# Patient Record
Sex: MALE | Race: WHITE | Employment: FULL TIME | URBAN - METROPOLITAN AREA
[De-identification: names, ages, dates, MRNs, and addresses within clinical notes are randomized per-mention and may not be internally consistent; named-entity substitution may affect disease eponyms.]

---

## 2021-10-11 ENCOUNTER — OFFICE VISIT (OUTPATIENT)
Dept: SURGERY | Age: 61
End: 2021-10-11
Payer: COMMERCIAL

## 2021-10-11 VITALS
RESPIRATION RATE: 18 BRPM | WEIGHT: 242 LBS | BODY MASS INDEX: 33.88 KG/M2 | OXYGEN SATURATION: 97 % | TEMPERATURE: 97.9 F | DIASTOLIC BLOOD PRESSURE: 82 MMHG | HEIGHT: 71 IN | SYSTOLIC BLOOD PRESSURE: 128 MMHG | HEART RATE: 94 BPM

## 2021-10-11 DIAGNOSIS — K64.2 GRADE III HEMORRHOIDS: Primary | ICD-10-CM

## 2021-10-11 PROCEDURE — 99203 OFFICE O/P NEW LOW 30 MIN: CPT | Performed by: COLON & RECTAL SURGERY

## 2021-10-11 PROCEDURE — 46600 DIAGNOSTIC ANOSCOPY SPX: CPT | Performed by: COLON & RECTAL SURGERY

## 2021-10-11 RX ORDER — METFORMIN HYDROCHLORIDE 500 MG/1
TABLET ORAL
COMMUNITY
Start: 2021-09-08

## 2021-10-11 RX ORDER — MELATONIN
1000 DAILY
COMMUNITY

## 2021-10-11 RX ORDER — DICLOFENAC SODIUM 75 MG/1
75 TABLET, DELAYED RELEASE ORAL 2 TIMES DAILY
COMMUNITY
Start: 2021-09-22

## 2021-10-11 RX ORDER — LISINOPRIL 40 MG/1
TABLET ORAL
COMMUNITY
Start: 2021-09-17

## 2021-10-11 RX ORDER — SIMVASTATIN 40 MG/1
TABLET, FILM COATED ORAL
COMMUNITY
Start: 2021-09-17

## 2021-10-11 RX ORDER — CYANOCOBALAMIN (VITAMIN B-12) 2500 MCG
5000 TABLET, SUBLINGUAL SUBLINGUAL
COMMUNITY

## 2021-10-11 RX ORDER — ACETAMINOPHEN 500 MG
TABLET ORAL
COMMUNITY

## 2021-10-11 RX ORDER — GABAPENTIN 300 MG/1
CAPSULE ORAL 3 TIMES DAILY
COMMUNITY
Start: 2021-04-14

## 2021-10-11 NOTE — PROGRESS NOTES
HPI: Neetu Morel is a 59 y.o. male presenting with chief complain of hemorrhoids. He has pain and burning. He denies blood per rectum. He denies a sensation of tissue protrusion. This is been going on for the last 6 weeks. He moves his bowels daily. He takes a stool softener twice per day. He denies fecal incontinence. He had a colonoscopy 7 to 8 years ago where polyps were identified. He he tells me he was asked to return but he did not. He has a colonoscopy scheduled with Dr. Rosalino Govea in December. Past Medical History:   Diagnosis Date    Back pain     Colon polyps     Diabetes ([address]     Hyperlipidemia     Hypertension     Pneumonia        Past Surgical History:   Procedure Laterality Date    HX COLONOSCOPY  2014    polyps    HX HEENT  1964    HX ORTHOPAEDIC  01/09/2017    right knee    HX ORTHOPAEDIC  1985    torn cartilage knee       History reviewed. No pertinent family history. Social History     Socioeconomic History    Marital status:      Spouse name: Not on file    Number of children: Not on file    Years of education: Not on file    Highest education level: Not on file   Tobacco Use    Smoking status: Current Every Day Smoker    Smokeless tobacco: Never Used   Substance and Sexual Activity    Alcohol use: Yes     Comment: rare    Drug use: Never     Social Determinants of Health     Financial Resource Strain:     Difficulty of Paying Living Expenses:    Food Insecurity:     Worried About Running Out of Food in the Last Year:     [address] in the Last Year:    Transportation Needs:     Lack of Transportation (Medical):      Lack of Transportation (Non-Medical):    Physical Activity:     Days of Exercise per Week:     Minutes of Exercise per Session:    Stress:     Feeling of Stress :    Social Connections:     Frequency of Communication with Friends and Family:     Frequency of Social Gatherings with Friends and Family:     Attends [employer] Services:     Active Member of Clubs or Organizations:     Attends Club or Organization Meetings:     Marital Status:        Review of Systems - Review of Systems   Constitutional: Negative. HENT: Negative. Eyes: Negative. Respiratory: Negative. Cardiovascular: Negative. Gastrointestinal: Negative. Genitourinary: Negative. Musculoskeletal: Positive for back pain. Negative for falls, joint pain, myalgias and neck pain. Skin: Positive for itching. Negative for rash. rectal   Neurological: Negative. Endo/Heme/Allergies: Negative for environmental allergies and polydipsia. Bruises/bleeds easily. Psychiatric/Behavioral: Negative. Outpatient Medications Marked as Taking for the 10/11/21 encounter (Office Visit) with Johnice Meigs, MD   Medication Sig Dispense Refill    metFORMIN (GLUCOPHAGE) 500 mg tablet TAKE 1 TABLET BY MOUTH TWICE DAILY WITH A MEAL      cholecalciferol (VITAMIN D3) (1000 Units /25 mcg) tablet Take 1,000 Units by mouth daily.  gabapentin (NEURONTIN) 300 mg capsule TAKE 1 CAPSULE BY MOUTH EVERY DAY AT BEDTIME      diclofenac EC (VOLTAREN) 75 mg EC tablet Take 75 mg by mouth two (2) times a day.  cyanocobalamin-cobamamide (B-12) 5,000-100 mcg sublingual tablet 5,000 mcg by SubLINGual route.  lisinopriL (PRINIVIL, ZESTRIL) 40 mg tablet TAKE 1 TABLET BY MOUTH ONCE DAILY FOR BLOOD PRESSURE FOR 90 DAYS      simvastatin (ZOCOR) 40 mg tablet TAKE 1 TABLET BY MOUTH ONCE DAILY IN THE EVENING FOR CHOLESTEROL FOR 90 DAYS      acetaminophen (Tylenol Extra Strength) 500 mg tablet Take  by mouth every six (6) hours as needed for Pain. No Known Allergies    Vitals:    10/11/21 1441   Resp: 18   Weight: 109.8 kg (242 lb)   Height: 5' 11\" (1.803 m)   PainSc:   0 - No pain       Physical Exam  Constitutional:       Appearance: He is well-developed. HENT:      Head: Normocephalic and atraumatic.    Eyes:      Conjunctiva/sclera: Conjunctivae normal.   Abdominal:      General: There is no distension. Palpations: Abdomen is soft. There is no mass. Tenderness: There is no abdominal tenderness. Musculoskeletal:         General: Normal range of motion. Lymphadenopathy:      Cervical: No cervical adenopathy. Skin:     General: Skin is warm and dry. Neurological:      Sensory: No sensory deficit. Psychiatric:         Speech: Speech normal.     Rectum: Minor perianal erythema, prolapsed internal hemorrhoid right anterior quadrant, enlarged external hemorrhoid right posterior quadrant  Digital rectal exam: Moderate tone, no mass  Anoscopy: Enlarged internal hemorrhoids in 3 quadrants    Assessment / Plan    Grade 3 hemorrhoids and perianal dermatitis  Fiber supplement daily, stops stool softener  Reviewed perianal hygiene, calmoseptine  Patient told to call regarding hemorrhoidectomy if symptoms persist  We need to work on timing with regards to spine surgery and colonoscopy in December    The diagnoses and plan were discussed with the patient. All questions answered. Plan of care agreed to by all concerned.

## 2021-10-11 NOTE — PATIENT INSTRUCTIONS
"Radha Morin  Your attached labs are overall normal, but your vitamin D is still low and your platelets are still low (but improving)  Please make sure you are taking Vitamin  D 5000 units daily and we will repeat Complete Blood Count in a few months.  Please contact the office with any questions or concerns.    Ambar Viera \"Timo\" ADDIE Mohamud    " Metamucil fiber powder once a day    Don't use wipes to clean with, only toilet paper    Do not over clean area, 1-2 swipes then done    After washing use barrier cream such as Desitin, A & D ointment or Calmoseptine    Call office if symptoms persist to schedule hemorrhoidectomy

## 2021-10-11 NOTE — LETTER
10/11/2021    Patient: Jaswant Johnston   YOB: 1960   Date of Visit: 10/11/2021     MD Jase [facility] [address] [place] [id number]  Via Fax: [phone]    Dear Adina Ann saw Ratna Jo in the office today for his hemorrhoids. On exam he has grade 3 hemorrhoids. I recommended a high-fiber diet and a fiber supplement. If his symptoms persist he is asked to call the office to discuss hemorrhoidectomy. I tell him there is a substantial recovery time from hemorrhoid surgery, so if he has other procedures such as colonoscopy and spine surgery he may wish to manage his hemorrhoids conservatively for the time being. If you have questions, please do not hesitate to call me. I look forward to following your patient along with you.       Sincerely,    Cammie Hernandez MD

## 2022-11-11 ENCOUNTER — HOSPITAL ENCOUNTER (OUTPATIENT)
Dept: LAB | Age: 62
Discharge: HOME | End: 2022-11-11

## 2022-11-11 LAB — SENTARA SPECIMEN COL,SENBCF: NORMAL

## 2022-11-11 PROCEDURE — 99001 SPECIMEN HANDLING PT-LAB: CPT

## 2022-11-16 ENCOUNTER — HOSPITAL ENCOUNTER (OUTPATIENT)
Dept: LAB | Age: 62
Discharge: HOME | End: 2022-11-16
Payer: COMMERCIAL

## 2022-11-16 LAB
ATRIAL RATE: 88 BPM
CALCULATED P AXIS, ECG09: 9 DEGREES
CALCULATED R AXIS, ECG10: 41 DEGREES
CALCULATED T AXIS, ECG11: 27 DEGREES
DIAGNOSIS, 93000: NORMAL
P-R INTERVAL, ECG05: 132 MS
Q-T INTERVAL, ECG07: 370 MS
QRS DURATION, ECG06: 122 MS
QTC CALCULATION (BEZET), ECG08: 447 MS
VENTRICULAR RATE, ECG03: 88 BPM

## 2022-11-16 PROCEDURE — 93005 ELECTROCARDIOGRAM TRACING: CPT

## 2022-11-18 ENCOUNTER — HOSPITAL ENCOUNTER (OUTPATIENT)
Dept: PREADMISSION TESTING | Age: 62
Discharge: HOME | End: 2022-11-18

## 2022-11-18 VITALS — HEIGHT: 71 IN | BODY MASS INDEX: 33.6 KG/M2 | WEIGHT: 240 LBS

## 2022-11-18 RX ORDER — ASPIRIN 81 MG/1
81 TABLET ORAL DAILY
Status: ON HOLD | COMMUNITY

## 2022-11-18 RX ORDER — CEFAZOLIN SODIUM/WATER 2 G/20 ML
2 SYRINGE (ML) INTRAVENOUS ONCE
Status: CANCELLED | OUTPATIENT
Start: 2022-12-07 | End: 2022-12-07

## 2022-11-18 RX ORDER — SODIUM CHLORIDE, SODIUM LACTATE, POTASSIUM CHLORIDE, CALCIUM CHLORIDE 600; 310; 30; 20 MG/100ML; MG/100ML; MG/100ML; MG/100ML
125 INJECTION, SOLUTION INTRAVENOUS CONTINUOUS
Status: CANCELLED | OUTPATIENT
Start: 2022-11-18

## 2022-12-06 NOTE — H&P
Pre-Admission History and Physical    Patient: Aggie Scott   MRN: [id number]   SSN: [id number]   YOB: 1960   Age: 57 y.o. Sex: male     Patient scheduled for: lumbar two. three, lumbar three/four, lumbar four/five, lumbar five/sacral one laminectomy fusion, transforaminal lumbar interbody fusion. Date of surgery: 2022. Surgeon: Thuy Bangura MD    HPI:  Aggie Scott is a 57 y.o. male with severe back, buttock and leg pain. He reports a pain level of 6/10. MRI demonstrates stenosis severe to moderately severe at L3/4, L4/5, L5/S1. This patient has failed the presurgical conservative treatments  including physical therapy, spinal block injections and medications. Pain has impacted the patient's functional ability. He is being admitted for surgical intervention. Past Medical History:   Diagnosis Date    Arthritis     Back pain     from previous back injury    Colon polyps     Diabetes ([facility]     9421    Hernia, umbilical     Hyperlipidemia     Hypertension     Pneumonia      Social History     Socioeconomic History    Marital status:    Tobacco Use    Smoking status: Former     Years: 5.00     Types: Cigarettes     Quit date: 2021     Years since quittin.9    Smokeless tobacco: Never    Tobacco comments:     3-4 cig per day, told not to smoke or drink 24/hrs prior to surgery   Vaping Use    Vaping Use: Never used   Substance and Sexual Activity    Alcohol use: Yes     Alcohol/week: 2.0 standard drinks     Types: 2 Cans of beer per week     Comment: 2 per month    Drug use: Never    Sexual activity: Never     Past Surgical History:   Procedure Laterality Date    HX COLONOSCOPY      polyps    HX HEENT      left lazy eye procedure during childhood    HX HEMORRHOIDECTOMY      HX HERNIA REPAIR          HX ORTHOPAEDIC  2017    right knee    HX ORTHOPAEDIC  1985    torn cartilage knee    HX TONSILLECTOMY  1964     No family history on file.   No Known Allergies  Current Outpatient Medications   Medication Sig Dispense Refill    aspirin delayed-release 81 mg tablet Take 81 mg by mouth daily. OTHER,NON-FORMULARY, Indications: maxforce prostate 1 tab twice daily      multivitamin (ONE A DAY) tablet Take 1 Tablet by mouth daily. metFORMIN (GLUCOPHAGE) 500 mg tablet TAKE 1 TABLET BY MOUTH TWICE DAILY WITH A MEAL      cholecalciferol (VITAMIN D3) (1000 Units /25 mcg) tablet Take 2,000 Units by mouth daily. gabapentin (NEURONTIN) 300 mg capsule 600 mg three (3) times daily. diclofenac EC (VOLTAREN) 75 mg EC tablet Take 75 mg by mouth two (2) times a day. lisinopriL (PRINIVIL, ZESTRIL) 40 mg tablet TAKE 1 TABLET BY MOUTH ONCE DAILY FOR BLOOD PRESSURE FOR 90 DAYS      simvastatin (ZOCOR) 40 mg tablet TAKE 1 TABLET BY MOUTH ONCE DAILY IN THE EVENING FOR CHOLESTEROL FOR 90 DAYS      acetaminophen (TYLENOL) 500 mg tablet Take  by mouth every six (6) hours as needed for Pain. ROS:  Denies chills, fever,night sweats,  bowel or bladder dysfunction, unexplained weight loss/weight gain, chest pain, sob or anxiety. Physical Examination    Gen: Well developed, well nourished 57 y.o. male in moderate distress. Diffuse sense of weakness in his lower extremities. Has good strength of his ehl, ta, hams and quads. Decreased range of motion of the lumbar spine. 2+ pulses. Soft abdomen. Assessment and Plan    Due to the pt's persistent symptoms unrelieved by conservative measure Dario Newton is being admitted to undergo surgical intervention. The post-operative plan of care consists of physical therapy, home health and a 2 week f/u office visit. The risks, benefits, complications and alternatives to surgery have been discussed in detail with the patient. The patient understands and agrees to proceed.

## 2022-12-07 ENCOUNTER — ANESTHESIA (OUTPATIENT)
Dept: SURGERY | Age: 62
DRG: 455 | End: 2022-12-07
Payer: COMMERCIAL

## 2022-12-07 ENCOUNTER — ANESTHESIA EVENT (OUTPATIENT)
Dept: SURGERY | Age: 62
DRG: 455 | End: 2022-12-07
Payer: COMMERCIAL

## 2022-12-07 ENCOUNTER — HOSPITAL ENCOUNTER (INPATIENT)
Age: 62
LOS: 3 days | Discharge: HOME | DRG: 455 | End: 2022-12-12
Attending: ORTHOPAEDIC SURGERY | Admitting: ORTHOPAEDIC SURGERY
Payer: COMMERCIAL

## 2022-12-07 ENCOUNTER — APPOINTMENT (OUTPATIENT)
Dept: GENERAL RADIOLOGY | Age: 62
DRG: 455 | End: 2022-12-07
Attending: ORTHOPAEDIC SURGERY
Payer: COMMERCIAL

## 2022-12-07 DIAGNOSIS — Z98.1 S/P LUMBAR FUSION: Primary | ICD-10-CM

## 2022-12-07 PROBLEM — M48.061 SPINAL STENOSIS OF LUMBAR REGION AT MULTIPLE LEVELS: Status: ACTIVE | Noted: 2022-12-07

## 2022-12-07 LAB
ABO + RH BLD: NORMAL
ANION GAP SERPL CALC-SCNC: 3 MMOL/L (ref 3–18)
BLOOD GROUP ANTIBODIES SERPL: NORMAL
BUN SERPL-MCNC: 24 MG/DL (ref 7–18)
BUN/CREAT SERPL: 17 (ref 12–20)
CALCIUM SERPL-MCNC: 8.3 MG/DL (ref 8.5–10.1)
CHLORIDE SERPL-SCNC: 107 MMOL/L (ref 100–111)
CO2 SERPL-SCNC: 28 MMOL/L (ref 21–32)
CREAT SERPL-MCNC: 1.39 MG/DL (ref 0.6–1.3)
ERYTHROCYTE [DISTWIDTH] IN BLOOD BY AUTOMATED COUNT: 12.1 % (ref 11.6–14.5)
GLUCOSE BLD STRIP.AUTO-MCNC: 149 MG/DL (ref 70–110)
GLUCOSE BLD STRIP.AUTO-MCNC: 159 MG/DL (ref 70–110)
GLUCOSE BLD STRIP.AUTO-MCNC: 179 MG/DL (ref 70–110)
GLUCOSE BLD STRIP.AUTO-MCNC: 229 MG/DL (ref 70–110)
GLUCOSE SERPL-MCNC: 144 MG/DL (ref 74–99)
HCT VFR BLD AUTO: 29 % (ref 36–48)
HCT VFR BLD AUTO: 30 % (ref 36–48)
HGB BLD-MCNC: 9.7 G/DL (ref 13–16)
HGB BLD-MCNC: 9.8 G/DL (ref 13–16)
MCH RBC QN AUTO: 32.6 PG (ref 24–34)
MCHC RBC AUTO-ENTMCNC: 32.3 G/DL (ref 31–37)
MCV RBC AUTO: 100.7 FL (ref 78–100)
NRBC # BLD: 0 K/UL (ref 0–0.01)
NRBC BLD-RTO: 0 PER 100 WBC
PLATELET # BLD AUTO: 210 K/UL (ref 135–420)
PMV BLD AUTO: 9.8 FL (ref 9.2–11.8)
POTASSIUM SERPL-SCNC: 4.6 MMOL/L (ref 3.5–5.5)
RBC # BLD AUTO: 2.98 M/UL (ref 4.35–5.65)
SODIUM SERPL-SCNC: 138 MMOL/L (ref 136–145)
SPECIMEN EXP DATE BLD: NORMAL
WBC # BLD AUTO: 11.5 K/UL (ref 4.6–13.2)

## 2022-12-07 PROCEDURE — 77030005513 HC CATH URETH FOL11 MDII -B: Performed by: ORTHOPAEDIC SURGERY

## 2022-12-07 PROCEDURE — C1821 INTERSPINOUS IMPLANT: HCPCS | Performed by: ORTHOPAEDIC SURGERY

## 2022-12-07 PROCEDURE — 76060000039 HC ANESTHESIA 4 TO 4.5 HR: Performed by: ORTHOPAEDIC SURGERY

## 2022-12-07 PROCEDURE — 74011000250 HC RX REV CODE- 250: Performed by: ORTHOPAEDIC SURGERY

## 2022-12-07 PROCEDURE — 80048 BASIC METABOLIC PNL TOTAL CA: CPT

## 2022-12-07 PROCEDURE — 0ST20ZZ RESECTION OF LUMBAR VERTEBRAL DISC, OPEN APPROACH: ICD-10-PCS | Performed by: ORTHOPAEDIC SURGERY

## 2022-12-07 PROCEDURE — 0SG30AJ FUSION OF LUMBOSACRAL JOINT WITH INTERBODY FUSION DEVICE, POSTERIOR APPROACH, ANTERIOR COLUMN, OPEN APPROACH: ICD-10-PCS | Performed by: ORTHOPAEDIC SURGERY

## 2022-12-07 PROCEDURE — 77030002933 HC SUT MCRYL J&J -A: Performed by: ORTHOPAEDIC SURGERY

## 2022-12-07 PROCEDURE — 74011000250 HC RX REV CODE- 250: Performed by: ANESTHESIOLOGY

## 2022-12-07 PROCEDURE — 77030004402 HC BUR NEUR STRY -C: Performed by: ORTHOPAEDIC SURGERY

## 2022-12-07 PROCEDURE — 74011250636 HC RX REV CODE- 250/636: Performed by: ANESTHESIOLOGY

## 2022-12-07 PROCEDURE — 76010000135 HC OR TIME 4 TO 4.5 HR: Performed by: ORTHOPAEDIC SURGERY

## 2022-12-07 PROCEDURE — 77030040361 HC SLV COMPR DVT MDII -B: Performed by: ORTHOPAEDIC SURGERY

## 2022-12-07 PROCEDURE — 74011250636 HC RX REV CODE- 250/636: Performed by: ORTHOPAEDIC SURGERY

## 2022-12-07 PROCEDURE — C1713 ANCHOR/SCREW BN/BN,TIS/BN: HCPCS | Performed by: ORTHOPAEDIC SURGERY

## 2022-12-07 PROCEDURE — 36415 COLL VENOUS BLD VENIPUNCTURE: CPT

## 2022-12-07 PROCEDURE — 74011636637 HC RX REV CODE- 636/637: Performed by: ANESTHESIOLOGY

## 2022-12-07 PROCEDURE — 77030027138 HC INCENT SPIROMETER -A: Performed by: ORTHOPAEDIC SURGERY

## 2022-12-07 PROCEDURE — 82962 GLUCOSE BLOOD TEST: CPT

## 2022-12-07 PROCEDURE — 77030031140 HC SUT VCRL3 J&J -A: Performed by: ORTHOPAEDIC SURGERY

## 2022-12-07 PROCEDURE — 76210000017 HC OR PH I REC 1.5 TO 2 HR: Performed by: ORTHOPAEDIC SURGERY

## 2022-12-07 PROCEDURE — 2709999900 HC NON-CHARGEABLE SUPPLY: Performed by: ORTHOPAEDIC SURGERY

## 2022-12-07 PROCEDURE — 86900 BLOOD TYPING SEROLOGIC ABO: CPT

## 2022-12-07 PROCEDURE — 85018 HEMOGLOBIN: CPT

## 2022-12-07 PROCEDURE — 85027 COMPLETE CBC AUTOMATED: CPT

## 2022-12-07 PROCEDURE — 01NR0ZZ RELEASE SACRAL NERVE, OPEN APPROACH: ICD-10-PCS | Performed by: ORTHOPAEDIC SURGERY

## 2022-12-07 PROCEDURE — 77030034479 HC ADH SKN CLSR PRINEO J&J -B: Performed by: ORTHOPAEDIC SURGERY

## 2022-12-07 PROCEDURE — 74011250637 HC RX REV CODE- 250/637: Performed by: ORTHOPAEDIC SURGERY

## 2022-12-07 PROCEDURE — 0SG10K1 FUSION OF 2 OR MORE LUMBAR VERTEBRAL JOINTS WITH NONAUTOLOGOUS TISSUE SUBSTITUTE, POSTERIOR APPROACH, POSTERIOR COLUMN, OPEN APPROACH: ICD-10-PCS | Performed by: ORTHOPAEDIC SURGERY

## 2022-12-07 PROCEDURE — 77030035236 HC SUT PDS STRATFX BARB J&J -B: Performed by: ORTHOPAEDIC SURGERY

## 2022-12-07 PROCEDURE — 0SG30K1 FUSION OF LUMBOSACRAL JOINT WITH NONAUTOLOGOUS TISSUE SUBSTITUTE, POSTERIOR APPROACH, POSTERIOR COLUMN, OPEN APPROACH: ICD-10-PCS | Performed by: ORTHOPAEDIC SURGERY

## 2022-12-07 PROCEDURE — 74011636637 HC RX REV CODE- 636/637: Performed by: ORTHOPAEDIC SURGERY

## 2022-12-07 PROCEDURE — 0SG10AJ FUSION OF 2 OR MORE LUMBAR VERTEBRAL JOINTS WITH INTERBODY FUSION DEVICE, POSTERIOR APPROACH, ANTERIOR COLUMN, OPEN APPROACH: ICD-10-PCS | Performed by: ORTHOPAEDIC SURGERY

## 2022-12-07 PROCEDURE — 01NB0ZZ RELEASE LUMBAR NERVE, OPEN APPROACH: ICD-10-PCS | Performed by: ORTHOPAEDIC SURGERY

## 2022-12-07 PROCEDURE — 77030034475 HC MISC IMPL SPN: Performed by: ORTHOPAEDIC SURGERY

## 2022-12-07 PROCEDURE — 0ST40ZZ RESECTION OF LUMBOSACRAL DISC, OPEN APPROACH: ICD-10-PCS | Performed by: ORTHOPAEDIC SURGERY

## 2022-12-07 PROCEDURE — 77030014007 HC SPNG HEMSTAT J&J -B: Performed by: ORTHOPAEDIC SURGERY

## 2022-12-07 PROCEDURE — 77030020782 HC GWN BAIR PAWS FLX 3M -B: Performed by: ORTHOPAEDIC SURGERY

## 2022-12-07 DEVICE — SPACER SPNL 7 DEG SM 28X12 MM STRL PROLIFT: Type: IMPLANTABLE DEVICE | Site: SPINE LUMBAR | Status: FUNCTIONAL

## 2022-12-07 DEVICE — I-FACTOR™ PUTTY, 5.0 CC SYRINGE
Type: IMPLANTABLE DEVICE | Site: SPINE LUMBAR | Status: FUNCTIONAL
Brand: I-FACTOR™ PEPTIDE ENHANCED BONE GRAFT

## 2022-12-07 DEVICE — IMPLANTABLE DEVICE: Type: IMPLANTABLE DEVICE | Site: SPINE LUMBAR | Status: FUNCTIONAL

## 2022-12-07 DEVICE — SCREW SET EVEREST: Type: IMPLANTABLE DEVICE | Site: SPINE LUMBAR | Status: FUNCTIONAL

## 2022-12-07 DEVICE — I-FACTOR PUTTY, 2.5CC SYRINGE
Type: IMPLANTABLE DEVICE | Site: SPINE LUMBAR | Status: FUNCTIONAL
Brand: I-FACTOR PEPTIDE ENHANCED BONE GRAFT

## 2022-12-07 DEVICE — SCREW SPNL L40MM DIA7.5MM PEDCL POLYAX 2 LD THRD TOP LD FOR: Type: IMPLANTABLE DEVICE | Site: SPINE LUMBAR | Status: FUNCTIONAL

## 2022-12-07 DEVICE — ALLOGRAFT BNE DBM 10 CC VESUVIUS 4104K0810DC: Type: IMPLANTABLE DEVICE | Site: SPINE LUMBAR | Status: FUNCTIONAL

## 2022-12-07 RX ORDER — PHENYLEPHRINE HCL IN 0.9% NACL 1 MG/10 ML
SYRINGE (ML) INTRAVENOUS AS NEEDED
Status: DISCONTINUED | OUTPATIENT
Start: 2022-12-07 | End: 2022-12-07 | Stop reason: HOSPADM

## 2022-12-07 RX ORDER — TAMSULOSIN HYDROCHLORIDE 0.4 MG/1
0.4 CAPSULE ORAL DAILY
Status: DISCONTINUED | OUTPATIENT
Start: 2022-12-07 | End: 2022-12-13 | Stop reason: HOSPADM

## 2022-12-07 RX ORDER — SODIUM CHLORIDE 0.9 % (FLUSH) 0.9 %
5-40 SYRINGE (ML) INJECTION AS NEEDED
Status: DISCONTINUED | OUTPATIENT
Start: 2022-12-07 | End: 2022-12-13 | Stop reason: HOSPADM

## 2022-12-07 RX ORDER — ACETAMINOPHEN 500 MG
1000 TABLET ORAL EVERY 6 HOURS
Status: DISCONTINUED | OUTPATIENT
Start: 2022-12-07 | End: 2022-12-13 | Stop reason: HOSPADM

## 2022-12-07 RX ORDER — ONDANSETRON 2 MG/ML
INJECTION INTRAMUSCULAR; INTRAVENOUS AS NEEDED
Status: DISCONTINUED | OUTPATIENT
Start: 2022-12-07 | End: 2022-12-07 | Stop reason: HOSPADM

## 2022-12-07 RX ORDER — HYDROMORPHONE HYDROCHLORIDE 1 MG/ML
0.5 INJECTION, SOLUTION INTRAMUSCULAR; INTRAVENOUS; SUBCUTANEOUS
Status: DISCONTINUED | OUTPATIENT
Start: 2022-12-07 | End: 2022-12-07 | Stop reason: HOSPADM

## 2022-12-07 RX ORDER — VANCOMYCIN HYDROCHLORIDE 1 G/20ML
INJECTION, POWDER, LYOPHILIZED, FOR SOLUTION INTRAVENOUS AS NEEDED
Status: DISCONTINUED | OUTPATIENT
Start: 2022-12-07 | End: 2022-12-07 | Stop reason: HOSPADM

## 2022-12-07 RX ORDER — SODIUM CHLORIDE 0.9 % (FLUSH) 0.9 %
5-40 SYRINGE (ML) INJECTION EVERY 8 HOURS
Status: DISCONTINUED | OUTPATIENT
Start: 2022-12-07 | End: 2022-12-13 | Stop reason: HOSPADM

## 2022-12-07 RX ORDER — INSULIN LISPRO 100 [IU]/ML
INJECTION, SOLUTION INTRAVENOUS; SUBCUTANEOUS
Status: DISCONTINUED | OUTPATIENT
Start: 2022-12-07 | End: 2022-12-13 | Stop reason: HOSPADM

## 2022-12-07 RX ORDER — FENTANYL CITRATE 50 UG/ML
25 INJECTION, SOLUTION INTRAMUSCULAR; INTRAVENOUS AS NEEDED
Status: DISCONTINUED | OUTPATIENT
Start: 2022-12-07 | End: 2022-12-07 | Stop reason: HOSPADM

## 2022-12-07 RX ORDER — TRANEXAMIC ACID 10 MG/ML
1 INJECTION, SOLUTION INTRAVENOUS ONCE
Status: COMPLETED | OUTPATIENT
Start: 2022-12-07 | End: 2022-12-07

## 2022-12-07 RX ORDER — CEFAZOLIN SODIUM/WATER 2 G/20 ML
2 SYRINGE (ML) INTRAVENOUS ONCE
Status: COMPLETED | OUTPATIENT
Start: 2022-12-07 | End: 2022-12-07

## 2022-12-07 RX ORDER — HYDROMORPHONE HYDROCHLORIDE 1 MG/ML
1 INJECTION, SOLUTION INTRAMUSCULAR; INTRAVENOUS; SUBCUTANEOUS
Status: DISCONTINUED | OUTPATIENT
Start: 2022-12-07 | End: 2022-12-08

## 2022-12-07 RX ORDER — NALOXONE HYDROCHLORIDE 0.4 MG/ML
0.1 INJECTION, SOLUTION INTRAMUSCULAR; INTRAVENOUS; SUBCUTANEOUS AS NEEDED
Status: DISCONTINUED | OUTPATIENT
Start: 2022-12-07 | End: 2022-12-07 | Stop reason: HOSPADM

## 2022-12-07 RX ORDER — SODIUM CHLORIDE, SODIUM LACTATE, POTASSIUM CHLORIDE, CALCIUM CHLORIDE 600; 310; 30; 20 MG/100ML; MG/100ML; MG/100ML; MG/100ML
150 INJECTION, SOLUTION INTRAVENOUS CONTINUOUS
Status: DISCONTINUED | OUTPATIENT
Start: 2022-12-07 | End: 2022-12-07 | Stop reason: HOSPADM

## 2022-12-07 RX ORDER — SODIUM CHLORIDE, SODIUM LACTATE, POTASSIUM CHLORIDE, CALCIUM CHLORIDE 600; 310; 30; 20 MG/100ML; MG/100ML; MG/100ML; MG/100ML
125 INJECTION, SOLUTION INTRAVENOUS CONTINUOUS
Status: DISCONTINUED | OUTPATIENT
Start: 2022-12-07 | End: 2022-12-07

## 2022-12-07 RX ORDER — NALOXONE HYDROCHLORIDE 0.4 MG/ML
0.4 INJECTION, SOLUTION INTRAMUSCULAR; INTRAVENOUS; SUBCUTANEOUS AS NEEDED
Status: DISCONTINUED | OUTPATIENT
Start: 2022-12-07 | End: 2022-12-13 | Stop reason: HOSPADM

## 2022-12-07 RX ORDER — DIAZEPAM 5 MG/1
5 TABLET ORAL
Status: DISCONTINUED | OUTPATIENT
Start: 2022-12-07 | End: 2022-12-13 | Stop reason: HOSPADM

## 2022-12-07 RX ORDER — DIPHENHYDRAMINE HYDROCHLORIDE 50 MG/ML
12.5 INJECTION, SOLUTION INTRAMUSCULAR; INTRAVENOUS
Status: DISCONTINUED | OUTPATIENT
Start: 2022-12-07 | End: 2022-12-13 | Stop reason: HOSPADM

## 2022-12-07 RX ORDER — PROCHLORPERAZINE EDISYLATE 5 MG/ML
5 INJECTION INTRAMUSCULAR; INTRAVENOUS ONCE
Status: DISCONTINUED | OUTPATIENT
Start: 2022-12-07 | End: 2022-12-07 | Stop reason: HOSPADM

## 2022-12-07 RX ORDER — OXYCODONE HYDROCHLORIDE 5 MG/1
5-10 TABLET ORAL
Status: DISCONTINUED | OUTPATIENT
Start: 2022-12-07 | End: 2022-12-13 | Stop reason: HOSPADM

## 2022-12-07 RX ORDER — ALBUTEROL SULFATE 0.83 MG/ML
2.5 SOLUTION RESPIRATORY (INHALATION)
Status: DISCONTINUED | OUTPATIENT
Start: 2022-12-07 | End: 2022-12-07 | Stop reason: HOSPADM

## 2022-12-07 RX ORDER — ESMOLOL HYDROCHLORIDE 10 MG/ML
INJECTION INTRAVENOUS AS NEEDED
Status: DISCONTINUED | OUTPATIENT
Start: 2022-12-07 | End: 2022-12-07 | Stop reason: HOSPADM

## 2022-12-07 RX ORDER — MAGNESIUM SULFATE 100 %
4 CRYSTALS MISCELLANEOUS AS NEEDED
Status: DISCONTINUED | OUTPATIENT
Start: 2022-12-07 | End: 2022-12-13 | Stop reason: HOSPADM

## 2022-12-07 RX ORDER — FAMOTIDINE 20 MG/1
40 TABLET, FILM COATED ORAL EVERY 12 HOURS
Status: DISCONTINUED | OUTPATIENT
Start: 2022-12-07 | End: 2022-12-13 | Stop reason: HOSPADM

## 2022-12-07 RX ORDER — LORAZEPAM 2 MG/ML
1 INJECTION INTRAMUSCULAR
Status: DISCONTINUED | OUTPATIENT
Start: 2022-12-07 | End: 2022-12-08

## 2022-12-07 RX ORDER — DOCUSATE SODIUM 100 MG/1
100 CAPSULE, LIQUID FILLED ORAL 2 TIMES DAILY
Status: DISCONTINUED | OUTPATIENT
Start: 2022-12-07 | End: 2022-12-13 | Stop reason: HOSPADM

## 2022-12-07 RX ORDER — INSULIN LISPRO 100 [IU]/ML
INJECTION, SOLUTION INTRAVENOUS; SUBCUTANEOUS ONCE
Status: COMPLETED | OUTPATIENT
Start: 2022-12-07 | End: 2022-12-07

## 2022-12-07 RX ORDER — BUPIVACAINE HYDROCHLORIDE 2.5 MG/ML
INJECTION, SOLUTION EPIDURAL; INFILTRATION; INTRACAUDAL AS NEEDED
Status: DISCONTINUED | OUTPATIENT
Start: 2022-12-07 | End: 2022-12-07 | Stop reason: HOSPADM

## 2022-12-07 RX ORDER — LIDOCAINE HYDROCHLORIDE 20 MG/ML
INJECTION, SOLUTION EPIDURAL; INFILTRATION; INTRACAUDAL; PERINEURAL AS NEEDED
Status: DISCONTINUED | OUTPATIENT
Start: 2022-12-07 | End: 2022-12-07 | Stop reason: HOSPADM

## 2022-12-07 RX ORDER — EPHEDRINE SULFATE/0.9% NACL/PF 50 MG/5 ML
SYRINGE (ML) INTRAVENOUS AS NEEDED
Status: DISCONTINUED | OUTPATIENT
Start: 2022-12-07 | End: 2022-12-07 | Stop reason: HOSPADM

## 2022-12-07 RX ORDER — LISINOPRIL 20 MG/1
40 TABLET ORAL DAILY
Status: DISCONTINUED | OUTPATIENT
Start: 2022-12-08 | End: 2022-12-13 | Stop reason: HOSPADM

## 2022-12-07 RX ORDER — ROCURONIUM BROMIDE 10 MG/ML
INJECTION, SOLUTION INTRAVENOUS AS NEEDED
Status: DISCONTINUED | OUTPATIENT
Start: 2022-12-07 | End: 2022-12-07 | Stop reason: HOSPADM

## 2022-12-07 RX ORDER — HYDROMORPHONE HYDROCHLORIDE 2 MG/ML
INJECTION, SOLUTION INTRAMUSCULAR; INTRAVENOUS; SUBCUTANEOUS AS NEEDED
Status: DISCONTINUED | OUTPATIENT
Start: 2022-12-07 | End: 2022-12-07 | Stop reason: HOSPADM

## 2022-12-07 RX ORDER — METOCLOPRAMIDE HYDROCHLORIDE 5 MG/ML
INJECTION INTRAMUSCULAR; INTRAVENOUS AS NEEDED
Status: DISCONTINUED | OUTPATIENT
Start: 2022-12-07 | End: 2022-12-07 | Stop reason: HOSPADM

## 2022-12-07 RX ORDER — SODIUM CHLORIDE 0.9 % (FLUSH) 0.9 %
5-40 SYRINGE (ML) INJECTION EVERY 8 HOURS
Status: DISCONTINUED | OUTPATIENT
Start: 2022-12-07 | End: 2022-12-07 | Stop reason: HOSPADM

## 2022-12-07 RX ORDER — ONDANSETRON 2 MG/ML
4 INJECTION INTRAMUSCULAR; INTRAVENOUS
Status: DISCONTINUED | OUTPATIENT
Start: 2022-12-07 | End: 2022-12-13 | Stop reason: HOSPADM

## 2022-12-07 RX ORDER — CEFAZOLIN SODIUM/WATER 2 G/20 ML
2 SYRINGE (ML) INTRAVENOUS EVERY 8 HOURS
Status: DISCONTINUED | OUTPATIENT
Start: 2022-12-07 | End: 2022-12-08

## 2022-12-07 RX ORDER — TRANEXAMIC ACID 10 MG/ML
1 INJECTION, SOLUTION INTRAVENOUS SEE ADMIN INSTRUCTIONS
Status: CANCELLED | OUTPATIENT
Start: 2022-12-07

## 2022-12-07 RX ORDER — GABAPENTIN 300 MG/1
600 CAPSULE ORAL 3 TIMES DAILY
Status: DISCONTINUED | OUTPATIENT
Start: 2022-12-07 | End: 2022-12-13 | Stop reason: HOSPADM

## 2022-12-07 RX ORDER — KETAMINE HYDROCHLORIDE 10 MG/ML
INJECTION, SOLUTION INTRAMUSCULAR; INTRAVENOUS AS NEEDED
Status: DISCONTINUED | OUTPATIENT
Start: 2022-12-07 | End: 2022-12-07 | Stop reason: HOSPADM

## 2022-12-07 RX ORDER — SODIUM CHLORIDE 0.9 % (FLUSH) 0.9 %
5-40 SYRINGE (ML) INJECTION AS NEEDED
Status: DISCONTINUED | OUTPATIENT
Start: 2022-12-07 | End: 2022-12-07 | Stop reason: HOSPADM

## 2022-12-07 RX ORDER — DIPHENHYDRAMINE HYDROCHLORIDE 50 MG/ML
12.5 INJECTION, SOLUTION INTRAMUSCULAR; INTRAVENOUS
Status: DISCONTINUED | OUTPATIENT
Start: 2022-12-07 | End: 2022-12-07 | Stop reason: HOSPADM

## 2022-12-07 RX ORDER — PROPOFOL 10 MG/ML
INJECTION, EMULSION INTRAVENOUS AS NEEDED
Status: DISCONTINUED | OUTPATIENT
Start: 2022-12-07 | End: 2022-12-07 | Stop reason: HOSPADM

## 2022-12-07 RX ADMIN — Medication 200 MCG: at 11:49

## 2022-12-07 RX ADMIN — ONDANSETRON HYDROCHLORIDE 4 MG: 2 INJECTION INTRAMUSCULAR; INTRAVENOUS at 10:27

## 2022-12-07 RX ADMIN — SODIUM CHLORIDE, SODIUM LACTATE, POTASSIUM CHLORIDE, AND CALCIUM CHLORIDE 150 ML/HR: 600; 310; 30; 20 INJECTION, SOLUTION INTRAVENOUS at 16:23

## 2022-12-07 RX ADMIN — KETAMINE HYDROCHLORIDE 25 MG: 10 INJECTION, SOLUTION INTRAMUSCULAR; INTRAVENOUS at 12:34

## 2022-12-07 RX ADMIN — HYDROMORPHONE HYDROCHLORIDE 0.5 MG: 2 INJECTION, SOLUTION INTRAMUSCULAR; INTRAVENOUS; SUBCUTANEOUS at 12:09

## 2022-12-07 RX ADMIN — HYDROMORPHONE HYDROCHLORIDE 0.5 MG: 2 INJECTION, SOLUTION INTRAMUSCULAR; INTRAVENOUS; SUBCUTANEOUS at 11:45

## 2022-12-07 RX ADMIN — TRANEXAMIC ACID 1 G: 10 INJECTION, SOLUTION INTRAVENOUS at 09:14

## 2022-12-07 RX ADMIN — DOCUSATE SODIUM 100 MG: 100 CAPSULE ORAL at 21:09

## 2022-12-07 RX ADMIN — Medication 100 MCG: at 08:51

## 2022-12-07 RX ADMIN — Medication 200 MCG: at 10:27

## 2022-12-07 RX ADMIN — Medication 200 MCG: at 12:25

## 2022-12-07 RX ADMIN — ESMOLOL HYDROCHLORIDE 40 MG: 10 INJECTION, SOLUTION INTRAVENOUS at 12:20

## 2022-12-07 RX ADMIN — HYDROMORPHONE HYDROCHLORIDE 0.5 MG: 1 INJECTION, SOLUTION INTRAMUSCULAR; INTRAVENOUS; SUBCUTANEOUS at 16:33

## 2022-12-07 RX ADMIN — TRANEXAMIC ACID 1 G: 10 INJECTION, SOLUTION INTRAVENOUS at 12:37

## 2022-12-07 RX ADMIN — SUGAMMADEX 200 MG: 100 INJECTION, SOLUTION INTRAVENOUS at 12:57

## 2022-12-07 RX ADMIN — PROPOFOL 80 MG: 10 INJECTION, EMULSION INTRAVENOUS at 08:46

## 2022-12-07 RX ADMIN — CEFAZOLIN 2 G: 10 INJECTION, POWDER, FOR SOLUTION INTRAVENOUS at 18:13

## 2022-12-07 RX ADMIN — Medication 10 MG: at 09:42

## 2022-12-07 RX ADMIN — METOCLOPRAMIDE 10 MG: 5 INJECTION, SOLUTION INTRAMUSCULAR; INTRAVENOUS at 10:27

## 2022-12-07 RX ADMIN — GABAPENTIN 600 MG: 300 CAPSULE ORAL at 18:13

## 2022-12-07 RX ADMIN — ROCURONIUM BROMIDE 10 MG: 10 INJECTION, SOLUTION INTRAVENOUS at 09:42

## 2022-12-07 RX ADMIN — HYDROMORPHONE HYDROCHLORIDE 1 MG: 2 INJECTION, SOLUTION INTRAMUSCULAR; INTRAVENOUS; SUBCUTANEOUS at 09:21

## 2022-12-07 RX ADMIN — SODIUM CHLORIDE, PRESERVATIVE FREE 10 ML: 5 INJECTION INTRAVENOUS at 21:09

## 2022-12-07 RX ADMIN — DIAZEPAM 5 MG: 5 TABLET ORAL at 19:27

## 2022-12-07 RX ADMIN — CEFAZOLIN 2 G: 10 INJECTION, POWDER, FOR SOLUTION INTRAVENOUS at 21:08

## 2022-12-07 RX ADMIN — FAMOTIDINE 40 MG: 20 TABLET, FILM COATED ORAL at 21:08

## 2022-12-07 RX ADMIN — SODIUM CHLORIDE, SODIUM LACTATE, POTASSIUM CHLORIDE, AND CALCIUM CHLORIDE: 600; 310; 30; 20 INJECTION, SOLUTION INTRAVENOUS at 09:33

## 2022-12-07 RX ADMIN — HYDROMORPHONE HYDROCHLORIDE 0.5 MG: 1 INJECTION, SOLUTION INTRAMUSCULAR; INTRAVENOUS; SUBCUTANEOUS at 14:14

## 2022-12-07 RX ADMIN — Medication 200 MCG: at 08:57

## 2022-12-07 RX ADMIN — KETAMINE HYDROCHLORIDE 25 MG: 10 INJECTION, SOLUTION INTRAMUSCULAR; INTRAVENOUS at 09:21

## 2022-12-07 RX ADMIN — INSULIN LISPRO 3 UNITS: 100 INJECTION, SOLUTION INTRAVENOUS; SUBCUTANEOUS at 14:48

## 2022-12-07 RX ADMIN — Medication 4 UNITS: at 23:11

## 2022-12-07 RX ADMIN — SODIUM CHLORIDE, SODIUM LACTATE, POTASSIUM CHLORIDE, AND CALCIUM CHLORIDE: 600; 310; 30; 20 INJECTION, SOLUTION INTRAVENOUS at 08:39

## 2022-12-07 RX ADMIN — ROCURONIUM BROMIDE 50 MG: 10 INJECTION, SOLUTION INTRAVENOUS at 08:46

## 2022-12-07 RX ADMIN — ACETAMINOPHEN 1000 MG: 500 TABLET ORAL at 18:13

## 2022-12-07 RX ADMIN — GABAPENTIN 600 MG: 300 CAPSULE ORAL at 21:09

## 2022-12-07 RX ADMIN — Medication 100 MCG: at 09:27

## 2022-12-07 RX ADMIN — Medication 200 MCG: at 12:09

## 2022-12-07 RX ADMIN — Medication 20 MG: at 09:27

## 2022-12-07 RX ADMIN — Medication 200 MCG: at 09:42

## 2022-12-07 RX ADMIN — Medication 20 MG: at 09:00

## 2022-12-07 RX ADMIN — HYDROMORPHONE HYDROCHLORIDE 0.5 MG: 1 INJECTION, SOLUTION INTRAMUSCULAR; INTRAVENOUS; SUBCUTANEOUS at 14:48

## 2022-12-07 RX ADMIN — SODIUM CHLORIDE, SODIUM LACTATE, POTASSIUM CHLORIDE, AND CALCIUM CHLORIDE 125 ML/HR: 600; 310; 30; 20 INJECTION, SOLUTION INTRAVENOUS at 08:00

## 2022-12-07 RX ADMIN — OXYCODONE 5 MG: 5 TABLET ORAL at 18:13

## 2022-12-07 RX ADMIN — Medication 2 G: at 09:00

## 2022-12-07 RX ADMIN — SODIUM CHLORIDE, SODIUM LACTATE, POTASSIUM CHLORIDE, AND CALCIUM CHLORIDE 500 ML: 600; 310; 30; 20 INJECTION, SOLUTION INTRAVENOUS at 15:55

## 2022-12-07 RX ADMIN — SODIUM CHLORIDE, PRESERVATIVE FREE 10 ML: 5 INJECTION INTRAVENOUS at 18:00

## 2022-12-07 RX ADMIN — TAMSULOSIN HYDROCHLORIDE 0.4 MG: 0.4 CAPSULE ORAL at 08:02

## 2022-12-07 RX ADMIN — OXYCODONE 10 MG: 5 TABLET ORAL at 22:15

## 2022-12-07 RX ADMIN — LIDOCAINE HYDROCHLORIDE 100 MG: 20 INJECTION, SOLUTION INTRAVENOUS at 08:46

## 2022-12-07 RX ADMIN — SODIUM CHLORIDE, SODIUM LACTATE, POTASSIUM CHLORIDE, AND CALCIUM CHLORIDE: 600; 310; 30; 20 INJECTION, SOLUTION INTRAVENOUS at 11:47

## 2022-12-07 NOTE — PERIOP NOTES
TRANSFER - OUT REPORT:    Verbal report given to [facility] RN (name) on Ashvin Gu  being transferred to [address] [place] [address] (unit) for routine post - op       Report consisted of patients Situation, Background, Assessment and   Recommendations(SBAR). Information from the following report(s) SBAR, Kardex, OR Summary, Procedure Summary, Intake/Output, and MAR was reviewed with the receiving nurse. Lines:   Peripheral IV 12/07/22 Left;Posterior Hand (Active)   Site Assessment Clean, dry, & intact 12/07/22 1405   Phlebitis Assessment 0 12/07/22 1405   Infiltration Assessment 0 12/07/22 1405   Dressing Status Clean, dry, & intact; New 12/07/22 1405   Dressing Type Tape;Transparent 12/07/22 1405   Hub Color/Line Status Pink;Patent; Infusing 12/07/22 1405        Opportunity for questions and clarification was provided.       Patient transported with:   Registered Nurse  Mary Alexander

## 2022-12-07 NOTE — PERIOP NOTES
Spoke with Dr. Drew Scherer in regards to no resulting lab results yet. Given the okay to transport pt to the floor. Will notify floor nurse to call Dr. Drew Scherer with lab results.

## 2022-12-07 NOTE — PERIOP NOTES
Reviewed PTA medication list with patient/caregiver and patient/caregiver denies any additional medications. Patient admits to having a responsible adult care for them at home for at least 24 hours after surgery. Patient encouraged to use gown warming system and informed that using said warming gown to regulate body temperature prior to a procedure has been shown to help reduce the risks of blood clots and infection. Patient's pharmacy of choice verified and documented in PTA medication section. Dual skin assessment & fall risk band verification completed with debi BLOOD RN.

## 2022-12-07 NOTE — PERIOP NOTES
Dr. Hugo Almonte notified of pt being hypotensive and tachycardiac. Requests H&H and BMP to be drawn. Phlebotomist notified.

## 2022-12-07 NOTE — INTERVAL H&P NOTE
Update History & Physical    The Patient's History and Physical of December 6, 2022 was reviewed with the patient and I examined the patient. There was no change. The surgical site was confirmed by the patient and me. Plan:  The risk, benefits, expected outcome, and alternative to the recommended procedure have been discussed with the patient. Patient understands and wants to proceed with the procedure.     Electronically signed by Eden Jose MD on 12/7/2022 at 6:59 AM

## 2022-12-07 NOTE — PERIOP NOTES
TRANSFER - IN REPORT:    Verbal report received from CRNA AND RN (name) on Karen Weber  being received from OR (unit) for routine post - op      Report consisted of patients Situation, Background, Assessment and   Recommendations(SBAR). Information from the following report(s) SBAR, Kardex, OR Summary, Procedure Summary, Intake/Output, and MAR was reviewed with the receiving nurse. Opportunity for questions and clarification was provided. Assessment completed upon patients arrival to unit and care assumed.

## 2022-12-07 NOTE — ANESTHESIA POSTPROCEDURE EVALUATION
Procedure(s):  LUMBAR TWO/THREE, LUMBAR THREE/FOUR, LUMBAR FOUR/FIVE, LUMBAR FIVE/SACRAL ONE LAMINECTOMY FUSION, TRANSFORAMINAL LUMBAR INTERBODY FUSION WITH C-ARM. general    Anesthesia Post Evaluation      Multimodal analgesia: multimodal analgesia used between 6 hours prior to anesthesia start to PACU discharge  Patient location during evaluation: PACU  Patient participation: complete - patient participated  Level of consciousness: awake and alert  Airway patency: patent  Anesthetic complications: no  Cardiovascular status: acceptable  Respiratory status: acceptable  Hydration status: acceptable  Post anesthesia nausea and vomiting:  none  Final Post Anesthesia Temperature Assessment:  Normothermia (36.0-37.5 degrees C)      INITIAL Post-op Vital signs:   Vitals Value Taken Time   BP 91/60 12/07/22 1517   Temp     Pulse 109 12/07/22 1518   Resp 11 12/07/22 1518   SpO2 97 % 12/07/22 1518   Vitals shown include unvalidated device data.

## 2022-12-07 NOTE — PERIOP NOTES
Anesthesia paged for sign out at this time. Patient meets criteria for transfer to next phase of recovery. Due to the recent COVID outbreak this visit has been converted to a virtual telephone visit as agreed upon by the patient.  I have verified I am speaking to the patient and they are at home.      [facility] [place]  [address], [place] [address]  P [phone]  F [phone]    PATIENT:  Adonis Garcia   : 1955  Referring physician:Darian Patel MD  CHIEF COMPLAINT:   Chief Complaint   Patient presents with   • Follow-up     medications       HISTORY OF PRESENT ILLNESS:  Adonis is a 65 year old male patient presenting for a follow-up visit was seen on 2018 in the office.  His stress test and echo were fine and his shortness of breath was treated by pulmonologist Dr. Mcfadden and is improved.      The patient has been doing well from cardiac perspective.  No chest pain, shortness of breath, orthopnea or PND.  No dizziness or syncope.  No palpitations.  No lower extremity claudication.  The patient is active and exercises by walking 4 miles without any significant symptoms at this time.  Lower extremity claudication is improved.  Medication list reviewed, patient is compliant without side effects.   His blood pressure however is somewhat labile at home.  Today it was 150/82 as he has run out of some of his medications.  At times it runs in 100s//60s and he feels somewhat weak.  He denies any contact with any COVID patient.  He denies any cough cold fever nausea vomiting diarrhea.  PAST MEDICAL HISTORY:    Past Medical History:   Diagnosis Date   • Claudication (CMS/[facility])     abnml CESAR 10/18   • COPD (chronic obstructive pulmonary disease) (CMS/[facility]) 2020   • Essential (primary) hypertension    • Essential hypertension 2020   • Hyperlipidemia    • Mixed hyperlipidemia 2020       REVIEW OF SYSTEMS:    Constitutional: Negative.    HENT: Negative.    Eyes: Negative.    Respiratory: Negative.    Cardiovascular:        SEE HPI   Endocrine: Negative.    Genitourinary:  Negative.    Skin: Negative.    Allergic/Immunologic: Negative.    Neurological: Negative.    Hematological: Negative.    Psychiatric/Behavioral: Negative.      ALLERGIES:    ALLERGIES:   Allergen Reactions   • Penicillin G Sodium SHORTNESS OF BREATH   • Penicillins ANAPHYLAXIS     Unknown       MEDICATIONS:     Current Outpatient Medications   Medication Sig Dispense Refill   • fluticasone (FLONASE) 50 MCG/ACT nasal spray USE 1 SPRAY IN EACH NOSTRIL DAILY 32 g 1   • terazosin (HYTRIN) 2 MG capsule TAKE 1 CAPSULE BY MOUTH  NIGHTLY 90 capsule 1   • atorvastatin (LIPITOR) 80 MG tablet TAKE 1 TABLET BY MOUTH  DAILY 60 tablet 0   • BREO ELLIPTA 100-25 MCG/INH inhaler 1 puff daily.      • montelukast (SINGULAIR) 10 MG tablet nightly.      • amlodipine-valsartan (EXFORGE)  MG per tablet Take 1 tablet by mouth daily. 90 tablet 3   • hydrochlorothiazide (HYDRODIURIL) 12.5 MG tablet Take 12.5 mg by mouth 2 times daily.     • triamcinolone (ARISTOCORT) 0.1 % cream APPLY TO SKIN IN THE  MORNING (Patient taking differently: as needed. ) 240 g 0   • carvedilol (COREG) 12.5 MG tablet Take 1 tablet by mouth 2 times daily (with meals). 30 tablet 0   • hydrochlorothiazide (HYDRODIURIL) 25 MG tablet TAKE 1 TABLET BY MOUTH  DAILY 90 tablet 1   • triamcinolone (ARISTOCORT) 0.1 % cream APPLY TO AFFECTED AREA(S)  TOPICALLY TWO TIMES DAILY  TO HANDS 30 g 1   • carvedilol (COREG) 12.5 MG tablet TAKE 1 TABLET BY MOUTH  TWICE A DAY AS DIRECTED 120 tablet 0   • amLODIPine (NORVASC) 10 MG tablet Take one tablet by mouth daily 90 tablet 3   • lisinopril (PRINIVIL,ZESTRIL) 40 MG tablet TAKE 1 TABLET BY MOUTH ONCE DAILY 90 tablet 1   • fluticasone (FLOVENT HFA) 110 MCG/ACT inhaler Inhale 1 puff into the lungs 2 times daily. 12 g 12   • sildenafil (VIAGRA) 50 MG tablet Take 1 tablet by mouth as needed for Erectile Dysfunction. 10 tablet 3   • fluticasone (FLONASE) 50 MCG/ACT nasal spray INSTILL 2 SQUIRTS eACH NOSTRIL ONCE DAILY 16 g 11   •  levocetirizine (XYZAL) 5 MG tablet Take 1 tablet by mouth every evening. 30 tablet 11   • albuterol 108 (90 Base) MCG/ACT inhaler Inhale 2 puffs into the lungs every 4 hours as needed for Shortness of Breath or Wheezing. 1 Inhaler 11     No current facility-administered medications for this visit.        SOCIAL HISTORY:    Social History     Tobacco Use   • Smoking status: Former Smoker   • Smokeless tobacco: Never Used   • Tobacco comment: quit 35 years ago   Substance Use Topics   • Alcohol use: Yes     Alcohol/week: 1.0 standard drinks     Types: 1 Cans of beer per week     Comment: once a week   • Drug use: No         FAMILY HISTORY:    Family History   Problem Relation Age of Onset   • Stroke Mother    • Cancer, Liver Mother    • Myocardial Infarction Father        PHYSICAL EXAMINATION:  Exam  deferred as telephone visit due to COVID19.      personally reviewed and interpreted recent laboratory values in the chart.     CARDIAC TESTING/IMAGING  I have reviewed the pertinent imaging study reports. These are the pertinent findings:  · ECG performed and personally reviewed today -   · Labs - 1/17/2020: Cholesterol 140, HDL 48, LDL 76, Cr 0.95, Hgb 15.7.  Potassium 4.6  · TTE -10/2018 at [facility] unremarkable  · Stress Test -10/2018 within normal limits  · US AAA 1/23/2020:  There are atherosclerotic changes diffusely in the abdominal aorta. Abdominal aorta does not show any evidence of dilatation or focal aneurysm. Visualized portions of both common iliac arteries also do not show any dilatation.      ASSESSMENT/PLAN    1. Essential hypertension    2. Chronic obstructive pulmonary disease, unspecified COPD type (CMS/HCC)    3. Mixed hyperlipidemia      Hypertension  Somewhat labile according to patient's description.  We will refill and continue the same medications at this time.  I have advised him to keep a log and bring it in 4 weeks to see Elizabeth Chacon who would then adjust medications further.  His BMP was  unremarkable in January 2020  Shortness of breath   Has improved significantly with pulmonary treatment and was most likely due to COPD in this lifelong smoker.  His cardiac testing with stress test and echo were unremarkable  COPD  Followed by PCP and pulmonary Dr. Mcfadden    Peripheral arterial disease   lower extremity claudication bilaterally has improved significantly.  He walks 4 miles without any problem.  Abnormal CESAR left was 0.91 at rest and 0.84 post exercise in the right was 0.8-8 resting 0.7 post exercise.  He was started on aspirin and statin and was advised to enroll in structured exercise program at cardiac rehab.  Currently he is walking outside and would join cardiac rehab for structured exercise program and the pandemic is over  HLD  Continue statin.  Tolerating well.  Diet and exercise discussed    Others  Charts from hospital admissions were reviewed in detail with him.  All questions answered.  Plan of care discussed with the patient.  All questions were answered.  Patient verbalized understanding and agrees with the plan.  Primary care issues are being followed by the primary care physician.A letter has been sent to the referring physician    Due to the recent coronavirus pandemic we are limiting office visits.  The patient identity has been established and this visit has been converted to a virtual telephone visit as agreed upon by the patient.  I have verified I am speaking to the patient and they are at home.  During their visit, the patient was informed that their consent to treat includes permission to submit claims to their insurance on their behalf for the services received.  Without the patient being seen and evaluated in person, there is a risk that the information and/or assessment may be incomplete or inaccurate    I spent 26 minutes on the virtual encounter with > 50% spent counseling the patient and coordination of care.    No follow-ups on file.    On 6/2/2020, Alex REID  scribed the services personally performed by Mulu Eaton MD    I have reviewed and revised the note above. The documentation recorded by the scribe accurately reflects history obtained, actions preformed and decisions made by me.     Mulu Eaton MD

## 2022-12-07 NOTE — PROGRESS NOTES
1736  TRANSFER - IN REPORT:    Verbal report received from Johnathan Severino RN(name) on Lashonda Sahu  being received from PACU(unit) for routine post - op      Report consisted of patients Situation, Background, Assessment and   Recommendations(SBAR). Information from the following report(s) SBAR, Kardex, OR Summary, Procedure Summary, Intake/Output, MAR, and Recent Results was reviewed with the receiving nurse. Opportunity for questions and clarification was provided. Assessment completed upon patients arrival to unit and care assumed. [facility] care of patient. Assessment complete at this time. Pt alert and oriented x 4. Lungs clear bilaterally on room air. Patient denies shortness of breath/chest pain. No numbness and tingling in all extremities. 20 G IV to L. Hand dressing clean, dry, & intact. Stated pain 6/10. SCD's/ Tamir hose in place. Honeycomb dressing to back that is clean, dry, & intact. Elkin joseph drain in place. Luu catheter in place. Incentive spirometer at bedside. Patient taught how to use breathing tool w/ proper demonstration. Instructed to use 10x Q1. Verbalized understanding. Last BM 12/7/2022. Patient oriented to room and call bell. Call bell within reach. Bed in lowest position. 1813  PRN 5 mg Oral Oxycodone given for 6/10 pain to the back. 1927  PRN 5 mg Oral Valium given for muscle spasms     Verbal and Bedside change of shift report given to [address] by Bo Moreno RN. Opportunity for questions were provided.

## 2022-12-07 NOTE — ANESTHESIA PREPROCEDURE EVALUATION
Relevant Problems   No relevant active problems       Anesthetic History   No history of anesthetic complications            Review of Systems / Medical History  Patient summary reviewed, nursing notes reviewed and pertinent labs reviewed    Pulmonary              Pertinent negatives: No sleep apnea and smoker  Comments: Smokes 4 cigarettes per day, none for 10 days   Neuro/Psych   Within defined limits           Cardiovascular    Hypertension: well controlled              Exercise tolerance: >4 METS     GI/Hepatic/Renal             Pertinent negatives: No hiatal hernia and GERD   Endo/Other    Diabetes: poorly controlled, type 2    Arthritis     Other Findings              Physical Exam    Airway  Mallampati: II  TM Distance: > 6 cm  Neck ROM: normal range of motion   Mouth opening: Normal     Cardiovascular    Rhythm: regular  Rate: normal         Dental  No notable dental hx       Pulmonary  Breath sounds clear to auscultation               Abdominal  GI exam deferred       Other Findings            Anesthetic Plan    ASA: 3  Anesthesia type: general          Induction: Intravenous  Anesthetic plan and risks discussed with: Patient

## 2022-12-07 NOTE — PERIOP NOTES
DR. Radha Nava ALERTED OF LOW BP. IS OK AS LONG AS HE IS NOT SYMPTOMATIC. PT PRESENTS WITH NO SYMPTOMS.

## 2022-12-07 NOTE — BRIEF OP NOTE
Brief Postoperative Note    Patient: Aggie Scott  YOB: 1960  MRN: [id number]    Date of Procedure: 12/7/2022     Pre-Op Diagnosis: SPINAL STENOSIS SPONDYLOLISTHESIS,SCOLIOSIS    Post-Op Diagnosis: Same as preoperative diagnosis. Procedure(s):   LUMBAR THREE/FOUR, LUMBAR FOUR/FIVE, LUMBAR FIVE/SACRAL ONE LAMINECTOMY FUSION, TRANSFORAMINAL LUMBAR INTERBODY FUSION WITH C-ARM    Surgeon(s):  Carlos Klein MD    Surgical Assistant: Physician Assistant: Tess Mathur PA-C  Surg Asst-1: Lisette Madden    Anesthesia: General     Estimated Blood Loss (mL): 149     Complications: None    Specimens: * No specimens in log *     Implants:   Implant Name Type Inv. Item Serial No.  Lot No. LRB No. Used Action   ALLOGRAFT BNE DBM 10 CC VESUVIUS [id number] - [id number]  ALLOGRAFT BNE DBM 10 CC VESUVIUS [id number] [employer] HOW_WD 000 N/A 1 Implanted   ALLOGRAFT BNE DBM 10 CC VESUVIUS [id number] - [id number]  ALLOGRAFT BNE DBM 10 CC VESUVIUS [id number] [employer] HOW_WD 000 N/A 1 Implanted   PUTTY BONE GRAFT FACTOR 2.5ML PEPTIDE ENHANCED W/SYRINGE - [id number]  PUTTY BONE GRAFT FACTOR 2.5ML PEPTIDE ENHANCED W/SYRINGE  [employer]WD [id number] N/A 1 Implanted   Z DUP USE [id number] SCREW SPNL L45MM DIA6. 5MM PEDCL POLYAX 2 LD THRD TOP LD FOR - [id number]  Z DUP USE [id number] SCREW SPNL L45MM DIA6. 5MM PEDCL POLYAX 2 LD THRD TOP LD FOR  K[employer]_WD 0 N/A 6 Implanted   SCREW SPNL L40MM DIA7. 5MM PEDCL POLYAX 2 LD THRD TOP LD FOR - [id number]  SCREW SPNL L40MM DIA7. 5MM PEDCL POLYAX 2 LD THRD TOP LD FOR  [employer]_WD 0 N/A 2 Implanted   PUTTY BONE GRAFT FACTOR 5ML PEPTIDE ENHANCED W/SYRINGE - [id number]  PUTTY BONE GRAFT FACTOR 5ML PEPTIDE ENHANCED W/SYRINGE  [employer]WD [id number] N/A 1 Implanted   SPACER SPNL 7 DEG SM 28X12 MM STRL PROLIFT - [id number]  SPACER SPNL 7 DEG SM 28X12 MM STRL PROLIFT  [employer] HOW_WD [id number] N/A 3 Implanted DEEPIKA SPNL L95MM DIA5. 5MM PEDCL LUM TI CNTOUR JENNIFER - [id number]  DEEPIKA SPNL L95MM DIA5. 5MM PEDCL LUM TI RENÉ RODRIGUEZ  [employer]WD 0 N/A 2 Implanted   SCREW SET EVEREST - [id number]  SCREW SET EVEREST  [employer] HOWM_WD 0 N/A 8 Implanted       Drains:   Elkin-Julian Drain 12/07/22 Left Back (Active)       Findings: severe stenosis,     Electronically Signed by Bianka Wylie MD on 12/7/2022 at 12:36 PM

## 2022-12-08 LAB
ERYTHROCYTE [DISTWIDTH] IN BLOOD BY AUTOMATED COUNT: 12.2 % (ref 11.6–14.5)
GLUCOSE BLD STRIP.AUTO-MCNC: 144 MG/DL (ref 70–110)
GLUCOSE BLD STRIP.AUTO-MCNC: 163 MG/DL (ref 70–110)
GLUCOSE BLD STRIP.AUTO-MCNC: 181 MG/DL (ref 70–110)
GLUCOSE BLD STRIP.AUTO-MCNC: 193 MG/DL (ref 70–110)
HCT VFR BLD AUTO: 27.3 % (ref 36–48)
HGB BLD-MCNC: 8.9 G/DL (ref 13–16)
MCH RBC QN AUTO: 33 PG (ref 24–34)
MCHC RBC AUTO-ENTMCNC: 32.6 G/DL (ref 31–37)
MCV RBC AUTO: 101.1 FL (ref 78–100)
NRBC # BLD: 0 K/UL (ref 0–0.01)
NRBC BLD-RTO: 0 PER 100 WBC
PLATELET # BLD AUTO: 169 K/UL (ref 135–420)
PMV BLD AUTO: 9.7 FL (ref 9.2–11.8)
RBC # BLD AUTO: 2.7 M/UL (ref 4.35–5.65)
WBC # BLD AUTO: 6.7 K/UL (ref 4.6–13.2)

## 2022-12-08 PROCEDURE — 97530 THERAPEUTIC ACTIVITIES: CPT

## 2022-12-08 PROCEDURE — 74011636637 HC RX REV CODE- 636/637: Performed by: ORTHOPAEDIC SURGERY

## 2022-12-08 PROCEDURE — 97162 PT EVAL MOD COMPLEX 30 MIN: CPT

## 2022-12-08 PROCEDURE — 97535 SELF CARE MNGMENT TRAINING: CPT

## 2022-12-08 PROCEDURE — 74011000250 HC RX REV CODE- 250: Performed by: ORTHOPAEDIC SURGERY

## 2022-12-08 PROCEDURE — 82962 GLUCOSE BLOOD TEST: CPT

## 2022-12-08 PROCEDURE — 74011250637 HC RX REV CODE- 250/637: Performed by: ORTHOPAEDIC SURGERY

## 2022-12-08 PROCEDURE — 85027 COMPLETE CBC AUTOMATED: CPT

## 2022-12-08 PROCEDURE — 97166 OT EVAL MOD COMPLEX 45 MIN: CPT

## 2022-12-08 PROCEDURE — 74011250636 HC RX REV CODE- 250/636: Performed by: ORTHOPAEDIC SURGERY

## 2022-12-08 PROCEDURE — 36415 COLL VENOUS BLD VENIPUNCTURE: CPT

## 2022-12-08 PROCEDURE — 97116 GAIT TRAINING THERAPY: CPT

## 2022-12-08 RX ORDER — OXYCODONE HYDROCHLORIDE 5 MG/1
5 TABLET ORAL
Qty: 28 TABLET | Refills: 0 | Status: SHIPPED | OUTPATIENT
Start: 2022-12-08 | End: 2022-12-12

## 2022-12-08 RX ORDER — TRANEXAMIC ACID 10 MG/ML
1 INJECTION, SOLUTION INTRAVENOUS ONCE
Status: ACTIVE | OUTPATIENT
Start: 2022-12-08 | End: 2022-12-08

## 2022-12-08 RX ORDER — HYDROMORPHONE HYDROCHLORIDE 4 MG/1
4 TABLET ORAL
Status: DISCONTINUED | OUTPATIENT
Start: 2022-12-08 | End: 2022-12-13 | Stop reason: HOSPADM

## 2022-12-08 RX ADMIN — HYDROMORPHONE HYDROCHLORIDE 1 MG: 1 INJECTION, SOLUTION INTRAMUSCULAR; INTRAVENOUS; SUBCUTANEOUS at 05:41

## 2022-12-08 RX ADMIN — HYDROMORPHONE HYDROCHLORIDE 1 MG: 1 INJECTION, SOLUTION INTRAMUSCULAR; INTRAVENOUS; SUBCUTANEOUS at 10:28

## 2022-12-08 RX ADMIN — Medication 2 UNITS: at 12:22

## 2022-12-08 RX ADMIN — DOCUSATE SODIUM 100 MG: 100 CAPSULE ORAL at 09:19

## 2022-12-08 RX ADMIN — ACETAMINOPHEN 1000 MG: 500 TABLET ORAL at 12:21

## 2022-12-08 RX ADMIN — HYDROMORPHONE HYDROCHLORIDE 4 MG: 4 TABLET ORAL at 18:38

## 2022-12-08 RX ADMIN — OXYCODONE 10 MG: 5 TABLET ORAL at 08:34

## 2022-12-08 RX ADMIN — SODIUM CHLORIDE, PRESERVATIVE FREE 10 ML: 5 INJECTION INTRAVENOUS at 21:59

## 2022-12-08 RX ADMIN — OXYCODONE 10 MG: 5 TABLET ORAL at 16:42

## 2022-12-08 RX ADMIN — DOCUSATE SODIUM 100 MG: 100 CAPSULE ORAL at 20:26

## 2022-12-08 RX ADMIN — GABAPENTIN 600 MG: 300 CAPSULE ORAL at 21:59

## 2022-12-08 RX ADMIN — LISINOPRIL 40 MG: 20 TABLET ORAL at 09:19

## 2022-12-08 RX ADMIN — HYDROMORPHONE HYDROCHLORIDE 1 MG: 1 INJECTION, SOLUTION INTRAMUSCULAR; INTRAVENOUS; SUBCUTANEOUS at 00:57

## 2022-12-08 RX ADMIN — GABAPENTIN 600 MG: 300 CAPSULE ORAL at 16:42

## 2022-12-08 RX ADMIN — ACETAMINOPHEN 1000 MG: 500 TABLET ORAL at 18:38

## 2022-12-08 RX ADMIN — SODIUM CHLORIDE, PRESERVATIVE FREE 10 ML: 5 INJECTION INTRAVENOUS at 05:41

## 2022-12-08 RX ADMIN — OXYCODONE 10 MG: 5 TABLET ORAL at 04:23

## 2022-12-08 RX ADMIN — ACETAMINOPHEN 1000 MG: 500 TABLET ORAL at 00:58

## 2022-12-08 RX ADMIN — FAMOTIDINE 40 MG: 20 TABLET, FILM COATED ORAL at 09:19

## 2022-12-08 RX ADMIN — CEFAZOLIN 2 G: 10 INJECTION, POWDER, FOR SOLUTION INTRAVENOUS at 14:49

## 2022-12-08 RX ADMIN — GABAPENTIN 600 MG: 300 CAPSULE ORAL at 09:19

## 2022-12-08 RX ADMIN — FAMOTIDINE 40 MG: 20 TABLET, FILM COATED ORAL at 20:26

## 2022-12-08 RX ADMIN — OXYCODONE 10 MG: 5 TABLET ORAL at 20:26

## 2022-12-08 RX ADMIN — OXYCODONE 10 MG: 5 TABLET ORAL at 12:21

## 2022-12-08 RX ADMIN — HYDROMORPHONE HYDROCHLORIDE 1 MG: 1 INJECTION, SOLUTION INTRAMUSCULAR; INTRAVENOUS; SUBCUTANEOUS at 14:49

## 2022-12-08 RX ADMIN — DIAZEPAM 5 MG: 5 TABLET ORAL at 20:26

## 2022-12-08 RX ADMIN — Medication 2 UNITS: at 16:42

## 2022-12-08 RX ADMIN — DIAZEPAM 5 MG: 5 TABLET ORAL at 13:39

## 2022-12-08 RX ADMIN — ACETAMINOPHEN 1000 MG: 500 TABLET ORAL at 05:41

## 2022-12-08 RX ADMIN — CEFAZOLIN 2 G: 10 INJECTION, POWDER, FOR SOLUTION INTRAVENOUS at 05:41

## 2022-12-08 RX ADMIN — DIAZEPAM 5 MG: 5 TABLET ORAL at 04:23

## 2022-12-08 RX ADMIN — SODIUM CHLORIDE, PRESERVATIVE FREE 5 ML: 5 INJECTION INTRAVENOUS at 14:59

## 2022-12-08 RX ADMIN — TAMSULOSIN HYDROCHLORIDE 0.4 MG: 0.4 CAPSULE ORAL at 09:19

## 2022-12-08 RX ADMIN — Medication 2 UNITS: at 22:02

## 2022-12-08 NOTE — PROGRESS NOTES
Oral and Written notification given to patient and/or caregiver informing them that they are currently an Outpatient receiving care in our facility. Outpatient services include Observation Services under [address] and [facility] requirements.

## 2022-12-08 NOTE — ROUTINE PROCESS
Bedside and Verbal shift change report given to Antoine Zapata RN (oncoming nurse) by Ria Favre, RN (offgoing nurse). Report included the following information SBAR, Kardex, ED Summary, OR Summary, Procedure Summary, Intake/Output, and MAR.

## 2022-12-08 NOTE — PROGRESS NOTES
8853 - Patient in bed awake at this time. A/O x 4. IV to 20G ULE  intact and patent. SCDs and teds to bilateral LE. Honeycomb dressing to posterior back, CDI. No numbness/tingling. Radial and pedal pulses palpable. Lungs sounds clear. Bowel sounds active to all quadrants. Pain 10/10. Haris Santos RN administered dilauded. 65- Pt is sleeping peacefully, even rise and fall of the chest visualized. No signs of pain or discomfort noted.

## 2022-12-08 NOTE — DISCHARGE SUMMARY
Discharge/Transfer  Summary     Patient: Larry Cruz MRN: [id number]  SSN: [id number]    YOB: 1960  Age: 57 y.o. Sex: male       Admit Date: 12/7/2022    Discharge Date: 12/8/2022      Admission Diagnoses: Spinal stenosis of lumbar region at multiple levels [M48.061]    Discharge Diagnoses:   Problem List as of 12/8/2022 Date Reviewed: 10/11/2021            Codes Class Noted - Resolved    Spinal stenosis of lumbar region at multiple levels ICD-10-CM: M48.061  ICD-9-CM: 724.02  12/7/2022 - Present            Discharge Condition: Good      Surgery: LUMBAR TWO/THREE, LUMBAR THREE/FOUR, LUMBAR FOUR/FIVE, LUMBAR FIVE/SACRAL ONE LAMINECTOMY FUSION, TRANSFORAMINAL LUMBAR INTERBODY FUSION WITH C-ARM:      Procedure(s) (LRB):  LUMBAR TWO/THREE, LUMBAR THREE/FOUR, LUMBAR FOUR/FIVE, LUMBAR FIVE/SACRAL ONE LAMINECTOMY FUSION, TRANSFORAMINAL LUMBAR INTERBODY FUSION WITH C-ARM (N/A)       Hospital Course: benign, acute blood loss anemia hgb 8.9      Disposition: rehab    Discharge Medications:   Current Discharge Medication List        START taking these medications    Details   oxyCODONE IR (Roxicodone) 5 mg immediate release tablet Take 1 Tablet by mouth every six (6) hours as needed for Pain for up to 7 days. Max Daily Amount: 20 mg.  Qty: 28 Tablet, Refills: 0    Associated Diagnoses: S/P lumbar fusion           CONTINUE these medications which have NOT CHANGED    Details   aspirin delayed-release 81 mg tablet Take 81 mg by mouth daily. OTHER,NON-FORMULARY, Indications: maxforce prostate 1 tab twice daily      multivitamin (ONE A DAY) tablet Take 1 Tablet by mouth daily. metFORMIN (GLUCOPHAGE) 500 mg tablet TAKE 1 TABLET BY MOUTH TWICE DAILY WITH A MEAL      cholecalciferol (VITAMIN D3) (1000 Units /25 mcg) tablet Take 2,000 Units by mouth daily. gabapentin (NEURONTIN) 300 mg capsule 600 mg three (3) times daily.       diclofenac EC (VOLTAREN) 75 mg EC tablet Take 75 mg by mouth two (2) times a day. lisinopriL (PRINIVIL, ZESTRIL) 40 mg tablet TAKE 1 TABLET BY MOUTH ONCE DAILY FOR BLOOD PRESSURE FOR 90 DAYS      simvastatin (ZOCOR) 40 mg tablet TAKE 1 TABLET BY MOUTH ONCE DAILY IN THE EVENING FOR CHOLESTEROL FOR 90 DAYS      acetaminophen (TYLENOL) 500 mg tablet Take  by mouth every six (6) hours as needed for Pain. Follow-up Appointments   Procedures    FOLLOW UP VISIT Appointment in: Two Weeks     Standing Status:   Standing     Number of Occurrences:   1     Order Specific Question:   Appointment in     Answer:    Two Weeks       Signed By: Marcy Cisneros MD     December 8, 2022

## 2022-12-08 NOTE — ROUTINE PROCESS
Bedside and Verbal shift change report given to LINDSEY Renee RN (oncoming nurse) by WONG Samuels RN/KIMO Rodriguez RN (offgoing nurse). Report included the following information SBAR, Kardex, OR Summary, Procedure Summary, Intake/Output, and MAR.

## 2022-12-08 NOTE — PROGRESS NOTES
Problem: Mobility Impaired (Adult and Pediatric)  Goal: *Acute Goals and Plan of Care (Insert Text)  Description: Goals to be addressed in 1-3 days:  1. Supine to sit and sit to supine SBA with HR for meals. 2. Sit to stand and stand to sit SBA/CGA with RW in prep for ambulation. 3. Ambulate 100ft SBA/CGA with RW, WBAT, for home/community mobility. Note: []  Patient has met MD glenroy eddy for d/c home   []  Recommend  with 24 hour adult care   [x]  Benefit from additional acute PT session to address:  rehab placement    PHYSICAL THERAPY EVALUATION    Patient: Rachel Flowers (76 y.o. male)  Date: 12/8/2022  Primary Diagnosis: Spinal stenosis of lumbar region at multiple levels [M48.061]  Procedure(s) (LRB):  LUMBAR TWO/THREE, LUMBAR THREE/FOUR, LUMBAR FOUR/FIVE, LUMBAR FIVE/SACRAL ONE LAMINECTOMY FUSION, TRANSFORAMINAL LUMBAR INTERBODY FUSION WITH C-ARM (N/A) 1 Day Post-Op   Precautions: Fall, Back, Spinal    ASSESSMENT :  Based on the objective data described below, the patient presents with lower extremity weakness, decreased gait quality and endurance, impaired bed mobility and transfers, high pain levels and overall limitations in functional mobility s/p L2-S1 LF. Pt lives alone and has no family assistance. Pt frustrated with current pain level and limited ability to mobilize. Pt performed sit to stand with Min-ModA from low chair. Patient ambulated 20 feet with RW, GB applied, Julia for stability and occasional LE advancement. Reviewed icing, elevation, positioning, home safety, spinal precautions, home exercise program, and activity recommendations. Recommend rehab placement upon d/c from hospital.    Patient will benefit from skilled intervention to address the above impairments.   Patient's rehabilitation potential is considered to be Good  Factors which may influence rehabilitation potential include:   []         None noted  []         Mental ability/status  [x]         Medical condition  [x]         Home/family situation and support systems  []         Safety awareness  [x]         Pain tolerance/management  []         Other:      PLAN :  Recommendations and Planned Interventions:   [x]           Bed Mobility Training             []    Neuromuscular Re-Education  [x]           Transfer Training                   []    Orthotic/Prosthetic Training  [x]           Gait Training                          [x]    Modalities  [x]           Therapeutic Exercises           []    Edema Management/Control  [x]           Therapeutic Activities            [x]    Family Training/Education  [x]           Patient Education  []           Other (comment):    Frequency/Duration: Patient will be followed by physical therapy 1-2 times per day/4-7 days per week to address goals. Discharge Recommendations: Rehab  Further Equipment Recommendations for Discharge: N/A    AMPAC: 15/20    This AMPAC score should be considered in conjunction with interdisciplinary team recommendations to determine the most appropriate discharge setting. Patient's social support, diagnosis, medical stability, and prior level of function should also be taken into consideration. SUBJECTIVE:   Patient stated I hurts a lot, I don't know how I'm supposed to do this.     OBJECTIVE DATA SUMMARY:     Past Medical History:   Diagnosis Date    Arthritis     Back pain     from previous back injury    Colon polyps     Diabetes ([facility] [address]     6898    Hernia, umbilical     Hyperlipidemia     Hypertension     Pneumonia      Past Surgical History:   Procedure Laterality Date    HX COLONOSCOPY  2014    polyps    HX HEENT      left lazy eye procedure during childhood    HX HEMORRHOIDECTOMY      HX HERNIA REPAIR      2021    HX ORTHOPAEDIC  01/09/2017    right knee    HX ORTHOPAEDIC  1985    torn cartilage knee    HX TONSILLECTOMY  1964     Barriers to Learning/Limitations: None  Compensate with: Visual Cues and Verbal Cues  PLOF: Independent ambulation without AD  Home Situation:  Home Situation  Home Environment: Private residence  # Steps to Enter: 3  Rails to Enter: Yes  Hand Rails : Left  One/Two Story Residence: Two story  # of Interior Steps: 15  Interior Rails: Right  Lift Chair Available: No  Living Alone: Yes  Support Systems: No Support Systems  Patient Expects to be Discharged to[de-identified] Rehab hospital/unit acute  Current DME Used/Available at Home: Walker  Critical Behavior:  Neurologic State: Alert  Orientation Level: Oriented X4;Appropriate for age  Cognition: Appropriate decision making; Appropriate for age attention/concentration   Psychosocial  Patient Behaviors: Demanding;Agitated  Purposeful Interaction: Yes  Pt Identified Daily Priority: Clinical issues (comment)  Caritas Process: Nurture loving kindness;Establish trust;Teaching/learning; Attend basic human needs  Caring Interventions: Therapeutic modalities; Reassure  Reassure: Therapeutic listening  Therapeutic Modalities: Humor  Skin Condition/Temp: Dry;Warm   Skin Integrity: Incision (comment)  Skin Integumentary  Skin Color: Appropriate for ethnicity  Skin Condition/Temp: Dry;Warm  Skin Integrity: Incision (comment)  Turgor: Non-tenting   Strength:    Strength: Generally decreased, functional  Tone & Sensation:   Tone: Normal  Sensation: Impaired  Range Of Motion:  AROM: Generally decreased, functional  Functional Mobility:  Bed Mobility:  Sit to Supine: Moderate assistance   Transfers:  Sit to Stand: Minimum assistance; Moderate assistance  Stand to Sit: Minimum assistance  Balance:   Sitting: Intact  Standing: Impaired; With support  Standing - Static: Fair  Standing - Dynamic : Fair  Ambulation/Gait Training:  Distance (ft): 20 Feet (ft)  Assistive Device: Gait belt;Walker, rolling  Ambulation - Level of Assistance: Minimal assistance   Gait Description (WDL): Exceptions to WDL  Gait Abnormalities: Decreased step clearance  Base of Support: Widened  Stance: Left decreased;Right decreased  Speed/Bing: Slow  Step Length: Right shortened;Left shortened  Pain:  Pain level pre-treatment: 10/10   Pain level post-treatment: 10/10   Pain Intervention(s) : Medication (see MAR); Rest, Ice, Repositioning  Response to intervention: Nurse notified, See doc flow    Activity Tolerance:   Fair  Please refer to the flowsheet for vital signs taken during this treatment. After treatment:   []         Patient left in no apparent distress sitting up in chair  [x]         Patient left in no apparent distress in bed  [x]         Call bell left within reach  [x]         Nursing notified  []         Caregiver present  []         Bed alarm activated  []         SCDs applied    COMMUNICATION/EDUCATION:   [x]         Role of Physical Therapy in the acute care setting. [x]         Fall prevention education was provided and the patient/caregiver indicated understanding. [x]         Patient/family have participated as able in goal setting and plan of care. [x]         Patient/family agree to work toward stated goals and plan of care. []         Patient understands intent and goals of therapy, but is neutral about his/her participation. []         Patient is unable to participate in goal setting/plan of care: ongoing with therapy staff.  []         Other:     Thank you for this referral.  Angiemoses Rodriguez   Time Calculation: 43 mins      Eval Complexity: History: MEDIUM  Complexity : 1-2 comorbidities / personal factors will impact the outcome/ POC Exam:LOW Complexity : 1-2 Standardized tests and measures addressing body structure, function, activity limitation and / or participation in recreation  Presentation: LOW Complexity : Stable, uncomplicated  Clinical Decision Making:Low Complexity  Overall Complexity:LOW     Pelon Salgado AM-PAC® Basic Mobility Inpatient Short Form (6-Clicks) Version 2    How much HELP from another person does the patient currently need    (If the patient hasn't done an activity recently, how much help from another person do you think he/she would need if he/she tried?)   Total (Total A or Dep)   A Lot  (Mod to Max A)   A Little (Sup or Min A)   None (Mod I to I)   Turning from your back to your side while in a flat bed without using bedrails? [] 1 [] 2 [x] 3 [] 4   2. Moving from lying on your back to sitting on the side of a flat bed without using bedrails? [] 1 [] 2 [x] 3 [] 4   3. Moving to and from a bed to a chair (including a wheelchair)? [] 1 [] 2 [x] 3 [] 4   4. Standing up from a chair using your arms (e.g., wheelchair, or bedside chair)? [] 1 [] 2 [x] 3 [] 4   5. Walking in hospital room? [] 1 [] 2 [x] 3 [] 4   6. Climbing 3-5 steps with a railing?+   [] 1 [] 2 [] 3 [] 4   +If stair climbing cannot be assessed, skip item #6. Sum responses from items 1-5. Based on an AM-PAC score of 15/20 and their current functional mobility deficits, it is recommended that the patient have 2-3 sessions per week of Physical Therapy at d/c to increase the patient's independence.

## 2022-12-08 NOTE — ROUTINE PROCESS
Bedside and Verbal shift change report given to Chito Joseph RN  by Francine Cleaning RN. Report included the following information SBAR and Kardex.

## 2022-12-08 NOTE — PROGRESS NOTES
D/c plan: [facility] for [facility] pending therapy evals, acceptance and insurance auth. CM spoke w/ the pt. Confirmed information on the pt's face sheet. Pt lives at home alone. Pt states he has a 2 story home and that his bedroom and bathroom are on the 2nd floor. Pt states he informed Dr. Deshawn Gonzalez office prior to surgery that he wants to go to [facility]. Pt states he has spoken to [facility] and they are waiting for clinicals to be sent to them. Informed him that the CM team will send clinicals to [facility] to review. Pt will need insurance auth. Transition of Care (ANTONIO) Plan:          Pt admitted for an elective surgical procedure. LUMBAR THREE/FOUR, LUMBAR FOUR/FIVE, LUMBAR FIVE/SACRAL ONE LAMINECTOMY FUSION, TRANSFORAMINAL LUMBAR INTERBODY FUSION WITH C-ARM  Pt is independent. Please encourage ambulation. No transition of care needs identified at this time. Anticipate pt will be medically stable for discharge within the next 24-48 hours with physician follow up. CM available to assist as needed. ANTONIO Transportation:   How is patient being transported at discharge? Family/Friend      When? Once cleared by physician     Is transport scheduled? N/A      Follow-up appointment and transportation:   PCP/Specialist?  See AVS for Appointment         Who is transporting to the follow-up appointment? Self/Family/Friend      Is transport for follow up appointment scheduled? N/A    Communication plan (with patient/family): Who is being called? Patient Responsible party? Patient      What number(s) is to be used? See Facesheet      What service provider is calling for [facility] services? N/a          When are they calling? N/a    Readmission Risk?   (Green/Low; Yellow/Moderate; Red/High):  [employer] here to complete [address] including selection of the Healthcare Decision Maker Relationship (ie \"Primary\")    Care Management Interventions  Palliative Care Criteria Met (RRAT>21 & CHF Dx)?: No  Mode of Transport at Discharge: BLS  Transition of Care Consult (CM Consult): Acute Rehab  Discharge Durable Medical Equipment: No  Physical Therapy Consult: Yes  Occupational Therapy Consult: Yes  Speech Therapy Consult: No  Support Systems: No Support Systems  Confirm Follow Up Transport: Friends  The Plan for Transition of Care is Related to the Following Treatment Goals : [facility] for acute rehab.   The Patient and/or Patient Representative was Provided with a Choice of Provider and Agrees with the Discharge Plan?: Yes (The pt is alert and oriented. )  Freedom of Choice List was Provided with Basic Dialogue that Supports the Patient's Individualized Plan of Care/Goals, Treatment Preferences and Shares the Quality Data Associated with the Providers?: Yes [facility])  Discharge Location  Patient Expects to be Discharged to[de-identified] Rehab hospital/unit acute

## 2022-12-08 NOTE — DISCHARGE INSTRUCTIONS
Dr. Storm Alcala    DO NOT take any NSAIDS if you had Fusion Surgery. ACTIVITIES:  *The first week after surgery   Change positions every hour while you are awake. Walking is the best way to rebuild strength. Activities around the house, such as washing dishes and preparing light meals are fine. Avoid strenuous activities, such as vacuuming, and do not lift anything heavier than 1 gallon of milk (or about 5-8 pounds). Do not bend over to  items from the ground level until 3 months post-op. *Week 2 and beyond  You may gradually increase your activities, but avoid heavy lifting, pushing/pulling. Walk at a pace that avoids fatigue or severe pain. Do not try to walk several blocks the first day! As you increase the distance, you may feel tired. If so, stop and rest.   Follow-up with Dr. Pennie Butt will be 2 weeks after surgery. BATHING and INCISION CARE:  The incision may be tender or feel numb: this is normal.   Keep the incision clean and dry. You may shower 3 days after surgery. Cover the dressing with saran wrap before getting in the shower. The incision is closed with sutures under the skin and glue on top. Do not apply any lotions, ointments or oils on the incision. Do not remove the dressing. Your dressing will be changed at your first post op appointment. If it comes loose or is damaged, dirty or wet before this appointment, call your home health nurse (if you are being seen by a nurse at home) or the office to have the dressing changed. If you notice any excessive swelling, redness, or persistent drainage around the incision, notify the office immediately. CONSTIPATION:  Take a stool softener twice a day while you are taking a narcotic.    If you have not had a bowel movement within 3 days of surgery, you will need to use a laxative or suppository that can be obtained over the counter at your local pharmacy     ICE  Use ice on your back to decrease pain and swelling. Do NOT use heat. MEDICATIONS:  If you had fusion surgery DO NOT TAKE non-steroidal anti-inflammatory (NSAID) medications, such as Motrin, Aleve, Advil Naprosyn, Ibuprofen or aspirin. Take Tylenol/Acetaminophen every 4-6 hours for pain. Do not take more than 3000 mg each day. (Do not take Tylenol/Acetaminophen if you have liver problems). Take your prescribed narcotic pain medication as needed for pain that is not tolerable. Eat food before you take any pain medication to avoid nausea. If you need a medication refill, please call the office during working hours at least 2 days before your prescription runs out. Do not wait until your bottle is empty to call for a refill. NUTRITION:  Eat healthy to help your wound to heal.    Eat a healthy balanced diet to help your wound to heal. Protein supplements should be considered if you are eating less than 50% of your meal.   Drink plenty of water to stay hydrated. DRIVING & RETURN TO WORK:   You will be told at your follow up appointment if it is safe for you to drive or return to work and will be provided with a [id number]-note if needed (please ask). NEVER drive while taking narcotic medication. WHEN TO CALL THE OFFICE:  If you have severe pain unrelieved by the medications, new numbness or tingling in your legs; If you have a fever of 101.0°F or greater   If you notice increased swelling, redness, or increased drainage from the incision    If you are not able to urinate  If you are not able to control your bowels       [address] [place] [address] [address] number is [phone]. They are open from 8:00am to 5:00pm Mon - Fri. After 5:00pm, or on weekends/holidays, please call the answering service at [phone] for a call back.

## 2022-12-08 NOTE — PROGRESS NOTES
Problem: Self Care Deficits Care Plan (Adult)  Goal: *Acute Goals and Plan of Care (Insert Text)  Description: Initial Occupational Therapy Goals (12/8/2022) Within 7 day(s):  1. Patient will perform toilet transfer with CGA in preparation for bowel and bladder management. 2. Patient will perform bowel and bladder management with CGA for increased independence with ADLs. 3. Patient will perform UB dressing with supervision for increased independence with ADLs. 4. Patient will perform LB dressing with CGA & A/E PRN for increased independence with ADLs. 5. Patient will adhere to back/spinal precautions 100% of the time with 1-2 verbal cues for increased independence with ADLs. 6. Patient will utilize energy conservation techniques with 1 verbal cue(s) for increased independence with ADLs. Outcome: Progressing Towards Goal  OCCUPATIONAL THERAPY EVALUATION    Patient: Lashonda Sahu (71 y.o. male)  Date: 12/8/2022  Primary Diagnosis: Spinal stenosis of lumbar region at multiple levels [M48.061]  Procedure(s) (LRB):  LUMBAR TWO/THREE, LUMBAR THREE/FOUR, LUMBAR FOUR/FIVE, LUMBAR FIVE/SACRAL ONE LAMINECTOMY FUSION, TRANSFORAMINAL LUMBAR INTERBODY FUSION WITH C-ARM (N/A) 1 Day Post-Op   Precautions: Fall, Back, Spinal  PLOF: pt mod I for ADLs/functional mobility, lives alone    ASSESSMENT AND RECOMMENDATIONS:  Based on the objective data described below, the patient presents with BLE decreased ROM and strength affecting LE ADLs. Pt found seated in chair, reporting pain 10/10. Educated pt on back precautions including log rolling technique for bed mobility. Pt appears hostile and voices many complaints about situation. Pt also perseverates during conversation, motivation required to complete minimal mobility. Pt required min/mod A for STS from chair, and was able to ambulate to door and back to EOB with CGA/additional time using RW.  Pt required extended seated rest break before returning to supine, mod A required to return to supine, ice applied to lower back. Recommend SNF rehab at hospital d/c to maximize safety/independence with ADLs. Education: Reviewed Back Precautions, body mechanics, home safety, importance of moving every hour to assist w/ stiffness & spasms, adaptive strategies and adaptive dressing techniques including clothing modifications with patient verbalizing understanding at this time. Patient will benefit from skilled intervention to address the above impairments. Patient's rehabilitation potential is considered to be Fair  Factors which may influence rehabilitation potential include:   []             None noted  []             Mental ability/status  []             Medical condition  [x]             Home/family situation and support systems  []             Safety awareness  [x]             Pain tolerance/management  []             Other:        PLAN :  Recommendations and Planned Interventions:   [x]               Self Care Training                  [x]      Therapeutic Activities  [x]               Functional Mobility Training   []      Cognitive Retraining  [x]               Therapeutic Exercises           [x]      Endurance Activities  [x]               Balance Training                    []      Neuromuscular Re-Education  []               Visual/Perceptual Training     [x]      Home Safety Training  [x]               Patient Education                   [x]      Family Training/Education  []               Other (comment):    Frequency/Duration: Patient will be followed by occupational therapy 1-2 times per day/4-7 days per week to address goals. Discharge Recommendations: SNF rehab  Further Equipment Recommendations for Discharge: N/A    AMPAC: Based on an AM-PAC score of 15/24 and their current ADL deficits; it is recommended that the patient have 3-5 sessions per week of Occupational Therapy at d/c to increase the patient's independence.       This AMPAC score should be considered in conjunction with interdisciplinary team recommendations to determine the most appropriate discharge setting. Patient's social support, diagnosis, medical stability, and prior level of function should also be taken into consideration. SUBJECTIVE:   Patient stated how am I supposed to go home when I can't even stand.     OBJECTIVE DATA SUMMARY:     Past Medical History:   Diagnosis Date    Arthritis     Back pain     from previous back injury    Colon polyps     Diabetes ([facility] [address]     8672    Hernia, umbilical     Hyperlipidemia     Hypertension     Pneumonia      Past Surgical History:   Procedure Laterality Date    HX COLONOSCOPY  2014    polyps    HX HEENT      left lazy eye procedure during childhood    HX HEMORRHOIDECTOMY      HX HERNIA REPAIR      2021    HX ORTHOPAEDIC  01/09/2017    right knee    HX ORTHOPAEDIC  1985    torn cartilage knee    HX TONSILLECTOMY  1964     Barriers to Learning/Limitations: yes;  physical  Compensate with: visual, verbal, tactile, kinesthetic cues/model    Home Situation/Prior Level of Function:   Home Situation  Home Environment: Private residence  # Steps to Enter: 3  Rails to Enter: Yes  Hand Rails : Left  One/Two Story Residence: Two story  # of Interior Steps: 13  Interior Rails: Right  Lift Chair Available: No  Living Alone: Yes  Support Systems: No Support Systems  Patient Expects to be Discharged to[de-identified] Rehab hospital/unit acute  Current DME Used/Available at Home: Adaptive dressing aides, Raised toilet seat  []  Right hand dominant   []  Left hand dominant    Cognitive/Behavioral Status:  Neurologic State: Alert  Orientation Level: Oriented X4  Cognition: Follows commands  Safety/Judgement: Awareness of environment    Skin: lumbar incision w/ dressing/Mepilex & HemeVAC     Coordination: BUE  Coordination: Generally decreased, functional  Fine Motor Skills-Upper: Left Intact; Right Intact    Gross Motor Skills-Upper: Left Intact; Right Intact    Balance:  Sitting: Intact  Standing: Impaired; With support  Standing - Static: Fair  Standing - Dynamic : Fair    Strength: BUE  Strength: Generally decreased, functional    Tone & Sensation:BUE  Tone: Normal  Sensation: Impaired    Range of Motion: BUE  AROM: Generally decreased, functional    Functional Mobility and Transfers for ADLs:  Bed Mobility:  Sit to Supine: Moderate assistance     Transfers:  Sit to Stand: Minimum assistance; Moderate assistance    ADL Assessment:  Feeding: Setup;Modified independent  Oral Facial Hygiene/Grooming: Contact guard assistance  Bathing: Maximum assistance  Upper Body Dressing: Minimum assistance  Lower Body Dressing: Maximum assistance  Toileting: Moderate assistance    ADL Intervention:  Upper Body Dressing Assistance  Dressing Assistance: Minimum assistance  Hospital Gown: Minimum  assistance    Cognitive Retraining  Safety/Judgement: Awareness of environment    Pain:  Pain level pre-treatment: 10/10  Pain level post-treatment: 10/10  Pain Intervention(s): Rest, Ice, Repositioning   Response to intervention: Nurse notified, see doc flow sheet    Activity Tolerance:   Fair-. Patient able to stand ~5 minute(s). Patient able to complete ADLs with intermittent rest breaks. Patient limited by pain, strength, ROM. Patient unsteady. Please refer to the flowsheet for vital signs taken during this treatment. After treatment:   []  Patient left in no apparent distress sitting up in chair  []  Patient sitting on EOB  [x]  Patient left in no apparent distress in bed  [x]  Call bell left within reach  [x]  Nursing notified  []  Caregiver present  [x]  Ice applied  [x]  SCD's on while back in bed  [] Bed alarm activated    COMMUNICATION/EDUCATION:   Communication/Collaboration:  [x]       Role of Occupational Therapy in the acute care setting. [x]      Home safety education was provided and the patient/caregiver indicated understanding.   [x]      Patient/family have participated as able in goal setting and plan of care. [x]      Patient/family agree to work toward stated goals and plan of care. []      Patient understands intent and goals of therapy, but is neutral about his/her participation. []      Patient is unable to participate in plan of care at this time. Thank you for this referral.  Siri Dowd, OTR/L  Time Calculation: 44 mins    Eval Complexity: History: MEDIUM Complexity : Expanded review of history including physical, cognitive and psychosocial  history ; Examination: MEDIUM Complexity : 3-5 performance deficits relating to physical, cognitive , or psychosocial skils that result in activity limitations and / or participation restrictions; Decision Making:MEDIUM Complexity : Patient may present with comorbidities that affect occupational performnce. Miniml to moderate modification of tasks or assistance (eg, physical or verbal ) with assesment(s) is necessary to enable patient to complete evaluation     Marnie Strickland AM-PAC® Daily Activity Inpatient Short Form (6-Clicks)*    How much HELP from another person does the patient currently need    (If the patient hasn't done an activity recently, how much help from another person do you think he/she would need if he/she tried?)   Total (Total A or Dep)   A Lot  (Mod to Max A)   A Little (Sup or Min A)   None (Mod I to I)   Putting on and taking off regular lower body clothing? [] 1 [x] 2 [] 3 [] 4   2. Bathing (including washing, rinsing,      drying)? [] 1 [x] 2 [] 3 [] 4   3. Toileting, which includes using toilet, bedpan or urinal?   [] 1 [x] 2 [] 3 [] 4   4. Putting on and taking off regular upper body clothing? [] 1 [] 2 [x] 3 [] 4   5. Taking care of personal grooming such as brushing teeth? [] 1 [] 2 [x] 3 [] 4   6. Eating meals?    [] 1 [] 2 [x] 3 [] 4     Based on an AM-PAC score of 15/24 and their current ADL deficits; it is recommended that the patient have 3-5 sessions per week of Occupational Therapy at d/c to increase the patient's independence.

## 2022-12-08 NOTE — ROUTINE PROCESS
Bedside and Verbal shift change report given to LINDSEY Renee RN by [facility], RN. Report included the following information SBAR, Kardex, Intake/Output and MAR.

## 2022-12-08 NOTE — PROGRESS NOTES
2048 - Head to toe assessment completed at this time. Pt denies CP and SOB. Pt rated pain 9/10. 20G Left Hand IVF infusing as ordered. Honeycomb dressing in place to back CDI. JAYE drain in place. Ulu in place draining straw yellow urine. Incentive spirometer encouraged. Bed in lowest position. Call bell and personal belongings within reach. Will continue to monitor.

## 2022-12-08 NOTE — NURSE NAVIGATOR
Zita Rodríguez post spine rounding. Patient educated: Activity:  OOB for all meals, walk every hour to prevent blood clots  If your surgeon wants you to wear a back brace. Wear it per your surgeon's instructions. Promoting Circulation:   Use SCD pumps except when walking. Ankle pumps 10 times an hour at hospital & home. Pain Control:  Pain medications side effects discussed. Use ice, distraction, moving, & change position to also help with pain. Narcotics and anesthesia cause constipation so it is important to take stool softener/mild laxative daily while on narcotics. Incentive Spirometry:    Use of incentive spirometer 10 x/hr. Diet:   Eat for healing. Drink enough fluids so urine is the color of lemonade. Medication causes nausea and dizziness if taken on an empty stomach so instructed to make sure to eat a snack before taking any medicaitons. Patient Safety:   Call light & belongings in reach. Call for help when want to walk or get OOB. Mobility Intervention:       [] Pt dangled at edge of bed    [] Pt assisted OOB to bedside commode    [x] Pt assisted OOB to chair    [] Pt ambulated to bathroom    [] Patient was ambulated in room/hallway    Assistive Device Utilized:       [x] Rolling walker   [] Crutches   [] Back Brace   [] Knee immobilizer   [] Neck Collar    After Rounding and Checking on Patient     [x] Patient left in no apparent distress sitting up in chair  [] Patient left in no apparent distress in bed  [x] Call bell left within reach  [] SCDs on both legs & machine turned on  [] Knee immobilizer on  [] Ice applied  [x] RN notified  []  present  [] Bed alarm activated    Reason patient not mobilized:      [] Patient refused   [] Nausea/vomiting   [] Low blood pressure   [] Drowsy/lethargic    Pain Rating:     [] 1  [] 6  [] 2  [] 7  [] 3  [] 8  [] 4  [] 9  [] 5  [] 10    Left patient with call light, cell phone and personal belongings in reach for safety.       Efren Chavez Dorcas verbalized understand. Given the opportunity for asking questions.

## 2022-12-08 NOTE — PROGRESS NOTES
0900  Nurse informed that pt arce was removed and 90ml of serosanguinous fluid was removed from arlene bulb.     0915  Assumed care of pt at this time. Assessment complete. Pt alert and oriented x 4. Shows no sign of distress. Fall risk arm band in place. Denies SOB and chest pain. Pt lungs clear bilaterally. Cap refill  less than 3 seconds. Pt denies numbness and tingling to all extremities. Stated pain 9/10. Pt has 20 G IV to L hand. Pt has honeycomb dressing to lower back CDI. Arlene drain in place. Scds and TEDs in place. Incentive spirometer at bedside. Pt encouraged to continue use of IS. Pt verbalized understanding. Call light and possessions within reach. Bed locked and in low position. Will continue to monitor. [id number]  Pt sitting up in chair for breakfast    0941  PT/OT in to see pt     1523  Nurse will inform Dr Charlene Reyes of pt uncontrolled pain and slightly elevated HR. Left VM with Dr Charlene Reyes awaiting return call     46  Dr Charlene Reyes aware of pt pain being uncontrolled and elevated HR. Dr Charlene Reyes will place orders for PO dilaudid and to pull drain. IV abx can now be d/c. Dr Charlene Reyes also aware that pt will not d/c today plan for rehab when placement is found. [address] drain removed Mepilex applied. 60ml of serosanguinous output noted in drain. 1858  Pt ambulated to bathroom with nurse alongside using walker.

## 2022-12-08 NOTE — PROGRESS NOTES
Vss afeb  Neuro intact  Incisional pain  Pt ot  Drain wet- No active bleeding. txa and observe.  Remove drain prior to discharge  DC arce  Labs OK HGB 9.7 to 8.9- acute blood loss anemia and hemo-dilution  Hope to dc this afternoon if clears PT OT  FU 2 weeks

## 2022-12-09 PROBLEM — M47.816 LUMBAR SPONDYLOSIS: Status: ACTIVE | Noted: 2022-12-09

## 2022-12-09 LAB
GLUCOSE BLD STRIP.AUTO-MCNC: 171 MG/DL (ref 70–110)
GLUCOSE BLD STRIP.AUTO-MCNC: 176 MG/DL (ref 70–110)
GLUCOSE BLD STRIP.AUTO-MCNC: 191 MG/DL (ref 70–110)

## 2022-12-09 PROCEDURE — 74011636637 HC RX REV CODE- 636/637: Performed by: ORTHOPAEDIC SURGERY

## 2022-12-09 PROCEDURE — 82962 GLUCOSE BLOOD TEST: CPT

## 2022-12-09 PROCEDURE — 74011250637 HC RX REV CODE- 250/637: Performed by: ORTHOPAEDIC SURGERY

## 2022-12-09 PROCEDURE — 74011000250 HC RX REV CODE- 250: Performed by: ORTHOPAEDIC SURGERY

## 2022-12-09 PROCEDURE — 97530 THERAPEUTIC ACTIVITIES: CPT

## 2022-12-09 PROCEDURE — 65270000029 HC RM PRIVATE

## 2022-12-09 PROCEDURE — 97116 GAIT TRAINING THERAPY: CPT

## 2022-12-09 RX ORDER — OXYCODONE HYDROCHLORIDE 5 MG/1
5 TABLET ORAL
Qty: 28 TABLET | Refills: 0 | Status: SHIPPED | OUTPATIENT
Start: 2022-12-09 | End: 2022-12-12

## 2022-12-09 RX ADMIN — DOCUSATE SODIUM 100 MG: 100 CAPSULE ORAL at 21:43

## 2022-12-09 RX ADMIN — ACETAMINOPHEN 1000 MG: 500 TABLET ORAL at 05:21

## 2022-12-09 RX ADMIN — ACETAMINOPHEN 1000 MG: 500 TABLET ORAL at 21:44

## 2022-12-09 RX ADMIN — ACETAMINOPHEN 1000 MG: 500 TABLET ORAL at 14:18

## 2022-12-09 RX ADMIN — SODIUM CHLORIDE, PRESERVATIVE FREE 10 ML: 5 INJECTION INTRAVENOUS at 22:00

## 2022-12-09 RX ADMIN — OXYCODONE 10 MG: 5 TABLET ORAL at 09:35

## 2022-12-09 RX ADMIN — FAMOTIDINE 40 MG: 20 TABLET, FILM COATED ORAL at 09:35

## 2022-12-09 RX ADMIN — Medication 2 UNITS: at 16:30

## 2022-12-09 RX ADMIN — FAMOTIDINE 40 MG: 20 TABLET, FILM COATED ORAL at 21:43

## 2022-12-09 RX ADMIN — SODIUM CHLORIDE, PRESERVATIVE FREE 10 ML: 5 INJECTION INTRAVENOUS at 14:00

## 2022-12-09 RX ADMIN — Medication 2 UNITS: at 09:34

## 2022-12-09 RX ADMIN — GABAPENTIN 600 MG: 300 CAPSULE ORAL at 09:35

## 2022-12-09 RX ADMIN — HYDROMORPHONE HYDROCHLORIDE 4 MG: 4 TABLET ORAL at 01:32

## 2022-12-09 RX ADMIN — LISINOPRIL 40 MG: 20 TABLET ORAL at 09:35

## 2022-12-09 RX ADMIN — SODIUM CHLORIDE, PRESERVATIVE FREE 10 ML: 5 INJECTION INTRAVENOUS at 05:21

## 2022-12-09 RX ADMIN — GABAPENTIN 600 MG: 300 CAPSULE ORAL at 17:36

## 2022-12-09 RX ADMIN — Medication 2 UNITS: at 21:43

## 2022-12-09 RX ADMIN — GABAPENTIN 600 MG: 300 CAPSULE ORAL at 21:42

## 2022-12-09 RX ADMIN — HYDROMORPHONE HYDROCHLORIDE 4 MG: 4 TABLET ORAL at 05:21

## 2022-12-09 RX ADMIN — TAMSULOSIN HYDROCHLORIDE 0.4 MG: 0.4 CAPSULE ORAL at 09:35

## 2022-12-09 RX ADMIN — DOCUSATE SODIUM 100 MG: 100 CAPSULE ORAL at 09:35

## 2022-12-09 NOTE — PROGRESS NOTES
D/c plan; [facility] for acute rehab pending insurance auth. [facility] accepted the pt yesterday and initiated insurance auth yesterday. Care Management Interventions  Palliative Care Criteria Met (RRAT>21 & CHF Dx)?: No  Mode of Transport at Discharge: BLS  Transition of Care Consult (CM Consult): Acute Rehab  Discharge Durable Medical Equipment: No  Physical Therapy Consult: Yes  Occupational Therapy Consult: Yes  Speech Therapy Consult: No  Support Systems: No Support Systems  Confirm Follow Up Transport: Friends  The Plan for Transition of Care is Related to the Following Treatment Goals : [facility] for acute rehab.   The Patient and/or Patient Representative was Provided with a Choice of Provider and Agrees with the Discharge Plan?: Yes (The pt is alert and oriented. )  Freedom of Choice List was Provided with Basic Dialogue that Supports the Patient's Individualized Plan of Care/Goals, Treatment Preferences and Shares the Quality Data Associated with the Providers?: Yes [facility])  Discharge Location  Patient Expects to be Discharged to[de-identified] Rehab hospital/unit acute

## 2022-12-09 NOTE — ROUTINE PROCESS
Bedside and Verbal shift change report given to Donato Apgar RN (oncoming nurse) by Jethro Magallanes RN (offgoing nurse). Report included the following information SBAR, Kardex, MAR and Recent Results.

## 2022-12-09 NOTE — PROGRESS NOTES
Problem: Mobility Impaired (Adult and Pediatric)  Goal: *Acute Goals and Plan of Care (Insert Text)  Description: Goals to be addressed in 1-3 days:  1. Supine to sit and sit to supine SBA with HR for meals. 2. Sit to stand and stand to sit SBA/CGA with RW in prep for ambulation. 3. Ambulate 100ft SBA/CGA with RW, WBAT, for home/community mobility. Note: []  Patient has met MD glenroy eddy for d/c home   []  Recommend HH with 24 hour adult care   [x]  Benefit from additional acute PT session to address:  rehab placement    PHYSICAL THERAPY TREATMENT    Patient: Abdi Kim (15 y.o. male)  Date: 12/9/2022  Diagnosis: Spinal stenosis of lumbar region at multiple levels [M48.061]  Lumbar spondylosis [M47.816] <principal problem not specified>  Procedure(s) (LRB):  LUMBAR TWO/THREE, LUMBAR THREE/FOUR, LUMBAR FOUR/FIVE, LUMBAR FIVE/SACRAL ONE LAMINECTOMY FUSION, TRANSFORAMINAL LUMBAR INTERBODY FUSION WITH C-ARM (N/A) 2 Days Post-Op  Precautions: Fall, Back, Spinal    ASSESSMENT:  Pt continues to be limited by pain and self-limiting. Pt required ModA for transition to sitting on EOB. Sit to stand with Min-modA. Pt amb short distance, x2 bouts; antalgic gait pattern, requiring frequent standing rest breaks within short distance ambulation. Reviewed activity recommendations, spinal precautions, log rolling, and exercise program. Pt positioned to comfort in side lying. Progression toward goals:   []      Improving appropriately and progressing toward goals  [x]      Improving slowly and progressing toward goals  []      Not making progress toward goals and plan of care will be adjusted     PLAN:  Patient continues to benefit from skilled intervention to address the above impairments. Continue treatment per established plan of care.   Discharge Recommendations: Rehab  Further Equipment Recommendations for Discharge:  N/A    AMPA: 11/20    This AMPA score should be considered in conjunction with interdisciplinary team recommendations to determine the most appropriate discharge setting. Patient's social support, diagnosis, medical stability, and prior level of function should also be taken into consideration. SUBJECTIVE:   Patient stated People do not understand how painful this is.     OBJECTIVE DATA SUMMARY:   Critical Behavior:  Neurologic State: Alert, Appropriate for age  Orientation Level: Oriented X4  Cognition: Appropriate decision making, Appropriate for age attention/concentration, Appropriate safety awareness, Follows commands  Safety/Judgement: Awareness of environment  Functional Mobility Training:  Bed Mobility:  Rolling: Minimum assistance  Supine to Sit: Moderate assistance  Sit to Supine: Moderate assistance  Transfers:  Sit to Stand: Minimum assistance; Moderate assistance; Additional time (from elevated bed)  Stand to Sit: Minimum assistance  Balance:  Sitting: Intact  Standing: Impaired; With support  Standing - Static: Fair  Standing - Dynamic : Fair  Ambulation/Gait Training:  Distance (ft): 40 Feet (ft) (x2)  Assistive Device: Gait belt;Walker, rolling  Ambulation - Level of Assistance: Contact guard assistance  Gait Abnormalities: Decreased step clearance; Antalgic  Base of Support: Widened   Speed/Bing: Slow  Step Length: Right shortened;Left shortened  Pain:  Pain level pre-treatment: 10/10  Pain level post-treatment: 10/10   Pain Intervention(s): Medication (see MAR); Rest, Ice, Repositioning   Response to intervention: Nurse notified, See doc flow    Activity Tolerance:   Good  Please refer to the flowsheet for vital signs taken during this treatment.   After treatment:   [] Patient left in no apparent distress sitting up in chair  [x] Patient left in no apparent distress in bed  [x] Call bell left within reach  [x] Nursing notified  [] Caregiver present  [] Bed alarm activated  [] SCDs applied      COMMUNICATION/EDUCATION:   [x]         Role of Physical Therapy in the acute care setting. [x]         Fall prevention education was provided and the patient/caregiver indicated understanding. [x]         Patient/family have participated as able in working toward goals and plan of care. []         Patient/family agree to work toward stated goals and plan of care. []         Patient understands intent and goals of therapy, but is neutral about his/her participation. []         Patient is unable to participate in stated goals/plan of care: ongoing with therapy staff.  []         Other:        Ping Climes Titcomb   Time Calculation: 41 mins    [employer] AM-PAC® Basic Mobility Inpatient Short Form (6-Clicks) Version 2    How much HELP from another person does the patient currently need    (If the patient hasn't done an activity recently, how much help from another person do you think he/she would need if he/she tried?)   Total (Total A or Dep)   A Lot  (Mod to Max A)   A Little (Sup or Min A)   None (Mod I to I)   Turning from your back to your side while in a flat bed without using bedrails? [] 1 [x] 2 [] 3 [] 4   2. Moving from lying on your back to sitting on the side of a flat bed without using bedrails? [] 1 [x] 2 [] 3 [] 4   3. Moving to and from a bed to a chair (including a wheelchair)? [] 1 [x] 2 [] 3 [] 4   4. Standing up from a chair using your arms (e.g., wheelchair, or bedside chair)? [] 1 [x] 2 [] 3 [] 4   5. Walking in hospital room? [] 1 [] 2 [x] 3 [] 4   6. Climbing 3-5 steps with a railing?+   [] 1 [] 2 [] 3 [] 4   +If stair climbing cannot be assessed, skip item #6. Sum responses from items 1-5.

## 2022-12-09 NOTE — ROUTINE PROCESS
Bedside and Verbal shift change report given to Yolis Mancini RN by Carlos Burgess RN. Report included the following information SBAR, Kardex, Intake/Output and MAR.

## 2022-12-09 NOTE — NURSE NAVIGATOR
Waynetta Mask post spine rounding. Patient educated: Activity:  OOB for all meals, walk every hour to prevent blood clots  If your surgeon wants you to wear a back brace. Wear it per your surgeon's instructions. Promoting Circulation:   Use SCD pumps except when walking. Ankle pumps 10 times an hour at hospital & home. Pain Control:  Pain medications side effects discussed. Use ice, distraction, moving, & change position to also help with pain. Narcotics and anesthesia cause constipation so it is important to take stool softener/mild laxative daily while on narcotics. Incentive Spirometry:    Use of incentive spirometer 10 x/hr. Diet:   Eat for healing. Drink enough fluids so urine is the color of lemonade. Medication causes nausea and dizziness if taken on an empty stomach so instructed to make sure to eat a snack before taking any medicaitons. Patient Safety:   Call light & belongings in reach. Call for help when want to walk or get OOB. Mobility Intervention:       [] Pt dangled at edge of bed    [] Pt assisted OOB to bedside commode    [x] Pt assisted OOB to chair    [x] Pt ambulated to bathroom    [] Patient was ambulated in room/hallway    Assistive Device Utilized:       [x] Rolling walker   [] Crutches   [] Back Brace   [] Knee immobilizer   [] Neck Collar    After Rounding and Checking on Patient     [x] Patient left in no apparent distress sitting up in chair  [] Patient left in no apparent distress in bed  [x] Call bell left within reach  [] SCDs on both legs & machine turned on  [] Knee immobilizer on  [] Ice applied  [x] RN notified  []  present  [] Bed alarm activated    Reason patient not mobilized:      [] Patient refused   [] Nausea/vomiting   [] Low blood pressure   [] Drowsy/lethargic    Pain Rating:     [] 1  [] 6  [] 2  [] 7  [] 3  [] 8  [] 4  [] 9  [x] 5  [] 10    Left patient with call light, cell phone and personal belongings in reach for safety.       Quan Garcia Dorcas verbalized understands but needs ongoing reinforcement. Given the opportunity for asking questions.

## 2022-12-09 NOTE — PROGRESS NOTES
[address] at this time. 2019 - Patient A&Ox4, RA. Denies chest pain and SOB. Honeycomb dressing to lower back C/D/I. Mepilex present to previous JAYE site with small amount of old breakthrough drainage present. Denies numbness/tingling/calf pain. Pain 6/10 with a tolerable level of 4/10, pain medication administered per MAR. SCD compression device and TEDs bilaterally. Pt educated on IS use, q2h rounds, and pain management. Pt verbalized understanding, no concerns voiced. Call bell and telephone within reach, bed in lowest position. Pt encouraged to call for assistance. 5 - Per Junior Banuelos RN pt requested to get out of bed. Once pt was assisted to EOB pt decided to stay in bed. Pt was assisted back to lying position.

## 2022-12-10 LAB
GLUCOSE BLD STRIP.AUTO-MCNC: 150 MG/DL (ref 70–110)
GLUCOSE BLD STRIP.AUTO-MCNC: 153 MG/DL (ref 70–110)
GLUCOSE BLD STRIP.AUTO-MCNC: 157 MG/DL (ref 70–110)
GLUCOSE BLD STRIP.AUTO-MCNC: 218 MG/DL (ref 70–110)

## 2022-12-10 PROCEDURE — 82962 GLUCOSE BLOOD TEST: CPT

## 2022-12-10 PROCEDURE — 97116 GAIT TRAINING THERAPY: CPT

## 2022-12-10 PROCEDURE — 74011250637 HC RX REV CODE- 250/637: Performed by: ORTHOPAEDIC SURGERY

## 2022-12-10 PROCEDURE — 74011000250 HC RX REV CODE- 250: Performed by: ORTHOPAEDIC SURGERY

## 2022-12-10 PROCEDURE — 65270000029 HC RM PRIVATE

## 2022-12-10 PROCEDURE — 97530 THERAPEUTIC ACTIVITIES: CPT

## 2022-12-10 PROCEDURE — 74011636637 HC RX REV CODE- 636/637: Performed by: ORTHOPAEDIC SURGERY

## 2022-12-10 RX ADMIN — SODIUM CHLORIDE, PRESERVATIVE FREE 10 ML: 5 INJECTION INTRAVENOUS at 13:21

## 2022-12-10 RX ADMIN — LISINOPRIL 40 MG: 20 TABLET ORAL at 09:13

## 2022-12-10 RX ADMIN — GABAPENTIN 600 MG: 300 CAPSULE ORAL at 09:13

## 2022-12-10 RX ADMIN — GABAPENTIN 600 MG: 300 CAPSULE ORAL at 21:40

## 2022-12-10 RX ADMIN — ACETAMINOPHEN 1000 MG: 500 TABLET ORAL at 02:59

## 2022-12-10 RX ADMIN — FAMOTIDINE 40 MG: 20 TABLET, FILM COATED ORAL at 09:13

## 2022-12-10 RX ADMIN — TAMSULOSIN HYDROCHLORIDE 0.4 MG: 0.4 CAPSULE ORAL at 09:13

## 2022-12-10 RX ADMIN — ACETAMINOPHEN 1000 MG: 500 TABLET ORAL at 21:39

## 2022-12-10 RX ADMIN — Medication 2 UNITS: at 17:37

## 2022-12-10 RX ADMIN — Medication 2 UNITS: at 09:12

## 2022-12-10 RX ADMIN — HYDROMORPHONE HYDROCHLORIDE 4 MG: 4 TABLET ORAL at 21:41

## 2022-12-10 RX ADMIN — DOCUSATE SODIUM 100 MG: 100 CAPSULE ORAL at 09:14

## 2022-12-10 RX ADMIN — Medication 4 UNITS: at 13:17

## 2022-12-10 RX ADMIN — DIAZEPAM 5 MG: 5 TABLET ORAL at 15:24

## 2022-12-10 RX ADMIN — Medication 2 UNITS: at 21:40

## 2022-12-10 RX ADMIN — OXYCODONE 10 MG: 5 TABLET ORAL at 06:27

## 2022-12-10 RX ADMIN — DOCUSATE SODIUM 100 MG: 100 CAPSULE ORAL at 21:40

## 2022-12-10 RX ADMIN — HYDROMORPHONE HYDROCHLORIDE 4 MG: 4 TABLET ORAL at 17:37

## 2022-12-10 RX ADMIN — ACETAMINOPHEN 1000 MG: 500 TABLET ORAL at 09:13

## 2022-12-10 RX ADMIN — FAMOTIDINE 40 MG: 20 TABLET, FILM COATED ORAL at 21:40

## 2022-12-10 RX ADMIN — OXYCODONE 10 MG: 5 TABLET ORAL at 13:19

## 2022-12-10 RX ADMIN — SODIUM CHLORIDE, PRESERVATIVE FREE 10 ML: 5 INJECTION INTRAVENOUS at 06:29

## 2022-12-10 RX ADMIN — DIAZEPAM 5 MG: 5 TABLET ORAL at 09:30

## 2022-12-10 RX ADMIN — GABAPENTIN 600 MG: 300 CAPSULE ORAL at 15:24

## 2022-12-10 RX ADMIN — SODIUM CHLORIDE, PRESERVATIVE FREE 10 ML: 5 INJECTION INTRAVENOUS at 21:41

## 2022-12-10 NOTE — PROGRESS NOTES
2026 - Head to toe assessment completed at this time. Pt denies CP and SOB. Pt rated pain 0/10. 20G Left Hand saline locked. Honeycomb dressing in place to back CDI. Incentive spirometer encouraged. Bed in lowest position. Call bell and personal belongings within reach. Will continue to monitor. 2929 - Pt rated pain 10/10 and medicated per MAR.

## 2022-12-10 NOTE — PROGRESS NOTES
Problem: Mobility Impaired (Adult and Pediatric)  Goal: *Acute Goals and Plan of Care (Insert Text)  Description: Goals to be addressed in 1-3 days:  1. Supine to sit and sit to supine SBA with HR for meals. 2. Sit to stand and stand to sit SBA/CGA with RW in prep for ambulation. 3. Ambulate 100ft SBA/CGA with RW, WBAT, for home/community mobility. Outcome: Progressing Towards Goal.    []  Patient has met MD glenroy eddy for d/c home   []  Recommend HH with 24 hour adult care   [x]  Benefit from additional acute PT session to address:  Functional mobility and ROM/motor performance deficits      PHYSICAL THERAPY TREATMENT    Patient: Villa Burciaga (11 y.o. male)  Date: 12/10/2022  Diagnosis: Spinal stenosis of lumbar region at multiple levels [M48.061]  Lumbar spondylosis [M47.816] <principal problem not specified>  Procedure(s) (LRB):  LUMBAR TWO/THREE, LUMBAR THREE/FOUR, LUMBAR FOUR/FIVE, LUMBAR FIVE/SACRAL ONE LAMINECTOMY FUSION, TRANSFORAMINAL LUMBAR INTERBODY FUSION WITH C-ARM (N/A) 3 Days Post-Op  Precautions: Fall, Back  PLOF: Independent amb w/o AD    ASSESSMENT:  Pt EOB on arrival and calling for assist to pick urinal up off floor. Pt with multiple c/o regarding hospitalization (nurse aware). Willing to participate with therapy session. Requires mod A/additional time and bed ht elevated sit>stand from bedside. Gait distance 30ft with RW/CGA and additional time. Bing slow and antalgic with step to gt. Pt assisted to bathroom and voided while standing; completed hand hygiene  with vc/supervision. Pt left up in chair eating meal.  Reports pain back 7-9/10 pre and 9/10 post session.     Progression toward goals:   []      Improving appropriately and progressing toward goals  [x]      Improving slowly and progressing toward goals  []      Not making progress toward goals and plan of care will be adjusted     PLAN:  Patient continues to benefit from skilled intervention to address the above impairments. Continue treatment per established plan of care. Discharge Recommendations: Rehab and Skilled Nursing Facility  Further Equipment Recommendations for Discharge:  TBD at next level of care    [facility]: 14/20    This [facility] score should be considered in conjunction with interdisciplinary team recommendations to determine the most appropriate discharge setting. Patient's social support, diagnosis, medical stability, and prior level of function should also be taken into consideration. SUBJECTIVE:   Patient stated Tired; I need some B12.    OBJECTIVE DATA SUMMARY:   Critical Behavior:  Neurologic State: Alert, Appropriate for age  Orientation Level: Oriented X4  Cognition: Appropriate for age attention/concentration, Appropriate decision making, Appropriate safety awareness  Safety/Judgement: Awareness of environment  Functional Mobility Training:  Bed Mobility: n/a pt seated EOB on arrival  Transfers:  Sit to Stand: Minimum assistance; Moderate assistance (bed elev'd and additional time)  Stand to Sit: Minimum assistance  Balance:  Sitting: Impaired; With support  Sitting - Static: Fair (occasional)  Sitting - Dynamic: Fair (occasional)  Standing: Impaired; With support  Standing - Static: Fair  Standing - Dynamic : Fair    Ambulation/Gait Training:  Distance (ft): 30 Feet (ft)  Assistive Device: Gait belt;Walker, rolling (GB placed pectorally)  Ambulation - Level of Assistance: Contact guard assistance; Additional time  Gait Description (WDL): Exceptions to WDL  Gait Abnormalities: Antalgic;Decreased step clearance; Step to gait  Base of Support: Widened  Stance: Left decreased;Right decreased  Speed/Bing: Slow  Step Length: Right shortened;Left shortened  Pain:  Pain level pre-treatment: 7-9/10  Pain level post-treatment: 9/10   Pain Intervention(s): Medication (see MAR);  Rest, Ice, Repositioning   Response to intervention: Nurse notified, See doc flow  Activity Tolerance:   Cindra Romberg Alonna Le (-) limited by pain  Please refer to the flowsheet for vital signs taken during this treatment. After treatment:   [x] Patient left in no apparent distress sitting up in chair  [] Patient left in no apparent distress in bed  [x] Call bell left within reach  [x] Nursing notified-Kaylan of pt request for pain meds  [] Caregiver present  [] Bed alarm activated  [] SCDs applied      COMMUNICATION/EDUCATION:   [x]         Role of Physical Therapy in the acute care setting. [x]         Fall prevention education was provided and the patient/caregiver indicated understanding. [x]         Patient/family have participated as able in working toward goals and plan of care. [x]         Patient/family agree to work toward stated goals and plan of care. []         Patient understands intent and goals of therapy, but is neutral about his/her participation. []         Patient is unable to participate in stated goals/plan of care: ongoing with therapy staff.  []         Other:        Lilibeth High, PT   Time Calculation: 32 mins    [address] [facility] [address] [address] AM-PAC® Basic Mobility Inpatient Short Form (6-Clicks) Version 2    How much HELP from another person does the patient currently need    (If the patient hasn't done an activity recently, how much help from another person do you think he/she would need if he/she tried?)   Total (Total A or Dep)   A Lot  (Mod to Max A)   A Little (Sup or Min A)   None (Mod I to I)   Turning from your back to your side while in a flat bed without using bedrails? [] 1 [] 2 [x] 3 [] 4   2. Moving from lying on your back to sitting on the side of a flat bed without using bedrails? [] 1 [] 2 [x] 3 [] 4   3. Moving to and from a bed to a chair (including a wheelchair)? [] 1 [] 2 [x] 3 [] 4   4. Standing up from a chair using your arms (e.g., wheelchair, or bedside chair)? [] 1 [x] 2 [] 3 [] 4   5. Walking in hospital room? [] 1 [] 2 [x] 3 [] 4   6.  Climbing 3-5 steps with a railing?+   [] 1 [] 2 [] 3 [] 4 +If stair climbing cannot be assessed, skip item #6. Sum responses from items 1-5. Based on an AM-PAC score of /24 (14/20 if omitting stairs) and their current functional mobility deficits, it is recommended that the patient have 3-5 sessions per week of Physical Therapy at d/c to increase the patient's independence.

## 2022-12-10 NOTE — ROUTINE PROCESS
Bedside and Verbal shift change report given to Josh Mike RN (oncoming nurse) by Genevieve Rawls RN (offgoing nurse). Report included the following information SBAR, Kardex, Intake/Output, MAR, and Recent Results.

## 2022-12-10 NOTE — PROGRESS NOTES
Vss  Afeb  Neuro intact  C/o expected back pain  Incentive spirometer  Mobilize  Pt ot  Plan  Rehab placement Monday

## 2022-12-10 NOTE — PROGRESS NOTES
[facility] care of patient at this time. Patient AAOx3. PIV is C/D/I and SL, no s/s of infiltration or phlebitis. VSS on RA. Breath sounds clear bilaterally. Patient reaching 1500 on IS. Dressing to lower back is C/D/I. Sensation intact to BUE and BLE. Pedal pulses present and palpable bilaterally. Radial pulses present and palpable bilaterally. No other concerns at this time. Possessions and call bell within reach, will continue to monitor. 0930  500mL urine emptied from urinal.  Patient states pain 10/10, PRN valium given per mar. 8844  PT in to see patient. 1235  Patient sitting up in chair. 1336  Patient returned to bed. 1319  Patient states pain 10/10, PRN pain meds given per mar.

## 2022-12-10 NOTE — ROUTINE PROCESS
Bedside and Verbal shift change report given to Aguila Candelaria RN by Ray Martínez RN Report included the following information SBAR and Kardex.

## 2022-12-11 LAB
GLUCOSE BLD STRIP.AUTO-MCNC: 141 MG/DL (ref 70–110)
GLUCOSE BLD STRIP.AUTO-MCNC: 146 MG/DL (ref 70–110)
GLUCOSE BLD STRIP.AUTO-MCNC: 210 MG/DL (ref 70–110)
GLUCOSE BLD STRIP.AUTO-MCNC: 262 MG/DL (ref 70–110)

## 2022-12-11 PROCEDURE — 74011250637 HC RX REV CODE- 250/637: Performed by: ORTHOPAEDIC SURGERY

## 2022-12-11 PROCEDURE — 82962 GLUCOSE BLOOD TEST: CPT

## 2022-12-11 PROCEDURE — 97530 THERAPEUTIC ACTIVITIES: CPT

## 2022-12-11 PROCEDURE — 74011636637 HC RX REV CODE- 636/637: Performed by: ORTHOPAEDIC SURGERY

## 2022-12-11 PROCEDURE — 65270000029 HC RM PRIVATE

## 2022-12-11 PROCEDURE — 74011000250 HC RX REV CODE- 250: Performed by: ORTHOPAEDIC SURGERY

## 2022-12-11 RX ADMIN — HYDROMORPHONE HYDROCHLORIDE 4 MG: 4 TABLET ORAL at 22:02

## 2022-12-11 RX ADMIN — SODIUM CHLORIDE, PRESERVATIVE FREE 10 ML: 5 INJECTION INTRAVENOUS at 05:51

## 2022-12-11 RX ADMIN — FAMOTIDINE 40 MG: 20 TABLET, FILM COATED ORAL at 22:02

## 2022-12-11 RX ADMIN — Medication 4 UNITS: at 22:02

## 2022-12-11 RX ADMIN — HYDROMORPHONE HYDROCHLORIDE 4 MG: 4 TABLET ORAL at 17:35

## 2022-12-11 RX ADMIN — DOCUSATE SODIUM 100 MG: 100 CAPSULE ORAL at 08:42

## 2022-12-11 RX ADMIN — ACETAMINOPHEN 1000 MG: 500 TABLET ORAL at 02:29

## 2022-12-11 RX ADMIN — TAMSULOSIN HYDROCHLORIDE 0.4 MG: 0.4 CAPSULE ORAL at 08:42

## 2022-12-11 RX ADMIN — SODIUM CHLORIDE, PRESERVATIVE FREE 10 ML: 5 INJECTION INTRAVENOUS at 17:36

## 2022-12-11 RX ADMIN — GABAPENTIN 600 MG: 300 CAPSULE ORAL at 08:42

## 2022-12-11 RX ADMIN — GABAPENTIN 600 MG: 300 CAPSULE ORAL at 17:35

## 2022-12-11 RX ADMIN — HYDROMORPHONE HYDROCHLORIDE 4 MG: 4 TABLET ORAL at 03:56

## 2022-12-11 RX ADMIN — DOCUSATE SODIUM 100 MG: 100 CAPSULE ORAL at 22:02

## 2022-12-11 RX ADMIN — DIAZEPAM 5 MG: 5 TABLET ORAL at 06:40

## 2022-12-11 RX ADMIN — OXYCODONE 10 MG: 5 TABLET ORAL at 10:23

## 2022-12-11 RX ADMIN — GABAPENTIN 600 MG: 300 CAPSULE ORAL at 22:02

## 2022-12-11 RX ADMIN — HYDROMORPHONE HYDROCHLORIDE 4 MG: 4 TABLET ORAL at 08:42

## 2022-12-11 RX ADMIN — FAMOTIDINE 40 MG: 20 TABLET, FILM COATED ORAL at 08:42

## 2022-12-11 RX ADMIN — ACETAMINOPHEN 1000 MG: 500 TABLET ORAL at 13:26

## 2022-12-11 RX ADMIN — LISINOPRIL 40 MG: 20 TABLET ORAL at 08:42

## 2022-12-11 RX ADMIN — ACETAMINOPHEN 1000 MG: 500 TABLET ORAL at 08:42

## 2022-12-11 RX ADMIN — SODIUM CHLORIDE, PRESERVATIVE FREE 10 ML: 5 INJECTION INTRAVENOUS at 22:01

## 2022-12-11 RX ADMIN — Medication 2 UNITS: at 13:27

## 2022-12-11 RX ADMIN — OXYCODONE 10 MG: 5 TABLET ORAL at 19:34

## 2022-12-11 NOTE — PROGRESS NOTES
OCCUPATIONAL THERAPY TREATMENT    Problem: Self Care Deficits Care Plan (Adult)  Goal: *Acute Goals and Plan of Care (Insert Text)  Description: Initial Occupational Therapy Goals (12/8/2022) Within 7 day(s):  1. Patient will perform toilet transfer with CGA in preparation for bowel and bladder management. 2. Patient will perform bowel and bladder management with CGA for increased independence with ADLs. 3. Patient will perform UB dressing with supervision for increased independence with ADLs. 4. Patient will perform LB dressing with CGA & A/E PRN for increased independence with ADLs. 5. Patient will adhere to back/spinal precautions 100% of the time with 1-2 verbal cues for increased independence with ADLs. 6. Patient will utilize energy conservation techniques with 1 verbal cue(s) for increased independence with ADLs. Outcome: Progressing Towards Goal     Patient: Kalie Clements (1 y.o. male)  Date: 12/11/2022  Diagnosis: Spinal stenosis of lumbar region at multiple levels [M48.061]  Lumbar spondylosis [M47.816] <principal problem not specified>  Procedure(s) (LRB):  LUMBAR TWO/THREE, LUMBAR THREE/FOUR, LUMBAR FOUR/FIVE, LUMBAR FIVE/SACRAL ONE LAMINECTOMY FUSION, TRANSFORAMINAL LUMBAR INTERBODY FUSION WITH C-ARM (N/A) 4 Days Post-Op  Precautions: Fall, Back    Chart, occupational therapy assessment, plan of care, and goals were reviewed. ASSESSMENT:  Pt presents supine in bed, frustrated about needing to adjust himself in bed. When asked what he needed, pt begins to rant on pain levels, hating the hospital, and how long everything takes. When asked how this therapist can help him pt does not give an answer. When offering to help get pt out of bed so that we can change sheets, get new bed covers, etc, pt continues to complain and state that he wont do it. Educated pt on bed positioining, performed rolling, bridging to get repsoitiong which took mod A for all parts.  Pt continues to be irritable and verbally aggressive towards this therapist when asked how else he can be assisted to make him more comfortable and give him best care. Pt left with all needs in reach, supine. Will need most likely need rehab placement  Progression toward goals:  []          Improving appropriately and progressing toward goals  []          Improving slowly and progressing toward goals  [x]          Not making progress toward goals and plan of care will be adjusted     PLAN:  Patient continues to benefit from skilled intervention to address the above impairments. Continue treatment per established plan of care. Discharge Recommendations:  Rehab  Further Equipment Recommendations for Discharge:  TBD     SUBJECTIVE:   Patient stated I dont know man it doesn't matter help me.     OBJECTIVE DATA SUMMARY:   Cognitive/Behavioral Status:  Neurologic State: Alert, Agitated  Orientation Level: Appropriate for age  Cognition: Decreased attention/concentration, Decreased command following, Impaired decision making  Safety/Judgement: Decreased awareness of need for assistance, Decreased insight into deficits    Functional Mobility and Transfers for ADLs:   Bed Mobility:  Rolling: Moderate assistance  Supine to Sit: Moderate assistance  Sit to Supine: Moderate assistance       Balance:  Sitting: Impaired    ADL Intervention:       Cognitive Retraining  Orientation Retraining: Reorienting  Problem Solving: General alternative solution; Identifying the problem  Executive Functions: Executing cognitive plans;Managing time;Regulating behavior  Organizing/Sequencing: Breaking task down  Safety/Judgement: Decreased awareness of need for assistance;Decreased insight into deficits    Pain:  Pain level pre-treatment: 8/10   Pain level post-treatment: 8/10  Pain Intervention(s): Medication administered by RN (see MAR);  Rest, Ice, Repositioning   Response to intervention: Nurse notified, See doc flow sheet    Activity Tolerance:    Low    Please refer to the flowsheet for vital signs taken during this treatment. After treatment:   []  Patient left in no apparent distress sitting up in chair  [x]  Patient left in no apparent distress in bed  [x]  Call bell left within reach  [x]  Nursing notified  []  Caregiver present  [x]  Bed alarm activated    COMMUNICATION/EDUCATION:   [x] Role of Occupational Therapy in the acute care setting  [x] Home safety education was provided and the patient/caregiver indicated understanding. [x] Patient/family have participated as able in working towards goals and plan of care. [x] Patient/family agree to work toward stated goals and plan of care. [] Patient understands intent and goals of therapy, but is neutral about his/her participation. [] Patient is unable to participate in goal setting and plan of care.       Thank you for this referral.  Ceci WRIGHT, OTR/L   Time Calculation: 30 mins

## 2022-12-11 NOTE — PROGRESS NOTES
0175 - Bedside report received from [facility]. Patient in bed resting with eyes closed at this time. Pain FLACC 0/10. Plan of care for the day addressed with the patient. 3013 - Patient in bed at this time. A/O x 4. IV to left hand intact and patent. Teds and SCDs to bilateral legs. Honey comb dressing to lower back CDI. Denies numbness/tingling. Pedal pulses palpable. Lungs clear. Bowel sounds active to all quadrants. Pain 8/10 PRN pain medication administered per patient request. Patient . 1023 - Pain 7/10. PRN  pain medication administered at this time. Patient has been educated on side effects. Bed pad changed. Patient pulled up in bed. 1735 - Pain 8/10. PRN pain medication administered at this time. Patient has been educated on side effects. 1919 - Bedside and Verbal shift change report given to Jermain Rangel by Brodie Johnson RN. Report included the following information SBAR, Kardex, OR Summary, Intake/Output and MAR. Shift Summary: Patient voiding without difficulty. Patient with several complaints of care despite all needs being met and all requests being fulfilled. Patient refused mobility with several attempts this shift. Patient would only allow repositioning. Patient irritable and aggressive verbally to staff consistently throughout shift. This nurse remained calm, addressed all patient needs as requested and continues with a positive approach in an attempt to de-escalate on each encounter.

## 2022-12-11 NOTE — PROGRESS NOTES
1941 - Head to toe assessment completed at this time. Pt denies CP and SOB. Pt rated pain 8/10. No distress noted. 20G Left Hand saline locked. Honeycomb dressing in place to back CDI. Incentive spirometer encouraged. Bed in lowest position. Call bell and personal belongings within reach. Will continue to monitor. 2141 - Pt rate pain 6/10 and medicate per MAR.      0357 - Pt rated pain 7/10 and medicated per MAR.     0640 - PRN Diazepam administered for spasms.

## 2022-12-11 NOTE — ROUTINE PROCESS
Bedside and Verbal shift change report given to Jaja Moss RN by Rena Billings RN Report included the following information SBAR and Kardex.

## 2022-12-11 NOTE — ROUTINE PROCESS
Bedside and Verbal shift change report given to [address], RN (oncoming nurse) by Kat Guerrero RN (offgoing nurse). Report included the following information SBAR, Kardex, Intake/Output, MAR, and Recent Results.

## 2022-12-12 ENCOUNTER — APPOINTMENT (OUTPATIENT)
Dept: CT IMAGING | Age: 62
DRG: 455 | End: 2022-12-12
Attending: ORTHOPAEDIC SURGERY
Payer: COMMERCIAL

## 2022-12-12 VITALS
OXYGEN SATURATION: 99 % | RESPIRATION RATE: 16 BRPM | DIASTOLIC BLOOD PRESSURE: 60 MMHG | TEMPERATURE: 98.3 F | HEART RATE: 94 BPM | SYSTOLIC BLOOD PRESSURE: 108 MMHG

## 2022-12-12 LAB
GLUCOSE BLD STRIP.AUTO-MCNC: 149 MG/DL (ref 70–110)
GLUCOSE BLD STRIP.AUTO-MCNC: 155 MG/DL (ref 70–110)
GLUCOSE BLD STRIP.AUTO-MCNC: 170 MG/DL (ref 70–110)

## 2022-12-12 PROCEDURE — 97530 THERAPEUTIC ACTIVITIES: CPT

## 2022-12-12 PROCEDURE — 74011250637 HC RX REV CODE- 250/637: Performed by: ORTHOPAEDIC SURGERY

## 2022-12-12 PROCEDURE — 74011636637 HC RX REV CODE- 636/637: Performed by: ORTHOPAEDIC SURGERY

## 2022-12-12 PROCEDURE — 74011250637 HC RX REV CODE- 250/637: Performed by: PHYSICIAN ASSISTANT

## 2022-12-12 PROCEDURE — 97116 GAIT TRAINING THERAPY: CPT

## 2022-12-12 PROCEDURE — 74011000250 HC RX REV CODE- 250: Performed by: ORTHOPAEDIC SURGERY

## 2022-12-12 PROCEDURE — 72131 CT LUMBAR SPINE W/O DYE: CPT

## 2022-12-12 PROCEDURE — 82962 GLUCOSE BLOOD TEST: CPT

## 2022-12-12 RX ORDER — HYDROMORPHONE HYDROCHLORIDE 4 MG/1
4 TABLET ORAL
Qty: 28 TABLET | Refills: 0 | Status: SHIPPED | OUTPATIENT
Start: 2022-12-12 | End: 2022-12-19

## 2022-12-12 RX ORDER — POLYETHYLENE GLYCOL 3350 17 G/17G
17 POWDER, FOR SOLUTION ORAL DAILY
Status: DISCONTINUED | OUTPATIENT
Start: 2022-12-12 | End: 2022-12-13 | Stop reason: HOSPADM

## 2022-12-12 RX ORDER — POLYETHYLENE GLYCOL 3350 17 G/17G
17 POWDER, FOR SOLUTION ORAL DAILY
Qty: 116 G | Refills: 0 | Status: SHIPPED | OUTPATIENT
Start: 2022-12-12

## 2022-12-12 RX ADMIN — Medication 2 UNITS: at 17:10

## 2022-12-12 RX ADMIN — SODIUM CHLORIDE, PRESERVATIVE FREE 10 ML: 5 INJECTION INTRAVENOUS at 13:10

## 2022-12-12 RX ADMIN — LISINOPRIL 40 MG: 20 TABLET ORAL at 11:10

## 2022-12-12 RX ADMIN — HYDROMORPHONE HYDROCHLORIDE 4 MG: 4 TABLET ORAL at 17:11

## 2022-12-12 RX ADMIN — HYDROMORPHONE HYDROCHLORIDE 4 MG: 4 TABLET ORAL at 05:29

## 2022-12-12 RX ADMIN — GABAPENTIN 600 MG: 300 CAPSULE ORAL at 11:09

## 2022-12-12 RX ADMIN — DIAZEPAM 5 MG: 5 TABLET ORAL at 11:10

## 2022-12-12 RX ADMIN — ACETAMINOPHEN 1000 MG: 500 TABLET ORAL at 17:11

## 2022-12-12 RX ADMIN — POLYETHYLENE GLYCOL 3350 17 G: 17 POWDER, FOR SOLUTION ORAL at 11:10

## 2022-12-12 RX ADMIN — HYDROMORPHONE HYDROCHLORIDE 4 MG: 4 TABLET ORAL at 11:10

## 2022-12-12 RX ADMIN — FAMOTIDINE 40 MG: 20 TABLET, FILM COATED ORAL at 11:09

## 2022-12-12 RX ADMIN — DOCUSATE SODIUM 100 MG: 100 CAPSULE ORAL at 11:10

## 2022-12-12 RX ADMIN — GABAPENTIN 600 MG: 300 CAPSULE ORAL at 17:11

## 2022-12-12 RX ADMIN — TAMSULOSIN HYDROCHLORIDE 0.4 MG: 0.4 CAPSULE ORAL at 11:09

## 2022-12-12 RX ADMIN — HYDROMORPHONE HYDROCHLORIDE 4 MG: 4 TABLET ORAL at 01:44

## 2022-12-12 RX ADMIN — DIAZEPAM 5 MG: 5 TABLET ORAL at 03:15

## 2022-12-12 RX ADMIN — SODIUM CHLORIDE, PRESERVATIVE FREE 10 ML: 5 INJECTION INTRAVENOUS at 05:29

## 2022-12-12 RX ADMIN — Medication 2 UNITS: at 13:07

## 2022-12-12 RX ADMIN — ACETAMINOPHEN 1000 MG: 500 TABLET ORAL at 01:44

## 2022-12-12 RX ADMIN — ACETAMINOPHEN 1000 MG: 500 TABLET ORAL at 11:10

## 2022-12-12 NOTE — PROGRESS NOTES
Spoke with patient via phone. Informed patient that he would likely not qualify for rehab under [employer] and the option would be home with home health. Discussed home health coverage as well as criteria for rehab. Patient stated he has 100 days of rehab benefit under [employer]. Explained that he would need to meet the criteria to access the rehab benefit. Explained that he was ambulating 150 feet and able to go up and down 5 stairs and would likely not qualify for rehab. Patient requesting a hospital bed for home. Explained that he would not qualify for a hospital bed under his insurance. Explained to [employer] that he will need to plan on going home with home health care. Patient wants to have confirmation of denial of authorization for SNF prior to going home with home health. Voicemail left for Monica Manrique and Ronni Olivares at [facility] to request update on authorization. Patient reports he has a walker at home.     Tania Lee, MSN, RN, ACM-RN  Care Management  [phone]

## 2022-12-12 NOTE — PROGRESS NOTES
D/c plan: [facility] pending insurance auth. Aneta Winn is still pending for [facility]. [facility] has been sent to [facility]. Care Management Interventions  Palliative Care Criteria Met (RRAT>21 & CHF Dx)?: No  Mode of Transport at Discharge: BLS  Transition of Care Consult (CM Consult): Acute Rehab  Discharge Durable Medical Equipment: No  Physical Therapy Consult: Yes  Occupational Therapy Consult: Yes  Speech Therapy Consult: No  Support Systems: No Support Systems  Confirm Follow Up Transport: Friends  The Plan for Transition of Care is Related to the Following Treatment Goals : [facility] for acute rehab.   The Patient and/or Patient Representative was Provided with a Choice of Provider and Agrees with the Discharge Plan?: Yes (The pt is alert and oriented. )  Freedom of Choice List was Provided with Basic Dialogue that Supports the Patient's Individualized Plan of Care/Goals, Treatment Preferences and Shares the Quality Data Associated with the Providers?: Yes [facility])  Discharge Location  Patient Expects to be Discharged to[de-identified] Rehab hospital/unit acute

## 2022-12-12 NOTE — PROGRESS NOTES
Spoke with patient via phone. Explained that he will no longer qualify for rehab and would need to discharge with [facility]. Patient agreeable. [place] of choice for [facility]. Referral sent to [facility].     Levon Painter, MSN, RN, ACM-RN  Care Management  [phone]

## 2022-12-12 NOTE — ROUTINE PROCESS
Bedside and Verbal shift change report given to Katerina Miguel RN by Karie Barriga RN. Report included the following information SBAR, Kardex, Intake/Output and MAR.

## 2022-12-12 NOTE — PROGRESS NOTES
Problem: Mobility Impaired (Adult and Pediatric)  Goal: *Acute Goals and Plan of Care (Insert Text)  Description: Goals to be addressed in 1-3 days:  1. Supine to sit and sit to supine SBA with HR for meals. 2. Sit to stand and stand to sit SBA/CGA with RW in prep for ambulation. 3. Ambulate 100ft SBA/CGA with RW, WBAT, for home/community mobility. Note: [x]  Patient has met MD glenroy eddy for d/c home   []  Recommend HH with 24 hour adult care   []  Benefit from additional acute PT session to address:      PHYSICAL THERAPY TREATMENT AND DISCHARGE    Patient: Rashid Thomas (45 y.o. male)  Date: 12/12/2022  Diagnosis: Spinal stenosis of lumbar region at multiple levels [M48.061]  Lumbar spondylosis [M47.816] <principal problem not specified>  Procedure(s) (LRB):  LUMBAR TWO/THREE, LUMBAR THREE/FOUR, LUMBAR FOUR/FIVE, LUMBAR FIVE/SACRAL ONE LAMINECTOMY FUSION, TRANSFORAMINAL LUMBAR INTERBODY FUSION WITH C-ARM (N/A) 5 Days Post-Op  Precautions: Fall, Back    ASSESSMENT:  Pt has progressed with mobility. Pt is now able to ambulate 150 ft with RW and SBA. Pt negotiated 5 stairs with handrail and CGA. Pt educated on proper technique with transfers, home safety, activity recommendations, pacing, log rolling, spinal precautions, positioning, icing, car transfers and stair negotiation. Pt unreceptive to education however education has been provided. Pt with many complaints. Recommend transition to home with home health PT services.   Progression toward goals:   [x]      Improving appropriately and progressing toward goals  []      Improving slowly and progressing toward goals  []      Not making progress toward goals and plan of care will be adjusted     PLAN:  Transition to home with home health PT  Discharge Recommendations: Home Health  Further Equipment Recommendations for Discharge:  N/A    [facility]: 18/24    This AMPA score should be considered in conjunction with interdisciplinary team recommendations to determine the most appropriate discharge setting. Patient's social support, diagnosis, medical stability, and prior level of function should also be taken into consideration. SUBJECTIVE:   Patient stated I am going home, no one is taking care of me here.     OBJECTIVE DATA SUMMARY:   Critical Behavior:  Neurologic State: Alert, Appropriate for age  Orientation Level: Oriented X4  Cognition: Appropriate decision making  Safety/Judgement: Fall prevention  Functional Mobility Training:  Bed Mobility:   Supine to Sit: Contact guard assistance  Sit to Supine: Contact guard assistance  Transfers:  Sit to Stand: Stand-by assistance;Contact guard assistance  Stand to Sit: Contact guard assistance  Bed to Chair: Contact guard assistance  Balance:  Sitting: Intact  Sitting - Static: Good (unsupported)  Sitting - Dynamic: Good (unsupported)  Standing: Intact; With support  Standing - Static: Good  Standing - Dynamic : Good  Ambulation/Gait Training:  Distance (ft): 150 Feet (ft)  Assistive Device: Gait belt;Walker, rolling  Ambulation - Level of Assistance: Stand-by assistance  Gait Abnormalities: Decreased step clearance  Base of Support: Widened   Speed/Bing: Slow  Step Length: Right shortened;Left shortened  Stairs:  Number of Stairs Trained: 5  Stairs - Level of Assistance: Contact guard assistance  Rail Use: Both  Pain:  Pain level pre-treatment: 10/10  Pain level post-treatment: 10/10   Pain Intervention(s): Medication (see MAR); Rest, Ice, Repositioning   Response to intervention: Nurse notified, See doc flow    Activity Tolerance:   Good  Please refer to the flowsheet for vital signs taken during this treatment.   After treatment:   [] Patient left in no apparent distress sitting up in chair  [x] Patient left in no apparent distress in bed  [x] Call bell left within reach  [x] Nursing notified  [] Caregiver present  [] Bed alarm activated  [] SCDs applied      COMMUNICATION/EDUCATION:   [x] Role of Physical Therapy in the acute care setting. [x]         Fall prevention education was provided and the patient/caregiver indicated understanding. [x]         Patient/family have participated as able in working toward goals and plan of care. []         Patient/family agree to work toward stated goals and plan of care. []         Patient understands intent and goals of therapy, but is neutral about his/her participation. []         Patient is unable to participate in stated goals/plan of care: ongoing with therapy staff.  []         Other:        Christina Rodriguez   Time Calculation: 23 mins    [employer] AM-PAC® Basic Mobility Inpatient Short Form (6-Clicks) Version 2    How much HELP from another person does the patient currently need    (If the patient hasn't done an activity recently, how much help from another person do you think he/she would need if he/she tried?)   Total (Total A or Dep)   A Lot  (Mod to Max A)   A Little (Sup or Min A)   None (Mod I to I)   Turning from your back to your side while in a flat bed without using bedrails? [] 1 [] 2 [x] 3 [] 4   2. Moving from lying on your back to sitting on the side of a flat bed without using bedrails? [] 1 [] 2 [x] 3 [] 4   3. Moving to and from a bed to a chair (including a wheelchair)? [] 1 [] 2 [x] 3 [] 4   4. Standing up from a chair using your arms (e.g., wheelchair, or bedside chair)? [] 1 [] 2 [x] 3 [] 4   5. Walking in hospital room? [] 1 [] 2 [x] 3 [] 4   6. Climbing 3-5 steps with a railing?+   [] 1 [] 2 [x] 3 [] 4   +If stair climbing cannot be assessed, skip item #6. Sum responses from items 1-5.

## 2022-12-12 NOTE — PROGRESS NOTES
LTSS completed and submitted via [facility] CRMS portal in anticipation of SNF placement.   Tracking number [id number], MSN, RN, ACM-RN  Care Management  [phone]

## 2022-12-12 NOTE — PROGRESS NOTES
1924 - Patient in bed at this time. A/O x 4. IV to LUE intact and patent. SCDs to BLE. Honeycomb dressing to back, CDI. No numbness/tingling. Radial an pedal pulses palpable. Lungs clear. Bowel sounds active  to all quadrants. Pain 9/10 in the lower arm. 1934 - Patient rated pain 9/10, pain medication administered per MAR. No other concerns voiced at this time. Call bell within reach, bed in lowest position. Pt encouraged to use call bell for any needs. 2200- Pt in bed resting, even rise and fall of the chest visualized. Respiratory rate 14.    0313- Pt in bed sleeping. Woke them up to take vitals, vitals are within normal range. Bed locked, low, call light within reach.

## 2022-12-12 NOTE — PROGRESS NOTES
Vss  Afeb  Neuro intact  Old drainage noted on dressing  Dressing change  Mobilize   Pt ot  Patient wishes to go home, does not want to go to rehab  Pt has cleared patient for d/c home  C/o 9/10 back pain  CT scan lumbar spine ordered  Patient sleeping well, but states no relief with oxycodone 10mg or dilaudid 4mg  States he has not had BM since being at hospital, unresponsive to colace   Add miralax, if no BM fleet enema

## 2022-12-12 NOTE — PROGRESS NOTES
Ct l spine reviewed by Dr. Jammie Martin benign   Pt ot recommended rehab at d/c  Patient wishes to go home now  Discharge placed   Pain medication and miralax for constipation sent to pharmacy  2 week follow up in clinic

## 2022-12-12 NOTE — PROGRESS NOTES
OCCUPATIONAL THERAPY TREATMENT/DISCHARGE    Problem: Self Care Deficits Care Plan (Adult)  Goal: *Acute Goals and Plan of Care (Insert Text)  Description: Initial Occupational Therapy Goals (12/8/2022) Within 7 day(s):  1. Patient will perform toilet transfer with CGA in preparation for bowel and bladder management. 2. Patient will perform bowel and bladder management with CGA for increased independence with ADLs. 3. Patient will perform UB dressing with supervision for increased independence with ADLs. 4. Patient will perform LB dressing with CGA & A/E PRN for increased independence with ADLs. 5. Patient will adhere to back/spinal precautions 100% of the time with 1-2 verbal cues for increased independence with ADLs. 6. Patient will utilize energy conservation techniques with 1 verbal cue(s) for increased independence with ADLs. Outcome: Progressing Towards Goal     Patient: Orion Murphy (53 y.o. male)  Date: 12/12/2022  Diagnosis: Spinal stenosis of lumbar region at multiple levels [M48.061]  Lumbar spondylosis [M47.816] <principal problem not specified>  Procedure(s) (LRB):  LUMBAR TWO/THREE, LUMBAR THREE/FOUR, LUMBAR FOUR/FIVE, LUMBAR FIVE/SACRAL ONE LAMINECTOMY FUSION, TRANSFORAMINAL LUMBAR INTERBODY FUSION WITH C-ARM (N/A) 5 Days Post-Op  Precautions: Fall, Back      Chart, occupational therapy assessment, plan of care, and goals were reviewed. ASSESSMENT:  Pt sitting EOB, agitated about current state and assistance received in the hospital. Pt seen with PT for safety and second set of skilled hands. Pt able to perform sit to stand, walk in hallway, bathroom ambulation, toilet transfer, stair manipulation with CGA. Pt not receptive to dressing education or ADL education at this time. Pt talks through any education given. Pt is safe to go home at this time, not receiving skilled OT in acute setting.  Recommend HH upon d/c.   Provided opportunity for patient to voice questions/concerns on ADL performance when home, patient voiced no further concerns. PLAN:  Patient will be discharged from occupational therapy at this time. Rationale for discharge:  [x] Goals Achieved  [] [address]  [] Patient not participating in therapy  [] Other:  Discharge Recommendations:  Home Health  Further Equipment Recommendations for Discharge:  Shower chair      SUBJECTIVE:   Patient stated this hospital is terrible.     OBJECTIVE DATA SUMMARY:   Cognitive/Behavioral Status:  Neurologic State: Alert, Appropriate for age  Orientation Level: Oriented X4  Cognition: Appropriate decision making  Safety/Judgement: Fall prevention    Functional Mobility and Transfers for ADLs:   Bed Mobility:     Supine to Sit: Contact guard assistance  Sit to Supine: Maximum assistance      Transfers:  Sit to Stand: Contact guard assistance     Bed to Chair: Contact guard assistance                      Bathroom Mobility: Contact guard assistance        Balance:  Sitting: Intact  Sitting - Static: Good (unsupported)  Sitting - Dynamic: Good (unsupported)  Standing: Intact; With support  Standing - Static: Good  Standing - Dynamic : Good    ADL Intervention:      Cognitive Retraining  Problem Solving: General alternative solution  Executive Functions: Managing time;Regulating behavior  Safety/Judgement: Fall prevention    Pain:  Pain level pre-treatment: 0/10   Pain level post-treatment: 0/10   Pain Intervention(s): Medication administered by RN (see MAR); Rest, Ice, Repositioning   Response to intervention: Nurse notified, See doc flow sheet    Activity Tolerance:    Fair, pt is self limitting to further activity    Please refer to the flowsheet for vital signs taken during this treatment.   After treatment:   []  Patient left in no apparent distress sitting up in chair  []  Patient left in no apparent distress in bed  [x]  Call bell left within reach  [x]  Nursing notified  []  Caregiver present  []  Bed alarm activated    COMMUNICATION/EDUCATION:   [x]      Role of Occupational Therapy in the acute care setting  [x]      Home safety education was provided and the patient/caregiver indicated understanding. [x]      Patient/family have participated as able and agree with findings and recommendations. []      Patient is unable to participate in plan of care at this time. Thank you for allowing me to assist in the care of this patient.   oJanna WRIGHT, OTR/L   Time Calculation: 31 mins

## 2022-12-12 NOTE — ROUTINE PROCESS
Bedside and Verbal shift change report given to WONG Mayfield RN (oncoming nurse) by WONG Santana RN/KIMO Rodriguez RN (offgoing nurse). Report included the following information SBAR, Kardex, OR Summary, Procedure Summary, Intake/Output, and MAR.

## 2022-12-12 NOTE — ROUTINE PROCESS
Bedside and Verbal shift change report given to Tamara Hernandez RN (oncoming nurse) by Colton Zaragoza RN (offgoing nurse). Report included the following information SBAR, Kardex, Intake/Output, MAR, and Recent Results.

## 2022-12-12 NOTE — ROUTINE PROCESS
Bedside and Verbal shift change report given to WONG Mike RN/KIMO Rodriguez RN (oncoming nurse) by Curt Lee RN (offgoing nurse). Report included the following information SBAR, Kardex, OR Summary, Procedure Summary, Intake/Output, and MAR.

## 2022-12-12 NOTE — PROGRESS NOTES
7995  Assumed care of patient at this time. Patient AAOx3. PIV is C/D/I and SL, no s/s of infiltration or phlebitis. VSS on RA. Breath sounds clear bilaterally. Patient reaching 1500 on IS. Honeycomb dressing to back is intact, old breakthrough drainage noted. Sensation intact to BUE and BLE. SCDs intact to BLE. No other concerns at this time. Possessions and call bell within reach, will continue to monitor. 0127  Patient ambulated to chair. 1005  PT in to see patient. 1110  Patient states pain 10/10, meds given per mar. 1128  Patient down for CT scan. 1144  Patient returned to room from CT scan. 1201  OT in to see patient    [phone]  Patient states pain 10/10, meds given per mar. 1857  AVS meds reviewed with SHERRY Harper. Discharge paperwork reviewed with patient, all questions answered. No other concerns at this time.

## 2022-12-12 NOTE — PROGRESS NOTES
[facility] at this time. 1924 - Patient rated pain 9/10, pain medication administered per MAR. No other concerns voiced at this time. Call bell within reach, bed in lowest position. Pt encouraged to use call bell for any needs. 2200 - Patient rated pain 9/10, pain medication administered per MAR. No other concerns voiced at this time. Call bell within reach, bed in lowest position. Pt encouraged to use call bell for any needs. 0108 - Patient rated pain 9/10, pain medication administered per MAR. No other concerns voiced at this time. Call bell within reach, bed in lowest position. Pt encouraged to use call bell for any needs. 4253 - Patient rated pain 10/10, pain medication administered per MAR. No other concerns voiced at this time. Call bell within reach, bed in lowest position. Pt encouraged to use call bell for any needs.

## 2022-12-12 NOTE — DISCHARGE SUMMARY
Discharge/Transfer  Summary     Patient: Ly Bill MRN: [id number]  SSN: [id number]    YOB: 1960  Age: 57 y.o. Sex: male       Admit Date: 12/7/2022    Discharge Date: 12/12/2022      Admission Diagnoses: Spinal stenosis of lumbar region at multiple levels [M48.061]  Lumbar spondylosis [M47.816]    Discharge Diagnoses:   Problem List as of 12/12/2022 Date Reviewed: 10/11/2021            Codes Class Noted - Resolved    Lumbar spondylosis ICD-10-CM: M47.816  ICD-9-CM: 721.3  12/9/2022 - Present        Spinal stenosis of lumbar region at multiple levels ICD-10-CM: M48.061  ICD-9-CM: 724.02  12/7/2022 - Present            Discharge Condition: [address] Course: benign      Disposition: home    Discharge Medications:   Current Discharge Medication List        START taking these medications    Details   HYDROmorphone (DILAUDID) 4 mg tablet Take 1 Tablet by mouth every six (6) hours as needed for Pain for up to 7 days. Max Daily Amount: 16 mg.  Qty: 28 Tablet, Refills: 0    Associated Diagnoses: S/P lumbar fusion      polyethylene glycol (MIRALAX) 17 gram/dose powder Take 17 g by mouth daily. Indications: constipation  Qty: 116 g, Refills: 0           CONTINUE these medications which have NOT CHANGED    Details   aspirin delayed-release 81 mg tablet Take 81 mg by mouth daily. OTHER,NON-FORMULARY, Indications: maxforce prostate 1 tab twice daily      multivitamin (ONE A DAY) tablet Take 1 Tablet by mouth daily. metFORMIN (GLUCOPHAGE) 500 mg tablet TAKE 1 TABLET BY MOUTH TWICE DAILY WITH A MEAL      cholecalciferol (VITAMIN D3) (1000 Units /25 mcg) tablet Take 2,000 Units by mouth daily. gabapentin (NEURONTIN) 300 mg capsule 600 mg three (3) times daily. diclofenac EC (VOLTAREN) 75 mg EC tablet Take 75 mg by mouth two (2) times a day.       lisinopriL (PRINIVIL, ZESTRIL) 40 mg tablet TAKE 1 TABLET BY MOUTH ONCE DAILY FOR BLOOD PRESSURE FOR 90 DAYS      simvastatin (ZOCOR) 40 mg tablet TAKE 1 TABLET BY MOUTH ONCE DAILY IN THE EVENING FOR CHOLESTEROL FOR 90 DAYS      acetaminophen (TYLENOL) 500 mg tablet Take  by mouth every six (6) hours as needed for Pain. Follow-up Appointments   Procedures    FOLLOW UP VISIT Appointment in: Two Weeks     Standing Status:   Standing     Number of Occurrences:   1     Order Specific Question:   Appointment in     Answer:    Two Weeks       Signed By: Alfredo Aguilera PA-C     December 12, 2022

## 2022-12-12 NOTE — DISCHARGE SUMMARY
Discharge/Transfer  Summary     Patient: Kalie Clements MRN: [id number]  SSN: [id number]    YOB: 1960  Age: 57 y.o. Sex: male       Admit Date: 12/7/2022    Discharge Date: 12/12/2022      Admission Diagnoses: Spinal stenosis of lumbar region at multiple levels [M48.061]  Lumbar spondylosis [M47.816]    Discharge Diagnoses:   Problem List as of 12/12/2022 Date Reviewed: 10/11/2021            Codes Class Noted - Resolved    Lumbar spondylosis ICD-10-CM: M47.816  ICD-9-CM: 721.3  12/9/2022 - Present        Spinal stenosis of lumbar region at multiple levels ICD-10-CM: M48.061  ICD-9-CM: 724.02  12/7/2022 - Present            Discharge Condition: [address] Course: benign      Disposition: home    Discharge Medications:   Current Discharge Medication List        START taking these medications    Details    oxyCODONE IR (Roxicodone) 5 mg immediate release tablet Take 1 Tablet by mouth every six (6) hours as needed for Pain for up to 7 days. Max Daily Amount: 20 mg.  Qty: 28 Tablet, Refills: 0    Associated Diagnoses: S/P lumbar fusion                         CONTINUE these medications which have NOT CHANGED    Details   aspirin delayed-release 81 mg tablet Take 81 mg by mouth daily. OTHER,NON-FORMULARY, Indications: maxforce prostate 1 tab twice daily      multivitamin (ONE A DAY) tablet Take 1 Tablet by mouth daily. metFORMIN (GLUCOPHAGE) 500 mg tablet TAKE 1 TABLET BY MOUTH TWICE DAILY WITH A MEAL      cholecalciferol (VITAMIN D3) (1000 Units /25 mcg) tablet Take 2,000 Units by mouth daily. gabapentin (NEURONTIN) 300 mg capsule 600 mg three (3) times daily. diclofenac EC (VOLTAREN) 75 mg EC tablet Take 75 mg by mouth two (2) times a day.       lisinopriL (PRINIVIL, ZESTRIL) 40 mg tablet TAKE 1 TABLET BY MOUTH ONCE DAILY FOR BLOOD PRESSURE FOR 90 DAYS      simvastatin (ZOCOR) 40 mg tablet TAKE 1 TABLET BY MOUTH ONCE DAILY IN THE EVENING FOR CHOLESTEROL FOR 90 DAYS acetaminophen (TYLENOL) 500 mg tablet Take  by mouth every six (6) hours as needed for Pain. Follow-up Appointments   Procedures    FOLLOW UP VISIT Appointment in: Two Weeks     Standing Status:   Standing     Number of Occurrences:   1     Order Specific Question:   Appointment in     Answer:    Two Weeks       Signed By: Bandar Ferreira PA-C     December 12, 2022

## 2022-12-12 NOTE — PROGRESS NOTES
Spoke with Megan Higuera at [facility].. Informed of patient ambulating 150 feet and navigating 5 stairs. Per Ashlie patient will not longer qualify for rehab. Attempted to call patient to inform. No answer.       Vinny Gomez, MSN, RN, ACM-RN  Care Management  [phone]

## 2022-12-13 NOTE — PROGRESS NOTES
CM left a VM for the pt to inform him that [facility] has declined him. Provided him w/ this CM's contact info and w/ the CMS' contact information for him to reach out in regards to [facility].

## 2022-12-13 NOTE — ROUTINE PROCESS
Bedside and Verbal shift change report given to Sarah Gonzales (oncoming nurse) by Gayathri Beverly RN (offgoing nurse). Report included the following information SBAR, Kardex, Intake/Output, MAR, and Recent Results.

## 2022-12-13 NOTE — ROUTINE PROCESS
1945-Pt already discharged, waiting for his ride, on the way. Discharge instructions done with day RN. 2005 Patient discharged via (mode of transport ie. Car, ambulance or air transport) W/C  Patient's arm band appropriately discarded. IV removed. No issues at this time.

## 2022-12-16 NOTE — OP NOTES
[facility] [place]  OPERATIVE REPORT    Name:  Prudence Canchola  MR#:   [id number]  :  1960  ACCOUNT #:  [de-identified]  DATE OF SERVICE:  2022      PREOPERATIVE DIAGNOSES:  Degenerative spondylolisthesis, spinal stenosis, degenerative scoliosis. POSTOPERATIVE DIAGNOSES:  Degenerative spondylolisthesis, spinal stenosis, degenerative scoliosis. PROCEDURES PERFORMED:  L3-4, L4-5, L5-S1 laminectomy; L3-4 transforaminal lumbar interbody fusion with interbody cage titanium, [place] type; L4-5 transforaminal lumbar interbody fusion with interbody cage titanium expanded, [place] type; L5-S1 transforaminal lumbar interbody fusion with titanium cage, [place] type; demineralized bone matrix; segmental spinal instrumentation, [place] L3, L4, L5, S1.    SURGEON:  Debora Desir MD    ASSISTANT:  ZINA Tiwari    ANESTHESIA:  General endotracheal.    COMPLICATIONS:  No complications. SPECIMENS REMOVED:  No specimens. IMPLANTS:  [place]. ESTIMATED BLOOD LOSS:  300 mL. FINDINGS:  The patient had severe central lateral recess and foraminal stenosis, degenerative scoliosis. We were able to restore segmental height, resolve the scoliotic curvature to a large degree and provide anterior column structural support and posterior fixation. OPERATION:  Following induction of general endotracheal anesthesia, the patient was turned to prone position on spinal frame. The patient prepped and draped in usual fashion. Midline incision was made. Paramedian incisions were made in the lumbodorsal fascia and subperiosteal dissection done from L3 to the sacrum, L2-3 had been considered for surgery but the facets appeared to be quiet. There was minimal stenosis and the pedicles of L2 were small, and it was concerned about that being able to be instrumented.   Following confirmation of level and debridement of the spine, pedicle screws were placed in the pedicles of L3, L4, L5, and S1 utilizing anatomic landmarks, C-arm image and direct palpation. Holes were made with an awl. Palpated. Appropriate screws placed at each segment. Fixation was rigid. The ligamentous structures were resected. The lamina bone saved for bone grafting. With the high-speed bur, the bone  were thinned to a thin cortical shell and then resected with graded curettes and Kerrisons from the sacrum up to L3. From pedicle to pedicle, the decompression was completed. Stenosis was dramatic. To address the scoliosis, transforaminal lumbar interbody fusions were done unilaterally to resolve the segmental curvature at each level. Cages were placed with the neural elements protected after evacuating the disk space and filling it with bone graft. At the conclusion, x-rays demonstrated resolution to a large degree of the scoliotic curvature, improvement of segmental lordosis. Care was taken that the neural elements were free and clear of all compression at the conclusion. Lordosed rods were placed against the screw heads and final tightening and torquing done. The intertransverse region had been decorticated, was grafted with a combination of autograft and demineralized bone matrix. A deep drain was placed. Vancomycin powder was utilized for infection prophylaxis. The lumbodorsal fascia was then closed with #1 Vicryl, subcutaneous tissues with 2-0 Vicryl and the skin closed with a 3-0 Monocryl subcuticular suture and Dermabond. A sterile occlusive was dressing placed upon the wound. All counts were correct.       MD MIGUELANGEL [email or web address]  D:  12/15/2022 16:29  T:  12/15/2022 20:58  JOB #:  [id number]

## 2022-12-16 NOTE — PROGRESS NOTES
Physician Progress Note      PATIENT:               Ellie Grande  CSN #:                  [id number]  :                       1960  ADMIT DATE:       2022 6:48 AM  DISCH DATE:        2022 8:10 PM  RESPONDING  PROVIDER #:        Jose Enrique Moyer MD          QUERY TEXT:    Pt admitted for elective surgery. Noted documentation of acute blood loss anemia in the  progress note and discharge summary. If possible, please document in progress notes and discharge summary:    The medical record reflects the following:  Risk Factors: Surgery with ebl 300 mL  Clinical Indicators:   Progress note:  Labs OK HGB 9.7 to 8.9- acute blood loss anemia and hemo-dilution  Discharge Summary:  benign, acute blood loss anemia hgb 8.9  Brief Op note:   mL  22 H&H 14.1/45.2  Treatment: H&H followed    Thank you,  Solomon Navarro [email or web address]  Options provided:  -- Acute blood loss anemia not clinically significant  -- Acute blood loss anemia is clinically significant  -- Other - I will add my own diagnosis  -- Disagree - Not applicable / Not valid  -- Disagree - Clinically unable to determine / Unknown  -- Refer to Clinical Documentation Reviewer    PROVIDER RESPONSE TEXT:    Acute blood loss anemia is not clinically significant.     Query created by: Dominick Andino on 12/15/2022 10:17 AM      Electronically signed by:  Jose Enrique Moyer MD 2022 6:37 AM

## 2022-12-22 ENCOUNTER — HOSPITAL ENCOUNTER (INPATIENT)
Age: 62
LOS: 22 days | Discharge: HOME HEALTH | DRG: 857 | End: 2023-01-13
Attending: EMERGENCY MEDICINE | Admitting: INTERNAL MEDICINE
Payer: COMMERCIAL

## 2022-12-22 ENCOUNTER — APPOINTMENT (OUTPATIENT)
Dept: MRI IMAGING | Age: 62
DRG: 857 | End: 2022-12-22
Attending: EMERGENCY MEDICINE
Payer: COMMERCIAL

## 2022-12-22 ENCOUNTER — ANESTHESIA EVENT (OUTPATIENT)
Dept: SURGERY | Age: 62
DRG: 857 | End: 2022-12-22
Payer: COMMERCIAL

## 2022-12-22 ENCOUNTER — APPOINTMENT (OUTPATIENT)
Dept: GENERAL RADIOLOGY | Age: 62
DRG: 857 | End: 2022-12-22
Attending: EMERGENCY MEDICINE
Payer: COMMERCIAL

## 2022-12-22 DIAGNOSIS — G89.18 POST-OP PAIN: ICD-10-CM

## 2022-12-22 DIAGNOSIS — E86.0 DEHYDRATION: ICD-10-CM

## 2022-12-22 DIAGNOSIS — T88.8XXA FLUID COLLECTION AT SURGICAL SITE, INITIAL ENCOUNTER: ICD-10-CM

## 2022-12-22 DIAGNOSIS — N17.9 AKI (ACUTE KIDNEY INJURY) (HCC): Primary | ICD-10-CM

## 2022-12-22 DIAGNOSIS — N17.9 STAGE 1 ACUTE KIDNEY INJURY (HCC): ICD-10-CM

## 2022-12-22 DIAGNOSIS — E11.9 TYPE 2 DIABETES MELLITUS WITHOUT COMPLICATION, WITHOUT LONG-TERM CURRENT USE OF INSULIN (HCC): ICD-10-CM

## 2022-12-22 PROBLEM — R70.0 ELEVATED SED RATE: Status: ACTIVE | Noted: 2022-12-22

## 2022-12-22 PROBLEM — I10 HYPERTENSION: Status: ACTIVE | Noted: 2022-12-22

## 2022-12-22 PROBLEM — M54.9 BACK PAIN: Status: ACTIVE | Noted: 2022-12-22

## 2022-12-22 PROBLEM — E78.5 HYPERLIPIDEMIA: Chronic | Status: ACTIVE | Noted: 2022-12-22

## 2022-12-22 PROBLEM — I10 HYPERTENSION: Chronic | Status: ACTIVE | Noted: 2022-12-22

## 2022-12-22 PROBLEM — M54.9 BACK PAIN: Chronic | Status: ACTIVE | Noted: 2022-12-22

## 2022-12-22 PROBLEM — E78.5 HYPERLIPIDEMIA: Status: ACTIVE | Noted: 2022-12-22

## 2022-12-22 LAB
ALBUMIN SERPL-MCNC: 3 G/DL (ref 3.4–5)
ALBUMIN/GLOB SERPL: 0.7 (ref 0.8–1.7)
ALP SERPL-CCNC: 66 U/L (ref 45–117)
ALT SERPL-CCNC: 82 U/L (ref 16–61)
ANION GAP SERPL CALC-SCNC: 7 MMOL/L (ref 3–18)
APPEARANCE UR: CLEAR
AST SERPL-CCNC: 69 U/L (ref 10–38)
ATRIAL RATE: 98 BPM
BACTERIA URNS QL MICRO: NEGATIVE /HPF
BASOPHILS # BLD: 0 K/UL (ref 0–0.1)
BASOPHILS NFR BLD: 0 % (ref 0–2)
BILIRUB SERPL-MCNC: 0.5 MG/DL (ref 0.2–1)
BILIRUB UR QL: NEGATIVE
BUN SERPL-MCNC: 36 MG/DL (ref 7–18)
BUN/CREAT SERPL: 18 (ref 12–20)
CALCIUM SERPL-MCNC: 9.5 MG/DL (ref 8.5–10.1)
CALCULATED R AXIS, ECG10: 36 DEGREES
CALCULATED T AXIS, ECG11: 41 DEGREES
CHLORIDE SERPL-SCNC: 98 MMOL/L (ref 100–111)
CO2 SERPL-SCNC: 26 MMOL/L (ref 21–32)
COLOR UR: YELLOW
CREAT SERPL-MCNC: 2.02 MG/DL (ref 0.6–1.3)
CRP SERPL-MCNC: 19.9 MG/DL (ref 0–0.3)
DIAGNOSIS, 93000: NORMAL
DIFFERENTIAL METHOD BLD: ABNORMAL
EOSINOPHIL # BLD: 0 K/UL (ref 0–0.4)
EOSINOPHIL NFR BLD: 0 % (ref 0–5)
EPITH CASTS URNS QL MICRO: ABNORMAL /LPF (ref 0–5)
ERYTHROCYTE [DISTWIDTH] IN BLOOD BY AUTOMATED COUNT: 12.8 % (ref 11.6–14.5)
ERYTHROCYTE [SEDIMENTATION RATE] IN BLOOD: >140 MM/HR (ref 0–20)
FLUAV RNA SPEC QL NAA+PROBE: NOT DETECTED
FLUBV RNA SPEC QL NAA+PROBE: NOT DETECTED
GLOBULIN SER CALC-MCNC: 4.2 G/DL (ref 2–4)
GLUCOSE BLD STRIP.AUTO-MCNC: 209 MG/DL (ref 70–110)
GLUCOSE SERPL-MCNC: 219 MG/DL (ref 74–99)
GLUCOSE UR STRIP.AUTO-MCNC: >1000 MG/DL
HCT VFR BLD AUTO: 27.3 % (ref 36–48)
HGB BLD-MCNC: 8.8 G/DL (ref 13–16)
HGB UR QL STRIP: ABNORMAL
IMM GRANULOCYTES # BLD AUTO: 0 K/UL (ref 0–0.04)
IMM GRANULOCYTES NFR BLD AUTO: 0 % (ref 0–0.5)
KETONES UR QL STRIP.AUTO: NEGATIVE MG/DL
LACTATE BLD-SCNC: 1.95 MMOL/L (ref 0.4–2)
LACTATE BLD-SCNC: 2.07 MMOL/L (ref 0.4–2)
LEUKOCYTE ESTERASE UR QL STRIP.AUTO: NEGATIVE
LYMPHOCYTES # BLD: 0.9 K/UL (ref 0.9–3.6)
LYMPHOCYTES NFR BLD: 10 % (ref 21–52)
MAGNESIUM SERPL-MCNC: 2.5 MG/DL (ref 1.6–2.6)
MCH RBC QN AUTO: 30.9 PG (ref 24–34)
MCHC RBC AUTO-ENTMCNC: 32.2 G/DL (ref 31–37)
MCV RBC AUTO: 95.8 FL (ref 78–100)
MONOCYTES # BLD: 1 K/UL (ref 0.05–1.2)
MONOCYTES NFR BLD: 11 % (ref 3–10)
MUCOUS THREADS URNS QL MICRO: ABNORMAL /LPF
NEUTS SEG # BLD: 7.3 K/UL (ref 1.8–8)
NEUTS SEG NFR BLD: 79 % (ref 40–73)
NITRITE UR QL STRIP.AUTO: NEGATIVE
NRBC # BLD: 0 K/UL (ref 0–0.01)
NRBC BLD-RTO: 0 PER 100 WBC
P-R INTERVAL, ECG05: 168 MS
PH UR STRIP: 5.5 (ref 5–8)
PLATELET # BLD AUTO: 576 K/UL (ref 135–420)
PMV BLD AUTO: 9.1 FL (ref 9.2–11.8)
POTASSIUM SERPL-SCNC: 4.1 MMOL/L (ref 3.5–5.5)
PROT SERPL-MCNC: 7.2 G/DL (ref 6.4–8.2)
PROT UR STRIP-MCNC: 30 MG/DL
Q-T INTERVAL, ECG07: 366 MS
QRS DURATION, ECG06: 138 MS
QTC CALCULATION (BEZET), ECG08: 467 MS
RBC # BLD AUTO: 2.85 M/UL (ref 4.35–5.65)
RBC #/AREA URNS HPF: ABNORMAL /HPF (ref 0–5)
SARS-COV-2, COV2: NOT DETECTED
SODIUM SERPL-SCNC: 131 MMOL/L (ref 136–145)
SP GR UR REFRACTOMETRY: 1.03 (ref 1–1.03)
TROPONIN-HIGH SENSITIVITY: 6 NG/L (ref 0–78)
UROBILINOGEN UR QL STRIP.AUTO: 1 EU/DL (ref 0.2–1)
VENTRICULAR RATE, ECG03: 98 BPM
WBC # BLD AUTO: 9.3 K/UL (ref 4.6–13.2)
WBC URNS QL MICRO: ABNORMAL /HPF (ref 0–4)

## 2022-12-22 PROCEDURE — 87636 SARSCOV2 & INF A&B AMP PRB: CPT

## 2022-12-22 PROCEDURE — 74011000258 HC RX REV CODE- 258: Performed by: EMERGENCY MEDICINE

## 2022-12-22 PROCEDURE — 96374 THER/PROPH/DIAG INJ IV PUSH: CPT

## 2022-12-22 PROCEDURE — 74011250636 HC RX REV CODE- 250/636: Performed by: NURSE ANESTHETIST, CERTIFIED REGISTERED

## 2022-12-22 PROCEDURE — 74011250637 HC RX REV CODE- 250/637: Performed by: NURSE ANESTHETIST, CERTIFIED REGISTERED

## 2022-12-22 PROCEDURE — 87040 BLOOD CULTURE FOR BACTERIA: CPT

## 2022-12-22 PROCEDURE — 96361 HYDRATE IV INFUSION ADD-ON: CPT

## 2022-12-22 PROCEDURE — 99285 EMERGENCY DEPT VISIT HI MDM: CPT

## 2022-12-22 PROCEDURE — 96367 TX/PROPH/DG ADDL SEQ IV INF: CPT

## 2022-12-22 PROCEDURE — 74011250636 HC RX REV CODE- 250/636: Performed by: EMERGENCY MEDICINE

## 2022-12-22 PROCEDURE — 74011636637 HC RX REV CODE- 636/637: Performed by: NURSE ANESTHETIST, CERTIFIED REGISTERED

## 2022-12-22 PROCEDURE — 74011250636 HC RX REV CODE- 250/636: Performed by: INTERNAL MEDICINE

## 2022-12-22 PROCEDURE — 87186 SC STD MICRODIL/AGAR DIL: CPT

## 2022-12-22 PROCEDURE — 87086 URINE CULTURE/COLONY COUNT: CPT

## 2022-12-22 PROCEDURE — 65270000046 HC RM TELEMETRY

## 2022-12-22 PROCEDURE — 81001 URINALYSIS AUTO W/SCOPE: CPT

## 2022-12-22 PROCEDURE — 93005 ELECTROCARDIOGRAM TRACING: CPT

## 2022-12-22 PROCEDURE — 83735 ASSAY OF MAGNESIUM: CPT

## 2022-12-22 PROCEDURE — 72158 MRI LUMBAR SPINE W/O & W/DYE: CPT

## 2022-12-22 PROCEDURE — 85652 RBC SED RATE AUTOMATED: CPT

## 2022-12-22 PROCEDURE — 87150 DNA/RNA AMPLIFIED PROBE: CPT

## 2022-12-22 PROCEDURE — 74011000250 HC RX REV CODE- 250: Performed by: NURSE ANESTHETIST, CERTIFIED REGISTERED

## 2022-12-22 PROCEDURE — 99223 1ST HOSP IP/OBS HIGH 75: CPT | Performed by: INTERNAL MEDICINE

## 2022-12-22 PROCEDURE — 85025 COMPLETE CBC W/AUTO DIFF WBC: CPT

## 2022-12-22 PROCEDURE — 86140 C-REACTIVE PROTEIN: CPT

## 2022-12-22 PROCEDURE — 71045 X-RAY EXAM CHEST 1 VIEW: CPT

## 2022-12-22 PROCEDURE — 74011636637 HC RX REV CODE- 636/637: Performed by: INTERNAL MEDICINE

## 2022-12-22 PROCEDURE — 82962 GLUCOSE BLOOD TEST: CPT

## 2022-12-22 PROCEDURE — 84484 ASSAY OF TROPONIN QUANT: CPT

## 2022-12-22 PROCEDURE — 83605 ASSAY OF LACTIC ACID: CPT

## 2022-12-22 PROCEDURE — 80053 COMPREHEN METABOLIC PANEL: CPT

## 2022-12-22 PROCEDURE — 96365 THER/PROPH/DIAG IV INF INIT: CPT

## 2022-12-22 PROCEDURE — 74011000250 HC RX REV CODE- 250: Performed by: INTERNAL MEDICINE

## 2022-12-22 PROCEDURE — 87077 CULTURE AEROBIC IDENTIFY: CPT

## 2022-12-22 PROCEDURE — 74011250637 HC RX REV CODE- 250/637: Performed by: INTERNAL MEDICINE

## 2022-12-22 PROCEDURE — A9577 INJ MULTIHANCE: HCPCS | Performed by: EMERGENCY MEDICINE

## 2022-12-22 RX ORDER — THERA TABS 400 MCG
1 TAB ORAL DAILY
Status: DISCONTINUED | OUTPATIENT
Start: 2022-12-23 | End: 2023-01-13 | Stop reason: HOSPADM

## 2022-12-22 RX ORDER — GABAPENTIN 300 MG/1
600 CAPSULE ORAL 3 TIMES DAILY
Status: DISCONTINUED | OUTPATIENT
Start: 2022-12-23 | End: 2023-01-13 | Stop reason: HOSPADM

## 2022-12-22 RX ORDER — SODIUM CHLORIDE 0.9 % (FLUSH) 0.9 %
5-40 SYRINGE (ML) INJECTION EVERY 8 HOURS
Status: DISCONTINUED | OUTPATIENT
Start: 2022-12-22 | End: 2022-12-24

## 2022-12-22 RX ORDER — SODIUM CHLORIDE 0.9 % (FLUSH) 0.9 %
5-40 SYRINGE (ML) INJECTION EVERY 8 HOURS
Status: DISCONTINUED | OUTPATIENT
Start: 2022-12-22 | End: 2023-01-13 | Stop reason: HOSPADM

## 2022-12-22 RX ORDER — SODIUM CHLORIDE 0.9 % (FLUSH) 0.9 %
5-40 SYRINGE (ML) INJECTION AS NEEDED
Status: DISCONTINUED | OUTPATIENT
Start: 2022-12-22 | End: 2022-12-24

## 2022-12-22 RX ORDER — ONDANSETRON 4 MG/1
4 TABLET, ORALLY DISINTEGRATING ORAL
Status: DISCONTINUED | OUTPATIENT
Start: 2022-12-22 | End: 2023-01-13 | Stop reason: HOSPADM

## 2022-12-22 RX ORDER — CHOLECALCIFEROL (VITAMIN D3) 125 MCG
5 CAPSULE ORAL
Status: DISCONTINUED | OUTPATIENT
Start: 2022-12-22 | End: 2023-01-13 | Stop reason: HOSPADM

## 2022-12-22 RX ORDER — MELATONIN
2000 DAILY
Status: DISCONTINUED | OUTPATIENT
Start: 2022-12-23 | End: 2023-01-13 | Stop reason: HOSPADM

## 2022-12-22 RX ORDER — HYDROMORPHONE HYDROCHLORIDE 4 MG/1
4 TABLET ORAL
COMMUNITY
End: 2023-01-13

## 2022-12-22 RX ORDER — FAMOTIDINE 20 MG/1
20 TABLET, FILM COATED ORAL ONCE
Status: COMPLETED | OUTPATIENT
Start: 2022-12-22 | End: 2022-12-22

## 2022-12-22 RX ORDER — IBUPROFEN 200 MG
4 TABLET ORAL AS NEEDED
Status: DISCONTINUED | OUTPATIENT
Start: 2022-12-22 | End: 2023-01-13 | Stop reason: HOSPADM

## 2022-12-22 RX ORDER — SODIUM CHLORIDE 0.9 % (FLUSH) 0.9 %
5-10 SYRINGE (ML) INJECTION AS NEEDED
Status: DISCONTINUED | OUTPATIENT
Start: 2022-12-22 | End: 2022-12-24

## 2022-12-22 RX ORDER — NALOXONE HYDROCHLORIDE 0.4 MG/ML
0.4 INJECTION, SOLUTION INTRAMUSCULAR; INTRAVENOUS; SUBCUTANEOUS
Status: DISCONTINUED | OUTPATIENT
Start: 2022-12-22 | End: 2023-01-13 | Stop reason: HOSPADM

## 2022-12-22 RX ORDER — IPRATROPIUM BROMIDE AND ALBUTEROL SULFATE 2.5; .5 MG/3ML; MG/3ML
3 SOLUTION RESPIRATORY (INHALATION)
Status: DISCONTINUED | OUTPATIENT
Start: 2022-12-22 | End: 2023-01-13 | Stop reason: HOSPADM

## 2022-12-22 RX ORDER — DEXTROSE MONOHYDRATE 100 MG/ML
0-250 INJECTION, SOLUTION INTRAVENOUS AS NEEDED
Status: DISCONTINUED | OUTPATIENT
Start: 2022-12-22 | End: 2023-01-13 | Stop reason: HOSPADM

## 2022-12-22 RX ORDER — MORPHINE SULFATE 2 MG/ML
2-4 INJECTION, SOLUTION INTRAMUSCULAR; INTRAVENOUS
Status: DISCONTINUED | OUTPATIENT
Start: 2022-12-22 | End: 2022-12-26 | Stop reason: SDUPTHER

## 2022-12-22 RX ORDER — ACETAMINOPHEN 325 MG/1
650 TABLET ORAL
Status: DISCONTINUED | OUTPATIENT
Start: 2022-12-22 | End: 2023-01-08 | Stop reason: SDUPTHER

## 2022-12-22 RX ORDER — VANCOMYCIN 2 GRAM/500 ML IN 0.9 % SODIUM CHLORIDE INTRAVENOUS
2000 ONCE
Status: COMPLETED | OUTPATIENT
Start: 2022-12-22 | End: 2022-12-22

## 2022-12-22 RX ORDER — MORPHINE SULFATE 2 MG/ML
2 INJECTION, SOLUTION INTRAMUSCULAR; INTRAVENOUS
Status: COMPLETED | OUTPATIENT
Start: 2022-12-22 | End: 2022-12-22

## 2022-12-22 RX ORDER — SODIUM CHLORIDE, SODIUM LACTATE, POTASSIUM CHLORIDE, CALCIUM CHLORIDE 600; 310; 30; 20 MG/100ML; MG/100ML; MG/100ML; MG/100ML
75 INJECTION, SOLUTION INTRAVENOUS CONTINUOUS
Status: DISCONTINUED | OUTPATIENT
Start: 2022-12-22 | End: 2022-12-23

## 2022-12-22 RX ORDER — INSULIN LISPRO 100 [IU]/ML
INJECTION, SOLUTION INTRAVENOUS; SUBCUTANEOUS EVERY 6 HOURS
Status: DISCONTINUED | OUTPATIENT
Start: 2022-12-22 | End: 2022-12-26

## 2022-12-22 RX ORDER — SODIUM CHLORIDE 9 MG/ML
125 INJECTION, SOLUTION INTRAVENOUS CONTINUOUS
Status: DISCONTINUED | OUTPATIENT
Start: 2022-12-22 | End: 2022-12-27

## 2022-12-22 RX ORDER — ONDANSETRON 2 MG/ML
4 INJECTION INTRAMUSCULAR; INTRAVENOUS
Status: DISCONTINUED | OUTPATIENT
Start: 2022-12-22 | End: 2023-01-13 | Stop reason: HOSPADM

## 2022-12-22 RX ORDER — ATORVASTATIN CALCIUM 20 MG/1
20 TABLET, FILM COATED ORAL
Status: DISCONTINUED | OUTPATIENT
Start: 2022-12-22 | End: 2023-01-13 | Stop reason: HOSPADM

## 2022-12-22 RX ORDER — INSULIN LISPRO 100 [IU]/ML
INJECTION, SOLUTION INTRAVENOUS; SUBCUTANEOUS ONCE
Status: COMPLETED | OUTPATIENT
Start: 2022-12-22 | End: 2022-12-22

## 2022-12-22 RX ORDER — POLYETHYLENE GLYCOL 3350 17 G/17G
17 POWDER, FOR SOLUTION ORAL DAILY PRN
Status: DISCONTINUED | OUTPATIENT
Start: 2022-12-22 | End: 2022-12-29

## 2022-12-22 RX ADMIN — PIPERACILLIN AND TAZOBACTAM 4.5 G: 4; .5 INJECTION, POWDER, FOR SOLUTION INTRAVENOUS at 11:29

## 2022-12-22 RX ADMIN — SODIUM CHLORIDE, PRESERVATIVE FREE 10 ML: 5 INJECTION INTRAVENOUS at 22:03

## 2022-12-22 RX ADMIN — MORPHINE SULFATE 4 MG: 2 INJECTION, SOLUTION INTRAMUSCULAR; INTRAVENOUS at 22:07

## 2022-12-22 RX ADMIN — MORPHINE SULFATE 4 MG: 2 INJECTION, SOLUTION INTRAMUSCULAR; INTRAVENOUS at 17:13

## 2022-12-22 RX ADMIN — SODIUM CHLORIDE, POTASSIUM CHLORIDE, SODIUM LACTATE AND CALCIUM CHLORIDE 1000 ML: 600; 310; 30; 20 INJECTION, SOLUTION INTRAVENOUS at 11:11

## 2022-12-22 RX ADMIN — SODIUM CHLORIDE, POTASSIUM CHLORIDE, SODIUM LACTATE AND CALCIUM CHLORIDE 259 ML: 600; 310; 30; 20 INJECTION, SOLUTION INTRAVENOUS at 11:51

## 2022-12-22 RX ADMIN — PIPERACILLIN SODIUM AND TAZOBACTAM SODIUM 3.38 G: 3; .375 INJECTION, POWDER, LYOPHILIZED, FOR SOLUTION INTRAVENOUS at 18:09

## 2022-12-22 RX ADMIN — SODIUM CHLORIDE, POTASSIUM CHLORIDE, SODIUM LACTATE AND CALCIUM CHLORIDE 1000 ML: 600; 310; 30; 20 INJECTION, SOLUTION INTRAVENOUS at 15:08

## 2022-12-22 RX ADMIN — GADOBENATE DIMEGLUMINE 20 ML: 529 INJECTION, SOLUTION INTRAVENOUS at 14:28

## 2022-12-22 RX ADMIN — VANCOMYCIN HYDROCHLORIDE 2000 MG: 10 INJECTION, POWDER, LYOPHILIZED, FOR SOLUTION INTRAVENOUS at 14:41

## 2022-12-22 RX ADMIN — ATORVASTATIN CALCIUM 20 MG: 20 TABLET, FILM COATED ORAL at 23:11

## 2022-12-22 RX ADMIN — SODIUM CHLORIDE, POTASSIUM CHLORIDE, SODIUM LACTATE AND CALCIUM CHLORIDE 1000 ML: 600; 310; 30; 20 INJECTION, SOLUTION INTRAVENOUS at 11:19

## 2022-12-22 RX ADMIN — MORPHINE SULFATE 2 MG: 2 INJECTION, SOLUTION INTRAMUSCULAR; INTRAVENOUS at 15:10

## 2022-12-22 RX ADMIN — Medication 4 UNITS: at 19:58

## 2022-12-22 RX ADMIN — Medication 4 UNITS: at 22:01

## 2022-12-22 RX ADMIN — FAMOTIDINE 20 MG: 20 TABLET, FILM COATED ORAL at 21:53

## 2022-12-22 RX ADMIN — SODIUM CHLORIDE 125 ML/HR: 9 INJECTION, SOLUTION INTRAVENOUS at 21:54

## 2022-12-22 NOTE — ED NOTES
Pt. Arrives to the ED endorsing increased lower back pain and loss of bowel and bladder. Pt recently had a lumbar fusion surgery a few weeks ago. Incision site is clean and dry. No discharge or erythema appreciated. Pt. Was sent here to the ED from his orthopedic appointment. Pt. Noted to initially have low BP and tachycardia. Pt. Is speaking in clear complete sentences. No acute distress appreciated at this time.

## 2022-12-22 NOTE — Clinical Note
Status[de-identified] INPATIENT [101]   Type of Bed: Telemetry [19]   Cardiac Monitoring Required?: Yes   Inpatient Hospitalization Certified Necessary for the Following Reasons: 3. Patient receiving treatment that can only be provided in an inpatient setting (further clarification in H&P documentation)   Admitting Diagnosis: [facility]acute [facility]) ([facility] [4728615]   Admitting Diagnosis: Dehydration [276.51. ICD-9-CM]   Admitting Physician: Jerod Sandoval   Attending Physician: Jerod Sandoval   Estimated Length of Stay: 2 Midnights   Discharge Plan[de-identified] Home with Office Follow-up

## 2022-12-22 NOTE — ED TRIAGE NOTES
Client referred to ed by Dr Tamiko Greco for possible post surgical infection. Client had back fusion 2 weeks ago. Client reports decreased apitite, fever, loss of bowel, nausea, and back spasms. NAD. AXOX4.

## 2022-12-22 NOTE — PROGRESS NOTES
Mri reviewed demonstrates hematoma possibly compressing neural elements cannot rule in or out infection  Elevated CRP ESR, no white count  Ill appearing  Plan   Tentatively schedule patient for wash out tomorrow with possible wound vac  1pm, if cleared [id number]  Hydration fluids   Obtain new labs in AM prior to surgery Problem: Falls - Risk of  Goal: *Absence of Falls  Document Patrick Fall Risk and appropriate interventions in the flowsheet. Outcome: Progressing Towards Goal  Fall Risk Interventions:  Mobility Interventions: Assess mobility with egress test, Bed/chair exit alarm, Communicate number of staff needed for ambulation/transfer, OT consult for ADLs, Patient to call before getting OOB, PT Consult for mobility concerns    Mentation Interventions: Adequate sleep, hydration, pain control, Bed/chair exit alarm, Door open when patient unattended, Evaluate medications/consider consulting pharmacy, More frequent rounding, Reorient patient, Self-releasing belt, Toileting rounds, Update white board    Medication Interventions: Evaluate medications/consider consulting pharmacy, Patient to call before getting OOB, Teach patient to arise slowly    Elimination Interventions: Call light in reach, Bed/chair exit alarm, Patient to call for help with toileting needs, Toilet paper/wipes in reach, Toileting schedule/hourly rounds, Elevated toilet seat    History of Falls Interventions: Bed/chair exit alarm, Door open when patient unattended, Evaluate medications/consider consulting pharmacy, Investigate reason for fall, Room close to nurse's station        Problem: Pressure Injury - Risk of  Goal: *Prevention of pressure injury  Document Jeison Scale and appropriate interventions in the flowsheet. Outcome: Progressing Towards Goal  Pressure Injury Interventions:  Sensory Interventions: Assess changes in LOC, Discuss PT/OT consult with provider, Float heels, Keep linens dry and wrinkle-free, Maintain/enhance activity level, Monitor skin under medical devices, Pressure redistribution bed/mattress (bed type), Turn and reposition approx.  every two hours (pillows and wedges if needed)    Moisture Interventions: Apply protective barrier, creams and emollients, Limit adult briefs, Maintain skin hydration (lotion/cream), Minimize layers, Moisture barrier    Activity Interventions: Increase time out of bed, Pressure redistribution bed/mattress(bed type), PT/OT evaluation    Mobility Interventions: Assess need for specialty bed, HOB 30 degrees or less, Pressure redistribution bed/mattress (bed type), PT/OT evaluation    Nutrition Interventions: Document food/fluid/supplement intake, Discuss nutritional consult with provider    Friction and Shear Interventions: Apply protective barrier, creams and emollients, HOB 30 degrees or less, Lift team/patient mobility team

## 2022-12-22 NOTE — PROGRESS NOTES
[facility] Pharmacokinetic Monitoring Service - Vancomycin     Cyndy Yuan is a 57 y.o. male starting on vancomycin therapy for Diabetic Foot Infection. Pharmacy consulted by Lior Mart MD for monitoring and adjustment. Target Concentration: Goal AUC/MARIANNE 400-600 mg*hr/L    Additional Antimicrobials: Piperacillin/Tazobactam    Pertinent Laboratory Values:   Temp: 98.6 °F (37 °C)  Weight: 108.9 kg (240 lb)  No results for input(s): CREA, BUN, WBC, PCT in the last 72 hours. Estimated Creatinine Clearance: 67.4 mL/min (A) (by C-G formula based on SCr of 1.39 mg/dL (H)). Pertinent Cultures:  Culture Date Source Results   12/22 Urine  Pending    MRSA Nasal Swab: N/A.  Non-respiratory infection    Plan:  Dosing recommendations based on Bayesian software  Start vancomycin 2000 mg IV followed by 750 mg IV q12 hours  Anticipated AUC of 444 and trough concentration of 15.4 at steady state  Renal labs as indicated   Vancomycin concentration ordered for AM labs tomorrow  Pharmacy will continue to monitor patient and adjust therapy as indicated    Thank you for the consult,  ISABELL Renner  12/22/2022

## 2022-12-22 NOTE — H&P
History and Physical    Patient: Valri Epley MRN: [id number]  SSN: [id number]    YOB: 1960  Age: 57 y.o. Sex: male      Subjective:      Valri Epley is a 57 y.o. male who presents to [facility] ER with complaint of Back Pain. Notably, Patient had a Multilevel Spinal Fusion performed on 12/07/2022. Patient reports that he was improving after back surgery and then felt issues began \"all at once\" approximately 3 days ago. Patient reports fecal incontinence, urinary incontinence (occurred before back surgery as well), loss of appetite, weakness, diarrhea, and intermittent, chronic pressure with urination. Patient reports fevers, chills, and diaphoresis over the last 3 days. Notably, it appears that Patient declined Inpatient Rehabilitation after Surgery and elected to return home. In [facility] ER, Patient is noted to have Temperature 98.6°F, Heart Rate 110 bpm, Respiration Rate 17 bpm, Blood Pressure 82/57 mm Hg to 109/63 mm Hg, SpO2 99% on Room Air, WBC 9.3, Hgb 8.8 g/dL, Platelet 494J, Neut% 79%, Neut# 7.3, Sed Rate >140 mm/hr, Lactic Acid 2.07 mmol/L, Na+ 131 mmol/L, Cl- 98 mmol/L, Glucose 219 mg/dL, BUN 36 mg/dL, Creatinine 2.02 mg/L (Baseline Creatinine ~1.1-1.3 mg/dL), eGFR 36, Albumin 3.0 g/dL, ALT 82, AST 69, Troponin 6 ng/L, CRP 19.9, Influenza A/B (-), and SARS-CoV-2 (-). CXR has been read by Radiologist to show \"Mild cardiomegaly. Low lung volumes with no acute cardiopulmonary abnormalities. \"  MRI Lumbar Spine w/ w/o Contrast has been performed, but read is PENDING. Patient has received LR 3.259 mL, IV Zosyn 4.5 grams, IV Vancomycin 2,000 mg, and IV Morphine 2 mg in [facility] ER. Patient is admitted to [facility] Telemetry Unit for management of Back Pain with concern for Seroma versus Abscess thought to be a Complication of Spinal Surgery and Acute Kidney Injury Stage 1.      Past Medical History:   Diagnosis Date    Arthritis     Back pain     from previous back injury    Colon polyps Diabetes ([facility]     1446    Hernia, umbilical     Hyperlipidemia     Hypertension     Pneumonia      Past Surgical History:   Procedure Laterality Date    HX COLONOSCOPY      polyps    HX HEENT      left lazy eye procedure during childhood    HX HEMORRHOIDECTOMY      HX HERNIA REPAIR          HX ORTHOPAEDIC  2017    right knee    HX ORTHOPAEDIC  1985    torn cartilage knee    HX TONSILLECTOMY  1964      History reviewed. No pertinent family history. Social History     Tobacco Use    Smoking status: Former     Years: 5.00     Types: Cigarettes     Quit date: 2021     Years since quittin.9    Smokeless tobacco: Never    Tobacco comments:     3-4 cig per day, told not to smoke or drink 24/hrs prior to surgery   Substance Use Topics    Alcohol use: Yes     Alcohol/week: 2.0 standard drinks     Types: 2 Cans of beer per week     Comment: 2 per month      Prior to Admission medications    Medication Sig Start Date End Date Taking? Authorizing Provider   HYDROmorphone (DILAUDID) 4 mg tablet Take 4 mg by mouth every six (6) hours as needed for Pain. Yes Provider, Historical   polyethylene glycol (MIRALAX) 17 gram/dose powder Take 17 g by mouth daily. Indications: constipation 22  Yes Jhon Ballesteros PA-C   aspirin delayed-release 81 mg tablet Take 81 mg by mouth daily. Yes Provider, Historical   OTHER,NON-FORMULARY, Indications: maxforce prostate 1 tab twice daily   Yes Provider, Historical   multivitamin (ONE A DAY) tablet Take 1 Tablet by mouth daily. Yes Provider, Historical   metFORMIN (GLUCOPHAGE) 500 mg tablet TAKE 1 TABLET BY MOUTH TWICE DAILY WITH A MEAL 21  Yes Provider, Historical   cholecalciferol (VITAMIN D3) (1000 Units /25 mcg) tablet Take 2,000 Units by mouth daily. Yes Provider, Historical   gabapentin (NEURONTIN) 300 mg capsule 600 mg three (3) times daily.  21  Yes Provider, Historical   lisinopriL (PRINIVIL, ZESTRIL) 40 mg tablet TAKE 1 TABLET BY MOUTH ONCE DAILY FOR BLOOD PRESSURE FOR 90 DAYS 9/17/21  Yes Provider, Historical   simvastatin (ZOCOR) 40 mg tablet TAKE 1 TABLET BY MOUTH ONCE DAILY IN THE EVENING FOR CHOLESTEROL FOR 90 DAYS 9/17/21  Yes Provider, Historical   acetaminophen (TYLENOL) 500 mg tablet Take  by mouth every six (6) hours as needed for Pain. Provider, Historical   Patient reports that he is on a muscle relaxant at home, but this agent cannot be found on his Discharge Medication List from his admission for Mutli-Level Vertebral Fusion. No Known Allergies    Review of Systems:  (+) Loss of Appetite  (+) Fevers  (+) Chills  (-) Cough  (-) Increased Sputum Production  (-) Pain with Inspiration  (-) Chest Pain  (-) Abdominal Pain  (-) Nausea  (-) Vomiting  (+) Diarrhea  (-) Dysuria  (~) Nocturia  (+) Fecal Incontinence  (+) Urinary Incontinence  (+) Joint Pain/Swelling  (+) Muscle Spasm  (+) Back Pain  (-) Loss of Sensation  (+) Diaphoresis  All other systems have been reviewed and are negative      Objective:     Vitals:    12/22/22 1450 12/22/22 1547 12/22/22 1622 12/22/22 1752   BP: 109/63  113/74 117/70   Pulse: 94  74    Resp: 17  16 17   Temp:       SpO2:  99% 99%    Weight:       Height:            Physical Exam:  General:  Older adult male lying in bed in no acute distress  HEENT:  Atraumatic, normocephalic; (+) Mild Esotropia; Pupils equally round and reactive to light with accommodation; Extraocular muscles intact; Moist Oropharynx without erythema, edema, or exudates  Chest:  No pectus carinatum;  No pectus excavatum  Cardiovascular:  Regular rate and rhythm without rubs, gallops, or murmurs  Respiratory:  Clear to Auscultation Bilaterally without wheezes, rales, or rhonchi; normal effort of breathing  Abdominal:  Obese, soft, non-tense, (+) Mildly Tender abdomen (however, unclear if the pain is really pressure translated to spine); BS present without guarding, rebound, or masses  :  Deferred  Extremities:  Pulses 2+ x4 without edema, clubbing, or cyanosis  Musculoskeletal:  Strength 5/5 and symmetrical in BUE and BLE  Integument:  No rash on face, forearms, or legs  Neurological:  Alert & Ostensibly Oriented x4/4; No gross deficits of Visual Acuity, Eye Movement, Jaw Opening, Facial Expression, Hearing, Phonation, or Head Movement; No gross deficits of Tongue Movement or Slurring of Speech  Psychiatric:  (+) Affect is minimally Eccentric, but otherwise appropriate; Language is present and fluent; Behavior is appropriate      Laboratory Studies:  CMP:   Lab Results   Component Value Date/Time     (L) 12/22/2022 11:06 AM    K 4.1 12/22/2022 11:06 AM    CL 98 (L) 12/22/2022 11:06 AM    CO2 26 12/22/2022 11:06 AM    AGAP 7 12/22/2022 11:06 AM     (H) 12/22/2022 11:06 AM    BUN 36 (H) 12/22/2022 11:06 AM    CREA 2.02 (H) 12/22/2022 11:06 AM    CA 9.5 12/22/2022 11:06 AM    MG 2.5 12/22/2022 11:06 AM    ALB 3.0 (L) 12/22/2022 11:06 AM    TP 7.2 12/22/2022 11:06 AM    GLOB 4.2 (H) 12/22/2022 11:06 AM    AGRAT 0.7 (L) 12/22/2022 11:06 AM    ALT 82 (H) 12/22/2022 11:06 AM     CBC:   Lab Results   Component Value Date/Time    WBC 9.3 12/22/2022 11:06 AM    HGB 8.8 (L) 12/22/2022 11:06 AM    HCT 27.3 (L) 12/22/2022 11:06 AM     (H) 12/22/2022 11:06 AM     All Cardiac Markers in the last 24 hours: No results found for: CPK, CK, CKMMB, CKMB, RCK3, CKMBT, CKNDX, CKND1, BRODERICK, TROPT, TROIQ, JONI, TROPT, TNIPOC, BNP, BNPP  Recent Glucose Results:   Lab Results   Component Value Date/Time     (H) 12/22/2022 11:06 AM     COAGS: No results found for: APTT, PTP, INR, INREXT, INREXT     Images Reviewed:  XR CHEST SNGL V    Result Date: 12/22/2022  CHEST PORTABLE CPT CODE: 88010 COMPARISON: 1/3/2017 INDICATIONS: Dyspnea FINDINGS: The heart is slightly enlarged. Lung volumes are low. No infiltrates. No pleural effusions. No significant osseous abnormalities. Mild cardiomegaly.  Low lung volumes with no acute cardiopulmonary abnormalities. Assessment:     Hospital Problems  Date Reviewed: 12/22/2022            Codes Class Noted POA    * (Principal) Fluid collection at surgical site ICD-10-CM: T88. 8XXA  ICD-9-CM: 998.13  12/22/2022 Yes        Stage 1 acute kidney injury [facility]) ICD-10-CM: N17.9  ICD-9-CM: 584.9  12/22/2022 Yes        Back pain ICD-10-CM: M54.9  ICD-9-CM: 724.5  12/22/2022 Yes        Elevated sed rate ICD-10-CM: R70.0  ICD-9-CM: 790.1  12/22/2022 Yes        Hypertension (Chronic) ICD-10-CM: I10  ICD-9-CM: 401.9  12/22/2022 Yes        Hyperlipidemia (Chronic) ICD-10-CM: E78.5  ICD-9-CM: 272.4  12/22/2022 Yes        Type II diabetes mellitus ([facility] (Chronic) ICD-10-CM: E11.9  ICD-9-CM: 250.00  12/22/2022 Yes           Plan:     Back Pain with concern for Seroma versus Abscess thought to be a Complication of Spinal Surgery  - read of MRI Lumbar Spine w/ w/o Contrast PENDING  Telemetry, IV Vancomycin, IV Zosyn, PRN Antiemetics, PRN Pain Control, and PRN Naloxone. Spinal Orthopedic Surgical services were consulted in [facility] ER by [facility] ER Physician. Acute Kidney Injury Stage 1  IV Fluids ( mL/hr). Monitor for improvement of Creatinine. Hypertension  HOLD home Lisinopril due to relative hypotension on presentation. Hyperlipidemia  Continue home Atorvastatin. Diabetes Mellitus Type 2  POC Glucose checks q6hrs with SSI coverage. DVT Prophylaxis  DVT mechanoprophylaxis is achieved with SCDs.      Signed By: Tete Anne DO     December 22, 2022

## 2022-12-22 NOTE — CONSULTS
Consult    Patient: Darius Carlson MRN: [id number]  SSN: [id number]    YOB: 1960  Age: 57 y.o. Sex: male      Subjective:      Darius Carlson is a 57 y.o. male Hx HTN, diabetes s/p 2 weeks multilevel lumbar decompression and fusion. Seen in clinic today for first post op appointment. Patient extremely weak, pale and lightheaded. BP in the office today 92/62. States he has not been eating or drinking well over the last week. Generally does not look well. He noted to have some wound drainage over the last week as well as chills, sweats, bowel incontinence. I recommended he go to [facility] for further evaluation. Past Medical History:   Diagnosis Date    Arthritis     Back pain     from previous back injury    Colon polyps     Diabetes ([facility]     8439    Hernia, umbilical     Hyperlipidemia     Hypertension     Pneumonia      Past Surgical History:   Procedure Laterality Date    HX COLONOSCOPY      polyps    HX HEENT      left lazy eye procedure during childhood    HX HEMORRHOIDECTOMY      HX HERNIA REPAIR          HX ORTHOPAEDIC  2017    right knee    HX ORTHOPAEDIC  1985    torn cartilage knee    HX TONSILLECTOMY  1964      No family history on file. Social History     Tobacco Use    Smoking status: Former     Years: 5.00     Types: Cigarettes     Quit date: 2021     Years since quittin.9    Smokeless tobacco: Never    Tobacco comments:     3-4 cig per day, told not to smoke or drink 24/hrs prior to surgery   Substance Use Topics    Alcohol use:  Yes     Alcohol/week: 2.0 standard drinks     Types: 2 Cans of beer per week     Comment: 2 per month      Current Facility-Administered Medications   Medication Dose Route Frequency Provider Last Rate Last Admin    sodium chloride (NS) flush 5-10 mL  5-10 mL IntraVENous PRN Maxi Thomas MD        piperacillin-tazobactam (ZOSYN) 3.375 g in 0.9% sodium chloride (MBP/ADV) 100 mL MBP  3.375 g IntraVENous Q8H Carlene Bills MD        vancomycin (VANCOCIN) 2000 mg in  ml infusion  2,000 mg IntraVENous ONCE Carlene Bills MD        Followed by    Bronson Tiwari ON 12/23/2022] vancomycin (VANCOCIN) 750 mg in 0.9% sodium chloride 250 mL (VIAL-MATE)  750 mg IntraVENous Q12H Carlene Bills MD        vial-mate adaptor              Current Outpatient Medications   Medication Sig Dispense Refill    polyethylene glycol (MIRALAX) 17 gram/dose powder Take 17 g by mouth daily. Indications: constipation 116 g 0    aspirin delayed-release 81 mg tablet Take 81 mg by mouth daily. OTHER,NON-FORMULARY, Indications: maxforce prostate 1 tab twice daily      multivitamin (ONE A DAY) tablet Take 1 Tablet by mouth daily. metFORMIN (GLUCOPHAGE) 500 mg tablet TAKE 1 TABLET BY MOUTH TWICE DAILY WITH A MEAL      cholecalciferol (VITAMIN D3) (1000 Units /25 mcg) tablet Take 2,000 Units by mouth daily. gabapentin (NEURONTIN) 300 mg capsule 600 mg three (3) times daily. diclofenac EC (VOLTAREN) 75 mg EC tablet Take 75 mg by mouth two (2) times a day. lisinopriL (PRINIVIL, ZESTRIL) 40 mg tablet TAKE 1 TABLET BY MOUTH ONCE DAILY FOR BLOOD PRESSURE FOR 90 DAYS      simvastatin (ZOCOR) 40 mg tablet TAKE 1 TABLET BY MOUTH ONCE DAILY IN THE EVENING FOR CHOLESTEROL FOR 90 DAYS      acetaminophen (TYLENOL) 500 mg tablet Take  by mouth every six (6) hours as needed for Pain. No Known Allergies    Review of Systems:  A comprehensive review of systems was negative except for that written in the History of Present Illness. Objective:     Vitals:    12/22/22 1043 12/22/22 1148   BP: (!) 82/57 102/65   Pulse: (!) 110    Resp: 16 17   Temp: 98.6 °F (37 °C)    SpO2: 99%    Weight: 108.9 kg (240 lb)    Height: 5' 11\" (1.803 m)         Physical Exam:  General:  Unsteady gait. Alert, cooperative, appears stated age. Eyes:  Conjunctivae/corneas clear. PERRL, EOMs intact.    Neck: Supple, symmetrical, trachea midline, no adenopathy, thyroid: no enlargment/tenderness/nodules, no carotid bruit and no JVD. Back:   Incision with mild erythema. No induration. There is small amount of drainage. Non tender. Mild swelling. Symmetric, no curvature. ROM normal. No CVA tenderness. Extremities: Extremities normal, atraumatic, no cyanosis or edema. Pulses: 2+ and symmetric all extremities. Skin:  Pale, sweating. Skin texture, turgor normal. No rashes or lesions   Neurologic: Normal strength, sensation and reflexes throughout. Assessment:     Hospital Problems  Date Reviewed: 10/11/2021   None      Plan:     Patient is 2 weeks s/p multilevel lumbar decompression fusion with generalized weakness, hypotension, chills/sweats. Labs ordered. No white count. Elevated CRP and ESR. Urine pending. Having bowel incontinence, will obtain mri lumbar spine also to r/o any possibility of infection.      Signed By: Jason Campoverde PA-C     December 22, 2022

## 2022-12-22 NOTE — PROGRESS NOTES
Pharmacy Note     Zosyn 4.5gm q6h ordered for treatment of diabetic foot infection. Per Harriet Skelton 10 will be changed to 4.5 gm IV x 1 dose followed by 3.375 gm IV over 240 minutes q8 hours. Estimated Creatinine Clearance: Estimated Creatinine Clearance: 67.4 mL/min (A) (by C-G formula based on SCr of 1.39 mg/dL (H)). BMI:  Body mass index is 33.47 kg/m². Rationale for Adjustment:  [facility] B-Lactam extended infusion policy    Pharmacy will continue to monitor and adjust dose as necessary. Please call with any questions.     Thank you,  Daron Wayne, PHARMD

## 2022-12-22 NOTE — ED PROVIDER NOTES
EMERGENCY DEPARTMENT HISTORY AND PHYSICAL EXAM    11:50 AM      Date: 12/22/2022  Patient Name: Ehsan Sumner    History of Presenting Illness     Chief Complaint   Patient presents with    Post OP Complication    Back Pain         History Provided By: Patient  Location/Duration/Severity/Modifying factors   Patient is a 15-year-old male with a history of multilevel lumbar fusion done on on 7 December by Dr. Monica Robles with a history of diabetes, hypertension, hyperlipidemia, umbilical hernia, pneumonia in the past, that presents emergency department complaint of 2 weeks of progressive decline since being released from the hospital.  The patient says he has been at home and initially was doing okay taking his medications and using a walker and going to the bathroom but over the last week he has been able to do much on his own and he is lost his appetite. Patient states feeling very weak and has not wanted to eat and says that he started to have incontinence of his stool which is unusual.  The patient said he is having some diarrhea and overall weakness and went to see his surgeon in follow-up and was found to be hypotensive and weak and sent to the hospital for an evaluation. Patient denies any increase in pain along his wound or any drainage from his wound and is compliant with his medications and is trying to drink boost because he does not want to eat anything else. The patient says he has some mild difficulty urinating that has not changed from his baseline. 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        PCP: Asha Isaac MD    Current Facility-Administered Medications   Medication Dose Route Frequency Provider Last Rate Last Admin    sodium chloride (NS) flush 5-10 mL  5-10 mL IntraVENous PRN Yajaira DURAN MD        lactated ringers bolus infusion 259 mL  259 mL IntraVENous ONCE Koko Callaway MD        piperacillin-tazobactam (ZOSYN) 4.5 g in 0.9% sodium chloride (MBP/ADV) 100 mL MBP  4.5 g IntraVENous ONCE Adrienne DURAN  mL/hr at 12/22/22 1129 4.5 g at 12/22/22 1129    Followed by    piperacillin-tazobactam (ZOSYN) 3.375 g in 0.9% sodium chloride (MBP/ADV) 100 mL MBP  3.375 g IntraVENous Q8H Hoang Redd MD        vancomycin (VANCOCIN) 2000 mg in  ml infusion  2,000 mg IntraVENous ONCE Hoang Redd MD        Followed by    Sandra Shannon ON 12/23/2022] vancomycin (VANCOCIN) 750 mg in 0.9% sodium chloride 250 mL (VIAL-MATE)  750 mg IntraVENous Q12H Hoang Redd MD        vial-mate adaptor              Current Outpatient Medications   Medication Sig Dispense Refill    polyethylene glycol (MIRALAX) 17 gram/dose powder Take 17 g by mouth daily. Indications: constipation 116 g 0    aspirin delayed-release 81 mg tablet Take 81 mg by mouth daily. OTHER,NON-FORMULARY, Indications: maxforce prostate 1 tab twice daily      multivitamin (ONE A DAY) tablet Take 1 Tablet by mouth daily. metFORMIN (GLUCOPHAGE) 500 mg tablet TAKE 1 TABLET BY MOUTH TWICE DAILY WITH A MEAL      cholecalciferol (VITAMIN D3) (1000 Units /25 mcg) tablet Take 2,000 Units by mouth daily. gabapentin (NEURONTIN) 300 mg capsule 600 mg three (3) times daily. diclofenac EC (VOLTAREN) 75 mg EC tablet Take 75 mg by mouth two (2) times a day. lisinopriL (PRINIVIL, ZESTRIL) 40 mg tablet TAKE 1 TABLET BY MOUTH ONCE DAILY FOR BLOOD PRESSURE FOR 90 DAYS      simvastatin (ZOCOR) 40 mg tablet TAKE 1 TABLET BY MOUTH ONCE DAILY IN THE EVENING FOR CHOLESTEROL FOR 90 DAYS      acetaminophen (TYLENOL) 500 mg tablet Take  by mouth every six (6) hours as needed for Pain.          Past History     Past Medical History:  Past Medical History:   Diagnosis Date    Arthritis     Back pain     from previous back injury    Colon polyps     Diabetes ([facility] [address]     9041    Hernia, umbilical     Hyperlipidemia     Hypertension     Pneumonia Past Surgical History:  Past Surgical History:   Procedure Laterality Date    HX COLONOSCOPY  2014    polyps    HX HEENT      left lazy eye procedure during childhood    HX HEMORRHOIDECTOMY      HX HERNIA REPAIR          HX ORTHOPAEDIC  2017    right knee    HX ORTHOPAEDIC  1985    torn cartilage knee    HX TONSILLECTOMY  1964       Family History:  No family history on file. Social History:  Social History     Tobacco Use    Smoking status: Former     Years: 5.00     Types: Cigarettes     Quit date: 2021     Years since quittin.9    Smokeless tobacco: Never    Tobacco comments:     3-4 cig per day, told not to smoke or drink 24/hrs prior to surgery   Vaping Use    Vaping Use: Never used   Substance Use Topics    Alcohol use: Yes     Alcohol/week: 2.0 standard drinks     Types: 2 Cans of beer per week     Comment: 2 per month    Drug use: Never       Allergies:  No Known Allergies      Review of Systems       Review of Systems   Constitutional:  Positive for activity change, appetite change and fatigue. Negative for fever. HENT:  Negative for congestion and rhinorrhea. Eyes:  Negative for visual disturbance. Respiratory:  Positive for shortness of breath. Cardiovascular:  Negative for chest pain and palpitations. Gastrointestinal:  Negative for abdominal pain, diarrhea, nausea and vomiting. Genitourinary:  Positive for difficulty urinating. Negative for dysuria and hematuria. Musculoskeletal:  Negative for back pain. Skin:  Negative for rash. Neurological:  Negative for dizziness, weakness and light-headedness. All other systems reviewed and are negative. Physical Exam   Visit Vitals  /65   Pulse (!) 110   Temp 98.6 °F (37 °C)   Resp 17   Ht 5' 11\" (1.803 m)   Wt 108.9 kg (240 lb)   SpO2 99%   BMI 33.47 kg/m²         Physical Exam  Vitals and nursing note reviewed. Constitutional:       General: He is not in acute distress.      Appearance: He is well-developed. HENT:      Head: Normocephalic and atraumatic. Right Ear: External ear normal.      Left Ear: External ear normal.      Nose: Nose normal.   Eyes:      General: No scleral icterus. Conjunctiva/sclera: Conjunctivae normal.      Pupils: Pupils are equal, round, and reactive to light. Neck:      Thyroid: No thyromegaly. Vascular: No JVD. Trachea: No tracheal deviation. Cardiovascular:      Rate and Rhythm: Regular rhythm. Tachycardia present. Heart sounds: Normal heart sounds. No murmur heard. No friction rub. No gallop. Pulmonary:      Effort: Pulmonary effort is normal.      Breath sounds: Normal breath sounds. Chest:      Chest wall: No tenderness. Abdominal:      General: Bowel sounds are normal. There is no distension. Palpations: Abdomen is soft. Tenderness: There is no abdominal tenderness. There is no guarding or rebound. Genitourinary:     Comments: Dried stool on his socks  Musculoskeletal:         General: No tenderness. Normal range of motion. Cervical back: Normal range of motion and neck supple. Comments: Midline lumbar surgical scar with erythema surrounding, some crusting at the most inferior aspect, no active drainage   Lymphadenopathy:      Cervical: No cervical adenopathy. Skin:     General: Skin is warm and dry. Neurological:      Mental Status: He is alert and oriented to person, place, and time. Cranial Nerves: No cranial nerve deficit. Coordination: Coordination normal.      Comments: Globally weak, symmetric strength, gait not observed   Psychiatric:         Behavior: Behavior normal.         Thought Content:  Thought content normal.         Judgment: Judgment normal.      Comments: No family currently at the bedside         Diagnostic Study Results     Labs -  Recent Results (from the past 12 hour(s))   SED RATE (ESR)    Collection Time: 12/22/22 11:06 AM   Result Value Ref Range    Sed rate, automated >140 (H) 0 - 20 mm/hr   C REACTIVE PROTEIN, QT    Collection Time: 12/22/22 11:06 AM   Result Value Ref Range    C-Reactive protein 19.9 (H) 0 - 0.3 mg/dL   CBC WITH AUTOMATED DIFF    Collection Time: 12/22/22 11:06 AM   Result Value Ref Range    WBC 9.3 4.6 - 13.2 K/uL    RBC 2.85 (L) 4.35 - 5.65 M/uL    HGB 8.8 (L) 13.0 - 16.0 g/dL    HCT 27.3 (L) 36.0 - 48.0 %    MCV 95.8 78.0 - 100.0 FL    MCH 30.9 24.0 - 34.0 PG    MCHC 32.2 31.0 - 37.0 g/dL    RDW 12.8 11.6 - 14.5 %    PLATELET 190 (H) 356 - 420 K/uL    MPV 9.1 (L) 9.2 - 11.8 FL    NRBC 0.0 0  WBC    ABSOLUTE NRBC 0.00 0.00 - 0.01 K/uL    NEUTROPHILS 79 (H) 40 - 73 %    LYMPHOCYTES 10 (L) 21 - 52 %    MONOCYTES 11 (H) 3 - 10 %    EOSINOPHILS 0 0 - 5 %    BASOPHILS 0 0 - 2 %    IMMATURE GRANULOCYTES 0 0.0 - 0.5 %    ABS. NEUTROPHILS 7.3 1.8 - 8.0 K/UL    ABS. LYMPHOCYTES 0.9 0.9 - 3.6 K/UL    ABS. MONOCYTES 1.0 0.05 - 1.2 K/UL    ABS. EOSINOPHILS 0.0 0.0 - 0.4 K/UL    ABS. BASOPHILS 0.0 0.0 - 0.1 K/UL    ABS. IMM. GRANS. 0.0 0.00 - 0.04 K/UL    DF AUTOMATED     METABOLIC PANEL, COMPREHENSIVE    Collection Time: 12/22/22 11:06 AM   Result Value Ref Range    Sodium 131 (L) 136 - 145 mmol/L    Potassium 4.1 3.5 - 5.5 mmol/L    Chloride 98 (L) 100 - 111 mmol/L    CO2 26 21 - 32 mmol/L    Anion gap 7 3.0 - 18 mmol/L    Glucose 219 (H) 74 - 99 mg/dL    BUN 36 (H) 7.0 - 18 MG/DL    Creatinine 2.02 (H) 0.6 - 1.3 MG/DL    BUN/Creatinine ratio 18 12 - 20      eGFR 36 (L) >60 ml/min/1.73m2    Calcium 9.5 8.5 - 10.1 MG/DL    Bilirubin, total 0.5 0.2 - 1.0 MG/DL    ALT (SGPT) 82 (H) 16 - 61 U/L    AST (SGOT) 69 (H) 10 - 38 U/L    Alk.  phosphatase 66 45 - 117 U/L    Protein, total 7.2 6.4 - 8.2 g/dL    Albumin 3.0 (L) 3.4 - 5.0 g/dL    Globulin 4.2 (H) 2.0 - 4.0 g/dL    A-G Ratio 0.7 (L) 0.8 - 1.7     MAGNESIUM    Collection Time: 12/22/22 11:06 AM   Result Value Ref Range    Magnesium 2.5 1.6 - 2.6 mg/dL   TROPONIN-HIGH SENSITIVITY    Collection Time: 12/22/22 11:06 AM   Result Value Ref Range    Troponin-High Sensitivity 6 0 - 78 ng/L   POC LACTIC ACID    Collection Time: 12/22/22 11:11 AM   Result Value Ref Range    Lactic Acid (POC) 2.07 (HH) 0.40 - 2.00 mmol/L   EKG, 12 LEAD, INITIAL    Collection Time: 12/22/22 11:20 AM   Result Value Ref Range    Ventricular Rate 98 BPM    Atrial Rate 98 BPM    P-R Interval 168 ms    QRS Duration 138 ms    Q-T Interval 366 ms    QTC Calculation (Bezet) 467 ms    Calculated R Axis 36 degrees    Calculated T Axis 41 degrees    Diagnosis       Normal sinus rhythm  Right bundle branch block  Abnormal ECG  When compared with ECG of 16-NOV-2022 13:04,  No significant change was found         Radiologic Studies -   CT DRAIN ABS W CATH PERC   Final Result      MRI LUMB SPINE W WO CONT   Final Result      Large rim-enhancing fluid collection/suspected abscess at the operative bed   results in multilevel severe central canal and severe subarticular recess   narrowing. Please refer to the body of the report for a comprehensive segmental analysis of   the lumbar spinal column. XR CHEST SNGL V   Final Result      Mild cardiomegaly. Low lung volumes with no acute cardiopulmonary abnormalities. Medical Decision Making   I am the first provider for this patient. I reviewed the vital signs, available nursing notes, past medical history, past surgical history, family history and social history. Vital Signs-Reviewed the patient's vital signs. EKG: SR at 98, no stemi interpreted by me    Records Reviewed: Nursing Notes, Old Medical Records, Previous Radiology Studies, and Previous Laboratory Studies (Time of Review: 11:50 AM)    ED Course: Progress Notes, Reevaluation, and Consults: The patient was hydrated using ideal body weight based on the size. Derek Pascual, DO 1:00 PM    The sepsis reevaluation is complete. Derek Pascual, DO 1:01 PM    The patient's blood pressure is improved and MAP is 81.   The MRI suggests the seroma versus infectious process around his surgical site. Dr. Aryan Mcclure looked at the MRI and plans to take him to the OR for a washout but we will plan to do it tomorrow at 1 PM so he can be optimized medically. He would like the patient be admitted to the medical service. Bridger Son, DO 3:12 PM        Provider Notes (Medical Decision Making):   MDM  Number of Diagnoses or Management Options  VIPIN (acute kidney injury) ([facility] [address]  Dehydration  Post-op pain  Diagnosis management comments: The patient is a 49-year-old female with a history of diabetes, hypertension, hyperlipidemia, chronic back pain, status post multilevel fusion done by Dr. Aryan Mcclure on 12/7/2022 remained in the hospital for approximately 6 days and then has been home and progressively worsening over the past 2 weeks. The patient is now having stool incontinence, fatigue, and decreased appetite. The patient was initially hypotensive but responded to IV fluids, mild elevation of lactic acid, and was given empiric antibiotics for concern for infection. Patient's wound is mildly erythematous without active drainage. We will send an ESR CRP but given the patient's decline and stool incontinence we will proceed with MRI with contrast of the lumbar spine. We will hydrate the patient and follow urinalysis and electrolytes and renal function as well. Procedures    Critical Care Time:Critical Care Time:  The services I provided to this patient were to treat and/or prevent clinically significant deterioration that could result in the failure of one or more body systems and/or organ systems due to VIPIN, hypotension, concern for infection.     Services included the following:  -reviewing nursing notes and old charts  -vital sign assessments  -direct patient care  -medication orders and management  -interpreting and reviewing diagnostic studies/labs  -re-evaluations  -documentation time    Aggregate critical care time was 38 minutes, which includes only time during which I was engaged in work directly related to the patient's care as described above, whether I was at bedside or elsewhere in the Emergency Department. It did not include time spent performing other reported procedures or the services of residents, students, nurses, or advance practice providers. 6:48 PM       Diagnosis     Clinical Impression:   1. VIPIN (acute kidney injury) ([facility] [address]    2. Dehydration    3. Post-op pain        Disposition: Admit    Follow-up Information    None          Patient's Medications   Start Taking    No medications on file   Continue Taking    ACETAMINOPHEN (TYLENOL) 500 MG TABLET    Take  by mouth every six (6) hours as needed for Pain. ASPIRIN DELAYED-RELEASE 81 MG TABLET    Take 81 mg by mouth daily. CHOLECALCIFEROL (VITAMIN D3) (1000 UNITS /25 MCG) TABLET    Take 2,000 Units by mouth daily. DICLOFENAC EC (VOLTAREN) 75 MG EC TABLET    Take 75 mg by mouth two (2) times a day. GABAPENTIN (NEURONTIN) 300 MG CAPSULE    600 mg three (3) times daily. LISINOPRIL (PRINIVIL, ZESTRIL) 40 MG TABLET    TAKE 1 TABLET BY MOUTH ONCE DAILY FOR BLOOD PRESSURE FOR 90 DAYS    METFORMIN (GLUCOPHAGE) 500 MG TABLET    TAKE 1 TABLET BY MOUTH TWICE DAILY WITH A MEAL    MULTIVITAMIN (ONE A DAY) TABLET    Take 1 Tablet by mouth daily. OTHER,NON-FORMULARY,    Indications: maxforce prostate 1 tab twice daily    POLYETHYLENE GLYCOL (MIRALAX) 17 GRAM/DOSE POWDER    Take 17 g by mouth daily. Indications: constipation    SIMVASTATIN (ZOCOR) 40 MG TABLET    TAKE 1 TABLET BY MOUTH ONCE DAILY IN THE EVENING FOR CHOLESTEROL FOR 90 DAYS   These Medications have changed    No medications on file   Stop Taking    No medications on file     Disclaimer: Sections of this note are dictated using utilizing voice recognition software. Minor typographical errors may be present. If questions arise, please do not hesitate to contact me or call our department.

## 2022-12-23 ENCOUNTER — APPOINTMENT (OUTPATIENT)
Dept: CT IMAGING | Age: 62
DRG: 857 | End: 2022-12-23
Attending: PHYSICIAN ASSISTANT
Payer: COMMERCIAL

## 2022-12-23 ENCOUNTER — ANESTHESIA (OUTPATIENT)
Dept: SURGERY | Age: 62
DRG: 857 | End: 2022-12-23
Payer: COMMERCIAL

## 2022-12-23 PROBLEM — B96.1 BACTEREMIA DUE TO KLEBSIELLA PNEUMONIAE: Status: ACTIVE | Noted: 2022-12-23

## 2022-12-23 PROBLEM — R78.81 BACTEREMIA DUE TO KLEBSIELLA PNEUMONIAE: Status: ACTIVE | Noted: 2022-12-23

## 2022-12-23 LAB
ABO + RH BLD: NORMAL
ACC. NO. FROM MICRO ORDER, ACCP: ABNORMAL
ACINETOBACTER CALCOACETICUS-BAUMANII COMPLEX, ACBCX: NOT DETECTED
ALBUMIN SERPL-MCNC: 2.4 G/DL (ref 3.4–5)
ALBUMIN/GLOB SERPL: 0.6 (ref 0.8–1.7)
ALP SERPL-CCNC: 52 U/L (ref 45–117)
ALT SERPL-CCNC: 102 U/L (ref 16–61)
ANION GAP SERPL CALC-SCNC: 4 MMOL/L (ref 3–18)
AST SERPL-CCNC: 88 U/L (ref 10–38)
BACTEROIDES FRAGILIS, BFRA: NOT DETECTED
BASOPHILS # BLD: 0 K/UL (ref 0–0.1)
BASOPHILS # BLD: 0 K/UL (ref 0–0.1)
BASOPHILS NFR BLD: 0 % (ref 0–2)
BASOPHILS NFR BLD: 0 % (ref 0–2)
BILIRUB SERPL-MCNC: 0.5 MG/DL (ref 0.2–1)
BIOFIRE COMMENT, BCIDPF: ABNORMAL
BLOOD GROUP ANTIBODIES SERPL: NORMAL
BUN SERPL-MCNC: 25 MG/DL (ref 7–18)
BUN/CREAT SERPL: 24 (ref 12–20)
C GLABRATA DNA VAG QL NAA+PROBE: NOT DETECTED
CALCIUM SERPL-MCNC: 8.8 MG/DL (ref 8.5–10.1)
CANDIDA ALBICANS: NOT DETECTED
CANDIDA AURIS, CAAU: NOT DETECTED
CANDIDA KRUSEI, CKRP: NOT DETECTED
CANDIDA PARAPSILOSIS, CPAUP: NOT DETECTED
CANDIDA TROPICALIS, CTROP: NOT DETECTED
CHLORIDE SERPL-SCNC: 105 MMOL/L (ref 100–111)
CO2 SERPL-SCNC: 27 MMOL/L (ref 21–32)
CREAT SERPL-MCNC: 1.03 MG/DL (ref 0.6–1.3)
CRP SERPL-MCNC: 12.1 MG/DL (ref 0–0.3)
CRYPTO NEOFORMANS/GATTII, CRYNEG: NOT DETECTED
CTX-M (ESBL RESISTANT GENE), CTX: NOT DETECTED
DIFFERENTIAL METHOD BLD: ABNORMAL
DIFFERENTIAL METHOD BLD: ABNORMAL
ENTEROBACTER CLOACAE COMPLEX, ECCP: NOT DETECTED
ENTEROBACTERALES SP. , ENBLS: DETECTED
ENTEROCOCCUS FAECALIS, ENFA: NOT DETECTED
ENTEROCOCCUS FAECIUM, ENFAM: NOT DETECTED
EOSINOPHIL # BLD: 0 K/UL (ref 0–0.4)
EOSINOPHIL # BLD: 0 K/UL (ref 0–0.4)
EOSINOPHIL NFR BLD: 0 % (ref 0–5)
EOSINOPHIL NFR BLD: 0 % (ref 0–5)
ERYTHROCYTE [DISTWIDTH] IN BLOOD BY AUTOMATED COUNT: 13 % (ref 11.6–14.5)
ERYTHROCYTE [DISTWIDTH] IN BLOOD BY AUTOMATED COUNT: 13.1 % (ref 11.6–14.5)
ERYTHROCYTE [SEDIMENTATION RATE] IN BLOOD: 1 MM/HR (ref 0–20)
ESCHERICHIA COLI: NOT DETECTED
GLOBULIN SER CALC-MCNC: 4.3 G/DL (ref 2–4)
GLUCOSE BLD STRIP.AUTO-MCNC: 127 MG/DL (ref 70–110)
GLUCOSE BLD STRIP.AUTO-MCNC: 149 MG/DL (ref 70–110)
GLUCOSE BLD STRIP.AUTO-MCNC: 182 MG/DL (ref 70–110)
GLUCOSE SERPL-MCNC: 119 MG/DL (ref 74–99)
HAEMOPHILUS INFLUENZAE, HMI: NOT DETECTED
HCT VFR BLD AUTO: 23.6 % (ref 36–48)
HCT VFR BLD AUTO: 25 % (ref 36–48)
HGB BLD-MCNC: 7.5 G/DL (ref 13–16)
HGB BLD-MCNC: 8.1 G/DL (ref 13–16)
IMM GRANULOCYTES # BLD AUTO: 0 K/UL (ref 0–0.04)
IMM GRANULOCYTES # BLD AUTO: 0 K/UL (ref 0–0.04)
IMM GRANULOCYTES NFR BLD AUTO: 0 % (ref 0–0.5)
IMM GRANULOCYTES NFR BLD AUTO: 0 % (ref 0–0.5)
IMP (CARBAPENEMASE RESISTANT GENE), IMPC: NOT DETECTED
INR PPP: 1.1 (ref 0.8–1.2)
KLEBSIELLA AEROGENES, KLAE: NOT DETECTED
KLEBSIELLA OXYTOCA: NOT DETECTED
KLEBSIELLA PNEUMONIAE GROUP, KPPG: DETECTED
KPC (CARBAPENEM RESISTANCE GENE): NOT DETECTED
LISTERIA MONOCYTOGENES, LMONP: NOT DETECTED
LYMPHOCYTES # BLD: 0.9 K/UL (ref 0.9–3.6)
LYMPHOCYTES # BLD: 1 K/UL (ref 0.9–3.6)
LYMPHOCYTES NFR BLD: 14 % (ref 21–52)
LYMPHOCYTES NFR BLD: 17 % (ref 21–52)
MCH RBC QN AUTO: 30.6 PG (ref 24–34)
MCH RBC QN AUTO: 31.2 PG (ref 24–34)
MCHC RBC AUTO-ENTMCNC: 31.8 G/DL (ref 31–37)
MCHC RBC AUTO-ENTMCNC: 32.4 G/DL (ref 31–37)
MCR-1 (COLISTIN RESISTANT GENE), MCR: NOT DETECTED
MCV RBC AUTO: 96.2 FL (ref 78–100)
MCV RBC AUTO: 96.3 FL (ref 78–100)
MONOCYTES # BLD: 0.7 K/UL (ref 0.05–1.2)
MONOCYTES # BLD: 0.7 K/UL (ref 0.05–1.2)
MONOCYTES NFR BLD: 11 % (ref 3–10)
MONOCYTES NFR BLD: 12 % (ref 3–10)
NDM (CARBAPENEMASE RESISTANT GENE), NDM: NOT DETECTED
NEISSERIA MENINGITIDIS, NMNI: NOT DETECTED
NEUTS SEG # BLD: 4.3 K/UL (ref 1.8–8)
NEUTS SEG # BLD: 4.5 K/UL (ref 1.8–8)
NEUTS SEG NFR BLD: 71 % (ref 40–73)
NEUTS SEG NFR BLD: 73 % (ref 40–73)
NRBC # BLD: 0 K/UL (ref 0–0.01)
NRBC # BLD: 0 K/UL (ref 0–0.01)
NRBC BLD-RTO: 0 PER 100 WBC
NRBC BLD-RTO: 0 PER 100 WBC
OXA-48-LIKE (CARBAPENEMASE RESISTANT GENE), OXA48: NOT DETECTED
PLATELET # BLD AUTO: 400 K/UL (ref 135–420)
PLATELET # BLD AUTO: 422 K/UL (ref 135–420)
PMV BLD AUTO: 8.9 FL (ref 9.2–11.8)
PMV BLD AUTO: 9 FL (ref 9.2–11.8)
POTASSIUM SERPL-SCNC: 3.9 MMOL/L (ref 3.5–5.5)
PROT SERPL-MCNC: 6.7 G/DL (ref 6.4–8.2)
PROTEUS, PRP: NOT DETECTED
PROTHROMBIN TIME: 14.8 SEC (ref 11.5–15.2)
PSEUDOMONAS AERUGINOSA: NOT DETECTED
RBC # BLD AUTO: 2.45 M/UL (ref 4.35–5.65)
RBC # BLD AUTO: 2.6 M/UL (ref 4.35–5.65)
RESISTANT GENE SPACE, REGENE: ABNORMAL
SALMONELLA, SALMO: NOT DETECTED
SERRATIA MARCESCENS: NOT DETECTED
SODIUM SERPL-SCNC: 136 MMOL/L (ref 136–145)
SPECIMEN EXP DATE BLD: NORMAL
STAPH EPIDERMIDIS, STEP: NOT DETECTED
STAPH LUGDUNENSIS, STALUG: NOT DETECTED
STAPHYLOCOCCUS AUREUS: NOT DETECTED
STAPHYLOCOCCUS, STAPP: NOT DETECTED
STENO MALTOPHILIA, STMA: NOT DETECTED
STREPTOCOCCUS , STPSP: NOT DETECTED
STREPTOCOCCUS AGALACTIAE (GROUP B): NOT DETECTED
STREPTOCOCCUS PNEUMONIAE , SPNP: NOT DETECTED
STREPTOCOCCUS PYOGENES (GROUP A), SPYOP: NOT DETECTED
VANCOMYCIN SERPL-MCNC: 20.9 UG/ML (ref 5–40)
VIM (CARBAPENEMASE RESISTANT GENE), VIM: NOT DETECTED
WBC # BLD AUTO: 6 K/UL (ref 4.6–13.2)
WBC # BLD AUTO: 6.1 K/UL (ref 4.6–13.2)

## 2022-12-23 PROCEDURE — 77030012890: Performed by: ORTHOPAEDIC SURGERY

## 2022-12-23 PROCEDURE — 87205 SMEAR GRAM STAIN: CPT

## 2022-12-23 PROCEDURE — 77030008683 HC TU ET CUF COVD -A: Performed by: ANESTHESIOLOGY

## 2022-12-23 PROCEDURE — 76060000033 HC ANESTHESIA 1 TO 1.5 HR: Performed by: ORTHOPAEDIC SURGERY

## 2022-12-23 PROCEDURE — 74011250636 HC RX REV CODE- 250/636: Performed by: NURSE ANESTHETIST, CERTIFIED REGISTERED

## 2022-12-23 PROCEDURE — 74011000250 HC RX REV CODE- 250: Performed by: ORTHOPAEDIC SURGERY

## 2022-12-23 PROCEDURE — 74011000250 HC RX REV CODE- 250: Performed by: NURSE ANESTHETIST, CERTIFIED REGISTERED

## 2022-12-23 PROCEDURE — 74011000258 HC RX REV CODE- 258: Performed by: EMERGENCY MEDICINE

## 2022-12-23 PROCEDURE — 74011250636 HC RX REV CODE- 250/636: Performed by: EMERGENCY MEDICINE

## 2022-12-23 PROCEDURE — 86900 BLOOD TYPING SEROLOGIC ABO: CPT

## 2022-12-23 PROCEDURE — 74011250637 HC RX REV CODE- 250/637: Performed by: ORTHOPAEDIC SURGERY

## 2022-12-23 PROCEDURE — 82962 GLUCOSE BLOOD TEST: CPT

## 2022-12-23 PROCEDURE — 97530 THERAPEUTIC ACTIVITIES: CPT

## 2022-12-23 PROCEDURE — 74011250636 HC RX REV CODE- 250/636: Performed by: ORTHOPAEDIC SURGERY

## 2022-12-23 PROCEDURE — 2709999900 HC NON-CHARGEABLE SUPPLY: Performed by: ORTHOPAEDIC SURGERY

## 2022-12-23 PROCEDURE — 99152 MOD SED SAME PHYS/QHP 5/>YRS: CPT

## 2022-12-23 PROCEDURE — 97161 PT EVAL LOW COMPLEX 20 MIN: CPT

## 2022-12-23 PROCEDURE — 3E10X8Z IRRIGATION OF SKIN AND MUCOUS MEMBRANES USING IRRIGATING SUBSTANCE: ICD-10-PCS | Performed by: ORTHOPAEDIC SURGERY

## 2022-12-23 PROCEDURE — 97116 GAIT TRAINING THERAPY: CPT

## 2022-12-23 PROCEDURE — 77030040922 HC BLNKT HYPOTHRM STRY -A: Performed by: ORTHOPAEDIC SURGERY

## 2022-12-23 PROCEDURE — 74011636637 HC RX REV CODE- 636/637: Performed by: ORTHOPAEDIC SURGERY

## 2022-12-23 PROCEDURE — 76010000149 HC OR TIME 1 TO 1.5 HR: Performed by: ORTHOPAEDIC SURGERY

## 2022-12-23 PROCEDURE — 87186 SC STD MICRODIL/AGAR DIL: CPT

## 2022-12-23 PROCEDURE — 86140 C-REACTIVE PROTEIN: CPT

## 2022-12-23 PROCEDURE — 65270000046 HC RM TELEMETRY

## 2022-12-23 PROCEDURE — 74011250636 HC RX REV CODE- 250/636: Performed by: INTERNAL MEDICINE

## 2022-12-23 PROCEDURE — 87077 CULTURE AEROBIC IDENTIFY: CPT

## 2022-12-23 PROCEDURE — 0S903ZZ DRAINAGE OF LUMBAR VERTEBRAL JOINT, PERCUTANEOUS APPROACH: ICD-10-PCS | Performed by: PHYSICIAN ASSISTANT

## 2022-12-23 PROCEDURE — 0JD70ZZ EXTRACTION OF BACK SUBCUTANEOUS TISSUE AND FASCIA, OPEN APPROACH: ICD-10-PCS | Performed by: ORTHOPAEDIC SURGERY

## 2022-12-23 PROCEDURE — 80053 COMPREHEN METABOLIC PANEL: CPT

## 2022-12-23 PROCEDURE — 87075 CULTR BACTERIA EXCEPT BLOOD: CPT

## 2022-12-23 PROCEDURE — 74011000250 HC RX REV CODE- 250: Performed by: INTERNAL MEDICINE

## 2022-12-23 PROCEDURE — 85652 RBC SED RATE AUTOMATED: CPT

## 2022-12-23 PROCEDURE — 74011250636 HC RX REV CODE- 250/636: Performed by: RADIOLOGY

## 2022-12-23 PROCEDURE — 2709999900 HC NON-CHARGEABLE SUPPLY

## 2022-12-23 PROCEDURE — 85025 COMPLETE CBC W/AUTO DIFF WBC: CPT

## 2022-12-23 PROCEDURE — 85610 PROTHROMBIN TIME: CPT

## 2022-12-23 PROCEDURE — 51798 US URINE CAPACITY MEASURE: CPT

## 2022-12-23 PROCEDURE — 74011000272 HC RX REV CODE- 272: Performed by: ORTHOPAEDIC SURGERY

## 2022-12-23 PROCEDURE — 77030018836 HC SOL IRR NACL ICUM -A: Performed by: ORTHOPAEDIC SURGERY

## 2022-12-23 PROCEDURE — 76210000016 HC OR PH I REC 1 TO 1.5 HR: Performed by: ORTHOPAEDIC SURGERY

## 2022-12-23 PROCEDURE — 77030040361 HC SLV COMPR DVT MDII -B: Performed by: ORTHOPAEDIC SURGERY

## 2022-12-23 PROCEDURE — 99233 SBSQ HOSP IP/OBS HIGH 50: CPT | Performed by: INTERNAL MEDICINE

## 2022-12-23 PROCEDURE — 80202 ASSAY OF VANCOMYCIN: CPT

## 2022-12-23 PROCEDURE — 74011000250 HC RX REV CODE- 250: Performed by: ANESTHESIOLOGY

## 2022-12-23 PROCEDURE — 74011000258 HC RX REV CODE- 258: Performed by: ORTHOPAEDIC SURGERY

## 2022-12-23 PROCEDURE — 74011250636 HC RX REV CODE- 250/636: Performed by: ANESTHESIOLOGY

## 2022-12-23 PROCEDURE — 74011000258 HC RX REV CODE- 258: Performed by: ANESTHESIOLOGY

## 2022-12-23 RX ORDER — TAMSULOSIN HYDROCHLORIDE 0.4 MG/1
0.4 CAPSULE ORAL
Status: COMPLETED | OUTPATIENT
Start: 2022-12-23 | End: 2022-12-23

## 2022-12-23 RX ORDER — FENTANYL CITRATE 50 UG/ML
25 INJECTION, SOLUTION INTRAMUSCULAR; INTRAVENOUS AS NEEDED
Status: DISCONTINUED | OUTPATIENT
Start: 2022-12-23 | End: 2022-12-23 | Stop reason: HOSPADM

## 2022-12-23 RX ORDER — MIDAZOLAM HYDROCHLORIDE 1 MG/ML
INJECTION, SOLUTION INTRAMUSCULAR; INTRAVENOUS AS NEEDED
Status: DISCONTINUED | OUTPATIENT
Start: 2022-12-23 | End: 2022-12-23 | Stop reason: HOSPADM

## 2022-12-23 RX ORDER — FLUMAZENIL 0.1 MG/ML
0.2 INJECTION INTRAVENOUS AS NEEDED
Status: DISCONTINUED | OUTPATIENT
Start: 2022-12-23 | End: 2023-01-06

## 2022-12-23 RX ORDER — ONDANSETRON 2 MG/ML
4 INJECTION INTRAMUSCULAR; INTRAVENOUS ONCE
Status: DISCONTINUED | OUTPATIENT
Start: 2022-12-23 | End: 2022-12-23 | Stop reason: HOSPADM

## 2022-12-23 RX ORDER — DIAZEPAM 5 MG/1
5 TABLET ORAL
Status: DISCONTINUED | OUTPATIENT
Start: 2022-12-23 | End: 2022-12-29

## 2022-12-23 RX ORDER — SODIUM CHLORIDE 0.9 % (FLUSH) 0.9 %
5-40 SYRINGE (ML) INJECTION EVERY 8 HOURS
Status: DISCONTINUED | OUTPATIENT
Start: 2022-12-23 | End: 2022-12-23 | Stop reason: HOSPADM

## 2022-12-23 RX ORDER — FENTANYL CITRATE 50 UG/ML
50 INJECTION, SOLUTION INTRAMUSCULAR; INTRAVENOUS
Status: DISCONTINUED | OUTPATIENT
Start: 2022-12-23 | End: 2022-12-23 | Stop reason: HOSPADM

## 2022-12-23 RX ORDER — LORAZEPAM 2 MG/ML
1 INJECTION INTRAMUSCULAR
Status: DISCONTINUED | OUTPATIENT
Start: 2022-12-23 | End: 2022-12-28

## 2022-12-23 RX ORDER — LIDOCAINE HYDROCHLORIDE 10 MG/ML
30 INJECTION, SOLUTION EPIDURAL; INFILTRATION; INTRACAUDAL; PERINEURAL
Status: COMPLETED | OUTPATIENT
Start: 2022-12-23 | End: 2022-12-23

## 2022-12-23 RX ORDER — FENTANYL CITRATE 50 UG/ML
12.5-1 INJECTION, SOLUTION INTRAMUSCULAR; INTRAVENOUS
Status: DISPENSED | OUTPATIENT
Start: 2022-12-23 | End: 2022-12-23

## 2022-12-23 RX ORDER — SODIUM CHLORIDE 0.9 % (FLUSH) 0.9 %
5-40 SYRINGE (ML) INJECTION EVERY 8 HOURS
Status: DISCONTINUED | OUTPATIENT
Start: 2022-12-23 | End: 2022-12-24

## 2022-12-23 RX ORDER — SODIUM CHLORIDE 0.9 % (FLUSH) 0.9 %
5-40 SYRINGE (ML) INJECTION AS NEEDED
Status: DISCONTINUED | OUTPATIENT
Start: 2022-12-23 | End: 2022-12-23 | Stop reason: HOSPADM

## 2022-12-23 RX ORDER — SUCCINYLCHOLINE CHLORIDE 20 MG/ML
INJECTION INTRAMUSCULAR; INTRAVENOUS AS NEEDED
Status: DISCONTINUED | OUTPATIENT
Start: 2022-12-23 | End: 2022-12-23 | Stop reason: HOSPADM

## 2022-12-23 RX ORDER — DOCUSATE SODIUM 100 MG/1
100 CAPSULE, LIQUID FILLED ORAL 2 TIMES DAILY
Status: DISCONTINUED | OUTPATIENT
Start: 2022-12-23 | End: 2023-01-07

## 2022-12-23 RX ORDER — ACETAMINOPHEN 500 MG
1000 TABLET ORAL EVERY 6 HOURS
Status: DISCONTINUED | OUTPATIENT
Start: 2022-12-23 | End: 2023-01-06

## 2022-12-23 RX ORDER — DEXTROSE MONOHYDRATE 100 MG/ML
0-250 INJECTION, SOLUTION INTRAVENOUS AS NEEDED
Status: DISCONTINUED | OUTPATIENT
Start: 2022-12-23 | End: 2022-12-23 | Stop reason: HOSPADM

## 2022-12-23 RX ORDER — ROCURONIUM BROMIDE 10 MG/ML
INJECTION, SOLUTION INTRAVENOUS AS NEEDED
Status: DISCONTINUED | OUTPATIENT
Start: 2022-12-23 | End: 2022-12-23 | Stop reason: HOSPADM

## 2022-12-23 RX ORDER — NEOSTIGMINE METHYLSULFATE 1 MG/ML
INJECTION, SOLUTION INTRAVENOUS AS NEEDED
Status: DISCONTINUED | OUTPATIENT
Start: 2022-12-23 | End: 2022-12-23 | Stop reason: HOSPADM

## 2022-12-23 RX ORDER — OXYCODONE HYDROCHLORIDE 10 MG/1
10 TABLET ORAL
Status: DISCONTINUED | OUTPATIENT
Start: 2022-12-23 | End: 2022-12-26

## 2022-12-23 RX ORDER — PROPOFOL 10 MG/ML
INJECTION, EMULSION INTRAVENOUS AS NEEDED
Status: DISCONTINUED | OUTPATIENT
Start: 2022-12-23 | End: 2022-12-23 | Stop reason: HOSPADM

## 2022-12-23 RX ORDER — GLYCOPYRROLATE 0.2 MG/ML
INJECTION INTRAMUSCULAR; INTRAVENOUS AS NEEDED
Status: DISCONTINUED | OUTPATIENT
Start: 2022-12-23 | End: 2022-12-23 | Stop reason: HOSPADM

## 2022-12-23 RX ORDER — NALOXONE HYDROCHLORIDE 0.4 MG/ML
0.2 INJECTION, SOLUTION INTRAMUSCULAR; INTRAVENOUS; SUBCUTANEOUS AS NEEDED
Status: DISCONTINUED | OUTPATIENT
Start: 2022-12-23 | End: 2022-12-24

## 2022-12-23 RX ORDER — DIPHENHYDRAMINE HYDROCHLORIDE 50 MG/ML
12.5 INJECTION, SOLUTION INTRAMUSCULAR; INTRAVENOUS
Status: DISCONTINUED | OUTPATIENT
Start: 2022-12-23 | End: 2023-01-10

## 2022-12-23 RX ORDER — HYDROMORPHONE HYDROCHLORIDE 1 MG/ML
1 INJECTION, SOLUTION INTRAMUSCULAR; INTRAVENOUS; SUBCUTANEOUS
Status: DISCONTINUED | OUTPATIENT
Start: 2022-12-23 | End: 2023-01-05

## 2022-12-23 RX ORDER — INSULIN LISPRO 100 [IU]/ML
INJECTION, SOLUTION INTRAVENOUS; SUBCUTANEOUS ONCE
Status: DISCONTINUED | OUTPATIENT
Start: 2022-12-23 | End: 2022-12-23 | Stop reason: HOSPADM

## 2022-12-23 RX ORDER — FAMOTIDINE 20 MG/1
40 TABLET, FILM COATED ORAL EVERY 12 HOURS
Status: DISCONTINUED | OUTPATIENT
Start: 2022-12-23 | End: 2022-12-31

## 2022-12-23 RX ORDER — MIDAZOLAM HYDROCHLORIDE 1 MG/ML
.5-2 INJECTION, SOLUTION INTRAMUSCULAR; INTRAVENOUS
Status: DISPENSED | OUTPATIENT
Start: 2022-12-23 | End: 2022-12-23

## 2022-12-23 RX ORDER — FENTANYL CITRATE 50 UG/ML
INJECTION, SOLUTION INTRAMUSCULAR; INTRAVENOUS AS NEEDED
Status: DISCONTINUED | OUTPATIENT
Start: 2022-12-23 | End: 2022-12-23 | Stop reason: HOSPADM

## 2022-12-23 RX ORDER — ONDANSETRON 2 MG/ML
4 INJECTION INTRAMUSCULAR; INTRAVENOUS
Status: DISCONTINUED | OUTPATIENT
Start: 2022-12-23 | End: 2022-12-24

## 2022-12-23 RX ORDER — LEVOFLOXACIN 5 MG/ML
750 INJECTION, SOLUTION INTRAVENOUS EVERY 24 HOURS
Status: DISCONTINUED | OUTPATIENT
Start: 2022-12-23 | End: 2022-12-26

## 2022-12-23 RX ORDER — DEXAMETHASONE SODIUM PHOSPHATE 4 MG/ML
INJECTION, SOLUTION INTRA-ARTICULAR; INTRALESIONAL; INTRAMUSCULAR; INTRAVENOUS; SOFT TISSUE AS NEEDED
Status: DISCONTINUED | OUTPATIENT
Start: 2022-12-23 | End: 2022-12-23 | Stop reason: HOSPADM

## 2022-12-23 RX ORDER — HYDROMORPHONE HYDROCHLORIDE 2 MG/1
4 TABLET ORAL
Status: DISCONTINUED | OUTPATIENT
Start: 2022-12-23 | End: 2022-12-28

## 2022-12-23 RX ORDER — IBUPROFEN 200 MG
4 TABLET ORAL AS NEEDED
Status: DISCONTINUED | OUTPATIENT
Start: 2022-12-23 | End: 2022-12-23 | Stop reason: HOSPADM

## 2022-12-23 RX ORDER — NALOXONE HYDROCHLORIDE 0.4 MG/ML
0.4 INJECTION, SOLUTION INTRAMUSCULAR; INTRAVENOUS; SUBCUTANEOUS AS NEEDED
Status: DISCONTINUED | OUTPATIENT
Start: 2022-12-23 | End: 2022-12-24

## 2022-12-23 RX ORDER — HYDROCODONE BITARTRATE AND ACETAMINOPHEN 5; 325 MG/1; MG/1
1 TABLET ORAL AS NEEDED
Status: DISCONTINUED | OUTPATIENT
Start: 2022-12-23 | End: 2022-12-23 | Stop reason: HOSPADM

## 2022-12-23 RX ORDER — SODIUM CHLORIDE 0.9 % (FLUSH) 0.9 %
5-40 SYRINGE (ML) INJECTION AS NEEDED
Status: DISCONTINUED | OUTPATIENT
Start: 2022-12-23 | End: 2023-01-13 | Stop reason: HOSPADM

## 2022-12-23 RX ADMIN — SODIUM CHLORIDE, PRESERVATIVE FREE 10 ML: 5 INJECTION INTRAVENOUS at 06:00

## 2022-12-23 RX ADMIN — SODIUM CHLORIDE, SODIUM LACTATE, POTASSIUM CHLORIDE, AND CALCIUM CHLORIDE 75 ML/HR: 600; 310; 30; 20 INJECTION, SOLUTION INTRAVENOUS at 10:46

## 2022-12-23 RX ADMIN — HYDROMORPHONE HYDROCHLORIDE 1 MG: 1 INJECTION, SOLUTION INTRAMUSCULAR; INTRAVENOUS; SUBCUTANEOUS at 17:06

## 2022-12-23 RX ADMIN — LIDOCAINE HYDROCHLORIDE 30 ML: 10 INJECTION, SOLUTION EPIDURAL; INFILTRATION; INTRACAUDAL; PERINEURAL at 09:15

## 2022-12-23 RX ADMIN — SODIUM CHLORIDE, PRESERVATIVE FREE 10 ML: 5 INJECTION INTRAVENOUS at 16:45

## 2022-12-23 RX ADMIN — GABAPENTIN 600 MG: 300 CAPSULE ORAL at 16:44

## 2022-12-23 RX ADMIN — PHENYLEPHRINE HYDROCHLORIDE 100 MCG: 10 INJECTION INTRAVENOUS at 11:53

## 2022-12-23 RX ADMIN — ATORVASTATIN CALCIUM 20 MG: 20 TABLET, FILM COATED ORAL at 21:14

## 2022-12-23 RX ADMIN — PIPERACILLIN SODIUM AND TAZOBACTAM SODIUM 3.38 G: 3; .375 INJECTION, POWDER, LYOPHILIZED, FOR SOLUTION INTRAVENOUS at 17:03

## 2022-12-23 RX ADMIN — VANCOMYCIN HYDROCHLORIDE 1000 MG: 1 INJECTION, POWDER, LYOPHILIZED, FOR SOLUTION INTRAVENOUS at 10:59

## 2022-12-23 RX ADMIN — VANCOMYCIN HYDROCHLORIDE 1000 MG: 1 INJECTION, POWDER, LYOPHILIZED, FOR SOLUTION INTRAVENOUS at 21:14

## 2022-12-23 RX ADMIN — MORPHINE SULFATE 2 MG: 2 INJECTION, SOLUTION INTRAMUSCULAR; INTRAVENOUS at 01:40

## 2022-12-23 RX ADMIN — TAMSULOSIN HYDROCHLORIDE 0.4 MG: 0.4 CAPSULE ORAL at 10:20

## 2022-12-23 RX ADMIN — ROCURONIUM BROMIDE 5 MG: 50 INJECTION INTRAVENOUS at 11:32

## 2022-12-23 RX ADMIN — FENTANYL CITRATE 25 MCG: 50 INJECTION, SOLUTION INTRAMUSCULAR; INTRAVENOUS at 12:15

## 2022-12-23 RX ADMIN — FENTANYL CITRATE 75 MCG: 50 INJECTION, SOLUTION INTRAMUSCULAR; INTRAVENOUS at 09:15

## 2022-12-23 RX ADMIN — VANCOMYCIN HYDROCHLORIDE 750 MG: 750 INJECTION, POWDER, LYOPHILIZED, FOR SOLUTION INTRAVENOUS at 01:39

## 2022-12-23 RX ADMIN — ACETAMINOPHEN 1000 MG: 500 TABLET ORAL at 16:44

## 2022-12-23 RX ADMIN — PROPOFOL 120 MG: 10 INJECTION, EMULSION INTRAVENOUS at 11:32

## 2022-12-23 RX ADMIN — GABAPENTIN 600 MG: 300 CAPSULE ORAL at 21:14

## 2022-12-23 RX ADMIN — SODIUM CHLORIDE 125 ML/HR: 9 INJECTION, SOLUTION INTRAVENOUS at 16:53

## 2022-12-23 RX ADMIN — GLYCOPYRROLATE 0.4 MG: 0.2 INJECTION INTRAMUSCULAR; INTRAVENOUS at 12:20

## 2022-12-23 RX ADMIN — PIPERACILLIN SODIUM AND TAZOBACTAM SODIUM 3.38 G: 3; .375 INJECTION, POWDER, LYOPHILIZED, FOR SOLUTION INTRAVENOUS at 03:32

## 2022-12-23 RX ADMIN — MIDAZOLAM HYDROCHLORIDE 2 MG: 2 INJECTION, SOLUTION INTRAMUSCULAR; INTRAVENOUS at 11:25

## 2022-12-23 RX ADMIN — SODIUM CHLORIDE, PRESERVATIVE FREE 10 ML: 5 INJECTION INTRAVENOUS at 21:25

## 2022-12-23 RX ADMIN — FAMOTIDINE 40 MG: 20 TABLET ORAL at 21:14

## 2022-12-23 RX ADMIN — PHENYLEPHRINE HYDROCHLORIDE 100 MCG: 10 INJECTION INTRAVENOUS at 11:57

## 2022-12-23 RX ADMIN — SODIUM CHLORIDE, PRESERVATIVE FREE 10 ML: 5 INJECTION INTRAVENOUS at 21:26

## 2022-12-23 RX ADMIN — MORPHINE SULFATE 4 MG: 2 INJECTION, SOLUTION INTRAMUSCULAR; INTRAVENOUS at 05:20

## 2022-12-23 RX ADMIN — Medication 2 UNITS: at 18:32

## 2022-12-23 RX ADMIN — FENTANYL CITRATE 25 MCG: 50 INJECTION, SOLUTION INTRAMUSCULAR; INTRAVENOUS at 12:06

## 2022-12-23 RX ADMIN — PHENYLEPHRINE HYDROCHLORIDE 100 MCG: 10 INJECTION INTRAVENOUS at 11:59

## 2022-12-23 RX ADMIN — SUCCINYLCHOLINE CHLORIDE 100 MG: 20 INJECTION, SOLUTION INTRAMUSCULAR; INTRAVENOUS at 11:32

## 2022-12-23 RX ADMIN — PHENYLEPHRINE HYDROCHLORIDE 100 MCG: 10 INJECTION INTRAVENOUS at 11:55

## 2022-12-23 RX ADMIN — PHENYLEPHRINE HYDROCHLORIDE 100 MCG: 10 INJECTION INTRAVENOUS at 11:50

## 2022-12-23 RX ADMIN — FENTANYL CITRATE 50 MCG: 50 INJECTION, SOLUTION INTRAMUSCULAR; INTRAVENOUS at 11:42

## 2022-12-23 RX ADMIN — NEOSTIGMINE METHYLSULFATE 3 MG: 1 INJECTION, SOLUTION INTRAVENOUS at 12:20

## 2022-12-23 RX ADMIN — MIDAZOLAM 1.5 MG: 1 INJECTION INTRAMUSCULAR; INTRAVENOUS at 09:15

## 2022-12-23 RX ADMIN — DEXAMETHASONE SODIUM PHOSPHATE 4 MG: 4 INJECTION, SOLUTION INTRAMUSCULAR; INTRAVENOUS at 11:45

## 2022-12-23 RX ADMIN — ONDANSETRON 4 MG: 2 INJECTION INTRAMUSCULAR; INTRAVENOUS at 10:22

## 2022-12-23 RX ADMIN — DOCUSATE SODIUM 100 MG: 100 CAPSULE, LIQUID FILLED ORAL at 17:11

## 2022-12-23 RX ADMIN — HYDROMORPHONE HYDROCHLORIDE 4 MG: 2 TABLET ORAL at 21:15

## 2022-12-23 RX ADMIN — ROCURONIUM BROMIDE 15 MG: 50 INJECTION INTRAVENOUS at 11:37

## 2022-12-23 NOTE — ROUTINE PROCESS
TRANSFER - IN REPORT:    Verbal report received from Jordana Mai RN(name) on Rashid Cocks  being received from ER(unit) for ordered procedure      Report consisted of patients Situation, Background, Assessment and   Recommendations(SBAR). Information from the following report(s) SBAR and Kardex was reviewed with the receiving nurse. Opportunity for questions and clarification was provided. Assessment completed upon patients arrival to unit and care assumed.

## 2022-12-23 NOTE — PROGRESS NOTES
completed the initial Spiritual Assessment of the patient, and offered Pastoral Care, support to the patient and prayer as a pre-op . Patient stated that he does not have an advance directive. Patient does not have any Yazdanism/cultural needs that will affect patients preferences in health care. Chaplains will continue to follow and will provide pastoral care on an as needed/requested basis.     [facility]  Spiritual Care Department  [phone]

## 2022-12-23 NOTE — ED NOTES
Pt was medicated per MAR with back pain 7/10. Given a cup of water. Dr. Kolby Davenport at bedside now.

## 2022-12-23 NOTE — ED NOTES
Pt was repositioned in bed. Pt also had a moderate amount of drainage on selam pad. Gown and pad was changed. Pt medicated per [facility] with back pain 9/10. Currently watching tv with no other complaints at this time.

## 2022-12-23 NOTE — PROGRESS NOTES
New OT orders received and chart reviewed. Unable to see pt for OT evaluation at this time due to:    Pt is in OR for surgery on 2x attempts this date.  Will follow up after the surgery as appropriate   Thank you for this referral.    Asif Baldwin MS, OTR/L

## 2022-12-23 NOTE — ANESTHESIA POSTPROCEDURE EVALUATION
Procedure(s):  INCISION AND DRAINAGE LUMBAR SPINE/ APPLICATION OF WOUND VAC. general    Anesthesia Post Evaluation      Multimodal analgesia: multimodal analgesia used between 6 hours prior to anesthesia start to PACU discharge  Patient location during evaluation: PACU  Patient participation: complete - patient participated  Level of consciousness: awake and alert  Pain management: adequate  Airway patency: patent  Anesthetic complications: no  Cardiovascular status: acceptable  Respiratory status: acceptable  Hydration status: acceptable  Post anesthesia nausea and vomiting:  controlled  Final Post Anesthesia Temperature Assessment:  Normothermia (36.0-37.5 degrees C)      INITIAL Post-op Vital signs:   Vitals Value Taken Time   /73 12/23/22 1328   Temp 36.3 °C (97.3 °F) 12/23/22 1328   Pulse 87 12/23/22 1329   Resp 17 12/23/22 1329   SpO2 96 % 12/23/22 1329   Vitals shown include unvalidated device data.

## 2022-12-23 NOTE — ROUTINE PROCESS
Patient admitted to 2S for sepsis due to lumbar abscess. Patient admitted to room 216. Discussed with patient SBAR, Care plan and lab results. No questions or concerns at this time.

## 2022-12-23 NOTE — OP NOTES
[address] [place] [address]   OPERATIVE REPORT    Name:  Mariano Licona  MR#:   [id number]  :  1960  ACCOUNT #:  [de-identified]  DATE OF SERVICE:  2022    PREOPERATIVE DIAGNOSIS:  Wound infection, lumbar spine. POSTOPERATIVE DIAGNOSIS:  Wound infection, lumbar spine. PROCEDURES PERFORMED:  Exploration of lumbar wound deep and superficial, irrigation and debridement of lumbar wound deep and superficial, placement of wound VAC. SURGEON:  Brian Tavera MD    ASSISTANT:  0    ANESTHESIA:  General endotracheal.    COMPLICATIONS:  0    SPECIMENS REMOVED:  Aerobic and anaerobic cultures. IMPLANTS:  None. ESTIMATED BLOOD LOSS:  minimal    FINDINGS:  The patient had both superficial and deep to the fascia gross pus encompassing both the patient's previously placed instrumentation and bone graft as well as superficial region. It was clearly infected. Wound irrigated and debrided clear. Debridement was done of devitalized tissues. Closure with a wound VAC was utilized given the extent of the infection and the patient's immunosuppressed state. PROCEDURE:  Following induction of endotracheal anesthesia, the patient was turned in prone position. The patient was prepped, draped in usual fashion. Wound was incised and gross pus expressed. Repeat cultures were obtained, but cultures had been done earlier today by Interventional Radiology as well as there were positive blood cultures with gram-negative rods consistent with urine or bowel contamination. The superficial closure appeared to be intact as well as the deep enclosure, though there was clearly a  of space superiorly to the deep closure that there was communication. All superficial suture material was removed. The superficial wound irrigated with pulse lavage until clean and debrided and the deep closure suture material removed. Further pus seen.   Large amount of bone graft clearly was involved in the infectious process and was irrigated and removed. Multiple liters of pulse lavage irrigant were utilized until the wound appeared to be sterilized and clean. All devitalized materials and loose material were removed. The instrumentation appeared to be solid and intact. There were no soft tissue defects present. The wound VAC allowed for close approximately of the wound edges. Wound VAC was utilized  closure measures given the fact that despite having a clearly massive infection, the patient was afebrile with a normal white count, with only an elevated C-reactive protein being an objective indicator of possible infection. On outward appearance, the incision looked relatively benign, though there was a statement of some drainage superiorly. Following closure of the incision, a sterile wound VAC dressing was placed. The patient was brought to Recovery in good and stable condition. The patient already had been evaluated by Infectious Disease and was on broad-spectrum antibiotics pending final ID and sensitivity. There was no true active blood loss notable from the surgery itself. The patient maintained hemodynamic stability.       Alana Chavez MD      [id number]  D:  12/23/2022 12:20  T:  12/23/2022 16:01  JOB #:  [id number]

## 2022-12-23 NOTE — ACP (ADVANCE CARE PLANNING)
Advance Care Planning   Advance Care Planning Inpatient Note  [address] [place] [address]   Spiritual Care Department    Today's Date: 12/23/2022  Unit: [facility]    Received request from . Upon review of chart and communication with care team, patient's decision making abilities are not in question. Patient was/were present in the room during visit. Goals of ACP Conversation:  Discuss Advance Care planning documents    Health Care Decision Makers:    No healthcare decision makers have been documented. Click here to complete [address] including selection of the Healthcare Decision Maker Relationship (ie \"Primary\")  Summary:  No Decision Maker named by patient at this time    Advance Care Planning Documents (Patient Wishes) on file:  None     Assessment:     visited with patient just prior to his being taken to CT scan and then to surgery.  Patient does not have an advance directive    Interventions:  Deferred conversation as patient not interested in completing an advance directive at this time    Care Preferences Communicated:  No    Outcomes/Plan:      Jigar Zelaya [facility] on 12/23/2022 at 10:40 AM

## 2022-12-23 NOTE — BRIEF OP NOTE
Brief Postoperative Note    Patient: Erika Estrada  YOB: 1960  MRN: [id number]    Date of Procedure: 12/23/2022     Pre-Op Diagnosis wound infection lumbar spine, deep and superficial    Post-Op Diagnosis: Same as preoperative diagnosis.       Procedure(s):  INCISION AND DRAINAGE LUMBAR SPINE/ APPLICATION OF WOUND VAC    Surgeon(s):  Sukhwinder Griffith MD    Surgical Assistant: Surg Asst-1: Norbert Charles    Anesthesia: General     Estimated Blood Loss (mL): Minimal    Complications: None    Specimens:   ID Type Source Tests Collected by Time Destination   1 : lumbar wound Wound Lumbar CULTURE, ANAEROBIC, AEROBIC CULTURE + Tanya Hernandez MD 12/23/2022 1145 Microbiology        Implants: * No implants in log *    Drains: * No LDAs found *    Findings: gross pus deep and superficial. Wound 16 cm long, 4 cm wide, 9 cm deep    Electronically Signed by Felix Tello MD on 12/23/2022 at 12:00 PM

## 2022-12-23 NOTE — PROGRESS NOTES
[address] [facility] Pharmacokinetic Monitoring Service - Vancomycin    Consulting Provider: Jessica Portillo MD   Indication: Diabetic Foot Infection  Target Concentration: Goal AUC/MARIANNE 400-600 mg*hr/L  Day of Therapy: 2  Additional Antimicrobials: Zosyn    Pertinent Laboratory Values: Wt Readings from Last 1 Encounters:   12/22/22 108.9 kg (240 lb)     Temp Readings from Last 1 Encounters:   12/22/22 98.6 °F (37 °C)     Recent Labs     12/23/22  0342 12/22/22  1106   CREA 1.03 2.02*   BUN 25* 36*   ]    Estimated Creatinine Clearance: 90.9 mL/min (based on SCr of 1.03 mg/dL). Recent Labs     12/23/22  0342 12/22/22  1106   WBC 6.0 9.3       Pertinent Cultures:  Culture Date Source Results   12/22 Blood Pending   12/22 Urine - Clean catch Pending   MRSA Nasal Swab: N/A. Non-respiratory infection. .        Assessment:  Date/Time Current Dose Concentration Timing of Concentration (h) AUC   12/22 1441 2000 mg x 1 - - -   12/23 0139 750 mg q12h - - -   12/23 0342 - 20.9 2 362   12/23 1000 1000 mg q12h      Note: Serum concentrations collected for AUC dosing may appear elevated if collected in close proximity to the dose administered, this is not necessarily an indication of toxicity    Plan:  Current dosing regimen is sub-therapeutic  Increase Vancomycin to 1000 mg IV q12h for anticipated AUC = 478 and trough = 15 at steady state  Repeat vancomycin concentration ordered for 12/25 @ 0400   Pharmacy will continue to monitor patient and adjust therapy as indicated

## 2022-12-23 NOTE — CONSULTS
Interventional Radiology Consult Note  Patient: Ehsan Sumner               Sex: male          DOA: 12/22/2022       YOB: 1960      Age:  57 y.o.        LOS:  LOS: 1 day              Assessment   Lumbar fluid collection with spinal canal compression  Surgery is scheduled for this afternoon pending IR findings    Image guided lumbar fluid collection drainage is needed at this time to aid in diagnostics and guide further management. Case and images reviewed by Dr. Jared Waddell. Discussed with Dr. Dick Shelton guided lumbar fluid collection drainage as schedule allows  NPO since midnight with blood thinning medications held prior to procedure  Additional specimen orders per referring team    Thank you,  ZINA Granados  [id number]    HPI:     Ehsan Sumner is a 57 y.o. male who has been seen in evaluation of lumbar fluid collection 2 week post op at the request of Dr. Edmundo Mendez. Ehsan Sumner presented to [facility] on 12/22/22 for incontinence and back pain. PMHx is significant for lumbar decompression surgery . Imaging (MRI 12/22) shows a lumbar fluid collection in the post operative bed. The patient does not take any blood thinning medication at home. This morning the patient denies any weakness or paresthesias in his lower extremities and reports that his incontinence has resolved. He endorses drainage from his lower back and back pain. Denies N/V, HA, dizziness, fevers, chills, chest pain, dyspnea. The anticipated procedure was discussed in detail including risk of injury, infection, and bleeding. All questions were answered and concerns addressed. Informed consents were obtained.     Past Medical History:   Diagnosis Date    Arthritis     Back pain     from previous back injury    Colon polyps     Diabetes ([facility] [address]     2606    Hernia, umbilical     Hyperlipidemia     Hypertension     Pneumonia      Past Surgical History:   Procedure Laterality Date    HX COLONOSCOPY  2014    polyps HX HEENT      left lazy eye procedure during childhood    HX HEMORRHOIDECTOMY      HX HERNIA REPAIR          HX ORTHOPAEDIC  2017    right knee    HX ORTHOPAEDIC  1985    torn cartilage knee    HX TONSILLECTOMY  1964     History reviewed. No pertinent family history. Social History     Socioeconomic History    Marital status:    Tobacco Use    Smoking status: Former     Years: 5.00     Types: Cigarettes     Quit date: 2021     Years since quittin.9    Smokeless tobacco: Never    Tobacco comments:     3-4 cig per day, told not to smoke or drink 24/hrs prior to surgery   Vaping Use    Vaping Use: Never used   Substance and Sexual Activity    Alcohol use: Yes     Alcohol/week: 2.0 standard drinks     Types: 2 Cans of beer per week     Comment: 2 per month    Drug use: Never    Sexual activity: Never     Prior to Admission medications    Medication Sig Start Date End Date Taking? Authorizing Provider   HYDROmorphone (DILAUDID) 4 mg tablet Take 4 mg by mouth every six (6) hours as needed for Pain. Yes Provider, Historical   polyethylene glycol (MIRALAX) 17 gram/dose powder Take 17 g by mouth daily. Indications: constipation 22  Yes Concepcion Ballesteros PA-C   aspirin delayed-release 81 mg tablet Take 81 mg by mouth daily. Yes Provider, Historical   OTHER,NON-FORMULARY, Indications: maxforce prostate 1 tab twice daily   Yes Provider, Historical   multivitamin (ONE A DAY) tablet Take 1 Tablet by mouth daily. Yes Provider, Historical   metFORMIN (GLUCOPHAGE) 500 mg tablet TAKE 1 TABLET BY MOUTH TWICE DAILY WITH A MEAL 21  Yes Provider, Historical   cholecalciferol (VITAMIN D3) (1000 Units /25 mcg) tablet Take 2,000 Units by mouth daily. Yes Provider, Historical   gabapentin (NEURONTIN) 300 mg capsule 600 mg three (3) times daily.  21  Yes Provider, Historical   lisinopriL (PRINIVIL, ZESTRIL) 40 mg tablet TAKE 1 TABLET BY MOUTH ONCE DAILY FOR BLOOD PRESSURE FOR 90 DAYS 21 Yes Provider, Historical   simvastatin (ZOCOR) 40 mg tablet TAKE 1 TABLET BY MOUTH ONCE DAILY IN THE EVENING FOR CHOLESTEROL FOR 90 DAYS 9/17/21  Yes Provider, Historical   acetaminophen (TYLENOL) 500 mg tablet Take  by mouth every six (6) hours as needed for Pain.     Provider, Historical     No Known Allergies  Review of Systems  As above    Physical Exam:      Visit Vitals  /70   Pulse 74   Temp 98.6 °F (37 °C)   Resp 17   Ht 5' 11\" (1.803 m)   Wt 108.9 kg (240 lb)   SpO2 99%   BMI 33.47 kg/m²     Physical Exam:  Constitutional: Awake and alert and oriented x 4, NAD  Respiratory: Normal work of breathing, equal chest rise and fall  Cardiovascular: RRR  Gastrointestinal: Soft NT ND  Extremities: Skin warm and dry, moves all four     Labs Reviewed:  CMP:   Lab Results   Component Value Date/Time     12/23/2022 03:42 AM    K 3.9 12/23/2022 03:42 AM     12/23/2022 03:42 AM    CO2 27 12/23/2022 03:42 AM    AGAP 4 12/23/2022 03:42 AM     (H) 12/23/2022 03:42 AM    BUN 25 (H) 12/23/2022 03:42 AM    CREA 1.03 12/23/2022 03:42 AM    CA 8.8 12/23/2022 03:42 AM    MG 2.5 12/22/2022 11:06 AM    ALB 2.4 (L) 12/23/2022 03:42 AM    TP 6.7 12/23/2022 03:42 AM    GLOB 4.3 (H) 12/23/2022 03:42 AM    AGRAT 0.6 (L) 12/23/2022 03:42 AM     (H) 12/23/2022 03:42 AM     CBC:   Lab Results   Component Value Date/Time    WBC 6.0 12/23/2022 03:42 AM    HGB 7.5 (L) 12/23/2022 03:42 AM    HCT 23.6 (L) 12/23/2022 03:42 AM     12/23/2022 03:42 AM     COAGS:   Lab Results   Component Value Date/Time    PTP 14.8 12/23/2022 03:42 AM    INR 1.1 12/23/2022 03:42 AM     Blood Thinners:  None

## 2022-12-23 NOTE — ANESTHESIA PREPROCEDURE EVALUATION
Relevant Problems   No relevant active problems       Anesthetic History   No history of anesthetic complications            Review of Systems / Medical History  Patient summary reviewed and pertinent labs reviewed    Pulmonary          Smoker         Neuro/Psych             Comments: Post op lumbar infection Cardiovascular    Hypertension          Hyperlipidemia    Exercise tolerance: >4 METS     GI/Hepatic/Renal  Within defined limits              Endo/Other    Diabetes: poorly controlled, type 2    Arthritis     Other Findings              Physical Exam    Airway  Mallampati: II  TM Distance: > 6 cm  Neck ROM: normal range of motion   Mouth opening: Normal     Cardiovascular  Regular rate and rhythm,  S1 and S2 normal,  no murmur, click, rub, or gallop             Dental         Pulmonary  Breath sounds clear to auscultation               Abdominal  GI exam deferred       Other Findings            Anesthetic Plan    ASA: 3, emergent  Anesthesia type: general          Induction: Intravenous  Anesthetic plan and risks discussed with: Patient

## 2022-12-23 NOTE — PROCEDURES
RADIOLOGY POST PROCEDURE NOTE     December 23, 2022       9:34 AM     Preoperative Diagnosis:   Lumbar fluid collection    Postoperative Diagnosis:  Same. :  Bola Joyce    Assistant:  None. Type of Anesthesia: moderate sedation    Procedure/Description:  CT guided aspiration    Findings:   Purulent material.    Estimated blood Loss:  Minimal    Specimen Removed:   yes, microbiology    Blood transfusions:  None. Implants:  None. Complications: None    Condition: Stable    Discharge Plan: To the OR. Discussed with Dr Yaa Swann.     Clarence Gilmore MD

## 2022-12-23 NOTE — PROGRESS NOTES
Hospitalist Progress Note    Patient: Min Crump Age: 57 y.o. : 1960 MR#: [id number] SSN: [id number]  Date/Time: 2022 4:31 PM    DOA: 2022  PCP: Gio Adhikari MD    Subjective:     He was seen postoperatively. He expressed that he felt better however he is remained with pain in his low back. He underwent exploration of the lumbar wound deep and superficial, irrigation and debridement of the lumbar wound deep and superficial, placement of wound VAC this afternoon. He remains on IV antibiotics, + blood culture grew Klebsiella pneumoniae in 1 of 2 bottles. Patient expressed that he lives at home by himself and has not been able to take a good care of himself. He expressed interest in hiring temporary home health aide to assist with his daily activities of living. Interval Hospital Course:        ROS:  No fever/chills, no headache, no dizziness, no facial pain, no sinus congestion,   No swallowing pain, No chest pain, no palpitation, no shortness of breath, no abd pain, + low back pain  No diarrhea, + urinary complaint, no leg pain or swelling       Assessment/Plan:   Lumbar surgical site infection with concern for abscess, status post I&D of the lumbar deep wound and wound VAC placement. Bacteremia with Klebsiella  Status post multilevel lumbar decompression fusion  Bowel incontinent  Hypertension  VIPIN  Hyponatremia  Type 2 diabetes mellitus, with hyperglycemia, hemoglobin A1c 7.2%  Elevated liver function, mild  Normocytic anemia  Thrombocytosis, reactive      Continue with Zosyn, vancomycin, repeat blood culture follow-up tomorrow  Wound culture and intraoperative culture follow-up  Wound care with wound VAC for now  Orthospine to follow  ID to follow  Pain control with as needed narcotic.,  He can get gabapentin though be aware to adjust for renal function.   PT OT  Daily lab work  Follow-up on HIV screening  ICS  Pepcid  Full code      Disposition planning: Pending clinical status, likely SNF.   If placement at home for antibiotic he may need IV PICC line  Family updated (.  None)  Additional Notes:    Time spent > 35 minutes    Case discussed with:  [x]Patient  []Family  [x]Nursing  [x]Case Management  DVT Prophylaxis:  []Lovenox  []Hep SQ  [x]SCDs  []Coumadin   []On Heparin gtt    Signed By: Jose Miguel Mejias MD     2022 4:31 PM              Objective:   VS: Visit Vitals  /73 (BP Patient Position: At rest)   Pulse 86   Temp 97.3 °F (36.3 °C)   Resp 16   Ht 5' 11\" (1.803 m)   Wt 108.9 kg (240 lb)   SpO2 96%   BMI 33.47 kg/m²      Tmax/24hrs: Temp (24hrs), Av.8 °F (36.6 °C), Min:97.3 °F (36.3 °C), Max:98.3 °F (36.8 °C)    Intake/Output Summary (Last 24 hours) at 2022 1631  Last data filed at 2022 1318  Gross per 24 hour   Intake 900 ml   Output 500 ml   Net 400 ml       Tele:   General:  Cooperative, Not in acute distress, speaks in full sentence while in bed  HEENT: PERRL, EOMI, supple neck, no JVD, dry oral mucosa  Cardiovascular: S1S2 regular, no rub/gallop   Pulmonary: air entry bilaterally, no wheezing, no crackle  GI:  Soft, non tender, non distended, +bs, no guarding   Extremities:  No pedal edema, +distal pulses appreciated   Neuro: AOx3, moving all extremities  Wound vac without leakage noted    Additional:       Current Facility-Administered Medications   Medication Dose Route Frequency    vancomycin (VANCOCIN) 1,000 mg in 0.9% sodium chloride 250 mL (VIAL-MATE)  1,000 mg IntraVENous Q12H    naloxone (NARCAN) injection 0.2 mg  0.2 mg IntraVENous PRN    flumazeniL (ROMAZICON) 0.1 mg/mL injection 0.2 mg  0.2 mg IntraVENous PRN    levoFLOXacin (LEVAQUIN) 750 mg in D5W IVPB  750 mg IntraVENous Q24H    HYDROmorphone (DILAUDID) tablet 4 mg  4 mg Oral Q6H PRN    sodium chloride (NS) flush 5-40 mL  5-40 mL IntraVENous Q8H    sodium chloride (NS) flush 5-40 mL  5-40 mL IntraVENous PRN    acetaminophen (TYLENOL) tablet 1,000 mg  1,000 mg Oral Q6H HYDROmorphone (DILAUDID) injection 1 mg  1 mg IntraVENous Q4H PRN    naloxone (NARCAN) injection 0.4 mg  0.4 mg IntraVENous PRN    ondansetron (ZOFRAN) injection 4 mg  4 mg IntraVENous Q4H PRN    phenol throat spray (CHLORASEPTIC) 1 Spray  1 Spray Oral PRN    benzocaine-menthoL (CEPACOL) lozenge 1 Lozenge  1 Lozenge Oral PRN    diphenhydrAMINE (BENADRYL) injection 12.5 mg  12.5 mg IntraVENous Q4H PRN    famotidine (PEPCID) tablet 40 mg  40 mg Oral Q12H    diazePAM (VALIUM) tablet 5 mg  5 mg Oral Q6H PRN    docusate sodium (COLACE) capsule 100 mg  100 mg Oral BID    LORazepam (ATIVAN) injection 1 mg  1 mg IntraVENous Q6H PRN    oxyCODONE IR (ROXICODONE) tablet 10 mg  10 mg Oral Q4H PRN    sodium chloride (NS) flush 5-10 mL  5-10 mL IntraVENous PRN    piperacillin-tazobactam (ZOSYN) 3.375 g in 0.9% sodium chloride (MBP/ADV) 100 mL MBP  3.375 g IntraVENous Q8H    morphine injection 2-4 mg  2-4 mg IntraVENous Q3H PRN    naloxone (NARCAN) injection 0.4 mg  0.4 mg IntraVENous EVERY 2 MINUTES AS NEEDED    sodium chloride (NS) flush 5-40 mL  5-40 mL IntraVENous Q8H    sodium chloride (NS) flush 5-40 mL  5-40 mL IntraVENous PRN    sodium chloride (NS) flush 5-40 mL  5-40 mL IntraVENous Q8H    sodium chloride (NS) flush 5-40 mL  5-40 mL IntraVENous PRN    acetaminophen (TYLENOL) tablet 650 mg  650 mg Oral Q6H PRN    Or    acetaminophen (TYLENOL) suppository 650 mg  650 mg Rectal Q6H PRN    polyethylene glycol (MIRALAX) packet 17 g  17 g Oral DAILY PRN    ondansetron (ZOFRAN ODT) tablet 4 mg  4 mg Oral Q8H PRN    Or    ondansetron (ZOFRAN) injection 4 mg  4 mg IntraVENous Q6H PRN    melatonin tablet 5 mg  5 mg Oral QHS PRN    albuterol-ipratropium (DUO-NEB) 2.5 MG-0.5 MG/3 ML  3 mL Nebulization Q6H PRN    0.9% sodium chloride infusion  125 mL/hr IntraVENous CONTINUOUS    insulin lispro (HUMALOG) injection   SubCUTAneous Q6H    glucose chewable tablet 16 g  4 Tablet Oral PRN    glucagon (GLUCAGEN) injection 1 mg  1 mg IntraMUSCular PRN    dextrose 10% infusion 0-250 mL  0-250 mL IntraVENous PRN    cholecalciferol (VITAMIN D3) (1000 Units /25 mcg) tablet 2,000 Units  2,000 Units Oral DAILY    gabapentin (NEURONTIN) capsule 600 mg  600 mg Oral TID    therapeutic multivitamin (THERAGRAN) tablet 1 Tablet  1 Tablet Oral DAILY    atorvastatin (LIPITOR) tablet 20 mg  20 mg Oral QHS            Lab/Data Review:  Labs: Results:       Chemistry Recent Labs     12/23/22  0342 12/22/22  1106   * 219*    131*   K 3.9 4.1    98*   CO2 27 26   BUN 25* 36*   CREA 1.03 2.02*   BUCR 24* 18   AGAP 4 7   CA 8.8 9.5     Recent Labs     12/23/22  0342 12/22/22  1106   * 82*   TP 6.7 7.2   ALB 2.4* 3.0*   GLOB 4.3* 4.2*   AGRAT 0.6* 0.7*      CBC w/Diff Recent Labs     12/23/22  1125 12/23/22 0342 12/22/22  1106   WBC 6.1 6.0 9.3   RBC 2.60* 2.45* 2.85*   HGB 8.1* 7.5* 8.8*   HCT 25.0* 23.6* 27.3*   MCV 96.2 96.3 95.8   MCH 31.2 30.6 30.9   MCHC 32.4 31.8 32.2   RDW 13.1 13.0 12.8   * 400 576*   GRANS 73 71 79*   LYMPH 14* 17* 10*   EOS 0 0 0      Coagulation Recent Labs     12/23/22 0342   PTP 14.8   INR 1.1       Iron/Ferritin No results found for: IRON, FE, TIBC, IBCT, PSAT, FERR    BNP    Cardiac Enzymes No results found for: CPK, RCK1, RCK2, RCK3, RCK4, CKMB, CKNDX, CKND1, TROPT, TROIQ, BNPP, BNP     Lactic Acid    Thyroid Studies          All Micro Results       Procedure Component Value Units Date/Time    BLOOD CULTURE ID PANEL [[id number]]  (Abnormal) Collected: 12/22/22 1100    Order Status: Completed Specimen: Blood Updated: 12/23/22 1539     Acc. no. from Micro Order [id number]     Enterococcus faecalis Not detected        Enterococcus faecium Not detected        Listeria monocytogenes Not detected        Staphylococcus Not detected        Staphylococcus aureus Not detected        Staph epidermidis Not detected        Staph lugdunensis Not detected        Streptococcus Not detected        Streptococcus agalactiae (Group B) Not detected        Streptococcus pneumoniae Not detected        Streptococcus pyogenes (Group A) Not detected        Acinetobacter calcoaceticus-baumanii complex Not detected        Bacteroides fragilis Not detected        Enterobacterales species Detected        Enterobacter cloacae complex Not detected        Escherichia coli Not detected        Klebsiella aerogenes Not detected        Klebsiella oxytoca Not detected        Klebsiella pneumoniae group Detected        Proteus Not detected        Salmonella Not detected        Serratia marcescens Not detected        Haemophilus influenzae Not detected        Neisseria meningitidis Not detected        Pseudomonas aeruginosa Not detected        Steno maltophilia Not detected        Candida albicans Not detected        Candida auris Not detected        Candida glabrata Not detected        Candida krusei Not detected        Candida parapsilosis Not detected        Candida tropicalis Not detected        Crypto neoformans/gattii Not detected        RESISTANT GENES:            CTX-M (ESBL resistant gene) Not detected        IMP (Carbapenemase resistant gene) Not detected        KPC (Carbapenem Resistance Gene) Not detected        mcr-1 (Colistin resistant gene) Not detected        NDM (Carbapenemase resistant gene) Not detected        OXA-48-like (Carbapenemase resistant gene) Not detected        VIM (Carbapenemase resistant gene) Not detected        Comment       False positive results may rarely occur.  Correlate with clinical,epidemiologic, and other laboratory findings           Comment: Please see BCID Interpretation Guide in EPIC Links       CULTURE, BLOOD [[id number]]  (Abnormal) Collected: 12/22/22 1100    Order Status: Completed Specimen: Blood Updated: 12/23/22 1539     Special Requests: NO SPECIAL REQUESTS        GRAM STAIN       ANAEROBIC BOTTLE GRAM NEGATIVE RODS                  SMEAR CALLED TO AND CORRECTLY REPEATED BY: SHERRY Bliss, AT 0918 TO 1200 [facility] ON 12/23/22           Culture result:       PROBABLE KLEBSIELLA PNEUMONIAE GROWING IN 1 OF 2 BOTTLES DRAWN No Site Indicated          CULTURE, Deisy Arias [[id number]] Collected: 12/23/22 1145    Order Status: No result Updated: 12/23/22 1222    CULTURE, ANAEROBIC [[id number]] Collected: 12/23/22 1145    Order Status: No result Updated: 12/23/22 1220    CULTURE, Leyla Boss STAIN [[id number]] Collected: 12/23/22 1000    Order Status: No result Updated: 12/23/22 1040    CULTURE, BODY FLUID Dallas Mcfarlane [[id number]] Collected: 12/23/22 1000    Order Status: Canceled Specimen: Body Fluid from Drainage     CULTURE, URINE [[id number]] Collected: 12/22/22 1434    Order Status: Sent Specimen: Clean catch Updated: 12/23/22 0738    COVID-19 WITH INFLUENZA A/B [[id number]] Collected: 12/22/22 1110    Order Status: Completed Specimen: Nasopharyngeal Updated: 12/22/22 1248     SARS-CoV-2 by PCR Not detected        Comment: Not Detected results do not preclude SARS-CoV-2 infection and should not be used as the sole basis for patient management decisions. Results must be combined with clinical observations, patient history, and epidemiological information. Influenza A by PCR Not detected        Influenza B by PCR Not detected        Comment: Testing was performed using thiago [place] SARS-CoV-2 and Influenza A/B nucleic acid assay. This test is a multiplex Real-Time Reverse Transcriptase Polymerase Chain Reaction (RT-PCR) based in vitro diagnostic test intended for the qualitative detection of nucleic acids from SARS-CoV-2, Influenza A, and Influenza B in nasopharyngeal for use under the FDA's Emergency Use Authorization(EAU) only. Fact sheet for Patients: FindDrives.pl  Fact sheet for Healthcare Providers: FindDrives.pl                   Images:    CT (Most Recent).       MRI (Most Recent) MRI Results (most recent):  Results from [place] encounter on 12/22/22    MRI LUMB SPINE W WO CONT    Narrative  MRI LUMB SPINE W WO CONT: 12/22/2022 2:18 PM    CLINICAL INFORMATION  Post operative weakness, low BP, evaluate for surgical site infection. COMPARISON  CT lumbar spine 12 December 2022    TECHNIQUE  Multiplanar MR images of the lumbar spine obtained including T1 and T2-weighted  sequences. With without IV contrast.    FINDINGS  For discussion purposes, the first axial T2-weighted image defines the mid T11  vertebral body. Vertebral body height and alignment: Posterior fusion spanning L3-S1 with  bilateral pedicle screws and interlocking vertical bars. Intervertebral disc  spacers at L3-L4, L4-L5 and L5-S1. Bone marrow signal: Diffusely heterogeneous, nonspecific. Conus/filum: Conus medullaris terminates at a normal anatomic level. .    Paraspinal tissues: Large, approximately 7.9 x 3.9 x 13 cm, rim-enhancing fluid  collection at the operative bed extending to the skin surface. Specific segmental anatomy is as follows:    T12-L1: Central canal and neural foramina appear patent. L1-2: Broad-based disc bulge. Central canal and neural foramina appear patent. L2-3: Broad-based disc bulge. Mild/moderate central canal narrowing. Mild  bilateral foraminal narrowing. L3-4: Fusion level. Large rim-enhancing fluid collection at the operative bed  with mass effect/flattening of the thecal sac. Severe central canal and  bilateral subarticular recess narrowing. Mild/moderate bilateral foraminal  narrowing. L4-5: Fusion level. Large rim-enhancing fluid collection at the operative bed  with mass effect/flattening of the thecal sac. Severe central canal and  bilateral subarticular recess narrowing. Mild/moderate left foraminal narrowing. Moderate right foraminal narrowing. L5-S1: Fusion level. Broad-based protrusion disc herniation. Asymmetric  contact/impingement of the left S1 nerve root at its subarticular recess course.   Postoperative rim-enhancing fluid collection results in severe left foraminal  narrowing. No significant right foraminal narrowing. Mild central canal  narrowing. Impression  Large rim-enhancing fluid collection/suspected abscess at the operative bed  results in multilevel severe central canal and severe subarticular recess  narrowing. Please refer to the body of the report for a comprehensive segmental analysis of  the lumbar spinal column. EKG No results found for this or any previous visit.      2D ECHO

## 2022-12-23 NOTE — ROUTINE PROCESS
TRANSFER - IN REPORT:    Verbal report received from SHERRY Hansen  on Cyndy Sabal  being received from ER for ordered procedure      Report consisted of patients Situation, Background, Assessment and   Recommendations(SBAR). Information from the following report(s) SBAR was reviewed with the receiving nurse. Opportunity for questions and clarification was provided. Assessment completed upon patients arrival to unit and care assumed.

## 2022-12-23 NOTE — PROGRESS NOTES
Problem: Mobility Impaired (Adult and Pediatric)  Goal: *Acute Goals and Plan of Care (Insert Text)  Description: Physical Therapy Goals  Initiated 12/23/2022 and to be accomplished within 7 day(s)  1. Patient will move from supine to sit and sit to supine , scoot up and down, and roll side to side in bed with modified independence. 2.  Patient will transfer from bed to chair and chair to bed with modified independence using the least restrictive device. 3.  Patient will perform sit to stand with modified independence. 4.  Patient will ambulate with modified independence for 200 feet with the least restrictive device. 5.  Patient will ascend/descend 12 stairs with unilateral handrail(s) with modified independence. PLOF: Pt reporting living in 2 story house with 3 ANDREA with handrails, alone but has [de-identified]year old neighbor that is taking him home. Reports he did not change his socks/shower/go upstairs since discharge. Outcome: Progressing Towards Goal   PHYSICAL THERAPY EVALUATION    Patient: Augustus De Leon (50 y.o. male)  Date: 12/23/2022  Primary Diagnosis: VIPIN (acute kidney injury) ([facility] [address] [N17.9]  Dehydration [E86.0]  Procedure(s) (LRB):  INCISION AND DRAINAGE LUMBAR SPINE/ APPLICATION OF WOUND VAC (N/A) Day of Surgery   Precautions:  Fall Spinal  ASSESSMENT :  Pt cleared to participate in PT session, pt received semi-reclined in bed and agreeable to therapy session, wound vac in place. Based on the objective data described below, the patient presents with decreased endurance, decreased strength, decreased balance reactions, gait deviations, increased back pain and decreased independence in functional mobility. Pt completing supine to sit via log roll with modA. Sitting on EOB with good balance. Standing with Liya to RW, ambulating x20 feet in rod with Liya and RW. Pt returned to sitting EOB, declined OOB due to chair due to pain in low back. Returned to supine with Liya for LE management.  Scooting [facility] with totalAx2. Pt positioned for comfort and educated to call for assist before getting up, pt verbalized understanding. Pt left with all needs met and call bell in reach. RN notified of position and participation. Patient will benefit from skilled intervention to address the above impairments. Patient's rehabilitation potential is considered to be Fair  Factors which may influence rehabilitation potential include:   []         None noted  []         Mental ability/status  [x]         Medical condition  [x]         Home/family situation and support systems  []         Safety awareness  [x]         Pain tolerance/management  []         Other:      PLAN :  Recommendations and Planned Interventions:   [x]           Bed Mobility Training             []    Neuromuscular Re-Education  [x]           Transfer Training                   []    Orthotic/Prosthetic Training  [x]           Gait Training                          []    Modalities  [x]           Therapeutic Exercises           []    Edema Management/Control  [x]           Therapeutic Activities            [x]    Family Training/Education  [x]           Patient Education  []           Other (comment):    Frequency/Duration: Patient will be followed by physical therapy 1-2 times per day/4-7 days per week to address goals. Further Equipment Recommendations for Discharge: rolling walker    AMPAC: Current research shows that an AM-PAC score of 17 or less is not associated with a discharge to the patient's home setting. Based on an AM-PAC score of 15/24 and their current functional mobility deficits, it is recommended that the patient have 3-5 sessions per week of Physical Therapy at d/c to increase the patient's independence. This AMPAC score should be considered in conjunction with interdisciplinary team recommendations to determine the most appropriate discharge setting.  Patient's social support, diagnosis, medical stability, and prior level of function should also be taken into consideration. SUBJECTIVE:   Patient stated I can't sit up today, it hurts.     OBJECTIVE DATA SUMMARY:     Past Medical History:   Diagnosis Date    Arthritis     Back pain     from previous back injury    Colon polyps     Diabetes ([facility] [address]     7666    Hernia, umbilical     Hyperlipidemia     Hypertension     Pneumonia      Past Surgical History:   Procedure Laterality Date    HX COLONOSCOPY  2014    polyps    HX HEENT      left lazy eye procedure during childhood    HX HEMORRHOIDECTOMY      HX HERNIA REPAIR      2021    HX ORTHOPAEDIC  01/09/2017    right knee    HX ORTHOPAEDIC  1985    torn cartilage knee    HX TONSILLECTOMY  1964     Barriers to Learning/Limitations: None  Compensate with: N/A  Home Situation:  Home Situation  Home Environment: Private residence  # Steps to Enter: 3  Rails to Enter: Yes  Living Alone: Yes  Support Systems: Friend/Neighbor  Current DME Used/Available at Home: Walker, rolling  Critical Behavior:  Neurologic State: Alert  Orientation Level: Oriented X4  Cognition: Appropriate decision making  Safety/Judgement: Awareness of environment; Fall prevention  Psychosocial  Patient Behaviors: Calm; Cooperative  Skin Condition/Temp: Warm     Skin Integrity: Incision (comment) (incision and drain of lumbar spine/ Wound Vac application)  Skin Integumentary  Skin Color: Appropriate for ethnicity  Skin Condition/Temp: Warm  Skin Integrity: Incision (comment) (incision and drain of lumbar spine/ Wound Vac application)     Strength:    Strength: Generally decreased, functional    Tone & Sensation:   Tone: Normal    Sensation: Intact    Range Of Motion:  AROM: Within functional limits  Functional Mobility:  Bed Mobility:     Supine to Sit: Moderate assistance (via log roll)  Sit to Supine: Minimum assistance (for LE management)  Scooting:  Total assistance;Assist x2  Transfers:  Sit to Stand: Minimum assistance  Stand to Sit: Minimum assistance    Balance: Sitting: Intact  Standing: Impaired; With support  Standing - Static: Good  Standing - Dynamic : Fair    Ambulation/Gait Training:  Distance (ft): 20 Feet (ft)  Assistive Device: Walker, rolling  Ambulation - Level of Assistance: Minimal assistance     Gait Description (WDL): Exceptions to WDL  Gait Abnormalities: Decreased step clearance    Base of Support: Narrowed     Speed/Bing: Slow;Shuffled  Step Length: Right shortened;Left shortened    Pain:  Pain level pre-treatment: 6/10   Pain level post-treatment: 6/10   Pain Intervention(s) : Rest, Repositioning  Response to intervention: Nurse notified    Activity Tolerance:   Fair tolerance     Please refer to the flowsheet for vital signs taken during this treatment. After treatment:   []         Patient left in no apparent distress sitting up in chair  [x]         Patient left in no apparent distress in bed  [x]         Call bell left within reach  [x]         Nursing notified  []         Caregiver present  []         Bed alarm activated  []         SCDs applied    COMMUNICATION/EDUCATION:   [x]         Role of Physical Therapy in the acute care setting. [x]         Fall prevention education was provided and the patient/caregiver indicated understanding. [x]         Patient/family have participated as able in goal setting and plan of care. [x]         Patient/family agree to work toward stated goals and plan of care. []         Patient understands intent and goals of therapy, but is neutral about his/her participation. []         Patient is unable to participate in goal setting/plan of care: ongoing with therapy staff.  []         Other:     Thank you for this referral.  Alfonso Badillo, PT   Time Calculation: 42 mins      Eval Complexity: History: MEDIUM  Complexity : 1-2 comorbidities / personal factors will impact the outcome/ POC Exam:LOW Complexity : 1-2 Standardized tests and measures addressing body structure, function, activity limitation and / or participation in recreation  Presentation: LOW Complexity : Stable, uncomplicated  Clinical Decision Making:Low Complexity low  Overall Complexity:LOW     [employer] AM-PAC® Basic Mobility Inpatient Short Form (6-Clicks) Version 2    How much HELP from another person does the patient currently need    (If the patient hasn't done an activity recently, how much help from another person do you think he/she would need if he/she tried?)   Total (Total A or Dep)   A Lot  (Mod to Max A)   A Little (Sup or Min A)   None (Mod I to I)   Turning from your back to your side while in a flat bed without using bedrails? [] 1 [x] 2 [] 3 [] 4   2. Moving from lying on your back to sitting on the side of a flat bed without using bedrails? [] 1 [x] 2 [] 3 [] 4   3. Moving to and from a bed to a chair (including a wheelchair)? [] 1 [] 2 [x] 3 [] 4   4. Standing up from a chair using your arms (e.g., wheelchair, or bedside chair)? [] 1 [] 2 [x] 3 [] 4   5. Walking in hospital room? [] 1 [] 2 [x] 3 [] 4   6. Climbing 3-5 steps with a railing?+   [] 1 [x] 2 [] 3 [] 4   +If stair climbing cannot be assessed, skip item #6. Sum responses from items 1-5.

## 2022-12-23 NOTE — PROGRESS NOTES
Vss  Afeb  Neuro intact  Wound actively draining  Patient feeling better since hydration fluid support  Creatinine and kidney function improved  Will Repeat labs this morning   NPO  Tentatively scheduled for wash out with possible wound vac 1pm today pending clearance by medicine

## 2022-12-23 NOTE — PROGRESS NOTES
PT orders received and chart reviewed. PT eval attempted at 1049. Pt currently off floor. Will follow up. 2nd attempt at 1210, continues to be off floor. Will continue to follow.      Thank you for this referral.   Robina Carballo PT DPT

## 2022-12-23 NOTE — PERIOP NOTES
1358 TRANSFER - OUT REPORT:    Verbal report given to Bouchra RN(name) on [facility]  being transferred to (unit) for routine post - op       Report consisted of patients Situation, Background, Assessment and   Recommendations(SBAR). Information from the following report(s) SBAR, OR Summary, Intake/Output, MAR, Recent Results, and Cardiac Rhythm SR  was reviewed with the receiving nurse. Lines:   Peripheral IV Distal;Right Basilic (Active)   Site Assessment Clean, dry, & intact 12/23/22 1328   Phlebitis Assessment 0 12/23/22 1328   Infiltration Assessment 0 12/23/22 1328   Dressing Status Clean, dry, & intact 12/23/22 1328   Dressing Type Tape;Transparent 12/23/22 1328   Hub Color/Line Status Pink; Infusing;Patent 12/23/22 1328        Opportunity for questions and clarification was provided.       Patient transported with:   [employer]

## 2022-12-23 NOTE — PROGRESS NOTES
Vss afeb  Neuro intact  ? Of bowel incontinence sound more like diarrhea. Blood culture with gram negative organism  Aspiration of lumbar wound with clear pus. For exploration and washout of lumbar wound. Lack of fever and WBC concerning for immunosuppression. DM poorly controlled. Will consult ID for long term ABX rx. Discussed with patient  RBCA discussed patient consents.

## 2022-12-23 NOTE — CONSULTS
Infectious Disease Consultation Note  Consult requested by dr. Columba Lamas      Reason: lumbar abscess, gram-negative bloodstream infection    Current abx Prior abx   Zosyn, vancomycin since 12/22      Lines:       Assessment :  58-year-old man with past medical history significant for type 2 diabetes, hypertension, spinal stenosis s/p lumbar laminectomy/fusion surgery on 12/7/2022 presented to [facility] on 12/22/22 for incontinence and back pain. Now with gram-negative bacteremia, significant purulence noted with IR guided aspiration lumbar fluid collection on 12/23/2022    Clinical presentation consistent with probable gram-negative bloodstream infection, lumbar surgical site infected fluid collection/abscess likely due to indolent pathogens. Single positive blood culture for gram-negative tee in blood cx 12/22 could represent bloodstream infection or contamination. Will need to follow up ID and repeat blood culture to determine this. Recent complaints of dysuria would be concerning for undiagnosed cystitis. However no bacteria/pyuria noted on urine analysis 12/22/2022 would argue against this  It is possible that patient had contamination of the surgical wound secondary to urinary incontinence/diarrhea in the postoperative period which led to abscess and secondary bloodstream infection. Will need to follow-up lumbar abscess fluid culture and identification of gram-negative tee in the blood culture to determine this. Will obtain imaging studies to rule out undiagnosed GI/ infection, inflammation as a source of bacteremia    Acute kidney injury-likely secondary to volume depletion. Improving    Recommendations:    Continue pip/tazo, vancomycin. Will add levaquin to cover for beta-lactam resistant gram-negative still further information obtained  Follow-up identification of gram-negative tee in blood culture.   Repeat blood culture tomorrow  Follow-up abscess fluid cultures  Follow-up spine surgery recommendations regarding I&D lumbar abscess  5. Patient will need prolonged IV antibiotics since infection and close vicinity of underlying hardware. We will hold off on placing PICC line till blood cultures negative at 48 hours  6. Will order HIV testing to rule out undiagnosed immunocompromising illness since patient failed to mount leukocyte response, fevers despite significant infection      Thank you for consultation request. Above plan was discussed in details with patient, and dr Jammie Martin. Please call me if any further questions or concerns. Will continue to participate in the care of this patient. HPI:    73-year-old man with past medical history significant for type 2 diabetes, hypertension, lumbar decompression surgery presented to [facility] on 12/22/22 for incontinence and back pain. He underwent lumbar decompression surgery about 2 weeks ago at [facility]. He recollects having urinary incontinence after surgery and that he was not clean for about 4 hours subsequently. He continued to have some lower back pain at the site of surgery after he went home. He states that his dressing was not changed for a long time. He also recollects having some diarrhea but it was not significant for him. About a week ago he noticed some burning while urinating. He presented to [facility] ED yesterday with urinary incontinence and worsening back pain. Imaging (MRI 12/22) shows a lumbar fluid collection in the post operative bed. Spine surgery consulted. Recommendations for IR guided drainage made. Jason pus was noted in the aspiration. 1 set of blood culture positive for gram-negative rods. Plans for surgical I&D of the abscess. I have been consulted for further recommendations. Patient denies any fever or chills throughout this time. He denies any bilateral flank pain. No hematuria. Denies increasing chest pain, shortness of breath, abdominal pain. No prior history of MRSA colonization or infection.     Past Medical History:   Diagnosis Date    Arthritis     Back pain     from previous back injury    Colon polyps     Diabetes ([facility] [address]     5974    Hernia, umbilical     Hyperlipidemia     Hypertension     Pneumonia        Past Surgical History:   Procedure Laterality Date    HX COLONOSCOPY  2014    polyps    HX HEENT      left lazy eye procedure during childhood    HX HEMORRHOIDECTOMY      HX HERNIA REPAIR      2021    HX ORTHOPAEDIC  01/09/2017    right knee    HX ORTHOPAEDIC  1985    torn cartilage knee    HX TONSILLECTOMY  1964       Current Discharge Medication List        CONTINUE these medications which have NOT CHANGED    Details   HYDROmorphone (DILAUDID) 4 mg tablet Take 4 mg by mouth every six (6) hours as needed for Pain.      polyethylene glycol (MIRALAX) 17 gram/dose powder Take 17 g by mouth daily. Indications: constipation  Qty: 116 g, Refills: 0      aspirin delayed-release 81 mg tablet Take 81 mg by mouth daily. OTHER,NON-FORMULARY, Indications: maxforce prostate 1 tab twice daily      multivitamin (ONE A DAY) tablet Take 1 Tablet by mouth daily. metFORMIN (GLUCOPHAGE) 500 mg tablet TAKE 1 TABLET BY MOUTH TWICE DAILY WITH A MEAL      cholecalciferol (VITAMIN D3) (1000 Units /25 mcg) tablet Take 2,000 Units by mouth daily. gabapentin (NEURONTIN) 300 mg capsule 600 mg three (3) times daily. lisinopriL (PRINIVIL, ZESTRIL) 40 mg tablet TAKE 1 TABLET BY MOUTH ONCE DAILY FOR BLOOD PRESSURE FOR 90 DAYS      simvastatin (ZOCOR) 40 mg tablet TAKE 1 TABLET BY MOUTH ONCE DAILY IN THE EVENING FOR CHOLESTEROL FOR 90 DAYS      acetaminophen (TYLENOL) 500 mg tablet Take  by mouth every six (6) hours as needed for Pain.              Current Facility-Administered Medications   Medication Dose Route Frequency    vancomycin (VANCOCIN) 1,000 mg in 0.9% sodium chloride 250 mL (VIAL-MATE)  1,000 mg IntraVENous Q12H    naloxone (NARCAN) injection 0.2 mg  0.2 mg IntraVENous PRN    flumazeniL (ROMAZICON) 0.1 mg/mL injection 0.2 mg  0.2 mg IntraVENous PRN    fentaNYL citrate (PF) injection 12.5-100 mcg  12.5-100 mcg IntraVENous Multiple    midazolam (VERSED) injection 0.5-2 mg  0.5-2 mg IntraVENous Rad Multiple    sodium chloride (NS) flush 5-10 mL  5-10 mL IntraVENous PRN    piperacillin-tazobactam (ZOSYN) 3.375 g in 0.9% sodium chloride (MBP/ADV) 100 mL MBP  3.375 g IntraVENous Q8H    morphine injection 2-4 mg  2-4 mg IntraVENous Q3H PRN    naloxone (NARCAN) injection 0.4 mg  0.4 mg IntraVENous EVERY 2 MINUTES AS NEEDED    sodium chloride (NS) flush 5-40 mL  5-40 mL IntraVENous Q8H    sodium chloride (NS) flush 5-40 mL  5-40 mL IntraVENous PRN    lactated Ringers infusion  75 mL/hr IntraVENous CONTINUOUS    sodium chloride (NS) flush 5-40 mL  5-40 mL IntraVENous Q8H    sodium chloride (NS) flush 5-40 mL  5-40 mL IntraVENous PRN    acetaminophen (TYLENOL) tablet 650 mg  650 mg Oral Q6H PRN    Or    acetaminophen (TYLENOL) suppository 650 mg  650 mg Rectal Q6H PRN    polyethylene glycol (MIRALAX) packet 17 g  17 g Oral DAILY PRN    ondansetron (ZOFRAN ODT) tablet 4 mg  4 mg Oral Q8H PRN    Or    ondansetron (ZOFRAN) injection 4 mg  4 mg IntraVENous Q6H PRN    melatonin tablet 5 mg  5 mg Oral QHS PRN    albuterol-ipratropium (DUO-NEB) 2.5 MG-0.5 MG/3 ML  3 mL Nebulization Q6H PRN    0.9% sodium chloride infusion  125 mL/hr IntraVENous CONTINUOUS    insulin lispro (HUMALOG) injection   SubCUTAneous Q6H    glucose chewable tablet 16 g  4 Tablet Oral PRN    glucagon (GLUCAGEN) injection 1 mg  1 mg IntraMUSCular PRN    dextrose 10% infusion 0-250 mL  0-250 mL IntraVENous PRN    cholecalciferol (VITAMIN D3) (1000 Units /25 mcg) tablet 2,000 Units  2,000 Units Oral DAILY    gabapentin (NEURONTIN) capsule 600 mg  600 mg Oral TID    therapeutic multivitamin (THERAGRAN) tablet 1 Tablet  1 Tablet Oral DAILY    atorvastatin (LIPITOR) tablet 20 mg  20 mg Oral QHS       Allergies: Patient has no known allergies. History reviewed.  No pertinent family history. Social History     Socioeconomic History    Marital status:      Spouse name: Not on file    Number of children: Not on file    Years of education: Not on file    Highest education level: Not on file   Occupational History    Not on file   Tobacco Use    Smoking status: Former     Years: 5.00     Types: Cigarettes     Quit date: 2021     Years since quittin.9    Smokeless tobacco: Never    Tobacco comments:     3-4 cig per day, told not to smoke or drink 24/hrs prior to surgery   Vaping Use    Vaping Use: Never used   Substance and Sexual Activity    Alcohol use: Yes     Alcohol/week: 2.0 standard drinks     Types: 2 Cans of beer per week     Comment: 2 per month    Drug use: Never    Sexual activity: Never   Other Topics Concern    Not on file   Social History Narrative    Not on file     Social Determinants of Health     Financial Resource Strain: Not on file   Food Insecurity: Not on file   Transportation Needs: Not on file   Physical Activity: Not on file   Stress: Not on file   Social Connections: Not on file   Intimate Partner Violence: Not on file   Housing Stability: Not on file     Social History     Tobacco Use   Smoking Status Former    Years: .    Types: Cigarettes    Quit date: 2021    Years since quittin.9   Smokeless Tobacco Never   Tobacco Comments    3-4 cig per day, told not to smoke or drink 24/hrs prior to surgery        Temp (24hrs), Av.3 °F (36.3 °C), Min:97.3 °F (36.3 °C), Max:97.3 °F (36.3 °C)    Visit Vitals  BP (!) 183/84 (BP Patient Position: At rest;Semi fowlers)   Pulse 85   Temp 97.3 °F (36.3 °C)   Resp 20   Ht 5' 11\" (1.803 m)   Wt 108.9 kg (240 lb)   SpO2 98%   BMI 33.47 kg/m²       ROS: 12 point ROS obtained in details. Pertinent positives as mentioned in HPI,   otherwise negative    Physical Exam:    General: Well developed, well nourished male laying on the bed AAOx3 in no acute distress.     HEENT:  Normocephalic, atraumatic,EOMI, no scleral icterus or pallor; no conjunctival hemmohage;  nasal and oral mucous are moist and without evidence of lesions. Neck supple, no bruits. Lymph Nodes:   no cervical, axillary or inguinal adenopathy   Lungs:   non-labored, bilateral clear to auscultation- no crackles wheezes rales or rhonchi   Heart:  RRR, s1 and s2; no rubs or gallops, no edema, + pedal pulses   Abdomen:  soft, non-distended, active bowel sounds, no hepatomegaly, no splenomegaly. Non-tender   Genitourinary:  deferred   Extremities:   no clubbing, cyanosis; no joint effusions or swelling;  muscle mass appropriate for age   Neurologic:  No gross focal sensory abnormalities; 5/5 muscle strength to upper and lower extremities. Speech appropriate. Cranial nerves intact                        Skin:  No rash or ulcers noted   Back: Tenderness over the recent surgical site, no CVA tenderness     Psychiatric:  No suicidal or homicidal ideations, appropriate mood and affect         Labs: Results:   Chemistry Recent Labs     12/23/22  0342 12/22/22  1106   * 219*    131*   K 3.9 4.1    98*   CO2 27 26   BUN 25* 36*   CREA 1.03 2.02*   CA 8.8 9.5   AGAP 4 7   BUCR 24* 18   AP 52 66   TP 6.7 7.2   ALB 2.4* 3.0*   GLOB 4.3* 4.2*   AGRAT 0.6* 0.7*      CBC w/Diff Recent Labs     12/23/22  0342 12/22/22  1106   WBC 6.0 9.3   RBC 2.45* 2.85*   HGB 7.5* 8.8*   HCT 23.6* 27.3*    576*   GRANS 71 79*   LYMPH 17* 10*   EOS 0 0      Microbiology Recent Labs     12/22/22  1100   CULT CULTURE IN PROGRESS,FURTHER UPDATES TO FOLLOW  Sent to [facility] for ID/Susceptibility if indicated. RADIOLOGY:    All available imaging studies/reports in [facility] for this admission were reviewed      Disclaimer: Sections of this note are dictated utilizing voice recognition software, which may have resulted in some phonetic based errors in grammar and contents.  Even though attempts were made to correct all the mistakes, some may have been missed, and remained in the body of the document. If questions arise, please contact our department.     Dr. Lucina Jimenez, Infectious Disease Specialist  [phone]  December 23, 2022  11:30 AM

## 2022-12-24 LAB
ANION GAP SERPL CALC-SCNC: 3 MMOL/L (ref 3–18)
BACTERIA SPEC CULT: NORMAL
BASOPHILS # BLD: 0 K/UL (ref 0–0.1)
BASOPHILS NFR BLD: 0 % (ref 0–2)
BUN SERPL-MCNC: 22 MG/DL (ref 7–18)
BUN/CREAT SERPL: 20 (ref 12–20)
CALCIUM SERPL-MCNC: 9.1 MG/DL (ref 8.5–10.1)
CHLORIDE SERPL-SCNC: 104 MMOL/L (ref 100–111)
CO2 SERPL-SCNC: 27 MMOL/L (ref 21–32)
CREAT SERPL-MCNC: 1.09 MG/DL (ref 0.6–1.3)
DIFFERENTIAL METHOD BLD: ABNORMAL
EOSINOPHIL # BLD: 0 K/UL (ref 0–0.4)
EOSINOPHIL NFR BLD: 0 % (ref 0–5)
ERYTHROCYTE [DISTWIDTH] IN BLOOD BY AUTOMATED COUNT: 13.2 % (ref 11.6–14.5)
GLUCOSE BLD STRIP.AUTO-MCNC: 182 MG/DL (ref 70–110)
GLUCOSE BLD STRIP.AUTO-MCNC: 186 MG/DL (ref 70–110)
GLUCOSE BLD STRIP.AUTO-MCNC: 188 MG/DL (ref 70–110)
GLUCOSE BLD STRIP.AUTO-MCNC: 199 MG/DL (ref 70–110)
GLUCOSE BLD STRIP.AUTO-MCNC: 203 MG/DL (ref 70–110)
GLUCOSE BLD STRIP.AUTO-MCNC: 243 MG/DL (ref 70–110)
GLUCOSE BLD STRIP.AUTO-MCNC: 251 MG/DL (ref 70–110)
GLUCOSE BLD STRIP.AUTO-MCNC: 348 MG/DL (ref 70–110)
GLUCOSE SERPL-MCNC: 211 MG/DL (ref 74–99)
HCT VFR BLD AUTO: 24.3 % (ref 36–48)
HGB BLD-MCNC: 8 G/DL (ref 13–16)
IMM GRANULOCYTES # BLD AUTO: 0 K/UL (ref 0–0.04)
IMM GRANULOCYTES NFR BLD AUTO: 1 % (ref 0–0.5)
LYMPHOCYTES # BLD: 0.9 K/UL (ref 0.9–3.6)
LYMPHOCYTES NFR BLD: 15 % (ref 21–52)
MCH RBC QN AUTO: 31.7 PG (ref 24–34)
MCHC RBC AUTO-ENTMCNC: 32.9 G/DL (ref 31–37)
MCV RBC AUTO: 96.4 FL (ref 78–100)
MONOCYTES # BLD: 0.5 K/UL (ref 0.05–1.2)
MONOCYTES NFR BLD: 8 % (ref 3–10)
NEUTS SEG # BLD: 4.6 K/UL (ref 1.8–8)
NEUTS SEG NFR BLD: 76 % (ref 40–73)
NRBC # BLD: 0 K/UL (ref 0–0.01)
NRBC BLD-RTO: 0 PER 100 WBC
PLATELET # BLD AUTO: 409 K/UL (ref 135–420)
PMV BLD AUTO: 10.2 FL (ref 9.2–11.8)
POTASSIUM SERPL-SCNC: 4.7 MMOL/L (ref 3.5–5.5)
RBC # BLD AUTO: 2.52 M/UL (ref 4.35–5.65)
SERVICE CMNT-IMP: NORMAL
SODIUM SERPL-SCNC: 134 MMOL/L (ref 136–145)
WBC # BLD AUTO: 6 K/UL (ref 4.6–13.2)

## 2022-12-24 PROCEDURE — 74011636637 HC RX REV CODE- 636/637: Performed by: ORTHOPAEDIC SURGERY

## 2022-12-24 PROCEDURE — 74011000258 HC RX REV CODE- 258: Performed by: ORTHOPAEDIC SURGERY

## 2022-12-24 PROCEDURE — 2709999900 HC NON-CHARGEABLE SUPPLY

## 2022-12-24 PROCEDURE — 65270000046 HC RM TELEMETRY

## 2022-12-24 PROCEDURE — 97530 THERAPEUTIC ACTIVITIES: CPT

## 2022-12-24 PROCEDURE — 74011250637 HC RX REV CODE- 250/637: Performed by: ORTHOPAEDIC SURGERY

## 2022-12-24 PROCEDURE — 74011000250 HC RX REV CODE- 250: Performed by: ORTHOPAEDIC SURGERY

## 2022-12-24 PROCEDURE — 99232 SBSQ HOSP IP/OBS MODERATE 35: CPT | Performed by: INTERNAL MEDICINE

## 2022-12-24 PROCEDURE — 82962 GLUCOSE BLOOD TEST: CPT

## 2022-12-24 PROCEDURE — 80048 BASIC METABOLIC PNL TOTAL CA: CPT

## 2022-12-24 PROCEDURE — 77030018842 HC SOL IRR SOD CL 9% BAXT -A

## 2022-12-24 PROCEDURE — 87389 HIV-1 AG W/HIV-1&-2 AB AG IA: CPT

## 2022-12-24 PROCEDURE — 36415 COLL VENOUS BLD VENIPUNCTURE: CPT

## 2022-12-24 PROCEDURE — 97165 OT EVAL LOW COMPLEX 30 MIN: CPT

## 2022-12-24 PROCEDURE — 74011250636 HC RX REV CODE- 250/636: Performed by: ORTHOPAEDIC SURGERY

## 2022-12-24 PROCEDURE — 85025 COMPLETE CBC W/AUTO DIFF WBC: CPT

## 2022-12-24 PROCEDURE — 87040 BLOOD CULTURE FOR BACTERIA: CPT

## 2022-12-24 PROCEDURE — 97535 SELF CARE MNGMENT TRAINING: CPT

## 2022-12-24 RX ADMIN — ACETAMINOPHEN 1000 MG: 500 TABLET ORAL at 17:56

## 2022-12-24 RX ADMIN — SODIUM CHLORIDE, PRESERVATIVE FREE 10 ML: 5 INJECTION INTRAVENOUS at 09:31

## 2022-12-24 RX ADMIN — HYDROMORPHONE HYDROCHLORIDE 1 MG: 1 INJECTION, SOLUTION INTRAMUSCULAR; INTRAVENOUS; SUBCUTANEOUS at 09:30

## 2022-12-24 RX ADMIN — Medication 4 UNITS: at 23:03

## 2022-12-24 RX ADMIN — FAMOTIDINE 40 MG: 20 TABLET ORAL at 09:30

## 2022-12-24 RX ADMIN — HYDROMORPHONE HYDROCHLORIDE 4 MG: 2 TABLET ORAL at 22:45

## 2022-12-24 RX ADMIN — VANCOMYCIN HYDROCHLORIDE 1000 MG: 1 INJECTION, POWDER, LYOPHILIZED, FOR SOLUTION INTRAVENOUS at 20:51

## 2022-12-24 RX ADMIN — PIPERACILLIN SODIUM AND TAZOBACTAM SODIUM 3.38 G: 3; .375 INJECTION, POWDER, LYOPHILIZED, FOR SOLUTION INTRAVENOUS at 01:11

## 2022-12-24 RX ADMIN — OXYCODONE HYDROCHLORIDE 10 MG: 10 TABLET ORAL at 15:17

## 2022-12-24 RX ADMIN — VANCOMYCIN HYDROCHLORIDE 1000 MG: 1 INJECTION, POWDER, LYOPHILIZED, FOR SOLUTION INTRAVENOUS at 09:32

## 2022-12-24 RX ADMIN — GABAPENTIN 600 MG: 300 CAPSULE ORAL at 09:29

## 2022-12-24 RX ADMIN — THERA TABS 1 TABLET: TAB at 09:30

## 2022-12-24 RX ADMIN — DOCUSATE SODIUM 100 MG: 100 CAPSULE, LIQUID FILLED ORAL at 17:56

## 2022-12-24 RX ADMIN — SODIUM CHLORIDE 125 ML/HR: 9 INJECTION, SOLUTION INTRAVENOUS at 18:03

## 2022-12-24 RX ADMIN — Medication 2 UNITS: at 07:28

## 2022-12-24 RX ADMIN — LEVOFLOXACIN 750 MG: 5 INJECTION, SOLUTION INTRAVENOUS at 15:19

## 2022-12-24 RX ADMIN — GABAPENTIN 600 MG: 300 CAPSULE ORAL at 22:46

## 2022-12-24 RX ADMIN — FAMOTIDINE 40 MG: 20 TABLET ORAL at 22:46

## 2022-12-24 RX ADMIN — PIPERACILLIN SODIUM AND TAZOBACTAM SODIUM 3.38 G: 3; .375 INJECTION, POWDER, LYOPHILIZED, FOR SOLUTION INTRAVENOUS at 12:03

## 2022-12-24 RX ADMIN — Medication 2 UNITS: at 17:58

## 2022-12-24 RX ADMIN — Medication 5 MG: at 22:46

## 2022-12-24 RX ADMIN — SODIUM CHLORIDE, PRESERVATIVE FREE 10 ML: 5 INJECTION INTRAVENOUS at 15:19

## 2022-12-24 RX ADMIN — HYDROMORPHONE HYDROCHLORIDE 4 MG: 2 TABLET ORAL at 04:49

## 2022-12-24 RX ADMIN — Medication 4 UNITS: at 12:04

## 2022-12-24 RX ADMIN — CHOLECALCIFEROL TAB 25 MCG (1000 UNIT) 2000 UNITS: 25 TAB at 09:29

## 2022-12-24 RX ADMIN — Medication 2 UNITS: at 00:35

## 2022-12-24 RX ADMIN — DOCUSATE SODIUM 100 MG: 100 CAPSULE, LIQUID FILLED ORAL at 09:30

## 2022-12-24 RX ADMIN — ACETAMINOPHEN 1000 MG: 500 TABLET ORAL at 00:11

## 2022-12-24 RX ADMIN — ATORVASTATIN CALCIUM 20 MG: 20 TABLET, FILM COATED ORAL at 22:46

## 2022-12-24 RX ADMIN — GABAPENTIN 600 MG: 300 CAPSULE ORAL at 16:37

## 2022-12-24 RX ADMIN — SODIUM CHLORIDE, PRESERVATIVE FREE 10 ML: 5 INJECTION INTRAVENOUS at 22:46

## 2022-12-24 NOTE — PROGRESS NOTES
Vss   Afeb  Neuro intact  Wound vac with tight seal and suction  Pain is improved  Pos blood culture klebsiella   Likely need picc line   Abx per ID

## 2022-12-24 NOTE — PROGRESS NOTES
Bedside shift report received from Marcela Sesay RN  Pt is a/ox4, c/o pain during movement. Pt has wound vac to back s/p I&D of lumbar deep wound, wound vac dressing is clean, dry, and intact, wound Rayma Pardon is draining with no issues. Bed in lowest position, call bell in reach, no distress noted, will continue to monitor.

## 2022-12-24 NOTE — PROGRESS NOTES
Problem: Patient Education: Go to Patient Education Activity  Goal: Patient/Family Education  Outcome: Progressing Towards Goal     Problem: Falls - Risk of  Goal: *Absence of Falls  Description: Document Conner Short Fall Risk and appropriate interventions in the flowsheet.   Outcome: Progressing Towards Goal  Note: Fall Risk Interventions:  Mobility Interventions: OT consult for ADLs, Patient to call before getting OOB         Medication Interventions: Patient to call before getting OOB, Evaluate medications/consider consulting pharmacy                   Problem: Patient Education: Go to Patient Education Activity  Goal: Patient/Family Education  Outcome: Progressing Towards Goal     Problem: Patient Education: Go to Patient Education Activity  Goal: Patient/Family Education  Outcome: Progressing Towards Goal

## 2022-12-24 NOTE — PROGRESS NOTES
Problem: Self Care Deficits Care Plan (Adult)  Goal: *Acute Goals and Plan of Care (Insert Text)  Description: Occupational Therapy Goals  Initiated 12/24/2022 within 7 day(s). 1.  Patient will perform grooming with supervision/set-up standing at the sink for > 4 min with Good balance. 2.  Patient will perform bathing with supervision/set-up using AE. 3.  Patient will perform lower body dressing with supervision/set-up using AE. 4.  Patient will perform toilet transfers with supervision/set-up. 5.  Patient will perform all aspects of toileting with supervision/set-up. 6.  Patient will participate in upper extremity therapeutic exercise/activities with supervision/set-up for 8 minutes to improve endurance and UB strength needed for ADLs    7. Patient will utilize energy conservation techniques during functional activities with verbal cues. 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 Outcome: Progressing Towards Goal  OCCUPATIONAL THERAPY EVALUATION    Patient: Larry Cruz (3 y.o. male)  Date: 12/24/2022  Primary Diagnosis: VIPIN (acute kidney injury) ([facility] [address] [N17.9]  Dehydration [E86.0]  Procedure(s) (LRB):  INCISION AND DRAINAGE LUMBAR SPINE/ APPLICATION OF WOUND VAC (N/A) 1 Day Post-Op   Precautions:   Fall, spinal    ASSESSMENT :  Pt cleared to participate in OT evaluation by RN. Upon entering room, pt received standing near the bed with standard walker, RN present, alert, and agreeable to OT eval/treatment. Pt was assisted to sit EOB for objective assessment and tx. Based on the objective data described below, the patient presents with spinal precautions, increased pain, decreased activity tolerance, affecting pt's participation in ADLs and functional mobility. Pt educated on spinal precautions and how they relate to ADLs. Pt verbalized understanding.  Pt educated on and issued AE for LB ADLs (reacher, sock aid long handled sponge). Following education pt was able to doff/don socks with Mod A for proper form. Pt would benefit from continuous practice with AE. Pt performed functional mobility with FWW and CGA, c/o pain in incisional area. Pt benefits from VCs for attention to task for safety as pt is speaking tangentially throughout the session, and requires additional time to complete all tasks. Pt returned to bed at the end of the session, all needs met. RN notified of pt's request of pain medication. Patient will benefit from skilled intervention to address the above impairments. Patient's rehabilitation potential is considered to be Good  Factors which may influence rehabilitation potential include:   []             None noted  []             Mental ability/status  [x]             Medical condition  [x]             Home/family situation and support systems  []             Safety awareness  [x]             Pain tolerance/management  []             Other:      PLAN :  Recommendations and Planned Interventions:   [x]               Self Care Training                  [x]      Therapeutic Activities  [x]               Functional Mobility Training   []      Cognitive Retraining  [x]               Therapeutic Exercises           [x]      Endurance Activities  [x]               Balance Training                    [x]      Neuromuscular Re-Education  []               Visual/Perceptual Training     [x]      Home Safety Training  [x]               Patient Education                   [x]      Family Training/Education  []               Other (comment):    Frequency/Duration: Patient will be followed by occupational therapy 1-2 times per day/3-5 days per week to address goals.     Further Equipment Recommendations for Discharge: bedside commode, transfer bench, and rolling walker    AMPAC: Current research shows that an AM-PAC score of 17 or less is not associated with a discharge to the patient's home setting. Based on an AM-PAC score of 17/24 and their current ADL deficits; it is recommended that the patient have 3-5 sessions per week of Occupational Therapy at d/c to increase the patient's independence. This AMPAC score should be considered in conjunction with interdisciplinary team recommendations to determine the most appropriate discharge setting. Patient's social support, diagnosis, medical stability, and prior level of function should also be taken into consideration. SUBJECTIVE:   Patient stated It hurts a lot.     OBJECTIVE DATA SUMMARY:     Past Medical History:   Diagnosis Date    Arthritis     Back pain     from previous back injury    Colon polyps     Diabetes ([facility] [address]     2336    Hernia, umbilical     Hyperlipidemia     Hypertension     Pneumonia      Past Surgical History:   Procedure Laterality Date    HX COLONOSCOPY  2014    polyps    HX HEENT      left lazy eye procedure during childhood    HX HEMORRHOIDECTOMY      HX HERNIA REPAIR      2021    HX ORTHOPAEDIC  01/09/2017    right knee    HX ORTHOPAEDIC  1985    torn cartilage knee    HX TONSILLECTOMY  1964     Barriers to Learning/Limitations: None  Compensate with: visual, verbal, tactile, kinesthetic cues/model    Home Situation:   Home Situation  Home Environment: Private residence  # Steps to Enter: 3  Rails to Enter: Yes  One/Two Story Residence: One story  Living Alone: Yes  Support Systems: Friend/Neighbor  Patient Expects to be Discharged to[de-identified] Rehab Unit Subacute  Current DME Used/Available at Home: Negrita Nutley, rollator  Tub or Shower Type: Tub/Shower combination (upstairs)  []  Right hand dominant   [x]  Left hand dominant    Cognitive/Behavioral Status:  Neurologic State: Alert  Orientation Level: Oriented X4  Cognition: Follows commands;Decreased attention/concentration  Safety/Judgement: Awareness of environment; Fall prevention;Home safety    Skin: wound vac to L/s  Edema: min BLE    Vision/Perceptual:       Acuity: Able to read clock/calendar on wall without difficulty    Corrective Lenses: Glasses    Coordination: BUE  Coordination: Generally decreased, functional (bilateral UEs tremors)  Fine Motor Skills-Upper: Left Impaired;Right Impaired    Gross Motor Skills-Upper: Left Intact; Right Intact    Balance:  Sitting: Intact  Standing: Impaired; With support  Standing - Static: Good  Standing - Dynamic : Fair    Strength: BUE    Strength: Generally decreased, functional     Tone & Sensation: BUE    Tone: Normal  Sensation: Intact   Range of Motion: BUE    AROM: Within functional limits     Functional Mobility and Transfers for ADLs:  Bed Mobility:        Sit to Supine: Minimum assistance  Scooting: Minimum assistance  Transfers:  Sit to Stand: Contact guard assistance  Stand to Sit: Stand-by assistance   Toilet Transfer : Contact guard assistance          ADL Assessment:   Feeding: Setup    Oral Facial Hygiene/Grooming: Supervision    Bathing: Moderate assistance    Upper Body Dressing: Supervision    Lower Body Dressing: Maximum assistance    Toileting: Minimum assistance   ADL Intervention:       Cognitive Retraining  Safety/Judgement: Awareness of environment; Fall prevention;Home safety    Pain:  Pain level pre-treatment: not rated   Pain level post-treatment: not rated      Activity Tolerance:   Fair  Please refer to the flowsheet for vital signs taken during this treatment. After treatment:   [] Patient left in no apparent distress sitting up in chair  [x] Patient left in no apparent distress in bed  [x] Call bell left within reach  [x] Nursing notified  [] Caregiver present  [x] Bed alarm activated    COMMUNICATION/EDUCATION:   [x] Role of Occupational Therapy in the acute care setting  [x] Home safety education was provided and the patient/caregiver indicated understanding. [] Patient/family have participated as able in goal setting and plan of care. [] Patient/family agree to work toward stated goals and plan of care.   [] Patient understands intent and goals of therapy, but is neutral about his/her participation. [] Patient is unable to participate in goal setting and plan of care. Thank you for this referral.  Shankar Healy OTR/BRENDA  Time Calculation: 51 mins    Eval Complexity: History: LOW Complexity : Brief history review ; Examination: MEDIUM Complexity : 3-5 performance deficits relating to physical, cognitive , or psychosocial skils that result in activity limitations and / or participation restrictions; Decision Making:LOW Complexity : No comorbidities that affect functional and no verbal or physical assistance needed to complete eval tasks     [employer] AM-PAC® Daily Activity Inpatient Short Form (6-Clicks)*    How much HELP from another person does the patient currently need    (If the patient hasn't done an activity recently, how much help from another person do you think he/she would need if he/she tried?)   Total (Total A or Dep)   A Lot  (Mod to Max A)   A Little (Sup or Min A)   None (Mod I to I)   Putting on and taking off regular lower body clothing? [] 1 [x] 2 [] 3 [] 4   2. Bathing (including washing, rinsing,      drying)? [] 1 [x] 2 [] 3 [] 4   3. Toileting, which includes using toilet, bedpan or urinal?   [] 1 [x] 2 [] 3 [] 4   4. Putting on and taking off regular upper body clothing? [] 1 [] 2 [x] 3 [] 4   5. Taking care of personal grooming such as brushing teeth? [] 1 [] 2 [] 3 [x] 4   6. Eating meals? [] 1 [] 2 [] 3 [x] 4       Current research shows that an AM-PAC score of 17 or less is not associated with a discharge to the patient's home setting. Based on an AM-PAC score of 17/24 and their current ADL deficits; it is recommended that the patient have 3-5 sessions per week of Occupational Therapy at d/c to increase the patient's independence.

## 2022-12-24 NOTE — PROGRESS NOTES
[facility]  Progress Note    Patient: Kalie Clements Age: 57 y.o. : 1960 MR#: [id number] SSN: [id number]  Date/Time: 2022     C/C: Backache / incontinence       Subjective:   HPI : Patient had undergone recent laminectomy and fusion surgery in the lumbar area, now infection s/p exploration of lumbar wound -deep and superficial with wound VAC placement by spine surgery. Pain control is adequate. Currently on IV antibiotic per ID. Blood cultures grew Klebsiella pneumoniae 1 out of 2 bottle. Other significant history includes type 2 diabetes, hypertension, history of spinal stenosis and multiple disc prolapse secondary to his falls at work . Review of Systems: Patient is very pleasant alert awake oriented lying in bed slight frustration is obvious, however he is planning to get better that is his goal.    positive responses in bold type   Constitutional: Negative for fever, chills, diaphoresis and unexpected weight change. HENT: Negative for ear pain, congestion, sore throat, rhinorrhea, drooling, trouble swallowing, neck pain and tinnitus. Eyes: Negative for photophobia, pain, redness and visual disturbance. Respiratory: negative for shortness of breath, cough, choking, chest tightness, wheezing or stridor. Cardiovascular: Negative for chest pain, palpitations and leg swelling. Gastrointestinal: Negative for nausea, vomiting, abdominal pain, diarrhea, constipation, blood in stool, abdominal distention and anal bleeding. Genitourinary: Negative for dysuria, urgency, frequency, hematuria, flank pain and difficulty urinating. Musculoskeletal: Negative for back pain and arthralgias. Skin: Negative for color change, rash and wound. Neurological: Negative for dizziness, seizures, syncope, speech difficulty, light-headedness or headaches. Hematological: Does not bruise/bleed easily.    Psychiatric/Behavioral: Negative for suicidal ideas, hallucinations, behavioral problems, self-injury or agitation     Assessment/Plan:     1. Backache/postoperative back wound infection-on Vanco, Zosyn, Levaquin  2 gram-negative bacteremia-Klebsiella pneumoniae-as above  3 diabetes mellitus type 2  4 hypertension  5 morbid obesity-BMI 33.33    -Awaiting final report on culture sensitivity and identity of organism so that selection of antibiotic can be done  Objective:       General:  Alert, cooperative, no acute distress  HEENT: No facial asymmetry, RACH Dave, External ears - WNL    Cardiovascular: S1S2 - regular , No Murmur   Pulmonary: Equal expansion , No Use of accessory muscles , No Rales No Rhonchi    GI:  +BS in all four quadrants, soft, non-tender  Extremities:  No edema; 2+ dorsalis pedis pulses bilaterally  Neuro: Alert and oriented X 2.        DVT Prophylaxis:  []Lovenox  []Hep SQ  [x]SCDs  []Coumadin   []On Heparin gtt    [] Eliquis [] Xarelto     Vitals:         VS: Visit Vitals  /75 (BP 1 Location: Left upper arm, BP Patient Position: At rest)   Pulse 82   Temp 97.9 °F (36.6 °C)   Resp 20   Ht 5' 11\" (1.803 m)   Wt 108.4 kg (239 lb)   SpO2 98%   BMI 33.33 kg/m²      Tmax/24hrs: Temp (24hrs), Av °F (36.7 °C), Min:97.6 °F (36.4 °C), Max:98.6 °F (37 °C)        Medications:   Current Facility-Administered Medications   Medication Dose Route Frequency    vancomycin (VANCOCIN) 1,000 mg in 0.9% sodium chloride 250 mL (VIAL-MATE)  1,000 mg IntraVENous Q12H    flumazeniL (ROMAZICON) 0.1 mg/mL injection 0.2 mg  0.2 mg IntraVENous PRN    levoFLOXacin (LEVAQUIN) 750 mg in D5W IVPB  750 mg IntraVENous Q24H    HYDROmorphone (DILAUDID) tablet 4 mg  4 mg Oral Q6H PRN    sodium chloride (NS) flush 5-40 mL  5-40 mL IntraVENous PRN    acetaminophen (TYLENOL) tablet 1,000 mg  1,000 mg Oral Q6H    HYDROmorphone (DILAUDID) injection 1 mg  1 mg IntraVENous Q4H PRN    phenol throat spray (CHLORASEPTIC) 1 Spray  1 Spray Oral PRN    benzocaine-menthoL (CEPACOL) lozenge 1 Lozenge  1 Lozenge Oral PRN    diphenhydrAMINE (BENADRYL) injection 12.5 mg  12.5 mg IntraVENous Q4H PRN    famotidine (PEPCID) tablet 40 mg  40 mg Oral Q12H    diazePAM (VALIUM) tablet 5 mg  5 mg Oral Q6H PRN    docusate sodium (COLACE) capsule 100 mg  100 mg Oral BID    LORazepam (ATIVAN) injection 1 mg  1 mg IntraVENous Q6H PRN    oxyCODONE IR (ROXICODONE) tablet 10 mg  10 mg Oral Q4H PRN    piperacillin-tazobactam (ZOSYN) 3.375 g in 0.9% sodium chloride (MBP/ADV) 100 mL MBP  3.375 g IntraVENous Q8H    morphine injection 2-4 mg  2-4 mg IntraVENous Q3H PRN    naloxone (NARCAN) injection 0.4 mg  0.4 mg IntraVENous EVERY 2 MINUTES AS NEEDED    sodium chloride (NS) flush 5-40 mL  5-40 mL IntraVENous Q8H    acetaminophen (TYLENOL) tablet 650 mg  650 mg Oral Q6H PRN    Or    acetaminophen (TYLENOL) suppository 650 mg  650 mg Rectal Q6H PRN    polyethylene glycol (MIRALAX) packet 17 g  17 g Oral DAILY PRN    ondansetron (ZOFRAN ODT) tablet 4 mg  4 mg Oral Q8H PRN    Or    ondansetron (ZOFRAN) injection 4 mg  4 mg IntraVENous Q6H PRN    melatonin tablet 5 mg  5 mg Oral QHS PRN    albuterol-ipratropium (DUO-NEB) 2.5 MG-0.5 MG/3 ML  3 mL Nebulization Q6H PRN    0.9% sodium chloride infusion  125 mL/hr IntraVENous CONTINUOUS    insulin lispro (HUMALOG) injection   SubCUTAneous Q6H    glucose chewable tablet 16 g  4 Tablet Oral PRN    glucagon (GLUCAGEN) injection 1 mg  1 mg IntraMUSCular PRN    dextrose 10% infusion 0-250 mL  0-250 mL IntraVENous PRN    cholecalciferol (VITAMIN D3) (1000 Units /25 mcg) tablet 2,000 Units  2,000 Units Oral DAILY    gabapentin (NEURONTIN) capsule 600 mg  600 mg Oral TID    therapeutic multivitamin (THERAGRAN) tablet 1 Tablet  1 Tablet Oral DAILY    atorvastatin (LIPITOR) tablet 20 mg  20 mg Oral QHS       Labs:    Recent Labs     12/24/22  0249 12/23/22  1125 12/23/22  0342   WBC 6.0 6.1 6.0   HGB 8.0* 8.1* 7.5*   HCT 24.3* 25.0* 23.6*    422* 400     Recent Labs 12/24/22  0249 12/23/22  0342 12/22/22  1106   * 136 131*   K 4.7 3.9 4.1    105 98*   CO2 27 27 26   * 119* 219*   BUN 22* 25* 36*   CREA 1.09 1.03 2.02*   CA 9.1 8.8 9.5   MG  --   --  2.5   ALB  --  2.4* 3.0*   ALT  --  102* 82*   INR  --  1.1  --          Time spent on direct patient care >30 mints     Complexity : High complex - due to multiple medical issues outlined above. CODE Status : Full CODE     Case discussed with:  [x]Patient  [] Family  []Nursing  []Case Management        Disclaimer: Sections of this note are dictated utilizing voice recognition software, which may have resulted in some phonetic based errors in grammar and contents. Even though attempts were made to correct all the mistakes, some may have been missed, and remained in the body of the document. If questions arise, please contact our department.     Signed By: Karolina Jorge MD     December 24, 2022

## 2022-12-24 NOTE — PROGRESS NOTES
[address] [facility] Pharmacokinetic Monitoring Service - Vancomycin    Indication: DM foot infection  Goal AUC/MARIANNE: 400-600 mg*hr/L  Day of Therapy: 3  Additional Antimicrobials: pip-tazo, LVX    Pertinent Laboratory Values: Wt Readings from Last 1 Encounters:   12/23/22 108.4 kg (239 lb)     Temp Readings from Last 1 Encounters:   12/24/22 97.9 °F (36.6 °C)     No components found for: PROCAL  Estimated Creatinine Clearance: 85.7 mL/min (based on SCr of 1.09 mg/dL). Recent Labs     12/24/22  0249 12/23/22  1125   WBC 6.0 6.1     Pertinent Cultures:  Culture Date Source Results   12/22 blood Kleb pneumo   12/22 urine NGF   12/23 Lumbar abscess NGTD   12/24 blood pending   MRSA Nasal Swab: N/A. Non-respiratory infection.     Assessment:  Date/Time Current Dose Concentration (mg/L) Timing of Concentration (h) AUC   12/22 2,000 mg x1 - - -   12/23 750 mg q12h  1,000 mg q12h 20.9 peak 362  478   12/24 1,000 mg q12h - - -   Note: Serum concentrations collected for AUC dosing may appear elevated if collected in close proximity to the dose administered, this is not necessarily an indication of toxicity    Plan:  Continue current dose of 1,000 mg q12h  Ordered a level for 12/25 with AM labs  Pharmacy will continue to monitor patient and adjust therapy as indicated    Thank you for the consult,  ISABELL Calvo  12/24/2022

## 2022-12-25 LAB
ANION GAP SERPL CALC-SCNC: 6 MMOL/L (ref 3–18)
BACTERIA SPEC CULT: ABNORMAL
BACTERIA SPEC CULT: NORMAL
BUN SERPL-MCNC: 16 MG/DL (ref 7–18)
BUN/CREAT SERPL: 15 (ref 12–20)
CALCIUM SERPL-MCNC: 8.9 MG/DL (ref 8.5–10.1)
CHLORIDE SERPL-SCNC: 106 MMOL/L (ref 100–111)
CO2 SERPL-SCNC: 27 MMOL/L (ref 21–32)
CREAT SERPL-MCNC: 1.06 MG/DL (ref 0.6–1.3)
ERYTHROCYTE [DISTWIDTH] IN BLOOD BY AUTOMATED COUNT: 13.2 % (ref 11.6–14.5)
GLUCOSE BLD STRIP.AUTO-MCNC: 144 MG/DL (ref 70–110)
GLUCOSE BLD STRIP.AUTO-MCNC: 167 MG/DL (ref 70–110)
GLUCOSE BLD STRIP.AUTO-MCNC: 191 MG/DL (ref 70–110)
GLUCOSE BLD STRIP.AUTO-MCNC: 207 MG/DL (ref 70–110)
GLUCOSE BLD STRIP.AUTO-MCNC: 296 MG/DL (ref 70–110)
GLUCOSE SERPL-MCNC: 172 MG/DL (ref 74–99)
GRAM STN SPEC: ABNORMAL
GRAM STN SPEC: ABNORMAL
HCT VFR BLD AUTO: 24.2 % (ref 36–48)
HGB BLD-MCNC: 7.7 G/DL (ref 13–16)
MCH RBC QN AUTO: 30.7 PG (ref 24–34)
MCHC RBC AUTO-ENTMCNC: 31.8 G/DL (ref 31–37)
MCV RBC AUTO: 96.4 FL (ref 78–100)
NRBC # BLD: 0 K/UL (ref 0–0.01)
NRBC BLD-RTO: 0 PER 100 WBC
PLATELET # BLD AUTO: 363 K/UL (ref 135–420)
PMV BLD AUTO: 9.2 FL (ref 9.2–11.8)
POTASSIUM SERPL-SCNC: 4.3 MMOL/L (ref 3.5–5.5)
RBC # BLD AUTO: 2.51 M/UL (ref 4.35–5.65)
SERVICE CMNT-IMP: ABNORMAL
SERVICE CMNT-IMP: NORMAL
SODIUM SERPL-SCNC: 139 MMOL/L (ref 136–145)
VANCOMYCIN SERPL-MCNC: 31.5 UG/ML (ref 5–40)
WBC # BLD AUTO: 5.5 K/UL (ref 4.6–13.2)

## 2022-12-25 PROCEDURE — 74011250636 HC RX REV CODE- 250/636: Performed by: ORTHOPAEDIC SURGERY

## 2022-12-25 PROCEDURE — 74011636637 HC RX REV CODE- 636/637: Performed by: ORTHOPAEDIC SURGERY

## 2022-12-25 PROCEDURE — 74011000250 HC RX REV CODE- 250: Performed by: ORTHOPAEDIC SURGERY

## 2022-12-25 PROCEDURE — 99232 SBSQ HOSP IP/OBS MODERATE 35: CPT | Performed by: INTERNAL MEDICINE

## 2022-12-25 PROCEDURE — 74011250637 HC RX REV CODE- 250/637: Performed by: ORTHOPAEDIC SURGERY

## 2022-12-25 PROCEDURE — 82962 GLUCOSE BLOOD TEST: CPT

## 2022-12-25 PROCEDURE — 74011000258 HC RX REV CODE- 258: Performed by: ORTHOPAEDIC SURGERY

## 2022-12-25 PROCEDURE — 80202 ASSAY OF VANCOMYCIN: CPT

## 2022-12-25 PROCEDURE — 85027 COMPLETE CBC AUTOMATED: CPT

## 2022-12-25 PROCEDURE — 77030018842 HC SOL IRR SOD CL 9% BAXT -A

## 2022-12-25 PROCEDURE — 36415 COLL VENOUS BLD VENIPUNCTURE: CPT

## 2022-12-25 PROCEDURE — 65270000046 HC RM TELEMETRY

## 2022-12-25 PROCEDURE — 80048 BASIC METABOLIC PNL TOTAL CA: CPT

## 2022-12-25 PROCEDURE — 2709999900 HC NON-CHARGEABLE SUPPLY

## 2022-12-25 RX ADMIN — GABAPENTIN 600 MG: 300 CAPSULE ORAL at 08:03

## 2022-12-25 RX ADMIN — VANCOMYCIN HYDROCHLORIDE 1000 MG: 1 INJECTION, POWDER, LYOPHILIZED, FOR SOLUTION INTRAVENOUS at 08:02

## 2022-12-25 RX ADMIN — FAMOTIDINE 40 MG: 20 TABLET ORAL at 22:50

## 2022-12-25 RX ADMIN — GABAPENTIN 600 MG: 300 CAPSULE ORAL at 22:49

## 2022-12-25 RX ADMIN — HYDROMORPHONE HYDROCHLORIDE 1 MG: 1 INJECTION, SOLUTION INTRAMUSCULAR; INTRAVENOUS; SUBCUTANEOUS at 12:58

## 2022-12-25 RX ADMIN — THERA TABS 1 TABLET: TAB at 08:02

## 2022-12-25 RX ADMIN — ACETAMINOPHEN 1000 MG: 500 TABLET ORAL at 05:06

## 2022-12-25 RX ADMIN — Medication 2 UNITS: at 23:52

## 2022-12-25 RX ADMIN — PIPERACILLIN SODIUM AND TAZOBACTAM SODIUM 3.38 G: 3; .375 INJECTION, POWDER, LYOPHILIZED, FOR SOLUTION INTRAVENOUS at 02:19

## 2022-12-25 RX ADMIN — HYDROMORPHONE HYDROCHLORIDE 4 MG: 2 TABLET ORAL at 05:09

## 2022-12-25 RX ADMIN — ATORVASTATIN CALCIUM 20 MG: 20 TABLET, FILM COATED ORAL at 22:49

## 2022-12-25 RX ADMIN — DOCUSATE SODIUM 100 MG: 100 CAPSULE, LIQUID FILLED ORAL at 08:02

## 2022-12-25 RX ADMIN — DOCUSATE SODIUM 100 MG: 100 CAPSULE, LIQUID FILLED ORAL at 17:22

## 2022-12-25 RX ADMIN — ACETAMINOPHEN 1000 MG: 500 TABLET ORAL at 23:52

## 2022-12-25 RX ADMIN — SODIUM CHLORIDE 125 ML/HR: 9 INJECTION, SOLUTION INTRAVENOUS at 23:03

## 2022-12-25 RX ADMIN — LEVOFLOXACIN 750 MG: 5 INJECTION, SOLUTION INTRAVENOUS at 12:58

## 2022-12-25 RX ADMIN — GABAPENTIN 600 MG: 300 CAPSULE ORAL at 15:09

## 2022-12-25 RX ADMIN — SODIUM CHLORIDE, PRESERVATIVE FREE 10 ML: 5 INJECTION INTRAVENOUS at 15:10

## 2022-12-25 RX ADMIN — CHOLECALCIFEROL TAB 25 MCG (1000 UNIT) 2000 UNITS: 25 TAB at 08:02

## 2022-12-25 RX ADMIN — HYDROMORPHONE HYDROCHLORIDE 1 MG: 1 INJECTION, SOLUTION INTRAMUSCULAR; INTRAVENOUS; SUBCUTANEOUS at 17:23

## 2022-12-25 RX ADMIN — Medication 2 UNITS: at 12:54

## 2022-12-25 RX ADMIN — PIPERACILLIN SODIUM AND TAZOBACTAM SODIUM 3.38 G: 3; .375 INJECTION, POWDER, LYOPHILIZED, FOR SOLUTION INTRAVENOUS at 10:48

## 2022-12-25 RX ADMIN — Medication 4 UNITS: at 17:24

## 2022-12-25 RX ADMIN — HYDROMORPHONE HYDROCHLORIDE 4 MG: 2 TABLET ORAL at 23:00

## 2022-12-25 RX ADMIN — Medication 5 MG: at 22:50

## 2022-12-25 RX ADMIN — FAMOTIDINE 40 MG: 20 TABLET ORAL at 08:02

## 2022-12-25 RX ADMIN — PIPERACILLIN SODIUM AND TAZOBACTAM SODIUM 3.38 G: 3; .375 INJECTION, POWDER, LYOPHILIZED, FOR SOLUTION INTRAVENOUS at 17:22

## 2022-12-25 RX ADMIN — SODIUM CHLORIDE, PRESERVATIVE FREE 10 ML: 5 INJECTION INTRAVENOUS at 22:51

## 2022-12-25 RX ADMIN — VANCOMYCIN HYDROCHLORIDE 1000 MG: 1 INJECTION, POWDER, LYOPHILIZED, FOR SOLUTION INTRAVENOUS at 22:51

## 2022-12-25 NOTE — PROGRESS NOTES
Problem: Patient Education: Go to Patient Education Activity  Goal: Patient/Family Education  Outcome: Progressing Towards Goal     Problem: Falls - Risk of  Goal: *Absence of Falls  Description: Document Cherylle Cockayne Fall Risk and appropriate interventions in the flowsheet.   Outcome: Progressing Towards Goal  Note: Fall Risk Interventions:  Mobility Interventions: Patient to call before getting OOB, PT Consult for mobility concerns         Medication Interventions: Patient to call before getting OOB                   Problem: Patient Education: Go to Patient Education Activity  Goal: Patient/Family Education  Outcome: Progressing Towards Goal     Problem: Patient Education: Go to Patient Education Activity  Goal: Patient/Family Education  Outcome: Progressing Towards Goal

## 2022-12-25 NOTE — PROGRESS NOTES
[facility]  Progress Note    Patient: Villa Burciaga Age: 57 y.o. : 1960 MR#: [id number] SSN: [id number]  Date/Time: 2022     C/C: Backache / incontinence       Subjective:   HPI : Patient had undergone recent laminectomy and fusion surgery in the lumbar area, now infection s/p exploration of lumbar wound -deep and superficial with wound VAC placement by spine surgery. Pain control is adequate. Currently on IV antibiotic per ID. Blood cultures grew Klebsiella pneumoniae 1 out of 2 bottle. Other significant history includes type 2 diabetes, hypertension, history of spinal stenosis and multiple disc prolapse secondary to his falls at work . Review of Systems: Patient lying in bed alert awake oriented no confusion,   positive responses in bold type   Constitutional: Negative for fever, chills, diaphoresis and unexpected weight change. HENT: Negative for ear pain, congestion, sore throat, rhinorrhea, drooling, trouble swallowing, neck pain and tinnitus. Eyes: Negative for photophobia, pain, redness and visual disturbance. Respiratory: negative for shortness of breath, cough, choking, chest tightness, wheezing or stridor. Cardiovascular: Negative for chest pain, palpitations and leg swelling. Gastrointestinal: Negative for nausea, vomiting, abdominal pain, diarrhea, constipation, blood in stool, abdominal distention and anal bleeding. Genitourinary: Negative for dysuria, urgency, frequency, hematuria, flank pain and difficulty urinating. Musculoskeletal: Negative for back pain and arthralgias. Skin: Negative for color change, rash and wound. Neurological: Negative for dizziness, seizures, syncope, speech difficulty, light-headedness or headaches. Hematological: Does not bruise/bleed easily.    Psychiatric/Behavioral: Negative for suicidal ideas, hallucinations, behavioral problems, self-injury or agitation     Assessment/Plan: 1.  Backache/postoperative back wound infection-on Vanco, Zosyn, Levaquin  2 gram-negative bacteremia-Klebsiella pneumoniae-as above  3 diabetes mellitus type 2  4 hypertension  5 morbid obesity-BMI 33.33    -Patient condition remains same  -Follow cultures  -Vitals and ribs reviewed  -Continue current antibiotic  -Follow with ID and surgery  Objective:       General:  Alert, cooperative, no acute distress  HEENT: No facial asymmetry, RACH Dave, External ears - WNL    Cardiovascular: S1S2 - regular , No Murmur   Pulmonary: Equal expansion , No Use of accessory muscles , No Rales No Rhonchi    GI:  +BS in all four quadrants, soft, non-tender  Extremities:  No edema; 2+ dorsalis pedis pulses bilaterally  Neuro: Alert and oriented X 2.        DVT Prophylaxis:  []Lovenox  []Hep SQ  [x]SCDs  []Coumadin   []On Heparin gtt    [] Eliquis [] Xarelto     Vitals:         VS: Visit Vitals  /79 (BP 1 Location: Left upper arm, BP Patient Position: At rest)   Pulse 91   Temp 97.8 °F (36.6 °C)   Resp 19   Ht 5' 11\" (1.803 m)   Wt 108.4 kg (239 lb)   SpO2 100%   BMI 33.33 kg/m²      Tmax/24hrs: Temp (24hrs), Av.6 °F (36.4 °C), Min:97.3 °F (36.3 °C), Max:98.1 °F (36.7 °C)        Medications:   Current Facility-Administered Medications   Medication Dose Route Frequency    vancomycin (VANCOCIN) 1,000 mg in 0.9% sodium chloride 250 mL (VIAL-MATE)  1,000 mg IntraVENous Q12H    flumazeniL (ROMAZICON) 0.1 mg/mL injection 0.2 mg  0.2 mg IntraVENous PRN    levoFLOXacin (LEVAQUIN) 750 mg in D5W IVPB  750 mg IntraVENous Q24H    HYDROmorphone (DILAUDID) tablet 4 mg  4 mg Oral Q6H PRN    sodium chloride (NS) flush 5-40 mL  5-40 mL IntraVENous PRN    acetaminophen (TYLENOL) tablet 1,000 mg  1,000 mg Oral Q6H    HYDROmorphone (DILAUDID) injection 1 mg  1 mg IntraVENous Q4H PRN    phenol throat spray (CHLORASEPTIC) 1 Spray  1 Spray Oral PRN    benzocaine-menthoL (CEPACOL) lozenge 1 Lozenge  1 Lozenge Oral PRN    diphenhydrAMINE (BENADRYL) injection 12.5 mg  12.5 mg IntraVENous Q4H PRN    famotidine (PEPCID) tablet 40 mg  40 mg Oral Q12H    diazePAM (VALIUM) tablet 5 mg  5 mg Oral Q6H PRN    docusate sodium (COLACE) capsule 100 mg  100 mg Oral BID    LORazepam (ATIVAN) injection 1 mg  1 mg IntraVENous Q6H PRN    oxyCODONE IR (ROXICODONE) tablet 10 mg  10 mg Oral Q4H PRN    piperacillin-tazobactam (ZOSYN) 3.375 g in 0.9% sodium chloride (MBP/ADV) 100 mL MBP  3.375 g IntraVENous Q8H    morphine injection 2-4 mg  2-4 mg IntraVENous Q3H PRN    naloxone (NARCAN) injection 0.4 mg  0.4 mg IntraVENous EVERY 2 MINUTES AS NEEDED    sodium chloride (NS) flush 5-40 mL  5-40 mL IntraVENous Q8H    acetaminophen (TYLENOL) tablet 650 mg  650 mg Oral Q6H PRN    Or    acetaminophen (TYLENOL) suppository 650 mg  650 mg Rectal Q6H PRN    polyethylene glycol (MIRALAX) packet 17 g  17 g Oral DAILY PRN    ondansetron (ZOFRAN ODT) tablet 4 mg  4 mg Oral Q8H PRN    Or    ondansetron (ZOFRAN) injection 4 mg  4 mg IntraVENous Q6H PRN    melatonin tablet 5 mg  5 mg Oral QHS PRN    albuterol-ipratropium (DUO-NEB) 2.5 MG-0.5 MG/3 ML  3 mL Nebulization Q6H PRN    0.9% sodium chloride infusion  125 mL/hr IntraVENous CONTINUOUS    insulin lispro (HUMALOG) injection   SubCUTAneous Q6H    glucose chewable tablet 16 g  4 Tablet Oral PRN    glucagon (GLUCAGEN) injection 1 mg  1 mg IntraMUSCular PRN    dextrose 10% infusion 0-250 mL  0-250 mL IntraVENous PRN    cholecalciferol (VITAMIN D3) (1000 Units /25 mcg) tablet 2,000 Units  2,000 Units Oral DAILY    gabapentin (NEURONTIN) capsule 600 mg  600 mg Oral TID    therapeutic multivitamin (THERAGRAN) tablet 1 Tablet  1 Tablet Oral DAILY    atorvastatin (LIPITOR) tablet 20 mg  20 mg Oral QHS       Labs:    Recent Labs     12/25/22  0919 12/24/22  0249 12/23/22  1125   WBC 5.5 6.0 6.1   HGB 7.7* 8.0* 8.1*   HCT 24.2* 24.3* 25.0*    409 422*     Recent Labs     12/25/22  0919 12/24/22  0249 12/23/22  0342    134* 136 K 4.3 4.7 3.9    104 105   CO2 27 27 27   * 211* 119*   BUN 16 22* 25*   CREA 1.06 1.09 1.03   CA 8.9 9.1 8.8   ALB  --   --  2.4*   ALT  --   --  102*   INR  --   --  1.1         Time spent on direct patient care >30 mints     Complexity : High complex - due to multiple medical issues outlined above. CODE Status : Full CODE     Case discussed with:  [x]Patient  [] Family  []Nursing  []Case Management        Disclaimer: Sections of this note are dictated utilizing voice recognition software, which may have resulted in some phonetic based errors in grammar and contents. Even though attempts were made to correct all the mistakes, some may have been missed, and remained in the body of the document. If questions arise, please contact our department.     Signed By: Anju Dietrich MD     December 25, 2022

## 2022-12-25 NOTE — PROGRESS NOTES
Patient refused to had am labs done at [address] [facility] states he is trying to get some sleep. Can this be done later in the morning? Will update receiving nurse.

## 2022-12-26 LAB
ANION GAP SERPL CALC-SCNC: 7 MMOL/L (ref 3–18)
BACTERIA SPEC CULT: ABNORMAL
BACTERIA SPEC CULT: ABNORMAL
BUN SERPL-MCNC: 13 MG/DL (ref 7–18)
BUN/CREAT SERPL: 14 (ref 12–20)
CALCIUM SERPL-MCNC: 9.4 MG/DL (ref 8.5–10.1)
CHLORIDE SERPL-SCNC: 105 MMOL/L (ref 100–111)
CO2 SERPL-SCNC: 26 MMOL/L (ref 21–32)
CREAT SERPL-MCNC: 0.93 MG/DL (ref 0.6–1.3)
ERYTHROCYTE [DISTWIDTH] IN BLOOD BY AUTOMATED COUNT: 13.2 % (ref 11.6–14.5)
GLUCOSE BLD STRIP.AUTO-MCNC: 146 MG/DL (ref 70–110)
GLUCOSE BLD STRIP.AUTO-MCNC: 155 MG/DL (ref 70–110)
GLUCOSE BLD STRIP.AUTO-MCNC: 169 MG/DL (ref 70–110)
GLUCOSE BLD STRIP.AUTO-MCNC: 173 MG/DL (ref 70–110)
GLUCOSE SERPL-MCNC: 118 MG/DL (ref 74–99)
GRAM STN SPEC: ABNORMAL
HCT VFR BLD AUTO: 27.2 % (ref 36–48)
HGB BLD-MCNC: 8.8 G/DL (ref 13–16)
HIV 1+2 AB+HIV1 P24 AG SERPL QL IA: NONREACTIVE
HIV12 RESULT COMMENT, HHIVC: NORMAL
MCH RBC QN AUTO: 30.9 PG (ref 24–34)
MCHC RBC AUTO-ENTMCNC: 32.4 G/DL (ref 31–37)
MCV RBC AUTO: 95.4 FL (ref 78–100)
NRBC # BLD: 0 K/UL (ref 0–0.01)
NRBC BLD-RTO: 0 PER 100 WBC
PLATELET # BLD AUTO: 409 K/UL (ref 135–420)
PMV BLD AUTO: 9.1 FL (ref 9.2–11.8)
POTASSIUM SERPL-SCNC: 4 MMOL/L (ref 3.5–5.5)
RBC # BLD AUTO: 2.85 M/UL (ref 4.35–5.65)
SERVICE CMNT-IMP: ABNORMAL
SERVICE CMNT-IMP: ABNORMAL
SODIUM SERPL-SCNC: 138 MMOL/L (ref 136–145)
VANCOMYCIN SERPL-MCNC: 14.9 UG/ML (ref 5–40)
WBC # BLD AUTO: 8.3 K/UL (ref 4.6–13.2)

## 2022-12-26 PROCEDURE — 74011250636 HC RX REV CODE- 250/636: Performed by: INTERNAL MEDICINE

## 2022-12-26 PROCEDURE — 36573 INSJ PICC RS&I 5 YR+: CPT | Performed by: INTERNAL MEDICINE

## 2022-12-26 PROCEDURE — 80202 ASSAY OF VANCOMYCIN: CPT

## 2022-12-26 PROCEDURE — 74011636637 HC RX REV CODE- 636/637: Performed by: INTERNAL MEDICINE

## 2022-12-26 PROCEDURE — 74011250636 HC RX REV CODE- 250/636: Performed by: ORTHOPAEDIC SURGERY

## 2022-12-26 PROCEDURE — 80048 BASIC METABOLIC PNL TOTAL CA: CPT

## 2022-12-26 PROCEDURE — 65270000046 HC RM TELEMETRY

## 2022-12-26 PROCEDURE — 74011000250 HC RX REV CODE- 250: Performed by: INTERNAL MEDICINE

## 2022-12-26 PROCEDURE — 74011250637 HC RX REV CODE- 250/637: Performed by: ORTHOPAEDIC SURGERY

## 2022-12-26 PROCEDURE — 77030018842 HC SOL IRR SOD CL 9% BAXT -A

## 2022-12-26 PROCEDURE — 85027 COMPLETE CBC AUTOMATED: CPT

## 2022-12-26 PROCEDURE — 74011000250 HC RX REV CODE- 250: Performed by: ORTHOPAEDIC SURGERY

## 2022-12-26 PROCEDURE — 82962 GLUCOSE BLOOD TEST: CPT

## 2022-12-26 PROCEDURE — 74011000258 HC RX REV CODE- 258: Performed by: ORTHOPAEDIC SURGERY

## 2022-12-26 PROCEDURE — 36415 COLL VENOUS BLD VENIPUNCTURE: CPT

## 2022-12-26 PROCEDURE — 99232 SBSQ HOSP IP/OBS MODERATE 35: CPT | Performed by: INTERNAL MEDICINE

## 2022-12-26 RX ORDER — INSULIN LISPRO 100 [IU]/ML
INJECTION, SOLUTION INTRAVENOUS; SUBCUTANEOUS
Status: DISCONTINUED | OUTPATIENT
Start: 2022-12-26 | End: 2023-01-13 | Stop reason: HOSPADM

## 2022-12-26 RX ORDER — OXYCODONE HYDROCHLORIDE 5 MG/1
5 TABLET ORAL
Status: DISCONTINUED | OUTPATIENT
Start: 2022-12-26 | End: 2022-12-28

## 2022-12-26 RX ADMIN — Medication 5 MG: at 21:43

## 2022-12-26 RX ADMIN — GABAPENTIN 600 MG: 300 CAPSULE ORAL at 21:44

## 2022-12-26 RX ADMIN — FAMOTIDINE 40 MG: 20 TABLET ORAL at 21:44

## 2022-12-26 RX ADMIN — WATER 2 G: 1 INJECTION INTRAMUSCULAR; INTRAVENOUS; SUBCUTANEOUS at 09:23

## 2022-12-26 RX ADMIN — Medication 2 UNITS: at 22:25

## 2022-12-26 RX ADMIN — HYDROMORPHONE HYDROCHLORIDE 4 MG: 2 TABLET ORAL at 04:40

## 2022-12-26 RX ADMIN — ACETAMINOPHEN 1000 MG: 500 TABLET ORAL at 11:52

## 2022-12-26 RX ADMIN — GABAPENTIN 600 MG: 300 CAPSULE ORAL at 09:21

## 2022-12-26 RX ADMIN — CHOLECALCIFEROL TAB 25 MCG (1000 UNIT) 2000 UNITS: 25 TAB at 09:23

## 2022-12-26 RX ADMIN — Medication 2 UNITS: at 07:30

## 2022-12-26 RX ADMIN — SODIUM CHLORIDE, PRESERVATIVE FREE 10 ML: 5 INJECTION INTRAVENOUS at 08:15

## 2022-12-26 RX ADMIN — THERA TABS 1 TABLET: TAB at 09:23

## 2022-12-26 RX ADMIN — HYDROMORPHONE HYDROCHLORIDE 4 MG: 2 TABLET ORAL at 10:48

## 2022-12-26 RX ADMIN — POLYETHYLENE GLYCOL 3350 17 G: 17 POWDER, FOR SOLUTION ORAL at 13:30

## 2022-12-26 RX ADMIN — SODIUM CHLORIDE 125 ML/HR: 9 INJECTION, SOLUTION INTRAVENOUS at 21:48

## 2022-12-26 RX ADMIN — SODIUM CHLORIDE, PRESERVATIVE FREE 10 ML: 5 INJECTION INTRAVENOUS at 22:02

## 2022-12-26 RX ADMIN — Medication 2 UNITS: at 11:56

## 2022-12-26 RX ADMIN — FAMOTIDINE 40 MG: 20 TABLET ORAL at 09:23

## 2022-12-26 RX ADMIN — ATORVASTATIN CALCIUM 20 MG: 20 TABLET, FILM COATED ORAL at 21:44

## 2022-12-26 RX ADMIN — GABAPENTIN 600 MG: 300 CAPSULE ORAL at 17:49

## 2022-12-26 RX ADMIN — ACETAMINOPHEN 1000 MG: 500 TABLET ORAL at 17:49

## 2022-12-26 RX ADMIN — PIPERACILLIN SODIUM AND TAZOBACTAM SODIUM 3.38 G: 3; .375 INJECTION, POWDER, LYOPHILIZED, FOR SOLUTION INTRAVENOUS at 02:22

## 2022-12-26 RX ADMIN — DOCUSATE SODIUM 100 MG: 100 CAPSULE, LIQUID FILLED ORAL at 17:49

## 2022-12-26 RX ADMIN — DOCUSATE SODIUM 100 MG: 100 CAPSULE, LIQUID FILLED ORAL at 09:23

## 2022-12-26 RX ADMIN — ACETAMINOPHEN 1000 MG: 500 TABLET ORAL at 08:12

## 2022-12-26 RX ADMIN — HYDROMORPHONE HYDROCHLORIDE 4 MG: 2 TABLET ORAL at 21:43

## 2022-12-26 NOTE — PROGRESS NOTES
[facility]  Progress Note    Patient: Richa Gaming Age: 57 y.o. : 1960 MR#: [id number] SSN: [id number]  Date/Time: 2022     C/C: Backache / incontinence       Subjective:   HPI : Patient had undergone recent laminectomy and fusion surgery in the lumbar area, now infection s/p exploration of lumbar wound -deep and superficial with wound VAC placement by spine surgery. Pain control is adequate. Currently on IV antibiotic per ID. Blood cultures grew Klebsiella pneumoniae 1 out of 2 bottle. Other significant history includes type 2 diabetes, hypertension, history of spinal stenosis and multiple disc prolapse secondary to his falls at work . Review of Systems: Patient lying in bed alert awake oriented no confusion,   positive responses in bold type   Constitutional: Negative for fever, chills, diaphoresis and unexpected weight change. HENT: Negative for ear pain, congestion, sore throat, rhinorrhea, drooling, trouble swallowing, neck pain and tinnitus. Eyes: Negative for photophobia, pain, redness and visual disturbance. Respiratory: negative for shortness of breath, cough, choking, chest tightness, wheezing or stridor. Cardiovascular: Negative for chest pain, palpitations and leg swelling. Gastrointestinal: Negative for nausea, vomiting, abdominal pain, diarrhea, constipation, blood in stool, abdominal distention and anal bleeding. Genitourinary: Negative for dysuria, urgency, frequency, hematuria, flank pain and difficulty urinating. Musculoskeletal: Negative for back pain and arthralgias. Skin: Negative for color change, rash and wound. Neurological: Negative for dizziness, seizures, syncope, speech difficulty, light-headedness or headaches. Hematological: Does not bruise/bleed easily.    Psychiatric/Behavioral: Negative for suicidal ideas, hallucinations, behavioral problems, self-injury or agitation     Assessment/Plan: 1.  Backache/postoperative back wound infection-on Vanco, Zosyn, Levaquin  2 gram-negative bacteremia-Klebsiella pneumoniae-as above  3 diabetes mellitus type 2  4 hypertension  5 morbid obesity-BMI 33.33    - plan D/W ID   - PIC with long term antibiotics- Start IV ceftriaxone till 2023   - Home with HH -   Objective:       General:  Alert, cooperative, no acute distress  HEENT: No facial asymmetry, RACH Dave, External ears - WNL    Cardiovascular: S1S2 - regular , No Murmur   Pulmonary: Equal expansion , No Use of accessory muscles , No Rales No Rhonchi    GI:  +BS in all four quadrants, soft, non-tender  Extremities:  No edema; 2+ dorsalis pedis pulses bilaterally  Neuro: Alert and oriented X 2.        DVT Prophylaxis:  []Lovenox  []Hep SQ  [x]SCDs  []Coumadin   []On Heparin gtt    [] Eliquis [] Xarelto     Vitals:         VS: Visit Vitals  BP (!) 148/85   Pulse 82   Temp 98 °F (36.7 °C)   Resp 19   Ht 5' 11\" (1.803 m)   Wt 108.9 kg (240 lb)   SpO2 99%   BMI 33.47 kg/m²      Tmax/24hrs: Temp (24hrs), Av °F (36.7 °C), Min:97.7 °F (36.5 °C), Max:98.4 °F (36.9 °C)        Medications:   Current Facility-Administered Medications   Medication Dose Route Frequency    insulin lispro (HUMALOG) injection   SubCUTAneous AC&HS    cefTRIAXone (ROCEPHIN) 2 g in sterile water (preservative free) 20 mL IV syringe  2 g IntraVENous Q24H    oxyCODONE IR (ROXICODONE) tablet 5 mg  5 mg Oral Q4H PRN    flumazeniL (ROMAZICON) 0.1 mg/mL injection 0.2 mg  0.2 mg IntraVENous PRN    HYDROmorphone (DILAUDID) tablet 4 mg  4 mg Oral Q6H PRN    sodium chloride (NS) flush 5-40 mL  5-40 mL IntraVENous PRN    acetaminophen (TYLENOL) tablet 1,000 mg  1,000 mg Oral Q6H    HYDROmorphone (DILAUDID) injection 1 mg  1 mg IntraVENous Q4H PRN    phenol throat spray (CHLORASEPTIC) 1 Spray  1 Spray Oral PRN    benzocaine-menthoL (CEPACOL) lozenge 1 Lozenge  1 Lozenge Oral PRN    diphenhydrAMINE (BENADRYL) injection 12.5 mg  12.5 mg IntraVENous Q4H PRN    famotidine (PEPCID) tablet 40 mg  40 mg Oral Q12H    diazePAM (VALIUM) tablet 5 mg  5 mg Oral Q6H PRN    docusate sodium (COLACE) capsule 100 mg  100 mg Oral BID    LORazepam (ATIVAN) injection 1 mg  1 mg IntraVENous Q6H PRN    naloxone (NARCAN) injection 0.4 mg  0.4 mg IntraVENous EVERY 2 MINUTES AS NEEDED    sodium chloride (NS) flush 5-40 mL  5-40 mL IntraVENous Q8H    acetaminophen (TYLENOL) tablet 650 mg  650 mg Oral Q6H PRN    Or    acetaminophen (TYLENOL) suppository 650 mg  650 mg Rectal Q6H PRN    polyethylene glycol (MIRALAX) packet 17 g  17 g Oral DAILY PRN    ondansetron (ZOFRAN ODT) tablet 4 mg  4 mg Oral Q8H PRN    Or    ondansetron (ZOFRAN) injection 4 mg  4 mg IntraVENous Q6H PRN    melatonin tablet 5 mg  5 mg Oral QHS PRN    albuterol-ipratropium (DUO-NEB) 2.5 MG-0.5 MG/3 ML  3 mL Nebulization Q6H PRN    0.9% sodium chloride infusion  125 mL/hr IntraVENous CONTINUOUS    glucose chewable tablet 16 g  4 Tablet Oral PRN    glucagon (GLUCAGEN) injection 1 mg  1 mg IntraMUSCular PRN    dextrose 10% infusion 0-250 mL  0-250 mL IntraVENous PRN    cholecalciferol (VITAMIN D3) (1000 Units /25 mcg) tablet 2,000 Units  2,000 Units Oral DAILY    gabapentin (NEURONTIN) capsule 600 mg  600 mg Oral TID    therapeutic multivitamin (THERAGRAN) tablet 1 Tablet  1 Tablet Oral DAILY    atorvastatin (LIPITOR) tablet 20 mg  20 mg Oral QHS       Labs:    Recent Labs     12/26/22  0439 12/25/22  0919 12/24/22  0249   WBC 8.3 5.5 6.0   HGB 8.8* 7.7* 8.0*   HCT 27.2* 24.2* 24.3*    363 409     Recent Labs     12/26/22  0439 12/25/22  0919 12/24/22  0249    139 134*   K 4.0 4.3 4.7    106 104   CO2 26 27 27   * 172* 211*   BUN 13 16 22*   CREA 0.93 1.06 1.09   CA 9.4 8.9 9.1         Time spent on direct patient care >30 mints     Complexity : High complex - due to multiple medical issues outlined above.      CODE Status : Full CODE     Case discussed with:  [x]Patient  [] Family  []Nursing []Case Management        Disclaimer: Sections of this note are dictated utilizing voice recognition software, which may have resulted in some phonetic based errors in grammar and contents. Even though attempts were made to correct all the mistakes, some may have been missed, and remained in the body of the document. If questions arise, please contact our department.     Signed By: Anju Dietrich MD     December 26, 2022

## 2022-12-26 NOTE — PROGRESS NOTES
Problem: Patient Education: Go to Patient Education Activity  Goal: Patient/Family Education  Outcome: Progressing Towards Goal     Problem: Falls - Risk of  Goal: *Absence of Falls  Description: Document Lettie Duverney Fall Risk and appropriate interventions in the flowsheet.   Outcome: Progressing Towards Goal  Note: Fall Risk Interventions:  Mobility Interventions: Patient to call before getting OOB         Medication Interventions: Patient to call before getting OOB                   Problem: Patient Education: Go to Patient Education Activity  Goal: Patient/Family Education  Outcome: Progressing Towards Goal     Problem: Patient Education: Go to Patient Education Activity  Goal: Patient/Family Education  Outcome: Progressing Towards Goal

## 2022-12-26 NOTE — PROGRESS NOTES
[facility] Pharmacokinetic Monitoring Service - Vancomycin    Consulting Provider: John Torres MD  Indication: Diabetic Foot Infection  Target Concentration: Goal AUC/MARIANNE 400-600 mg*hr/L  Day of Therapy: 5  Additional Antimicrobials: Zosyn, Levaquin    Pertinent Laboratory Values: Wt Readings from Last 1 Encounters:   12/23/22 108.4 kg (239 lb)     Temp Readings from Last 1 Encounters:   12/26/22 97.7 °F (36.5 °C)     Recent Labs     12/26/22  0439 12/25/22  0919 12/24/22  0249   CREA 0.93 1.06 1.09   BUN 13 16 22*   ]    Estimated Creatinine Clearance: 88.1 mL/min (based on SCr of 1.06 mg/dL). Recent Labs     12/25/22  0919 12/24/22  0249   WBC 5.5 6.0     Pertinent Cultures:  Culture Date Source Results   12/22 Blood KLEBSIELLA PNEUMONIAE GROWING IN 1 OF 2 BOTTLES DRAWN No Site Indicated   12/22 Urine - Clean catch No growth (<1,000 CFU/ML)   12/23 Lumbar Abscess LIGHT GRAM NEGATIVE RODS   12/23 Wound - Lumbar LIGHT GRAM NEGATIVE RODS   12/23 Blood NO GROWTH AFTER 20 HOURS   MRSA Nasal Swab: N/A. Non-respiratory infection.     Assessment:  Date/Time Current Dose Concentration Timing of Concentration (h) AUC   12/22 1441 2000 mg x 1 - - -   12/23 0139 750 mg q12h - - -   12/23 0342 - 20.9 2 362   12/23 1000 1000 mg q12h      12/23 2114 1000 mg - - -   12/24 0932 1000 mg - - -   12/24 2051 1000 mg - - -   12/25 0802 1000 mg - - -   12/25 2200 1000 mg - - -   12/26 0439 - 14.9 6 435   Note: Serum concentrations collected for AUC dosing may appear elevated if collected in close proximity to the dose administered, this is not necessarily an indication of toxicity    Plan:  Current dose therapeutic  Continue current dose of 1,000 mg q12h  Pharmacy will continue to monitor patient and adjust therapy as indicated

## 2022-12-26 NOTE — PROGRESS NOTES
Infectious Disease progress Note        Reason: lumbar abscess, gram-negative bloodstream infection    Current abx Prior abx   Zosyn, vancomycin since 12/22      Lines:       Assessment :  20-year-old man with past medical history significant for type 2 diabetes, hypertension, spinal stenosis s/p lumbar laminectomy/fusion surgery on 12/7/2022 presented to [facility] on 12/22/22 for incontinence and back pain. Now with gram-negative bacteremia, significant purulence noted with IR guided aspiration lumbar fluid collection on 12/23/2022    Clinical presentation consistent with klebsiella bloodstream infection (positive blood culture 12/22, negative blood culture 12/24), lumbar surgical site infected fluid collection/abscess likely due to indolent pathogens. Single positive blood culture for Klebsiella in blood cx 12/22 likely due to lumbar abscess    Status post I&D lumbar abscess on 12/23/2022. Intraoperative cultures Klebsiella    Acute kidney injury-likely secondary to volume depletion. Improving    Recommendations:    Discontinue pip/tazo, vancomycin,  levaquin. Start IV ceftriaxone till 2/6/2023  PICC line for outpatient IV antibiotics   follow-up spine surgery recommendations regarding wound care  4. Discharge planning per primary team    Home health orders on chart (click on \"chart review, other orders, IP home health)      Above plan was discussed in details with patient, and dr Hugo Martin. All questions answered to their full satisfaction. Spent additional 35 minutes in management and evaluation of this patient. >50% time spent in counselling and coordination of care. Please call me if any further questions or concerns. Will continue to participate in the care of this patient. HPI:      Patient denies any fever or chills t Denies increasing chest pain, shortness of breath, abdominal pain. No prior history of MRSA colonization or infection.     Past Medical History:   Diagnosis Date    Arthritis     Back pain from previous back injury    Colon polyps     [facility]     7983    Hernia, umbilical     Hyperlipidemia     Hypertension     Pneumonia        Past Surgical History:   Procedure Laterality Date    HX COLONOSCOPY  2014    polyps    HX HEENT      left lazy eye procedure during childhood    HX HEMORRHOIDECTOMY      HX HERNIA REPAIR      2021    HX ORTHOPAEDIC  01/09/2017    right knee    HX ORTHOPAEDIC  1985    torn cartilage knee    HX TONSILLECTOMY  1964       Current Discharge Medication List        CONTINUE these medications which have NOT CHANGED    Details   HYDROmorphone (DILAUDID) 4 mg tablet Take 4 mg by mouth every six (6) hours as needed for Pain.      polyethylene glycol (MIRALAX) 17 gram/dose powder Take 17 g by mouth daily. Indications: constipation  Qty: 116 g, Refills: 0      aspirin delayed-release 81 mg tablet Take 81 mg by mouth daily. OTHER,NON-FORMULARY, Indications: maxforce prostate 1 tab twice daily      multivitamin (ONE A DAY) tablet Take 1 Tablet by mouth daily. metFORMIN (GLUCOPHAGE) 500 mg tablet TAKE 1 TABLET BY MOUTH TWICE DAILY WITH A MEAL      cholecalciferol (VITAMIN D3) (1000 Units /25 mcg) tablet Take 2,000 Units by mouth daily. gabapentin (NEURONTIN) 300 mg capsule 600 mg three (3) times daily. lisinopriL (PRINIVIL, ZESTRIL) 40 mg tablet TAKE 1 TABLET BY MOUTH ONCE DAILY FOR BLOOD PRESSURE FOR 90 DAYS      simvastatin (ZOCOR) 40 mg tablet TAKE 1 TABLET BY MOUTH ONCE DAILY IN THE EVENING FOR CHOLESTEROL FOR 90 DAYS      acetaminophen (TYLENOL) 500 mg tablet Take  by mouth every six (6) hours as needed for Pain.              Current Facility-Administered Medications   Medication Dose Route Frequency    insulin lispro (HUMALOG) injection   SubCUTAneous AC&HS    vancomycin (VANCOCIN) 1,000 mg in 0.9% sodium chloride 250 mL (VIAL-MATE)  1,000 mg IntraVENous Q12H    flumazeniL (ROMAZICON) 0.1 mg/mL injection 0.2 mg  0.2 mg IntraVENous PRN    levoFLOXacin (LEVAQUIN) 750 mg in D5W IVPB  750 mg IntraVENous Q24H    HYDROmorphone (DILAUDID) tablet 4 mg  4 mg Oral Q6H PRN    sodium chloride (NS) flush 5-40 mL  5-40 mL IntraVENous PRN    acetaminophen (TYLENOL) tablet 1,000 mg  1,000 mg Oral Q6H    HYDROmorphone (DILAUDID) injection 1 mg  1 mg IntraVENous Q4H PRN    phenol throat spray (CHLORASEPTIC) 1 Spray  1 Spray Oral PRN    benzocaine-menthoL (CEPACOL) lozenge 1 Lozenge  1 Lozenge Oral PRN    diphenhydrAMINE (BENADRYL) injection 12.5 mg  12.5 mg IntraVENous Q4H PRN    famotidine (PEPCID) tablet 40 mg  40 mg Oral Q12H    diazePAM (VALIUM) tablet 5 mg  5 mg Oral Q6H PRN    docusate sodium (COLACE) capsule 100 mg  100 mg Oral BID    LORazepam (ATIVAN) injection 1 mg  1 mg IntraVENous Q6H PRN    oxyCODONE IR (ROXICODONE) tablet 10 mg  10 mg Oral Q4H PRN    piperacillin-tazobactam (ZOSYN) 3.375 g in 0.9% sodium chloride (MBP/ADV) 100 mL MBP  3.375 g IntraVENous Q8H    morphine injection 2-4 mg  2-4 mg IntraVENous Q3H PRN    naloxone (NARCAN) injection 0.4 mg  0.4 mg IntraVENous EVERY 2 MINUTES AS NEEDED    sodium chloride (NS) flush 5-40 mL  5-40 mL IntraVENous Q8H    acetaminophen (TYLENOL) tablet 650 mg  650 mg Oral Q6H PRN    Or    acetaminophen (TYLENOL) suppository 650 mg  650 mg Rectal Q6H PRN    polyethylene glycol (MIRALAX) packet 17 g  17 g Oral DAILY PRN    ondansetron (ZOFRAN ODT) tablet 4 mg  4 mg Oral Q8H PRN    Or    ondansetron (ZOFRAN) injection 4 mg  4 mg IntraVENous Q6H PRN    melatonin tablet 5 mg  5 mg Oral QHS PRN    albuterol-ipratropium (DUO-NEB) 2.5 MG-0.5 MG/3 ML  3 mL Nebulization Q6H PRN    0.9% sodium chloride infusion  125 mL/hr IntraVENous CONTINUOUS    glucose chewable tablet 16 g  4 Tablet Oral PRN    glucagon (GLUCAGEN) injection 1 mg  1 mg IntraMUSCular PRN    dextrose 10% infusion 0-250 mL  0-250 mL IntraVENous PRN    cholecalciferol (VITAMIN D3) (1000 Units /25 mcg) tablet 2,000 Units  2,000 Units Oral DAILY    gabapentin (NEURONTIN) capsule 600 mg  600 mg Oral TID    therapeutic multivitamin (THERAGRAN) tablet 1 Tablet  1 Tablet Oral DAILY    atorvastatin (LIPITOR) tablet 20 mg  20 mg Oral QHS       Allergies: Patient has no known allergies. History reviewed. No pertinent family history. Social History     Socioeconomic History    Marital status:      Spouse name: Not on file    Number of children: Not on file    Years of education: Not on file    Highest education level: Not on file   Occupational History    Not on file   Tobacco Use    Smoking status: Former     Years: 5.00     Types: Cigarettes     Quit date: 2021     Years since quittin.9    Smokeless tobacco: Never    Tobacco comments:     3-4 cig per day, told not to smoke or drink 24/hrs prior to surgery   Vaping Use    Vaping Use: Never used   Substance and Sexual Activity    Alcohol use: Yes     Alcohol/week: 2.0 standard drinks     Types: 2 Cans of beer per week     Comment: 2 per month    Drug use: Never    Sexual activity: Never   Other Topics Concern    Not on file   Social History Narrative    Not on file     Social Determinants of Health     Financial Resource Strain: Not on file   Food Insecurity: Not on file   Transportation Needs: Not on file   Physical Activity: Not on file   Stress: Not on file   Social Connections: Not on file   Intimate Partner Violence: Not on file   Housing Stability: Not on file     Social History     Tobacco Use   Smoking Status Former    Years: 5.00    Types: Cigarettes    Quit date: 2021    Years since quittin.9   Smokeless Tobacco Never   Tobacco Comments    3-4 cig per day, told not to smoke or drink 24/hrs prior to surgery        Temp (24hrs), Av.9 °F (36.6 °C), Min:97.7 °F (36.5 °C), Max:98.4 °F (36.9 °C)    Visit Vitals  BP (!) 158/69   Pulse 82   Temp 97.7 °F (36.5 °C)   Resp 20   Ht 5' 11\" (1.803 m)   Wt 108.4 kg (239 lb)   SpO2 97%   BMI 33.33 kg/m²       ROS: 12 point ROS obtained in details.  Pertinent positives as mentioned in HPI,   otherwise negative    Physical Exam:    General: Well developed, well nourished male laying on the bed AAOx3 in no acute distress. HEENT:  Normocephalic, atraumatic,EOMI, no scleral icterus or pallor; no conjunctival hemmohage;  nasal and oral mucous are moist and without evidence of lesions. Neck supple, no bruits. Lymph Nodes:   no cervical, axillary or inguinal adenopathy   Lungs:   non-labored, bilateral clear to auscultation- no crackles wheezes rales or rhonchi   Heart:  RRR, s1 and s2; no rubs or gallops, no edema, + pedal pulses   Abdomen:  soft, non-distended, active bowel sounds, no hepatomegaly, no splenomegaly. Non-tender   Genitourinary:  deferred   Extremities:   no clubbing, cyanosis; no joint effusions or swelling;  muscle mass appropriate for age   Neurologic:  No gross focal sensory abnormalities; 5/5 muscle strength to upper and lower extremities. Speech appropriate.  Cranial nerves intact                        Skin:  No rash or ulcers noted   Back: Tsurgical site covered with dressing     Psychiatric:  No suicidal or homicidal ideations, appropriate mood and affect         Labs: Results:   Chemistry Recent Labs     12/26/22  0439 12/25/22  0919 12/24/22  0249   * 172* 211*    139 134*   K 4.0 4.3 4.7    106 104   CO2 26 27 27   BUN 13 16 22*   CREA 0.93 1.06 1.09   CA 9.4 8.9 9.1   AGAP 7 6 3   BUCR 14 15 20        CBC w/Diff Recent Labs     12/26/22  0439 12/25/22  0919 12/24/22  0249 12/23/22  1125   WBC 8.3 5.5 6.0 6.1   RBC 2.85* 2.51* 2.52* 2.60*   HGB 8.8* 7.7* 8.0* 8.1*   HCT 27.2* 24.2* 24.3* 25.0*    363 409 422*   GRANS  --   --  76* 73   LYMPH  --   --  15* 14*   EOS  --   --  0 0        Microbiology Recent Labs     12/24/22  0956 12/23/22  1145 12/23/22  1000   CULT NO GROWTH AFTER 20 HOURS NO ANAEROBES ISOLATED  LIGHT GRAM NEGATIVE RODS* LIGHT GRAM NEGATIVE RODS*            RADIOLOGY:    All available imaging studies/reports in [facility] for this admission were reviewed        Disclaimer: Sections of this note are dictated utilizing voice recognition software, which may have resulted in some phonetic based errors in grammar and contents. Even though attempts were made to correct all the mistakes, some may have been missed, and remained in the body of the document. If questions arise, please contact our department.     Dr. Chauncey Bangura, Infectious Disease Specialist  [phone]  December 26, 2022  11:30 AM

## 2022-12-26 NOTE — ROUTINE PROCESS
Bedside and Verbal shift change report given to Sonu Boateng RN (oncoming nurse) by Jeanine Hood RN (offgoing nurse). Report included the following information SBAR, Kardex, MAR, and Recent Results. Patient quietly resting, call light in reach.

## 2022-12-27 LAB
ANION GAP SERPL CALC-SCNC: 7 MMOL/L (ref 3–18)
BUN SERPL-MCNC: 15 MG/DL (ref 7–18)
BUN/CREAT SERPL: 16 (ref 12–20)
CALCIUM SERPL-MCNC: 8.9 MG/DL (ref 8.5–10.1)
CHLORIDE SERPL-SCNC: 105 MMOL/L (ref 100–111)
CO2 SERPL-SCNC: 26 MMOL/L (ref 21–32)
CREAT SERPL-MCNC: 0.94 MG/DL (ref 0.6–1.3)
GLUCOSE BLD STRIP.AUTO-MCNC: 123 MG/DL (ref 70–110)
GLUCOSE BLD STRIP.AUTO-MCNC: 151 MG/DL (ref 70–110)
GLUCOSE BLD STRIP.AUTO-MCNC: 162 MG/DL (ref 70–110)
GLUCOSE BLD STRIP.AUTO-MCNC: 163 MG/DL (ref 70–110)
GLUCOSE SERPL-MCNC: 198 MG/DL (ref 74–99)
POTASSIUM SERPL-SCNC: 3.8 MMOL/L (ref 3.5–5.5)
SODIUM SERPL-SCNC: 138 MMOL/L (ref 136–145)

## 2022-12-27 PROCEDURE — 74011250637 HC RX REV CODE- 250/637: Performed by: ORTHOPAEDIC SURGERY

## 2022-12-27 PROCEDURE — 82962 GLUCOSE BLOOD TEST: CPT

## 2022-12-27 PROCEDURE — 74011000250 HC RX REV CODE- 250: Performed by: INTERNAL MEDICINE

## 2022-12-27 PROCEDURE — 74011250636 HC RX REV CODE- 250/636: Performed by: ORTHOPAEDIC SURGERY

## 2022-12-27 PROCEDURE — 77030019934 HC DRSG VAC ASST KCON -B

## 2022-12-27 PROCEDURE — 65270000046 HC RM TELEMETRY

## 2022-12-27 PROCEDURE — 97116 GAIT TRAINING THERAPY: CPT

## 2022-12-27 PROCEDURE — 2709999900 HC NON-CHARGEABLE SUPPLY

## 2022-12-27 PROCEDURE — 74011636637 HC RX REV CODE- 636/637: Performed by: INTERNAL MEDICINE

## 2022-12-27 PROCEDURE — 77030025414 HC DRSG VAC ASST SMPLC KCON -B

## 2022-12-27 PROCEDURE — 97530 THERAPEUTIC ACTIVITIES: CPT

## 2022-12-27 PROCEDURE — 97535 SELF CARE MNGMENT TRAINING: CPT

## 2022-12-27 PROCEDURE — 99232 SBSQ HOSP IP/OBS MODERATE 35: CPT | Performed by: INTERNAL MEDICINE

## 2022-12-27 PROCEDURE — 74011000250 HC RX REV CODE- 250: Performed by: ORTHOPAEDIC SURGERY

## 2022-12-27 PROCEDURE — 74011250636 HC RX REV CODE- 250/636: Performed by: INTERNAL MEDICINE

## 2022-12-27 PROCEDURE — 80048 BASIC METABOLIC PNL TOTAL CA: CPT

## 2022-12-27 PROCEDURE — 36415 COLL VENOUS BLD VENIPUNCTURE: CPT

## 2022-12-27 RX ADMIN — DOCUSATE SODIUM 100 MG: 100 CAPSULE, LIQUID FILLED ORAL at 09:50

## 2022-12-27 RX ADMIN — GABAPENTIN 600 MG: 300 CAPSULE ORAL at 09:50

## 2022-12-27 RX ADMIN — HYDROMORPHONE HYDROCHLORIDE 4 MG: 2 TABLET ORAL at 11:40

## 2022-12-27 RX ADMIN — SODIUM CHLORIDE, PRESERVATIVE FREE 10 ML: 5 INJECTION INTRAVENOUS at 05:29

## 2022-12-27 RX ADMIN — GABAPENTIN 600 MG: 300 CAPSULE ORAL at 16:22

## 2022-12-27 RX ADMIN — ACETAMINOPHEN 1000 MG: 500 TABLET ORAL at 18:23

## 2022-12-27 RX ADMIN — HYDROMORPHONE HYDROCHLORIDE 4 MG: 2 TABLET ORAL at 23:50

## 2022-12-27 RX ADMIN — GABAPENTIN 600 MG: 300 CAPSULE ORAL at 21:22

## 2022-12-27 RX ADMIN — Medication 5 MG: at 21:23

## 2022-12-27 RX ADMIN — SODIUM CHLORIDE, PRESERVATIVE FREE 10 ML: 5 INJECTION INTRAVENOUS at 14:59

## 2022-12-27 RX ADMIN — HYDROMORPHONE HYDROCHLORIDE 1 MG: 1 INJECTION, SOLUTION INTRAMUSCULAR; INTRAVENOUS; SUBCUTANEOUS at 16:13

## 2022-12-27 RX ADMIN — ACETAMINOPHEN 1000 MG: 500 TABLET ORAL at 11:40

## 2022-12-27 RX ADMIN — CHOLECALCIFEROL TAB 25 MCG (1000 UNIT) 2000 UNITS: 25 TAB at 09:50

## 2022-12-27 RX ADMIN — SODIUM CHLORIDE, PRESERVATIVE FREE 10 ML: 5 INJECTION INTRAVENOUS at 23:57

## 2022-12-27 RX ADMIN — Medication 2 UNITS: at 13:00

## 2022-12-27 RX ADMIN — THERA TABS 1 TABLET: TAB at 09:50

## 2022-12-27 RX ADMIN — FAMOTIDINE 40 MG: 20 TABLET ORAL at 21:23

## 2022-12-27 RX ADMIN — WATER 2 G: 1 INJECTION INTRAMUSCULAR; INTRAVENOUS; SUBCUTANEOUS at 09:48

## 2022-12-27 RX ADMIN — Medication 2 UNITS: at 22:35

## 2022-12-27 RX ADMIN — DOCUSATE SODIUM 100 MG: 100 CAPSULE, LIQUID FILLED ORAL at 18:23

## 2022-12-27 RX ADMIN — FAMOTIDINE 40 MG: 20 TABLET ORAL at 09:48

## 2022-12-27 RX ADMIN — ATORVASTATIN CALCIUM 20 MG: 20 TABLET, FILM COATED ORAL at 21:23

## 2022-12-27 RX ADMIN — SODIUM CHLORIDE 125 ML/HR: 9 INJECTION, SOLUTION INTRAVENOUS at 05:27

## 2022-12-27 RX ADMIN — HYDROMORPHONE HYDROCHLORIDE 4 MG: 2 TABLET ORAL at 18:23

## 2022-12-27 RX ADMIN — HYDROMORPHONE HYDROCHLORIDE 4 MG: 2 TABLET ORAL at 05:24

## 2022-12-27 RX ADMIN — ACETAMINOPHEN 1000 MG: 500 TABLET ORAL at 23:50

## 2022-12-27 RX ADMIN — ACETAMINOPHEN 1000 MG: 500 TABLET ORAL at 05:24

## 2022-12-27 NOTE — WOUND CARE
Physical Exam  Room 216: pt's wound vac dressing is due to be changed today. Wound vac dressing orders are on the chart. Wound measurements placed into [employer] for home machine. Discussed with primary nurse [facility] RN. Notified Kelsey ROCK of need for home wound vac machine.    Suri CROOKN, RN, [facility] [facility] [employer], [address]

## 2022-12-27 NOTE — PROGRESS NOTES
Problem: Self Care Deficits Care Plan (Adult)  Goal: *Acute Goals and Plan of Care (Insert Text)  Description: Occupational Therapy Goals  Initiated 12/24/2022 within 7 day(s). 1.  Patient will perform grooming with supervision/set-up standing at the sink for > 4 min with Good balance. 2.  Patient will perform bathing with supervision/set-up using AE. 3.  Patient will perform lower body dressing with supervision/set-up using AE. 4.  Patient will perform toilet transfers with supervision/set-up. 5.  Patient will perform all aspects of toileting with supervision/set-up. 6.  Patient will participate in upper extremity therapeutic exercise/activities with supervision/set-up for 8 minutes to improve endurance and UB strength needed for ADLs    7. Patient will utilize energy conservation techniques during functional activities with verbal cues. 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 Outcome: Progressing Towards Goal   OCCUPATIONAL THERAPY TREATMENT    Patient: Lashonda Sahu (88 y.o. male)  Date: 12/27/2022  Diagnosis: VIPIN (acute kidney injury) ([facility] [N17.9]  Dehydration [E86.0] Fluid collection at surgical site  Procedure(s) (LRB):  INCISION AND DRAINAGE LUMBAR SPINE/ APPLICATION OF WOUND VAC (N/A) 4 Days Post-Op  Precautions: Fall, Spinal  PLOF: see above    Chart, occupational therapy assessment, plan of care, and goals were reviewed. ASSESSMENT:  Pt semi reclined in bed and agreeable for OT session. Pt cotreated with PT for pt safety and participation. Pt performed supine>sit with SUP and donned pajama bottoms with min A and education on use of reacher. Pt performed sit>stand SUP and donned pants over hips SUP. Pt requesting to slip on his shoes in stance despite shoes being lace up boots.  Pt educated on sitting to myrna boots and performed sitting on EOB with mod A for use of LH shoe horn. Pt performed sit>stand again SUP and performed funcitonal ambulation to simulated ambulation needed for IADL participation in home with RW and SUP. Pt returned to straight backed chair with SUP and educated on need for Sup for getting up and ambulating in room. Pt then requested to return to bed and performed with SUP and education on log roll technique. Pt doffed shoes by kicking off MI. Pt set up with all needs in reach at end of session. Progression toward goals:  [x]          Improving appropriately and progressing toward goals  []          Improving slowly and progressing toward goals  []          Not making progress toward goals and plan of care will be adjusted     PLAN:  Patient continues to benefit from skilled intervention to address the above impairments. Continue treatment per established plan of care. Further Equipment Recommendations for Discharge:  transfer bench, walker, and raised commode    AMPAC:   Current research shows that an AM-PAC score of 17 or less is not associated with a discharge to the patient's home setting. Based on an AM-PAC score of 17/24 and their current ADL deficits; it is recommended that the patient have 3-5 sessions per week of Occupational Therapy at d/c to increase the patient's independence. This AMPAC score should be considered in conjunction with interdisciplinary team recommendations to determine the most appropriate discharge setting. Patient's social support, diagnosis, medical stability, and prior level of function should also be taken into consideration. SUBJECTIVE:   Patient stated I can just slip them on in standing.     OBJECTIVE DATA SUMMARY:   Cognitive/Behavioral Status:  Neurologic State: Alert  Orientation Level: Oriented X4  Cognition: Follows commands, Appropriate decision making, Appropriate for age attention/concentration  Safety/Judgement: Fall prevention, Home safety    Functional Mobility and Transfers for ADLs:   Bed Mobility: Supine to Sit: Supervision  Sit to Supine: Supervision      Transfers:  Sit to Stand: Supervision  Stand to Sit: Supervision   Balance:  Sitting: Intact  Standing: Impaired  Standing - Static: Good  Standing - Dynamic : Fair    ADL Intervention:     Upper Body Dressing Assistance  Pullover Shirt: Set-up; Supervision    Lower Body Dressing Assistance  Pants With Elastic Waist: Minimum assistance  Shoes with Cloth Laces: Moderate assistance  Cognitive Retraining  Safety/Judgement: Fall prevention;Home safety  Pain:  Pain level pre-treatment: 0/10   Pain level post-treatment: 0/10  Pain Intervention(s): Medication (see MAR); Rest, Ice, Repositioning   Response to intervention: Nurse notified, See doc flow    Activity Tolerance:    Fair to good   Please refer to the flowsheet for vital signs taken during this treatment. After treatment:   []  Patient left in no apparent distress sitting up in chair  [x]  Patient left in no apparent distress in bed  [x]  Call bell left within reach  [x]  Nursing notified  []  Caregiver present  []  Bed alarm activated    COMMUNICATION/EDUCATION:   [x] Role of Occupational Therapy in the acute care setting  [x] Home safety education was provided and the patient/caregiver indicated understanding. [x] Patient/family have participated as able in working towards goals and plan of care. [x] Patient/family agree to work toward stated goals and plan of care. [] Patient understands intent and goals of therapy, but is neutral about his/her participation. [] Patient is unable to participate in goal setting and plan of care.       Thank you for this referral.  Rayray Ybarra OT  Time Calculation: 27 mins    [facility] AM-PAC® Daily Activity Inpatient Short Form (6-Clicks)*    How much HELP from another person does the patient currently need    (If the patient hasn't done an activity recently, how much help from another person do you think he/she would need if he/she tried?)   Total (Total A or Dep)   A Lot  (Mod to Max A)   A Little (Sup or Min A)   None (Mod I to I)   Putting on and taking off regular lower body clothing? [] 1 [x] 2 [] 3 [] 4   2. Bathing (including washing, rinsing,      drying)? [] 1 [x] 2 [] 3 [] 4   3. Toileting, which includes using toilet, bedpan or urinal?   [] 1 [] 2 [x] 3 [] 4   4. Putting on and taking off regular upper body clothing? [] 1 [] 2 [x] 3 [] 4   5. Taking care of personal grooming such as brushing teeth? [] 1 [] 2 [x] 3 [] 4   6. Eating meals?    [] 1 [] 2 [] 3 [x] 4

## 2022-12-27 NOTE — PROGRESS NOTES
Pt's clinicals sent to [facility], [facility]. and [address] in [facility]. Informed Liam Villa of [address] that pt has IV antibiotic and wound vac needs    Called [facility] to see pt so we can sent updated notes to SNF for auth.             Venkat Anne, BSN RN  Care Management  Pager: [phone]

## 2022-12-27 NOTE — PROGRESS NOTES
Problem: Mobility Impaired (Adult and Pediatric)  Goal: *Acute Goals and Plan of Care (Insert Text)  Description: Physical Therapy Goals  Initiated 12/23/2022 and to be accomplished within 7 day(s)  1. Patient will move from supine to sit and sit to supine , scoot up and down, and roll side to side in bed with modified independence. 2.  Patient will transfer from bed to chair and chair to bed with modified independence using the least restrictive device. 3.  Patient will perform sit to stand with modified independence. 4.  Patient will ambulate with modified independence for 200 feet with the least restrictive device. 5.  Patient will ascend/descend 12 stairs with unilateral handrail(s) with modified independence. PLOF: Pt reporting living in 2 story house with 3 ANDREA with handrails, alone but has [de-identified]year old neighbor that is taking him home. Reports he did not change his socks/shower/go upstairs since discharge. Outcome: Progressing Towards Goal   PHYSICAL THERAPY TREATMENT    Patient: Valri Epley (84 y.o. male)  Date: 12/27/2022  Diagnosis: VIPIN (acute kidney injury) ([facility] [address] [N17.9]  Dehydration [E86.0] Fluid collection at surgical site  Procedure(s) (LRB):  INCISION AND DRAINAGE LUMBAR SPINE/ APPLICATION OF WOUND VAC (N/A) 4 Days Post-Op  Precautions: Fall, Spinal    ASSESSMENT:  Pt cleared to participate in PT session, pt received semi-reclined in bed and agreeable to therapy session. Completing with OT to maximize safety and mobility. Supine to sit with use of bedrails and supervision. Good sitting balance at EOB, assisted with donning pants. Pt reporting has been toileting with BSC independently. Pt then standing without AD and SBA, able to take several steps with CGA and no AD, given AD for improved stability. Pt ambulating x200 feet with slow gait speed and RW with SBA. Pt returning to bedside chair, then reporting he would like to go back to bed. Julia for sit to supine.  Pt positioned for comfort and educated to call for assist before getting up, pt verbalized understanding. Pt left with all needs met and call bell in reach. RN notified of position and participation. Progression toward goals:   []      Improving appropriately and progressing toward goals  [x]      Improving slowly and progressing toward goals  []      Not making progress toward goals and plan of care will be adjusted     PLAN:  Patient continues to benefit from skilled intervention to address the above impairments. Continue treatment per established plan of care. Further Equipment Recommendations for Discharge:  rolling walker    AMPAC: Current research shows that an AM-PAC score of 17 or less is not associated with a discharge to the patient's home setting. Based on an AM-PAC score of 17/24 and their current functional mobility deficits, it is recommended that the patient have 3-5 sessions per week of Physical Therapy at d/c to increase the patient's independence. This AMPAC score should be considered in conjunction with interdisciplinary team recommendations to determine the most appropriate discharge setting. Patient's social support, diagnosis, medical stability, and prior level of function should also be taken into consideration. SUBJECTIVE:   Patient stated I need my wound vac changed.     OBJECTIVE DATA SUMMARY:   Critical Behavior:  Neurologic State: Alert  Orientation Level: Oriented X4  Cognition: Follows commands, Appropriate decision making, Appropriate for age attention/concentration  Safety/Judgement: Fall prevention, Home safety  Functional Mobility Training:  Bed Mobility:     Supine to Sit: Supervision  Sit to Supine: Supervision    Transfers:  Sit to Stand: Supervision  Stand to Sit: Supervision    Balance:  Sitting: Intact  Standing: Impaired  Standing - Static: Good  Standing - Dynamic : Fair (+)      Posture:   Posture (WDL): Within defined limits     Ambulation/Gait Training:  Distance (ft): 200 Feet (ft)  Assistive Device: Walker, rolling  Ambulation - Level of Assistance: Stand-by assistance    Gait Abnormalities: Decreased step clearance    Base of Support: Narrowed     Speed/Bing: Slow  Step Length: Right shortened;Left shortened      Pain:  Pain level pre-treatment: 0/10  Pain level post-treatment: 0/10   FLACC     Activity Tolerance:   Improving     Please refer to the flowsheet for vital signs taken during this treatment. After treatment:   [] Patient left in no apparent distress sitting up in chair  [x] Patient left in no apparent distress in bed  [x] Call bell left within reach  [x] Nursing notified  [] Caregiver present  [] Bed alarm activated  [] SCDs applied      COMMUNICATION/EDUCATION:   [x]         Role of Physical Therapy in the acute care setting. [x]         Fall prevention education was provided and the patient/caregiver indicated understanding. [x]         Patient/family have participated as able in working toward goals and plan of care. [x]         Patient/family agree to work toward stated goals and plan of care. []         Patient understands intent and goals of therapy, but is neutral about his/her participation. []         Patient is unable to participate in stated goals/plan of care: ongoing with therapy staff.  []         Other:        Stacey Roman, PT   Time Calculation: 28 mins    [facility] AM-PAC® Basic Mobility Inpatient Short Form (6-Clicks) Version 2    How much HELP from another person does the patient currently need    (If the patient hasn't done an activity recently, how much help from another person do you think he/she would need if he/she tried?)   Total (Total A or Dep)   A Lot  (Mod to Max A)   A Little (Sup or Min A)   None (Mod I to I)   Turning from your back to your side while in a flat bed without using bedrails? [] 1 [] 2 [x] 3 [] 4   2. Moving from lying on your back to sitting on the side of a flat bed without using bedrails?     [] 1 [] 2 [x] 3 [] 4   3. Moving to and from a bed to a chair (including a wheelchair)? [] 1 [] 2 [x] 3 [] 4   4. Standing up from a chair using your arms (e.g., wheelchair, or bedside chair)? [] 1 [] 2 [x] 3 [] 4   5. Walking in hospital room? [] 1 [] 2 [x] 3 [] 4   6. Climbing 3-5 steps with a railing?+   [] 1 [x] 2 [] 3 [] 4   +If stair climbing cannot be assessed, skip item #6. Sum responses from items 1-5.

## 2022-12-27 NOTE — PROGRESS NOTES
Vss  Afeb  Neuro intact  Wound vac in place  PICC with long term abx per ID  Will need wound vac for home with management and home health

## 2022-12-27 NOTE — PROGRESS NOTES
[facility]  Progress Note    Patient: Jessica Clark Age: 57 y.o. : 1960 MR#: [id number] SSN: [id number]  Date/Time: 2022     C/C: Backache / incontinence       Subjective:   HPI : Patient had undergone recent laminectomy and fusion surgery in the lumbar area, now infection s/p exploration of lumbar wound -deep and superficial with wound VAC placement by spine surgery. Pain control is adequate. Currently on IV antibiotic per ID. Blood cultures grew Klebsiella pneumoniae 1 out of 2 bottle. Other significant history includes type 2 diabetes, hypertension, history of spinal stenosis and multiple disc prolapse secondary to his falls at work . Review of Systems: Patient lying in bed alert awake oriented no confusion,   positive responses in bold type   Constitutional: Negative for fever, chills, diaphoresis and unexpected weight change. HENT: Negative for ear pain, congestion, sore throat, rhinorrhea, drooling, trouble swallowing, neck pain and tinnitus. Eyes: Negative for photophobia, pain, redness and visual disturbance. Respiratory: negative for shortness of breath, cough, choking, chest tightness, wheezing or stridor. Cardiovascular: Negative for chest pain, palpitations and leg swelling. Gastrointestinal: Negative for nausea, vomiting, abdominal pain, diarrhea, constipation, blood in stool, abdominal distention and anal bleeding. Genitourinary: Negative for dysuria, urgency, frequency, hematuria, flank pain and difficulty urinating. Musculoskeletal: Negative for back pain and arthralgias. Skin: Negative for color change, rash and wound. Neurological: Negative for dizziness, seizures, syncope, speech difficulty, light-headedness or headaches. Hematological: Does not bruise/bleed easily.    Psychiatric/Behavioral: Negative for suicidal ideas, hallucinations, behavioral problems, self-injury or agitation     Assessment/Plan: 1.  Backache/postoperative back wound infection-on Vanco, Zosyn, Levaquin  2 gram-negative bacteremia-Klebsiella pneumoniae-as above  3 diabetes mellitus type 2  4 hypertension  5 morbid obesity-BMI 33.33    - plan   - Patient will need SNF for IV antibiotics   - PIC and IV antibiotics   - D/w patient and  in room with patient , detail dc planning discussed till patient's understanding     Objective:       General:  Alert, cooperative, no acute distress  HEENT: No facial asymmetry, RACH Dave, External ears - WNL    Cardiovascular: S1S2 - regular , No Murmur   Pulmonary: Equal expansion , No Use of accessory muscles , No Rales No Rhonchi    GI:  +BS in all four quadrants, soft, non-tender  Extremities:  No edema; 2+ dorsalis pedis pulses bilaterally  Neuro: Alert and oriented X 2.        DVT Prophylaxis:  []Lovenox  []Hep SQ  [x]SCDs  []Coumadin   []On Heparin gtt    [] Eliquis [] Xarelto     Vitals:         VS: Visit Vitals  /86 (BP 1 Location: Left upper arm, BP Patient Position: At rest)   Pulse 93   Temp 98.3 °F (36.8 °C)   Resp 18   Ht 5' 11\" (1.803 m)   Wt 108.9 kg (240 lb)   SpO2 98%   BMI 33.47 kg/m²      Tmax/24hrs: Temp (24hrs), Av.3 °F (36.8 °C), Min:98 °F (36.7 °C), Max:98.4 °F (36.9 °C)        Medications:   Current Facility-Administered Medications   Medication Dose Route Frequency    insulin lispro (HUMALOG) injection   SubCUTAneous AC&HS    cefTRIAXone (ROCEPHIN) 2 g in sterile water (preservative free) 20 mL IV syringe  2 g IntraVENous Q24H    oxyCODONE IR (ROXICODONE) tablet 5 mg  5 mg Oral Q4H PRN    flumazeniL (ROMAZICON) 0.1 mg/mL injection 0.2 mg  0.2 mg IntraVENous PRN    HYDROmorphone (DILAUDID) tablet 4 mg  4 mg Oral Q6H PRN    sodium chloride (NS) flush 5-40 mL  5-40 mL IntraVENous PRN    acetaminophen (TYLENOL) tablet 1,000 mg  1,000 mg Oral Q6H    HYDROmorphone (DILAUDID) injection 1 mg  1 mg IntraVENous Q4H PRN    phenol throat spray (CHLORASEPTIC) 1 Spray  1 Spray Oral PRN    benzocaine-menthoL (CEPACOL) lozenge 1 Lozenge  1 Lozenge Oral PRN    diphenhydrAMINE (BENADRYL) injection 12.5 mg  12.5 mg IntraVENous Q4H PRN    famotidine (PEPCID) tablet 40 mg  40 mg Oral Q12H    diazePAM (VALIUM) tablet 5 mg  5 mg Oral Q6H PRN    docusate sodium (COLACE) capsule 100 mg  100 mg Oral BID    LORazepam (ATIVAN) injection 1 mg  1 mg IntraVENous Q6H PRN    naloxone (NARCAN) injection 0.4 mg  0.4 mg IntraVENous EVERY 2 MINUTES AS NEEDED    sodium chloride (NS) flush 5-40 mL  5-40 mL IntraVENous Q8H    acetaminophen (TYLENOL) tablet 650 mg  650 mg Oral Q6H PRN    Or    acetaminophen (TYLENOL) suppository 650 mg  650 mg Rectal Q6H PRN    polyethylene glycol (MIRALAX) packet 17 g  17 g Oral DAILY PRN    ondansetron (ZOFRAN ODT) tablet 4 mg  4 mg Oral Q8H PRN    Or    ondansetron (ZOFRAN) injection 4 mg  4 mg IntraVENous Q6H PRN    melatonin tablet 5 mg  5 mg Oral QHS PRN    albuterol-ipratropium (DUO-NEB) 2.5 MG-0.5 MG/3 ML  3 mL Nebulization Q6H PRN    0.9% sodium chloride infusion  125 mL/hr IntraVENous CONTINUOUS    glucose chewable tablet 16 g  4 Tablet Oral PRN    glucagon (GLUCAGEN) injection 1 mg  1 mg IntraMUSCular PRN    dextrose 10% infusion 0-250 mL  0-250 mL IntraVENous PRN    cholecalciferol (VITAMIN D3) (1000 Units /25 mcg) tablet 2,000 Units  2,000 Units Oral DAILY    gabapentin (NEURONTIN) capsule 600 mg  600 mg Oral TID    therapeutic multivitamin (THERAGRAN) tablet 1 Tablet  1 Tablet Oral DAILY    atorvastatin (LIPITOR) tablet 20 mg  20 mg Oral QHS       Labs:    Recent Labs     12/26/22  0439 12/25/22  0919   WBC 8.3 5.5   HGB 8.8* 7.7*   HCT 27.2* 24.2*    363     Recent Labs     12/27/22  0130 12/26/22  0439 12/25/22  0919    138 139   K 3.8 4.0 4.3    105 106   CO2 26 26 27   * 118* 172*   BUN 15 13 16   CREA 0.94 0.93 1.06   CA 8.9 9.4 8.9         Time spent on direct patient care >30 mints     Complexity : High complex - due to multiple medical issues outlined above. CODE Status : Full CODE     Case discussed with:  [x]Patient  [] Family  []Nursing  []Case Management        Disclaimer: Sections of this note are dictated utilizing voice recognition software, which may have resulted in some phonetic based errors in grammar and contents. Even though attempts were made to correct all the mistakes, some may have been missed, and remained in the body of the document. If questions arise, please contact our department.     Signed By: Micaela Champagne MD     December 27, 2022

## 2022-12-27 NOTE — PROGRESS NOTES
Reason for Admission:  VIPIN (acute kidney injury) ([facility] [address] [N17.9]  Dehydration [E86.0]                 RUR Score:    12            Plan for utilizing home health:    no                       Likelihood of Readmission:   LOW                         Transition of Care Plan:              Initial assessment completed with parent. Cognitive status of patient: oriented to time, place, person and situation. Face sheet information confirmed:  yes. The patient designates his sister Pallavi Booth and friends Arpit Casas and Jennifer White to participate in his discharge plan and to receive any needed information. This patient lives in a 2 story home with patient. Patient is not able to navigate steps as needed. Per pt, he stays downstairs . Prior to hospitalization, patient was considered to be independent with ADLs/IADLS : yes . Patient has a current ACP document on file: no      Healthcare Decision Maker:     Click here to complete [address] including selection of the Healthcare Decision Maker Relationship (ie \"Primary\")    The friend will be available to transport patient home upon discharge. The patient already has [facility], [facility] equipment available in the home. Patient is not currently active with home health. Patient has not stayed in a skilled nursing facility or rehab. Was  stay within last 60 days : no. This patient is on dialysis :no    List of available SNF agencies were provided and reviewed with the patient prior to discharge. Freedom of choice signed: yes, for SNF in [facility]. Currently, the discharge plan is SNF. The patient states that he can obtain his medications from the pharmacy, and take his medications as directed. Patient's current insurance is [employer] [facility]       Care Management Interventions  PCP Verified by CM: Yes  Palliative Care Criteria Met (RRAT>21 & CHF Dx)?: No  Mode of Transport at Discharge:  Other (see comment)  Transition of Care Consult (CM Consult): Discharge Planning  Physical Therapy Consult: Yes  Occupational Therapy Consult: Yes  Support Systems: Other Family Member(s), Friend/Neighbor  The Patient and/or Patient Representative was Provided with a Choice of Provider and Agrees with the Discharge Plan?: Yes  Freedom of Choice List was Provided with Basic Dialogue that Supports the Patient's Individualized Plan of Care/Goals, Treatment Preferences and Shares the Quality Data Associated with the Providers?: Yes  Discharge Location  Patient Expects to be Discharged to[de-identified] Skilled nursing facility        BRENDAN Cowart RN  Care Management  Pager: [phone]

## 2022-12-28 LAB
ANION GAP SERPL CALC-SCNC: 5 MMOL/L (ref 3–18)
BUN SERPL-MCNC: 15 MG/DL (ref 7–18)
BUN/CREAT SERPL: 17 (ref 12–20)
CALCIUM SERPL-MCNC: 9.7 MG/DL (ref 8.5–10.1)
CHLORIDE SERPL-SCNC: 105 MMOL/L (ref 100–111)
CO2 SERPL-SCNC: 27 MMOL/L (ref 21–32)
CREAT SERPL-MCNC: 0.9 MG/DL (ref 0.6–1.3)
GLUCOSE BLD STRIP.AUTO-MCNC: 126 MG/DL (ref 70–110)
GLUCOSE BLD STRIP.AUTO-MCNC: 174 MG/DL (ref 70–110)
GLUCOSE BLD STRIP.AUTO-MCNC: 188 MG/DL (ref 70–110)
GLUCOSE BLD STRIP.AUTO-MCNC: 195 MG/DL (ref 70–110)
GLUCOSE SERPL-MCNC: 122 MG/DL (ref 74–99)
POTASSIUM SERPL-SCNC: 3.7 MMOL/L (ref 3.5–5.5)
SODIUM SERPL-SCNC: 137 MMOL/L (ref 136–145)

## 2022-12-28 PROCEDURE — 74011250636 HC RX REV CODE- 250/636: Performed by: ORTHOPAEDIC SURGERY

## 2022-12-28 PROCEDURE — 74011000250 HC RX REV CODE- 250: Performed by: INTERNAL MEDICINE

## 2022-12-28 PROCEDURE — 36415 COLL VENOUS BLD VENIPUNCTURE: CPT

## 2022-12-28 PROCEDURE — 80048 BASIC METABOLIC PNL TOTAL CA: CPT

## 2022-12-28 PROCEDURE — 77030019934 HC DRSG VAC ASST KCON -B

## 2022-12-28 PROCEDURE — 74011250637 HC RX REV CODE- 250/637: Performed by: ORTHOPAEDIC SURGERY

## 2022-12-28 PROCEDURE — 65270000046 HC RM TELEMETRY

## 2022-12-28 PROCEDURE — 97606 NEG PRS WND THER DME>50 SQCM: CPT

## 2022-12-28 PROCEDURE — 74011250637 HC RX REV CODE- 250/637: Performed by: INTERNAL MEDICINE

## 2022-12-28 PROCEDURE — 74011000250 HC RX REV CODE- 250: Performed by: ORTHOPAEDIC SURGERY

## 2022-12-28 PROCEDURE — 74011250636 HC RX REV CODE- 250/636: Performed by: INTERNAL MEDICINE

## 2022-12-28 PROCEDURE — 99232 SBSQ HOSP IP/OBS MODERATE 35: CPT | Performed by: INTERNAL MEDICINE

## 2022-12-28 PROCEDURE — 2709999900 HC NON-CHARGEABLE SUPPLY

## 2022-12-28 PROCEDURE — 74011636637 HC RX REV CODE- 636/637: Performed by: INTERNAL MEDICINE

## 2022-12-28 PROCEDURE — 82962 GLUCOSE BLOOD TEST: CPT

## 2022-12-28 RX ORDER — TRAMADOL HYDROCHLORIDE 50 MG/1
50 TABLET ORAL
Status: DISCONTINUED | OUTPATIENT
Start: 2022-12-28 | End: 2022-12-29

## 2022-12-28 RX ADMIN — FAMOTIDINE 40 MG: 20 TABLET ORAL at 09:09

## 2022-12-28 RX ADMIN — CHOLECALCIFEROL TAB 25 MCG (1000 UNIT) 2000 UNITS: 25 TAB at 09:09

## 2022-12-28 RX ADMIN — ATORVASTATIN CALCIUM 20 MG: 20 TABLET, FILM COATED ORAL at 21:51

## 2022-12-28 RX ADMIN — ACETAMINOPHEN 1000 MG: 500 TABLET ORAL at 06:24

## 2022-12-28 RX ADMIN — Medication 2 UNITS: at 17:32

## 2022-12-28 RX ADMIN — TRAMADOL HYDROCHLORIDE 50 MG: 50 TABLET ORAL at 15:05

## 2022-12-28 RX ADMIN — GABAPENTIN 600 MG: 300 CAPSULE ORAL at 17:31

## 2022-12-28 RX ADMIN — GABAPENTIN 600 MG: 300 CAPSULE ORAL at 09:09

## 2022-12-28 RX ADMIN — SODIUM CHLORIDE, PRESERVATIVE FREE 10 ML: 5 INJECTION INTRAVENOUS at 15:00

## 2022-12-28 RX ADMIN — THERA TABS 1 TABLET: TAB at 09:09

## 2022-12-28 RX ADMIN — DIAZEPAM 5 MG: 5 TABLET ORAL at 09:44

## 2022-12-28 RX ADMIN — ACETAMINOPHEN 1000 MG: 500 TABLET ORAL at 11:57

## 2022-12-28 RX ADMIN — Medication 2 UNITS: at 11:57

## 2022-12-28 RX ADMIN — DOCUSATE SODIUM 100 MG: 100 CAPSULE, LIQUID FILLED ORAL at 17:31

## 2022-12-28 RX ADMIN — DOCUSATE SODIUM 100 MG: 100 CAPSULE, LIQUID FILLED ORAL at 09:09

## 2022-12-28 RX ADMIN — HYDROMORPHONE HYDROCHLORIDE 4 MG: 2 TABLET ORAL at 06:24

## 2022-12-28 RX ADMIN — ACETAMINOPHEN 1000 MG: 500 TABLET ORAL at 17:31

## 2022-12-28 RX ADMIN — Medication 2 UNITS: at 23:13

## 2022-12-28 RX ADMIN — FAMOTIDINE 40 MG: 20 TABLET ORAL at 21:51

## 2022-12-28 RX ADMIN — Medication 5 MG: at 21:51

## 2022-12-28 RX ADMIN — SODIUM CHLORIDE, PRESERVATIVE FREE 40 ML: 5 INJECTION INTRAVENOUS at 23:14

## 2022-12-28 RX ADMIN — HYDROMORPHONE HYDROCHLORIDE 1 MG: 1 INJECTION, SOLUTION INTRAMUSCULAR; INTRAVENOUS; SUBCUTANEOUS at 09:44

## 2022-12-28 RX ADMIN — SODIUM CHLORIDE, PRESERVATIVE FREE 10 ML: 5 INJECTION INTRAVENOUS at 16:30

## 2022-12-28 RX ADMIN — GABAPENTIN 600 MG: 300 CAPSULE ORAL at 21:51

## 2022-12-28 RX ADMIN — TRAMADOL HYDROCHLORIDE 50 MG: 50 TABLET ORAL at 21:55

## 2022-12-28 RX ADMIN — WATER 2 G: 1 INJECTION INTRAMUSCULAR; INTRAVENOUS; SUBCUTANEOUS at 09:11

## 2022-12-28 NOTE — WOUND CARE
Physical Exam  Room 216: focused wound assess    Midline spine, open surgical incision, 13x4. 3x9cm, (+) bone exposure, base & sides have beefy red tissue along with yellow slough,  Cleansed with wound spray, base protected with adaptic nonadherent, black granufoam packing, bridged to right flank with drape on skin for protection from suction. Serous blister right flank. Bridge foam removed from right flank. Education provided to pt on plan of care & wound healing progress. Orders are on the chart for dressing changes. Will turn over care to nursing staff at this time.   Cammie CROOKN, RN, [facility], [address]

## 2022-12-28 NOTE — PROGRESS NOTES
PRANAV spoke with Francia Foster with [employer] and she informed she will start auth process.     CAROL Jones, HP

## 2022-12-28 NOTE — PROGRESS NOTES
[facility]  Progress Note    Patient: Villa Burciaga Age: 57 y.o. : 1960 MR#: [id number] SSN: [id number]  Date/Time: 2022     C/C: Backache / incontinence       Subjective:   HPI : Patient had undergone recent laminectomy and fusion surgery in the lumbar area, now infection s/p exploration of lumbar wound -deep and superficial with wound VAC placement by spine surgery. Pain control is adequate. Currently on IV antibiotic per ID. Blood cultures grew Klebsiella pneumoniae 1 out of 2 bottle. Other significant history includes type 2 diabetes, hypertension, history of spinal stenosis and multiple disc prolapse secondary to his falls at work . Review of Systems: Patient lying in bed alert awake oriented no confusion,   positive responses in bold type   Constitutional: Negative for fever, chills, diaphoresis and unexpected weight change. HENT: Negative for ear pain, congestion, sore throat, rhinorrhea, drooling, trouble swallowing, neck pain and tinnitus. Eyes: Negative for photophobia, pain, redness and visual disturbance. Respiratory: negative for shortness of breath, cough, choking, chest tightness, wheezing or stridor. Cardiovascular: Negative for chest pain, palpitations and leg swelling. Gastrointestinal: Negative for nausea, vomiting, abdominal pain, diarrhea, constipation, blood in stool, abdominal distention and anal bleeding. Genitourinary: Negative for dysuria, urgency, frequency, hematuria, flank pain and difficulty urinating. Musculoskeletal: Negative for back pain and arthralgias. Skin: Negative for color change, rash and wound. Neurological: Negative for dizziness, seizures, syncope, speech difficulty, light-headedness or headaches. Hematological: Does not bruise/bleed easily.    Psychiatric/Behavioral: Negative for suicidal ideas, hallucinations, behavioral problems, self-injury or agitation     Assessment/Plan: 1.  Backache/postoperative back wound infection-on Vanco, Zosyn, Levaquin  2 gram-negative bacteremia-Klebsiella pneumoniae-as above  3 diabetes mellitus type 2  4 hypertension  5 morbid obesity-BMI 33.33     plan   - Prolong IV antibiotics - ID rec   - patient will need SNF for this   -  working with Patient     Objective:       General:  Alert, cooperative, no acute distress  HEENT: No facial asymmetry, RACH Dave, External ears - WNL    Cardiovascular: S1S2 - regular , No Murmur   Pulmonary: Equal expansion , No Use of accessory muscles , No Rales No Rhonchi    GI:  +BS in all four quadrants, soft, non-tender  Extremities:  No edema; 2+ dorsalis pedis pulses bilaterally  Neuro: Alert and oriented X 2.        DVT Prophylaxis:  []Lovenox  []Hep SQ  [x]SCDs  []Coumadin   []On Heparin gtt    [] Eliquis [] Xarelto     Vitals:         VS: Visit Vitals  BP (!) 130/91 (BP 1 Location: Left upper arm, BP Patient Position: Lying)   Pulse 91   Temp 97.2 °F (36.2 °C)   Resp 16   Ht 5' 11\" (1.803 m)   Wt 108 kg (238 lb)   SpO2 99%   BMI 33.19 kg/m²      Tmax/24hrs: Temp (24hrs), Av.6 °F (36.4 °C), Min:97.2 °F (36.2 °C), Max:98.8 °F (37.1 °C)        Medications:   Current Facility-Administered Medications   Medication Dose Route Frequency    traMADoL (ULTRAM) tablet 50 mg  50 mg Oral Q6H PRN    insulin lispro (HUMALOG) injection   SubCUTAneous AC&HS    cefTRIAXone (ROCEPHIN) 2 g in sterile water (preservative free) 20 mL IV syringe  2 g IntraVENous Q24H    flumazeniL (ROMAZICON) 0.1 mg/mL injection 0.2 mg  0.2 mg IntraVENous PRN    sodium chloride (NS) flush 5-40 mL  5-40 mL IntraVENous PRN    acetaminophen (TYLENOL) tablet 1,000 mg  1,000 mg Oral Q6H    HYDROmorphone (DILAUDID) injection 1 mg  1 mg IntraVENous Q4H PRN    phenol throat spray (CHLORASEPTIC) 1 Spray  1 Spray Oral PRN    benzocaine-menthoL (CEPACOL) lozenge 1 Lozenge  1 Lozenge Oral PRN    diphenhydrAMINE (BENADRYL) injection 12.5 mg  12.5 mg IntraVENous Q4H PRN    famotidine (PEPCID) tablet 40 mg  40 mg Oral Q12H    diazePAM (VALIUM) tablet 5 mg  5 mg Oral Q6H PRN    docusate sodium (COLACE) capsule 100 mg  100 mg Oral BID    naloxone (NARCAN) injection 0.4 mg  0.4 mg IntraVENous EVERY 2 MINUTES AS NEEDED    sodium chloride (NS) flush 5-40 mL  5-40 mL IntraVENous Q8H    acetaminophen (TYLENOL) tablet 650 mg  650 mg Oral Q6H PRN    Or    acetaminophen (TYLENOL) suppository 650 mg  650 mg Rectal Q6H PRN    polyethylene glycol (MIRALAX) packet 17 g  17 g Oral DAILY PRN    ondansetron (ZOFRAN ODT) tablet 4 mg  4 mg Oral Q8H PRN    Or    ondansetron (ZOFRAN) injection 4 mg  4 mg IntraVENous Q6H PRN    melatonin tablet 5 mg  5 mg Oral QHS PRN    albuterol-ipratropium (DUO-NEB) 2.5 MG-0.5 MG/3 ML  3 mL Nebulization Q6H PRN    glucose chewable tablet 16 g  4 Tablet Oral PRN    glucagon (GLUCAGEN) injection 1 mg  1 mg IntraMUSCular PRN    dextrose 10% infusion 0-250 mL  0-250 mL IntraVENous PRN    cholecalciferol (VITAMIN D3) (1000 Units /25 mcg) tablet 2,000 Units  2,000 Units Oral DAILY    gabapentin (NEURONTIN) capsule 600 mg  600 mg Oral TID    therapeutic multivitamin (THERAGRAN) tablet 1 Tablet  1 Tablet Oral DAILY    atorvastatin (LIPITOR) tablet 20 mg  20 mg Oral QHS       Labs:    Recent Labs     12/26/22  0439   WBC 8.3   HGB 8.8*   HCT 27.2*        Recent Labs     12/28/22  0438 12/27/22  0130 12/26/22  0439    138 138   K 3.7 3.8 4.0    105 105   CO2 27 26 26   * 198* 118*   BUN 15 15 13   CREA 0.90 0.94 0.93   CA 9.7 8.9 9.4         Time spent on direct patient care >30 mints     Complexity : High complex - due to multiple medical issues outlined above.      CODE Status : Full CODE     Case discussed with:  [x]Patient  [] Family  []Nursing  []Case Management        Disclaimer: Sections of this note are dictated utilizing voice recognition software, which may have resulted in some phonetic based errors in grammar and contents. Even though attempts were made to correct all the mistakes, some may have been missed, and remained in the body of the document. If questions arise, please contact our department.     Signed By: Jeanne Green MD     December 28, 2022

## 2022-12-28 NOTE — PROGRESS NOTES
Received report on pt.from off going RN. Resting quietly in bed on rounds. Denies c/o pain or SOB at this time. Wound Vac in place to sacral area. No leak present. Call bell at side. No acute distress noted. Will cont to monitor for any changes in status. 1114 medicated for pain in lower back open surgical wound with wound vac in place. Medicated with oral dilaudid. 1300 sleeping w/o noted distress. 1500 attempted to do wound vac dressing change to open surgical wound lower back area. Black foam dressing completely stuck in open wound area. Foam dressing was saturated with wound cleanser alternating with sterile normal saline numerous times over a period of 30 mins and still unable to remove foam dressing without significant pulling. Pt tolerated procedure poorly with any attempted gentle attempts at removing packing. attempted to contact wound care nurse for assistance but she was already off duty. Foam packing left in place and a new outer clear dressing cover reapplied with  new sponge bridging to vac placed  and canister changed. Will leave a message for needed assistance from wound  care nurse to see pt in am. No bleeding or notable drainage noted from open site . Very Small amt of serosanguinous drainage noted in tubing of wound vac.     1800 wound vac remains on with no leaks present. Very small amt of serosanguinous drainage in wound vac tubing and canister. Pt lying in bed watching TV.     2025 Verbal shift change report given to [address] [place] [address] (oncoming nurse) by Yimi Scherer RN (offgoing nurse). Report given with JESSICA, Rogelio and MAR.

## 2022-12-28 NOTE — PROGRESS NOTES
PT attempt x2. Patient declines OOB mobility today 2/2 increased pain following dressing change. Educated on the importance of mobility. Will continue to follow.      Mary Jane Raines PT, DPT

## 2022-12-29 LAB
ANION GAP SERPL CALC-SCNC: 4 MMOL/L (ref 3–18)
BUN SERPL-MCNC: 17 MG/DL (ref 7–18)
BUN/CREAT SERPL: 17 (ref 12–20)
CALCIUM SERPL-MCNC: 9.5 MG/DL (ref 8.5–10.1)
CHLORIDE SERPL-SCNC: 102 MMOL/L (ref 100–111)
CO2 SERPL-SCNC: 28 MMOL/L (ref 21–32)
CREAT SERPL-MCNC: 1 MG/DL (ref 0.6–1.3)
GLUCOSE BLD STRIP.AUTO-MCNC: 145 MG/DL (ref 70–110)
GLUCOSE BLD STRIP.AUTO-MCNC: 199 MG/DL (ref 70–110)
GLUCOSE BLD STRIP.AUTO-MCNC: 228 MG/DL (ref 70–110)
GLUCOSE SERPL-MCNC: 195 MG/DL (ref 74–99)
POTASSIUM SERPL-SCNC: 4 MMOL/L (ref 3.5–5.5)
SODIUM SERPL-SCNC: 134 MMOL/L (ref 136–145)

## 2022-12-29 PROCEDURE — 82962 GLUCOSE BLOOD TEST: CPT

## 2022-12-29 PROCEDURE — 74011250636 HC RX REV CODE- 250/636: Performed by: ORTHOPAEDIC SURGERY

## 2022-12-29 PROCEDURE — 99232 SBSQ HOSP IP/OBS MODERATE 35: CPT | Performed by: FAMILY MEDICINE

## 2022-12-29 PROCEDURE — 74011636637 HC RX REV CODE- 636/637: Performed by: INTERNAL MEDICINE

## 2022-12-29 PROCEDURE — 74011250637 HC RX REV CODE- 250/637: Performed by: ORTHOPAEDIC SURGERY

## 2022-12-29 PROCEDURE — 74011250637 HC RX REV CODE- 250/637: Performed by: INTERNAL MEDICINE

## 2022-12-29 PROCEDURE — 97535 SELF CARE MNGMENT TRAINING: CPT

## 2022-12-29 PROCEDURE — 97116 GAIT TRAINING THERAPY: CPT

## 2022-12-29 PROCEDURE — 74011000250 HC RX REV CODE- 250: Performed by: INTERNAL MEDICINE

## 2022-12-29 PROCEDURE — 74011000250 HC RX REV CODE- 250: Performed by: ORTHOPAEDIC SURGERY

## 2022-12-29 PROCEDURE — 97164 PT RE-EVAL EST PLAN CARE: CPT

## 2022-12-29 PROCEDURE — 36415 COLL VENOUS BLD VENIPUNCTURE: CPT

## 2022-12-29 PROCEDURE — 65270000046 HC RM TELEMETRY

## 2022-12-29 PROCEDURE — 74011250637 HC RX REV CODE- 250/637: Performed by: FAMILY MEDICINE

## 2022-12-29 PROCEDURE — 74011250636 HC RX REV CODE- 250/636: Performed by: INTERNAL MEDICINE

## 2022-12-29 PROCEDURE — 80048 BASIC METABOLIC PNL TOTAL CA: CPT

## 2022-12-29 RX ORDER — CYCLOBENZAPRINE HCL 10 MG
10 TABLET ORAL
Status: DISCONTINUED | OUTPATIENT
Start: 2022-12-29 | End: 2023-01-13 | Stop reason: HOSPADM

## 2022-12-29 RX ORDER — POLYETHYLENE GLYCOL 3350 17 G/17G
17 POWDER, FOR SOLUTION ORAL DAILY
Status: DISCONTINUED | OUTPATIENT
Start: 2022-12-30 | End: 2023-01-13 | Stop reason: HOSPADM

## 2022-12-29 RX ORDER — OXYCODONE HYDROCHLORIDE 5 MG/1
5-10 TABLET ORAL
Status: DISCONTINUED | OUTPATIENT
Start: 2022-12-29 | End: 2023-01-06

## 2022-12-29 RX ADMIN — SODIUM CHLORIDE, PRESERVATIVE FREE 10 ML: 5 INJECTION INTRAVENOUS at 14:26

## 2022-12-29 RX ADMIN — Medication 6 UNITS: at 22:03

## 2022-12-29 RX ADMIN — TRAMADOL HYDROCHLORIDE 50 MG: 50 TABLET ORAL at 05:21

## 2022-12-29 RX ADMIN — FAMOTIDINE 40 MG: 20 TABLET ORAL at 08:38

## 2022-12-29 RX ADMIN — HYDROMORPHONE HYDROCHLORIDE 1 MG: 1 INJECTION, SOLUTION INTRAMUSCULAR; INTRAVENOUS; SUBCUTANEOUS at 08:37

## 2022-12-29 RX ADMIN — SODIUM CHLORIDE, PRESERVATIVE FREE 10 ML: 5 INJECTION INTRAVENOUS at 22:06

## 2022-12-29 RX ADMIN — ACETAMINOPHEN 1000 MG: 500 TABLET ORAL at 00:58

## 2022-12-29 RX ADMIN — ONDANSETRON 4 MG: 4 TABLET, ORALLY DISINTEGRATING ORAL at 12:54

## 2022-12-29 RX ADMIN — DIAZEPAM 5 MG: 5 TABLET ORAL at 14:10

## 2022-12-29 RX ADMIN — OXYCODONE HYDROCHLORIDE 10 MG: 5 TABLET ORAL at 12:54

## 2022-12-29 RX ADMIN — DOCUSATE SODIUM 100 MG: 100 CAPSULE, LIQUID FILLED ORAL at 08:38

## 2022-12-29 RX ADMIN — ATORVASTATIN CALCIUM 20 MG: 20 TABLET, FILM COATED ORAL at 22:00

## 2022-12-29 RX ADMIN — THERA TABS 1 TABLET: TAB at 08:38

## 2022-12-29 RX ADMIN — Medication 2 UNITS: at 12:00

## 2022-12-29 RX ADMIN — GABAPENTIN 600 MG: 300 CAPSULE ORAL at 17:17

## 2022-12-29 RX ADMIN — ACETAMINOPHEN 1000 MG: 500 TABLET ORAL at 17:16

## 2022-12-29 RX ADMIN — FAMOTIDINE 40 MG: 20 TABLET ORAL at 22:00

## 2022-12-29 RX ADMIN — GABAPENTIN 600 MG: 300 CAPSULE ORAL at 08:38

## 2022-12-29 RX ADMIN — HYDROMORPHONE HYDROCHLORIDE 1 MG: 1 INJECTION, SOLUTION INTRAMUSCULAR; INTRAVENOUS; SUBCUTANEOUS at 22:15

## 2022-12-29 RX ADMIN — GABAPENTIN 600 MG: 300 CAPSULE ORAL at 22:00

## 2022-12-29 RX ADMIN — WATER 2 G: 1 INJECTION INTRAMUSCULAR; INTRAVENOUS; SUBCUTANEOUS at 08:37

## 2022-12-29 RX ADMIN — SODIUM CHLORIDE, PRESERVATIVE FREE 30 ML: 5 INJECTION INTRAVENOUS at 06:20

## 2022-12-29 RX ADMIN — HYDROMORPHONE HYDROCHLORIDE 1 MG: 1 INJECTION, SOLUTION INTRAMUSCULAR; INTRAVENOUS; SUBCUTANEOUS at 17:17

## 2022-12-29 RX ADMIN — DOCUSATE SODIUM 100 MG: 100 CAPSULE, LIQUID FILLED ORAL at 17:17

## 2022-12-29 RX ADMIN — CYCLOBENZAPRINE 10 MG: 10 TABLET, FILM COATED ORAL at 22:00

## 2022-12-29 RX ADMIN — CHOLECALCIFEROL TAB 25 MCG (1000 UNIT) 2000 UNITS: 25 TAB at 08:38

## 2022-12-29 NOTE — PROGRESS NOTES
[facility]  Progress Note    Patient: Valri Epley Age: 57 y.o. : 1960 MR#: [id number] SSN: [id number]  Date: 2022     Subjective/24-hour events:     Has had some complaints of pain this AM but o/w nothing acute. Afebrile ovenright. Assessment:   Postoperative L-spine wound infection status post exploration irrigation and debridement and VAC placement 2020. Klebsiella pneumoniae bacteremia  DM2  Anemia   Hypertension  hyponatremia  Obesity, BMI 33.19    Plan:   ABX per ID. Wound VAC per surgery/wound care services. Monitor sodium, H/H. Sugars in acceptable control overall - continue SSI and monitor. May consider addition of low-dose lantus depending on trends. Add analgesic PRN along with scheduled bowel regimen. PT/OT, mobilize as able/tolerated. Supportive care o/w - follow. Case discussed with:  [x]Patient  []Family  [x]Nursing  [x]Case Management  DVT Prophylaxis:  []Lovenox  []Hep SQ  []SCDs  []Coumadin   []On Heparin gtt    Objective:   VS: Visit Vitals  /81   Pulse 98   Temp 97.7 °F (36.5 °C)   Resp 17   Ht 5' 11\" (1.803 m)   Wt 108 kg (238 lb)   SpO2 96%   BMI 33.19 kg/m²      Tmax/24hrs: Temp (24hrs), Av °F (36.7 °C), Min:97.2 °F (36.2 °C), Max:98.4 °F (36.9 °C)    Intake/Output Summary (Last 24 hours) at 2022 1021  Last data filed at 2022 0912  Gross per 24 hour   Intake 0 ml   Output 500 ml   Net -500 ml       General:  In NAD. Nontoxic-appearing. Cardiovascular:  RRR. Pulmonary:  Lungs clear bilaterally. No accessory muscle use. GI:  Abdomen soft, NTTP. Extremities:  Warm, no edema or ischemia. Neuro:  Awake and alert. Moves extremities spontaneously.     Labs:    Recent Results (from the past 24 hour(s))   GLUCOSE, POC    Collection Time: 22 11:54 AM   Result Value Ref Range    Glucose (POC) 188 (H) 70 - 110 mg/dL   GLUCOSE, POC    Collection Time: 22  4:51 PM   Result Value Ref Range Glucose (POC) 195 (H) 70 - 110 mg/dL   GLUCOSE, POC    Collection Time: 12/28/22 11:08 PM   Result Value Ref Range    Glucose (POC) 174 (H) 70 - 069 mg/dL   METABOLIC PANEL, BASIC    Collection Time: 12/29/22  1:43 AM   Result Value Ref Range    Sodium 134 (L) 136 - 145 mmol/L    Potassium 4.0 3.5 - 5.5 mmol/L    Chloride 102 100 - 111 mmol/L    CO2 28 21 - 32 mmol/L    Anion gap 4 3.0 - 18 mmol/L    Glucose 195 (H) 74 - 99 mg/dL    BUN 17 7.0 - 18 MG/DL    Creatinine 1.00 0.6 - 1.3 MG/DL    BUN/Creatinine ratio 17 12 - 20      eGFR >60 >60 ml/min/1.73m2    Calcium 9.5 8.5 - 10.1 MG/DL       Signed By: Columba Lentz MD     December 29, 2022

## 2022-12-29 NOTE — PROGRESS NOTES
Problem: Mobility Impaired (Adult and Pediatric)  Goal: *Acute Goals and Plan of Care (Insert Text)  Description: Physical Therapy Goals  Initiated 12/23/2022, re-evaluated 12/29/22 and goals adjusted as needed and to be accomplished within 7 day(s)  1. Patient will move from supine to sit and sit to supine , scoot up and down, and roll side to side in bed with modified independence. 2.  Patient will transfer from bed to chair and chair to bed with modified independence using the least restrictive device. 3.  Patient will perform sit to stand with modified independence. 4.  Patient will ambulate with modified independence for 200 feet with the least restrictive device. 5.  Patient will ascend/descend 12 stairs with unilateral handrail(s) with modified independence. PLOF: Pt reporting living in 2 story house with 3 ANDREA with handrails, alone but has [de-identified]year old neighbor that is taking him home. Reports he did not change his socks/shower/go upstairs since discharge. 12/29/2022 1530 by Colan Gaucher, PT  Outcome: Progressing Towards Goal  PHYSICAL THERAPY RE-EVALUATION    Patient: Augustus De Leon (89 y.o. male)  Date: 12/29/2022  Primary Diagnosis: VIPIN (acute kidney injury) ([facility] [address] [N17.9]  Dehydration [E86.0]  Procedure(s) (LRB):  INCISION AND DRAINAGE LUMBAR SPINE/ APPLICATION OF WOUND VAC (N/A) 6 Days Post-Op   Precautions:  Fall, Spinal    ASSESSMENT :  Pt cleared to participate in PT session, pt received semi-reclined in bed and agreeable to therapy session. Based on the objective data described below, the patient presents with decreased endurance, decreased strength, decreased balance reactions, gait deviations, and decreased independence in functional mobility. Pt completing bed mobility with HOB elevated and use of bedrail with supervision. Good sitting balance. Pt continues to have wound vac in place.  Standing with supervision to RW, ambulating x400 feet in rod with slow gait speed and several standing rest breaks and supervision. Pt declined attempts at stair training this session. Pt's only full bath is on 2nd floor and pt lives alone with no support systems. Pt positioned for comfort and educated to call for assist before getting up, pt verbalized understanding. Pt left with all needs met and call bell in reach. RN notified of position and participation. Patient will benefit from skilled intervention to address the above impairments. Patient's rehabilitation potential is considered to be Fair  Factors which may influence rehabilitation potential include:   []         None noted  []         Mental ability/status  []         Medical condition  [x]         Home/family situation and support systems  []         Safety awareness  []         Pain tolerance/management  []         Other:      PLAN :  Recommendations and Planned Interventions:   [x]           Bed Mobility Training             []    Neuromuscular Re-Education  [x]           Transfer Training                   []    Orthotic/Prosthetic Training  [x]           Gait Training                          []    Modalities  [x]           Therapeutic Exercises           []    Edema Management/Control  [x]           Therapeutic Activities            [x]    Family Training/Education  [x]           Patient Education  []           Other (comment):    Frequency/Duration: Patient will be followed by physical therapy 1-2 times per day/4-7 days per week to address goals. Further Equipment Recommendations for Discharge: rolling walker and N/A    AMPAC: Current research shows that an AM-PAC score of 17 or less is not associated with a discharge to the patient's home setting. Based on an AM-PAC score of 17/24 and their current functional mobility deficits, it is recommended that the patient have 3-5 sessions per week of Physical Therapy at d/c to increase the patient's independence.      This AMPAC score should be considered in conjunction with interdisciplinary team recommendations to determine the most appropriate discharge setting. Patient's social support, diagnosis, medical stability, and prior level of function should also be taken into consideration. SUBJECTIVE:   Patient stated I want to do a little at a time.       REASON FOR RE-EVALUATION: Pt due for re-evaluation. Pt improving in mobility needing supervision and RW for safety. Pt would continue to benefit from skilled PT to continue to progress towards goals. OBJECTIVE DATA SUMMARY:     Past Medical History:   Diagnosis Date    Arthritis     Back pain     from previous back injury    Colon polyps     Diabetes ([facility] [address]     9949    Hernia, umbilical     Hyperlipidemia     Hypertension     Pneumonia      Past Surgical History:   Procedure Laterality Date    HX COLONOSCOPY  2014    polyps    HX HEENT      left lazy eye procedure during childhood    HX HEMORRHOIDECTOMY      HX HERNIA REPAIR      2021    HX ORTHOPAEDIC  01/09/2017    right knee    HX ORTHOPAEDIC  1985    torn cartilage knee    HX TONSILLECTOMY  1964     Barriers to Learning/Limitations: None  Compensate with: N/A  Home Situation:  Home Situation  Home Environment: Private residence  # Steps to Enter: 3  Rails to Enter: Yes  One/Two Story Residence: One story  Living Alone: Yes  Support Systems: Other Family Member(s), Friend/Neighbor  Patient Expects to be Discharged to[de-identified] Skilled nursing facility  Current DME Used/Available at Home: [employer], rollator  Tub or Shower Type: Tub/Shower combination (upstairs)  Critical Behavior:  Neurologic State: Alert  Orientation Level: Oriented X4  Cognition: Follows commands     Psychosocial  Patient Behaviors: Calm; Cooperative  Purposeful Interaction: Yes  Pt Identified Daily Priority: Clinical issues (comment)  Caritas Process: Nurture loving kindness;Enable nathan/hope  Caring Interventions: Reassure  Skin Condition/Temp: Dry     Skin Integrity: Incision (comment)  Skin Integumentary  Skin Color: Appropriate for ethnicity  Skin Condition/Temp: Dry  Skin Integrity: Incision (comment)     Strength:    Strength: Generally decreased, functional    Tone & Sensation:   Tone: Normal    Sensation: Intact    Range Of Motion:  AROM: Within functional limits (BLEs)    Posture:  Posture (WDL): Within defined limits     Functional Mobility:  Bed Mobility:     Supine to Sit: Supervision  Sit to Supine: Supervision  Scooting: Minimum assistance  Transfers:  Sit to Stand: Supervision  Stand to Sit: Supervision    Balance:   Sitting: Intact  Standing: Intact; With support  Standing - Static: Good  Standing - Dynamic : Good    Ambulation/Gait Training:  Distance (ft): 400 Feet (ft)  Assistive Device: Walker, rolling  Ambulation - Level of Assistance: Stand-by assistance    Gait Abnormalities: Decreased step clearance    Speed/Bing: Slow  Step Length: Right shortened;Left shortened    Pain:  Pain level pre-treatment: 0/10   Pain level post-treatment: 0/10   FLACC     Activity Tolerance:   Fair tolerance     Please refer to the flowsheet for vital signs taken during this treatment. After treatment:   []         Patient left in no apparent distress sitting up in chair  [x]         Patient left in no apparent distress in bed  [x]         Call bell left within reach  [x]         Nursing notified  []         Caregiver present  []         Bed alarm activated  []         SCDs applied    COMMUNICATION/EDUCATION:   [x]         Role of Physical Therapy in the acute care setting. [x]         Fall prevention education was provided and the patient/caregiver indicated understanding. [x]         Patient/family have participated as able in goal setting and plan of care. [x]         Patient/family agree to work toward stated goals and plan of care. []         Patient understands intent and goals of therapy, but is neutral about his/her participation.   []         Patient is unable to participate in goal setting/plan of care: ongoing with therapy staff.  []         Other: Thank you for this referral.  Juana Michael PT   Time Calculation: 15 mins        [address] [facility] [address] [address] AM-PAC® Basic Mobility Inpatient Short Form (6-Clicks) Version 2    How much HELP from another person does the patient currently need    (If the patient hasn't done an activity recently, how much help from another person do you think he/she would need if he/she tried?)   Total (Total A or Dep)   A Lot  (Mod to Max A)   A Little (Sup or Min A)   None (Mod I to I)   Turning from your back to your side while in a flat bed without using bedrails? [] 1 [] 2 [x] 3 [] 4   2. Moving from lying on your back to sitting on the side of a flat bed without using bedrails? [] 1 [] 2 [x] 3 [] 4   3. Moving to and from a bed to a chair (including a wheelchair)? [] 1 [] 2 [x] 3 [] 4   4. Standing up from a chair using your arms (e.g., wheelchair, or bedside chair)? [] 1 [] 2 [x] 3 [] 4   5. Walking in hospital room? [] 1 [] 2 [x] 3 [] 4   6. Climbing 3-5 steps with a railing?+   [] 1 [x] 2 [] 3 [] 4   +If stair climbing cannot be assessed, skip item #6. Sum responses from items 1-5.

## 2022-12-29 NOTE — PROGRESS NOTES
PT re-evaluation attempted. Pt reporting need to rest and for PT return later. 2nd attempt at [phone], pt requesting PT return in afternoon. Will continue to follow.      Thank you for this referral   Akilah Ho PT DPT

## 2022-12-29 NOTE — PROGRESS NOTES
Problem: Self Care Deficits Care Plan (Adult)  Goal: *Acute Goals and Plan of Care (Insert Text)  Description: Occupational Therapy Goals  Initiated 12/24/2022 within 7 day(s). 1.  Patient will perform grooming with supervision/set-up standing at the sink for > 4 min with Good balance. 2.  Patient will perform bathing with supervision/set-up using AE. 3.  Patient will perform lower body dressing with supervision/set-up using AE. 4.  Patient will perform toilet transfers with supervision/set-up. 5.  Patient will perform all aspects of toileting with supervision/set-up. 6.  Patient will participate in upper extremity therapeutic exercise/activities with supervision/set-up for 8 minutes to improve endurance and UB strength needed for ADLs    7. Patient will utilize energy conservation techniques during functional activities with verbal cues. 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 Outcome: Progressing Towards Goal   OCCUPATIONAL THERAPY TREATMENT    Patient: Brittany Fong (40 y.o. male)  Date: 12/29/2022  Diagnosis: VIPIN (acute kidney injury) ([facility] [address] [N17.9]  Dehydration [E86.0] Fluid collection at surgical site  Procedure(s) (LRB):  INCISION AND DRAINAGE LUMBAR SPINE/ APPLICATION OF WOUND VAC (N/A) 6 Days Post-Op  Precautions: Fall, Spinal    Chart, occupational therapy assessment, plan of care, and goals were reviewed. ASSESSMENT:  Pt requires max encouragement for minimal participation. Pt recall 3/3 lumbar precautions, although requires moderate vc's to comply. Reviewed importance of maintaining lumbar precautions w/ADLs and functional transfers/mobility to maximize outcome. Pt declines OOB to chair 2/2 c/o uncomfortable chairs available. Pt educated on importance of safety w/walker and functional mobility w/wound vac mgt. Pt left at EOB. Bhavana Ortiz, aware.   Progression toward goals:  [x]          Improving appropriately and progressing toward goals  []          Improving slowly and progressing toward goals  []          Not making progress toward goals and plan of care will be adjusted     PLAN:  Patient continues to benefit from skilled intervention to address the above impairments. Continue treatment per established plan of care. Further Equipment Recommendations for Discharge:  shower chair and rolling walker    AMPAC:   Current research shows that an AM-PAC score of 17 or less is not associated with a discharge to the patient's home setting. Based on an AM-PAC score of 17/24 and their current ADL deficits; it is recommended that the patient have 3-5 sessions per week of Occupational Therapy at d/c to increase the patient's independence. This AMPAC score should be considered in conjunction with interdisciplinary team recommendations to determine the most appropriate discharge setting. Patient's social support, diagnosis, medical stability, and prior level of function should also be taken into consideration. SUBJECTIVE:   Patient stated I've been dealing with back pain since 1991.     OBJECTIVE DATA SUMMARY:   Cognitive/Behavioral Status:  Neurologic State: Alert  Orientation Level: Oriented X4  Cognition: Poor safety awareness  Safety/Judgement: Fall prevention, Home safety    Functional Mobility and Transfers for ADLs:   Bed Mobility:  Supine to Sit: Supervision  Sit to Supine: Supervision  Scooting: Minimum assistance     Transfers:  Sit to Stand: Supervision   Bathroom Mobility: Supervision/set up     Balance:  Sitting: Intact  Standing: Intact; With support  Standing - Static: Good  Standing - Dynamic : Good    ADL Intervention:  Grooming  Position Performed: Standing  Washing Face: Modified independent  Washing Hands: Modified independent    Pain:  Pain level pre-treatment: 0/10   Pain level post-treatment: 0/10  Pt c/o pain at wound vac placement, although pain not rated. Activity Tolerance:    Fair    Please refer to the flowsheet for vital signs taken during this treatment. After treatment:   []  Patient left in no apparent distress sitting up in chair  [x]  Patient left in no apparent distress seated EOB  [x]  Call bell left within reach  []  Nursing notified  []  Caregiver present  []  Bed alarm activated    COMMUNICATION/EDUCATION:   [] Role of Occupational Therapy in the acute care setting  [] Home safety education was provided and the patient/caregiver indicated understanding. [] Patient/family have participated as able in working towards goals and plan of care. [x] Patient/family agree to work toward stated goals and plan of care. [] Patient understands intent and goals of therapy, but is neutral about his/her participation. [] Patient is unable to participate in goal setting and plan of care. Thank you for this referral.  SUDHAKAR Botello  Time Calculation: 23 mins    Naomi Thrasher AM-PAC® Daily Activity Inpatient Short Form (6-Clicks)*    How much HELP from another person does the patient currently need    (If the patient hasn't done an activity recently, how much help from another person do you think he/she would need if he/she tried?)   Total (Total A or Dep)   A Lot  (Mod to Max A)   A Little (Sup or Min A)   None (Mod I to I)   Putting on and taking off regular lower body clothing? [] 1 [x] 2 [] 3 [] 4   2. Bathing (including washing, rinsing,      drying)? [] 1 [] 2 [x] 3 [] 4   3. Toileting, which includes using toilet, bedpan or urinal?   [] 1 [] 2 [x] 3 [] 4   4. Putting on and taking off regular upper body clothing? [] 1 [] 2 [x] 3 [] 4   5. Taking care of personal grooming such as brushing teeth? [] 1 [] 2 [x] 3 [] 4   6. Eating meals?    [] 1 [] 2 [x] 3 [] 4

## 2022-12-29 NOTE — PROGRESS NOTES
Comprehensive Nutrition Assessment    Type and Reason for Visit: Initial, RD nutrition re-screen/LOS    Nutrition Recommendations/Plan:   Add oral nutrition supplement to optimize nutrition intake opportunity: Glucerna (each provides 220 kcal, 10g protein) BID (Chocolate)    Include 5-CHO choice on current diet d/t T2DM dx. Plan to add double protein per pt preference   Monitor PO intake, compliance of oral supplement, weight, labs, and plan of care during admission. Malnutrition Assessment:  Malnutrition Status: At risk for malnutrition (specify) (r/t varied PO intake PTA, advance age and wounds) (12/29/22 1301)      Nutrition History and Allergies:   Past medical history: arthritis, back pain, colon polyps, diabetes, hernia, HLD, HTN, pneumonia. NKFA. Weight history per chart review:  CBW: 12/28/22 : 108 kg (238 lb), 30 days: 11/18/22 : 108.9 kg (240 lb), > 1 year: 10/11/21 : 109.8 kg (242 lb). Weight stable x > 1 year. Nutrition Assessment:    Pt admitted for back pain; s/p spinal fusion on 12/07/2022 per MD note. Pt reported   fecal incontinence, urinary incontinence (occurred before back surgery as well), loss of appetite, weakness, diarrhea, and intermittent, chronic pressure with urination; fevers, chills, and diaphoresis over the last 3 days PTA. Visited pt in room, laying in bed, alert and able to communicate. Pt reported tolerating current diet with 100% PO intake-no additional oral supplements at this time. Pt requested double protein. Pt currently denies abdominal pain nor d/c/n/v. Per MD note, lumbar area infection s/p exploration of lumbar wound -deep and superficial with wound VAC placement. Pt receptive to include Glucerna-consumes PTA as well. Nutrition Related Findings:    Last BM 12/28. Output: 500mL (urine). Pertinent Medications: miralax, humalog, pepcid, zofran, vitamin D3, MVI, lipitor. Wound Type:  Wound vac, Multiple, Skin tears, Surgical incision     Current Nutrition Intake & Therapies:  Average Meal Intake: %  Average Supplement Intake: None ordered  ADULT DIET Regular    Anthropometric Measures:  Height: 5' 11\" (180.3 cm)  Ideal Body Weight (IBW): 172 lbs (78 kg)  Admission Body Weight: 238 lb 1.6 oz  Current Body Wt:  108 kg (238 lb 1.6 oz), 138.4 % IBW. Not specified  Current BMI (kg/m2): 33.2        Weight Adjustment: No adjustment  BMI Category: Obese class 1 (BMI 30.0-34. 9)    Estimated Daily Nutrient Needs:  Energy Requirements Based On: Formula (MSJ x 1.2-1.4)  Weight Used for Energy Requirements: Current  Energy (kcal/day): 6746-5538  Weight Used for Protein Requirements: Current (1.0-1.3)  Protein (g/day): 108-140  Method Used for Fluid Requirements: 1 ml/kcal  Fluid (ml/day): 5290-9807    Nutrition Diagnosis:   Increased nutrient needs related to acute injury/trauma, increased demand for energy/nutrients as evidenced by poor intake prior to admission, wounds    Nutrition Interventions:   Food and/or Nutrient Delivery: Continue current diet, Start oral nutrition supplement, Mineral supplement, Vitamin supplement  Nutrition Education/Counseling: Education not indicated, No recommendations at this time  Coordination of Nutrition Care: Continue to monitor while inpatient  Plan of Care discussed with: patient    Goals:     Goals: Meet at least 75% of estimated needs, by next RD assessment       Nutrition Monitoring and Evaluation:   Behavioral-Environmental Outcomes: None identified  Food/Nutrient Intake Outcomes: Diet advancement/tolerance, Food and nutrient intake, Supplement intake, Vitamin/mineral intake  Physical Signs/Symptoms Outcomes: Biochemical data, Meal time behavior, Nutrition focused physical findings, Weight, GI status    Discharge Planning:    Continue current diet, Continue oral nutrition supplement    Luci German MA, RDN, LD   Contact: [phone]

## 2022-12-30 LAB
ANION GAP SERPL CALC-SCNC: 6 MMOL/L (ref 3–18)
BACTERIA SPEC CULT: NORMAL
BASOPHILS # BLD: 0 K/UL (ref 0–0.1)
BASOPHILS NFR BLD: 0 % (ref 0–2)
BUN SERPL-MCNC: 18 MG/DL (ref 7–18)
BUN/CREAT SERPL: 18 (ref 12–20)
CALCIUM SERPL-MCNC: 9.8 MG/DL (ref 8.5–10.1)
CHLORIDE SERPL-SCNC: 101 MMOL/L (ref 100–111)
CO2 SERPL-SCNC: 29 MMOL/L (ref 21–32)
CREAT SERPL-MCNC: 1.01 MG/DL (ref 0.6–1.3)
DIFFERENTIAL METHOD BLD: ABNORMAL
EOSINOPHIL # BLD: 0.1 K/UL (ref 0–0.4)
EOSINOPHIL NFR BLD: 1 % (ref 0–5)
ERYTHROCYTE [DISTWIDTH] IN BLOOD BY AUTOMATED COUNT: 13.7 % (ref 11.6–14.5)
GLUCOSE BLD STRIP.AUTO-MCNC: 132 MG/DL (ref 70–110)
GLUCOSE BLD STRIP.AUTO-MCNC: 146 MG/DL (ref 70–110)
GLUCOSE BLD STRIP.AUTO-MCNC: 152 MG/DL (ref 70–110)
GLUCOSE BLD STRIP.AUTO-MCNC: 187 MG/DL (ref 70–110)
GLUCOSE SERPL-MCNC: 107 MG/DL (ref 74–99)
HCT VFR BLD AUTO: 29.7 % (ref 36–48)
HGB BLD-MCNC: 9.3 G/DL (ref 13–16)
IMM GRANULOCYTES # BLD AUTO: 0.1 K/UL (ref 0–0.04)
IMM GRANULOCYTES NFR BLD AUTO: 1 % (ref 0–0.5)
LYMPHOCYTES # BLD: 1.2 K/UL (ref 0.9–3.6)
LYMPHOCYTES NFR BLD: 13 % (ref 21–52)
MCH RBC QN AUTO: 30.8 PG (ref 24–34)
MCHC RBC AUTO-ENTMCNC: 31.3 G/DL (ref 31–37)
MCV RBC AUTO: 98.3 FL (ref 78–100)
MONOCYTES # BLD: 1.2 K/UL (ref 0.05–1.2)
MONOCYTES NFR BLD: 13 % (ref 3–10)
NEUTS SEG # BLD: 6.6 K/UL (ref 1.8–8)
NEUTS SEG NFR BLD: 73 % (ref 40–73)
NRBC # BLD: 0 K/UL (ref 0–0.01)
NRBC BLD-RTO: 0 PER 100 WBC
PLATELET # BLD AUTO: 425 K/UL (ref 135–420)
PMV BLD AUTO: 9 FL (ref 9.2–11.8)
POTASSIUM SERPL-SCNC: 4.1 MMOL/L (ref 3.5–5.5)
RBC # BLD AUTO: 3.02 M/UL (ref 4.35–5.65)
SERVICE CMNT-IMP: NORMAL
SODIUM SERPL-SCNC: 136 MMOL/L (ref 136–145)
WBC # BLD AUTO: 9.1 K/UL (ref 4.6–13.2)

## 2022-12-30 PROCEDURE — 74011636637 HC RX REV CODE- 636/637: Performed by: INTERNAL MEDICINE

## 2022-12-30 PROCEDURE — 97606 NEG PRS WND THER DME>50 SQCM: CPT

## 2022-12-30 PROCEDURE — 82962 GLUCOSE BLOOD TEST: CPT

## 2022-12-30 PROCEDURE — 65270000046 HC RM TELEMETRY

## 2022-12-30 PROCEDURE — 74011250636 HC RX REV CODE- 250/636: Performed by: INTERNAL MEDICINE

## 2022-12-30 PROCEDURE — 77030015696

## 2022-12-30 PROCEDURE — 2709999900 HC NON-CHARGEABLE SUPPLY

## 2022-12-30 PROCEDURE — 74011250637 HC RX REV CODE- 250/637: Performed by: ORTHOPAEDIC SURGERY

## 2022-12-30 PROCEDURE — 80048 BASIC METABOLIC PNL TOTAL CA: CPT

## 2022-12-30 PROCEDURE — 77030040392 HC DRSG OPTIFOAM MDII -A

## 2022-12-30 PROCEDURE — 77030025414 HC DRSG VAC ASST SMPLC KCON -B

## 2022-12-30 PROCEDURE — 74011250637 HC RX REV CODE- 250/637: Performed by: FAMILY MEDICINE

## 2022-12-30 PROCEDURE — 74011000250 HC RX REV CODE- 250: Performed by: INTERNAL MEDICINE

## 2022-12-30 PROCEDURE — 85025 COMPLETE CBC W/AUTO DIFF WBC: CPT

## 2022-12-30 PROCEDURE — 36415 COLL VENOUS BLD VENIPUNCTURE: CPT

## 2022-12-30 PROCEDURE — 99232 SBSQ HOSP IP/OBS MODERATE 35: CPT | Performed by: FAMILY MEDICINE

## 2022-12-30 PROCEDURE — 74011000250 HC RX REV CODE- 250: Performed by: ORTHOPAEDIC SURGERY

## 2022-12-30 PROCEDURE — 74011250636 HC RX REV CODE- 250/636: Performed by: ORTHOPAEDIC SURGERY

## 2022-12-30 RX ADMIN — DOCUSATE SODIUM 100 MG: 100 CAPSULE, LIQUID FILLED ORAL at 17:42

## 2022-12-30 RX ADMIN — OXYCODONE HYDROCHLORIDE 10 MG: 5 TABLET ORAL at 17:42

## 2022-12-30 RX ADMIN — ATORVASTATIN CALCIUM 20 MG: 20 TABLET, FILM COATED ORAL at 21:59

## 2022-12-30 RX ADMIN — HYDROMORPHONE HYDROCHLORIDE 1 MG: 1 INJECTION, SOLUTION INTRAMUSCULAR; INTRAVENOUS; SUBCUTANEOUS at 02:32

## 2022-12-30 RX ADMIN — POLYETHYLENE GLYCOL 3350 17 G: 17 POWDER, FOR SOLUTION ORAL at 08:55

## 2022-12-30 RX ADMIN — GABAPENTIN 600 MG: 300 CAPSULE ORAL at 17:42

## 2022-12-30 RX ADMIN — SODIUM CHLORIDE, PRESERVATIVE FREE 10 ML: 5 INJECTION INTRAVENOUS at 14:14

## 2022-12-30 RX ADMIN — FAMOTIDINE 40 MG: 20 TABLET ORAL at 08:55

## 2022-12-30 RX ADMIN — THERA TABS 1 TABLET: TAB at 08:55

## 2022-12-30 RX ADMIN — CYCLOBENZAPRINE 10 MG: 10 TABLET, FILM COATED ORAL at 20:55

## 2022-12-30 RX ADMIN — SODIUM CHLORIDE, PRESERVATIVE FREE 10 ML: 5 INJECTION INTRAVENOUS at 22:02

## 2022-12-30 RX ADMIN — HYDROMORPHONE HYDROCHLORIDE 1 MG: 1 INJECTION, SOLUTION INTRAMUSCULAR; INTRAVENOUS; SUBCUTANEOUS at 06:37

## 2022-12-30 RX ADMIN — GABAPENTIN 600 MG: 300 CAPSULE ORAL at 21:58

## 2022-12-30 RX ADMIN — WATER 2 G: 1 INJECTION INTRAMUSCULAR; INTRAVENOUS; SUBCUTANEOUS at 08:55

## 2022-12-30 RX ADMIN — SODIUM CHLORIDE, PRESERVATIVE FREE 10 ML: 5 INJECTION INTRAVENOUS at 06:33

## 2022-12-30 RX ADMIN — CYCLOBENZAPRINE 10 MG: 10 TABLET, FILM COATED ORAL at 11:52

## 2022-12-30 RX ADMIN — ACETAMINOPHEN 1000 MG: 500 TABLET ORAL at 20:55

## 2022-12-30 RX ADMIN — Medication 2 UNITS: at 22:07

## 2022-12-30 RX ADMIN — GABAPENTIN 600 MG: 300 CAPSULE ORAL at 08:55

## 2022-12-30 RX ADMIN — OXYCODONE HYDROCHLORIDE 10 MG: 5 TABLET ORAL at 14:13

## 2022-12-30 RX ADMIN — ACETAMINOPHEN 1000 MG: 500 TABLET ORAL at 11:52

## 2022-12-30 RX ADMIN — CHOLECALCIFEROL TAB 25 MCG (1000 UNIT) 2000 UNITS: 25 TAB at 08:55

## 2022-12-30 RX ADMIN — HYDROMORPHONE HYDROCHLORIDE 1 MG: 1 INJECTION, SOLUTION INTRAMUSCULAR; INTRAVENOUS; SUBCUTANEOUS at 22:03

## 2022-12-30 RX ADMIN — DIPHENHYDRAMINE HYDROCHLORIDE 12.5 MG: 50 INJECTION, SOLUTION INTRAMUSCULAR; INTRAVENOUS at 10:33

## 2022-12-30 RX ADMIN — ACETAMINOPHEN 1000 MG: 500 TABLET ORAL at 02:33

## 2022-12-30 RX ADMIN — FAMOTIDINE 40 MG: 20 TABLET ORAL at 20:55

## 2022-12-30 RX ADMIN — HYDROMORPHONE HYDROCHLORIDE 1 MG: 1 INJECTION, SOLUTION INTRAMUSCULAR; INTRAVENOUS; SUBCUTANEOUS at 10:33

## 2022-12-30 RX ADMIN — DOCUSATE SODIUM 100 MG: 100 CAPSULE, LIQUID FILLED ORAL at 08:55

## 2022-12-30 NOTE — ROUTINE PROCESS
Bedside and verbal report received from [address] [place] [address] (offgoing nurse). Report included the following information; SBAR, MAR, LABS, Intake/output, Kardex, and summary of care. Patient resting in bed. Call bell and urinal in reach.     8:31 AM  Bedside and Verbal shift change report given to Clarke Ravi (oncoming nurse) by Jeffrey Redman RN (offgoing nurse). Report included the following information SBAR, Kardex, Intake/Output, MAR, and Recent Results.

## 2022-12-30 NOTE — PROGRESS NOTES
[facility] Hospitalists  Progress Note    Patient: Avis Way Age: 57 y.o. : 1960 MR#: [id number] SSN: [id number]  Date: 2022     Subjective/24-hour events:     VAC changed ths AM.  Complains of some back pain/spasm afterwards but no new issues otherwise. Afebrile, no N/V/D. Assessment:   Postoperative L-spine wound infection status post exploration irrigation and debridement and VAC placement 2020. Klebsiella pneumoniae bacteremia  DM2  Anemia   Hypertension  hyponatremia  Obesity, BMI 33.19    Plan:   Rocephin through 2023 per ID recommendations. PICC line for IV antibiotics. Wound care/VAC management as ordered. Wound showing signs of improvement per discussion with wound/ostomy nurse today on unit. Blood sugars remain in good control overall. Continue SSI. Analgesia/muscle relaxer as needed. Mobilize as much as able/tolerated. SNF disposition at discharge. Other care primarily supportive. Continue as ordered. Follow. Case discussed with:  [x]Patient  []Family  [x]Nursing  [x]Case Management  DVT Prophylaxis:  []Lovenox  []Hep SQ  []SCDs  []Coumadin   []On Heparin gtt    Objective:   VS: Visit Vitals  /87   Pulse 99   Temp 97.5 °F (36.4 °C)   Resp 18   Ht 5' 11\" (1.803 m)   Wt 108 kg (238 lb)   SpO2 95%   BMI 33.19 kg/m²      Tmax/24hrs: Temp (24hrs), Av.2 °F (36.8 °C), Min:97.5 °F (36.4 °C), Max:98.6 °F (37 °C)    Intake/Output Summary (Last 24 hours) at 2022 1652  Last data filed at 2022 1412  Gross per 24 hour   Intake 520 ml   Output 1705 ml   Net -1185 ml       General:  In NAD. Nontoxic-appearing. Cardiovascular:  RRR. Pulmonary:  Lungs clear bilaterally. No accessory muscle use. GI:  Abdomen soft, NTTP. Extremities:  Warm, no edema or ischemia. Neuro:  Awake and alert. Moves extremities spontaneously.     Labs:    Recent Results (from the past 24 hour(s))   GLUCOSE, POC    Collection Time: 22 5:05 PM   Result Value Ref Range    Glucose (POC) 145 (H) 70 - 110 mg/dL   GLUCOSE, POC    Collection Time: 12/29/22  9:15 PM   Result Value Ref Range    Glucose (POC) 228 (H) 70 - 653 mg/dL   METABOLIC PANEL, BASIC    Collection Time: 12/30/22  2:55 AM   Result Value Ref Range    Sodium 136 136 - 145 mmol/L    Potassium 4.1 3.5 - 5.5 mmol/L    Chloride 101 100 - 111 mmol/L    CO2 29 21 - 32 mmol/L    Anion gap 6 3.0 - 18 mmol/L    Glucose 107 (H) 74 - 99 mg/dL    BUN 18 7.0 - 18 MG/DL    Creatinine 1.01 0.6 - 1.3 MG/DL    BUN/Creatinine ratio 18 12 - 20      eGFR >60 >60 ml/min/1.73m2    Calcium 9.8 8.5 - 10.1 MG/DL   CBC WITH AUTOMATED DIFF    Collection Time: 12/30/22  2:55 AM   Result Value Ref Range    WBC 9.1 4.6 - 13.2 K/uL    RBC 3.02 (L) 4.35 - 5.65 M/uL    HGB 9.3 (L) 13.0 - 16.0 g/dL    HCT 29.7 (L) 36.0 - 48.0 %    MCV 98.3 78.0 - 100.0 FL    MCH 30.8 24.0 - 34.0 PG    MCHC 31.3 31.0 - 37.0 g/dL    RDW 13.7 11.6 - 14.5 %    PLATELET 529 (H) 020 - 420 K/uL    MPV 9.0 (L) 9.2 - 11.8 FL    NRBC 0.0 0  WBC    ABSOLUTE NRBC 0.00 0.00 - 0.01 K/uL    NEUTROPHILS 73 40 - 73 %    LYMPHOCYTES 13 (L) 21 - 52 %    MONOCYTES 13 (H) 3 - 10 %    EOSINOPHILS 1 0 - 5 %    BASOPHILS 0 0 - 2 %    IMMATURE GRANULOCYTES 1 (H) 0.0 - 0.5 %    ABS. NEUTROPHILS 6.6 1.8 - 8.0 K/UL    ABS. LYMPHOCYTES 1.2 0.9 - 3.6 K/UL    ABS. MONOCYTES 1.2 0.05 - 1.2 K/UL    ABS. EOSINOPHILS 0.1 0.0 - 0.4 K/UL    ABS. BASOPHILS 0.0 0.0 - 0.1 K/UL    ABS. IMM.  GRANS. 0.1 (H) 0.00 - 0.04 K/UL    DF AUTOMATED     GLUCOSE, POC    Collection Time: 12/30/22  7:11 AM   Result Value Ref Range    Glucose (POC) 152 (H) 70 - 110 mg/dL   GLUCOSE, POC    Collection Time: 12/30/22 11:42 AM   Result Value Ref Range    Glucose (POC) 146 (H) 70 - 110 mg/dL       Signed By: Corey Russo MD     December 30, 2022

## 2022-12-30 NOTE — WOUND CARE
Physical Exam  Room 216: focused wound re-assess    Pre-medicated for pain by unit staff nurse. Spine incision 14.6v2a2gy, cleansed with wound spray, applied adaptic to base, placed black granufoam 1 piece to wound bed with protected bridge to right flank, 125mmHG continuous suction via 3M ulta machine. Tolerated better than last dressing change. Will turn over care to nursing staff at this time.   Ramila CROOKN, RN, [facility], [address]

## 2022-12-31 ENCOUNTER — APPOINTMENT (OUTPATIENT)
Dept: GENERAL RADIOLOGY | Age: 62
DRG: 857 | End: 2022-12-31
Attending: FAMILY MEDICINE
Payer: COMMERCIAL

## 2022-12-31 LAB
ANION GAP SERPL CALC-SCNC: 4 MMOL/L (ref 3–18)
BUN SERPL-MCNC: 19 MG/DL (ref 7–18)
BUN/CREAT SERPL: 16 (ref 12–20)
CALCIUM SERPL-MCNC: 9.9 MG/DL (ref 8.5–10.1)
CHLORIDE SERPL-SCNC: 99 MMOL/L (ref 100–111)
CO2 SERPL-SCNC: 30 MMOL/L (ref 21–32)
CREAT SERPL-MCNC: 1.2 MG/DL (ref 0.6–1.3)
GLUCOSE BLD STRIP.AUTO-MCNC: 149 MG/DL (ref 70–110)
GLUCOSE BLD STRIP.AUTO-MCNC: 153 MG/DL (ref 70–110)
GLUCOSE BLD STRIP.AUTO-MCNC: 175 MG/DL (ref 70–110)
GLUCOSE BLD STRIP.AUTO-MCNC: 226 MG/DL (ref 70–110)
GLUCOSE SERPL-MCNC: 194 MG/DL (ref 74–99)
POTASSIUM SERPL-SCNC: 4.2 MMOL/L (ref 3.5–5.5)
SODIUM SERPL-SCNC: 133 MMOL/L (ref 136–145)

## 2022-12-31 PROCEDURE — 74011250637 HC RX REV CODE- 250/637: Performed by: ORTHOPAEDIC SURGERY

## 2022-12-31 PROCEDURE — 74011000250 HC RX REV CODE- 250: Performed by: ORTHOPAEDIC SURGERY

## 2022-12-31 PROCEDURE — 82962 GLUCOSE BLOOD TEST: CPT

## 2022-12-31 PROCEDURE — 74011250636 HC RX REV CODE- 250/636: Performed by: INTERNAL MEDICINE

## 2022-12-31 PROCEDURE — 65270000046 HC RM TELEMETRY

## 2022-12-31 PROCEDURE — 74011000250 HC RX REV CODE- 250: Performed by: INTERNAL MEDICINE

## 2022-12-31 PROCEDURE — 74011636637 HC RX REV CODE- 636/637: Performed by: INTERNAL MEDICINE

## 2022-12-31 PROCEDURE — 74011250637 HC RX REV CODE- 250/637: Performed by: FAMILY MEDICINE

## 2022-12-31 PROCEDURE — 36415 COLL VENOUS BLD VENIPUNCTURE: CPT

## 2022-12-31 PROCEDURE — 80048 BASIC METABOLIC PNL TOTAL CA: CPT

## 2022-12-31 PROCEDURE — 71045 X-RAY EXAM CHEST 1 VIEW: CPT

## 2022-12-31 PROCEDURE — 74011250636 HC RX REV CODE- 250/636: Performed by: ORTHOPAEDIC SURGERY

## 2022-12-31 PROCEDURE — 36569 INSJ PICC 5 YR+ W/O IMAGING: CPT

## 2022-12-31 PROCEDURE — 99232 SBSQ HOSP IP/OBS MODERATE 35: CPT | Performed by: FAMILY MEDICINE

## 2022-12-31 PROCEDURE — 02HV33Z INSERTION OF INFUSION DEVICE INTO SUPERIOR VENA CAVA, PERCUTANEOUS APPROACH: ICD-10-PCS | Performed by: INTERNAL MEDICINE

## 2022-12-31 RX ORDER — FAMOTIDINE 20 MG/1
20 TABLET, FILM COATED ORAL EVERY 12 HOURS
Status: DISCONTINUED | OUTPATIENT
Start: 2022-12-31 | End: 2023-01-13 | Stop reason: HOSPADM

## 2022-12-31 RX ADMIN — GABAPENTIN 600 MG: 300 CAPSULE ORAL at 16:25

## 2022-12-31 RX ADMIN — ACETAMINOPHEN 1000 MG: 500 TABLET ORAL at 20:55

## 2022-12-31 RX ADMIN — FAMOTIDINE 20 MG: 20 TABLET ORAL at 20:56

## 2022-12-31 RX ADMIN — GABAPENTIN 600 MG: 300 CAPSULE ORAL at 21:00

## 2022-12-31 RX ADMIN — HYDROMORPHONE HYDROCHLORIDE 1 MG: 1 INJECTION, SOLUTION INTRAMUSCULAR; INTRAVENOUS; SUBCUTANEOUS at 11:12

## 2022-12-31 RX ADMIN — WATER 2 G: 1 INJECTION INTRAMUSCULAR; INTRAVENOUS; SUBCUTANEOUS at 09:01

## 2022-12-31 RX ADMIN — SODIUM CHLORIDE, PRESERVATIVE FREE 10 ML: 5 INJECTION INTRAVENOUS at 21:01

## 2022-12-31 RX ADMIN — ATORVASTATIN CALCIUM 20 MG: 20 TABLET, FILM COATED ORAL at 21:00

## 2022-12-31 RX ADMIN — ACETAMINOPHEN 1000 MG: 500 TABLET ORAL at 09:00

## 2022-12-31 RX ADMIN — CHOLECALCIFEROL TAB 25 MCG (1000 UNIT) 2000 UNITS: 25 TAB at 09:00

## 2022-12-31 RX ADMIN — HYDROMORPHONE HYDROCHLORIDE 1 MG: 1 INJECTION, SOLUTION INTRAMUSCULAR; INTRAVENOUS; SUBCUTANEOUS at 22:41

## 2022-12-31 RX ADMIN — Medication 2 UNITS: at 12:26

## 2022-12-31 RX ADMIN — SODIUM CHLORIDE, PRESERVATIVE FREE 10 ML: 5 INJECTION INTRAVENOUS at 06:00

## 2022-12-31 RX ADMIN — OXYCODONE HYDROCHLORIDE 10 MG: 5 TABLET ORAL at 09:00

## 2022-12-31 RX ADMIN — SODIUM CHLORIDE, PRESERVATIVE FREE 10 ML: 5 INJECTION INTRAVENOUS at 16:26

## 2022-12-31 RX ADMIN — THERA TABS 1 TABLET: TAB at 09:00

## 2022-12-31 RX ADMIN — GABAPENTIN 600 MG: 300 CAPSULE ORAL at 09:00

## 2022-12-31 RX ADMIN — HYDROMORPHONE HYDROCHLORIDE 1 MG: 1 INJECTION, SOLUTION INTRAMUSCULAR; INTRAVENOUS; SUBCUTANEOUS at 02:49

## 2022-12-31 RX ADMIN — HYDROMORPHONE HYDROCHLORIDE 1 MG: 1 INJECTION, SOLUTION INTRAMUSCULAR; INTRAVENOUS; SUBCUTANEOUS at 16:28

## 2022-12-31 RX ADMIN — DOCUSATE SODIUM 100 MG: 100 CAPSULE, LIQUID FILLED ORAL at 09:00

## 2022-12-31 RX ADMIN — FAMOTIDINE 40 MG: 20 TABLET ORAL at 09:00

## 2022-12-31 RX ADMIN — ACETAMINOPHEN 1000 MG: 500 TABLET ORAL at 16:25

## 2022-12-31 NOTE — ROUTINE PROCESS
Bedside and verbal report received from [address] [place] [address] Trav (offgoing nurse). Report included the following information; SBAR, MAR, LABS, Intake/output, Kardex, and summary of care. Patient sleeping at this time. Bedside commode at the bedside. Will continue to monitor for any changes.

## 2022-12-31 NOTE — PROGRESS NOTES
[facility] Hospitalists  Progress Note    Patient: Kalie Clements Age: 57 y.o. : 1960 MR#: [id number] SSN: [id number]  Date: 2022     Subjective/24-hour events:     Has had issues with pain in sides/upper chest this AM.  Symptoms are primarily positional.    Assessment:   Postoperative L-spine wound infection status post exploration irrigation and debridement and VAC placement 2020. Klebsiella pneumoniae bacteremia  DM2  Anemia   Hypertension  hyponatremia  Obesity, BMI 33.19    Plan:   Continue rocephin through 2023. Will place order for PICC line in preparation for discharge due to need for long-term antibiotics. Wound VAC per [id number] services. CXR given chest complaints. ICS to bedside, encourage use. Sugars in adequtae control - continue SSI. Analgesia as needed, continue bowel regimen. PT/OT as much as able. SNF disposition at discharge - can transition once placement arrangements arranged. Case discussed with:  [x]Patient  []Family  [x]Nursing  []Case Management  DVT Prophylaxis:  []Lovenox  []Hep SQ  []SCDs  []Coumadin   []On Heparin gtt    Objective:   VS: Visit Vitals  BP (!) 147/91 (BP 1 Location: Right upper arm, BP Patient Position: At rest)   Pulse 96   Temp 99.3 °F (37.4 °C)   Resp 18   Ht 5' 11\" (1.803 m)   Wt 108 kg (238 lb)   SpO2 93%   BMI 33.19 kg/m²      Tmax/24hrs: Temp (24hrs), Av.5 °F (36.9 °C), Min:97.5 °F (36.4 °C), Max:99.3 °F (37.4 °C)    Intake/Output Summary (Last 24 hours) at 2022 0751  Last data filed at 2022 1857  Gross per 24 hour   Intake 780 ml   Output 700 ml   Net 80 ml       General:  In NAD. Nontoxic-appearing. Cardiovascular:  RRR. Pulmonary:  Lungs clear bilaterally. No accessory muscle use. GI:  Abdomen soft, NTTP. Extremities:  Warm, no edema or ischemia. Neuro:  Awake and alert. Moves extremities spontaneously.     Labs:    Recent Results (from the past 24 hour(s))   GLUCOSE, POC Collection Time: 12/30/22 11:42 AM   Result Value Ref Range    Glucose (POC) 146 (H) 70 - 110 mg/dL   GLUCOSE, POC    Collection Time: 12/30/22  4:55 PM   Result Value Ref Range    Glucose (POC) 132 (H) 70 - 110 mg/dL   GLUCOSE, POC    Collection Time: 12/30/22 10:07 PM   Result Value Ref Range    Glucose (POC) 187 (H) 70 - 283 mg/dL   METABOLIC PANEL, BASIC    Collection Time: 12/31/22  3:33 AM   Result Value Ref Range    Sodium 133 (L) 136 - 145 mmol/L    Potassium 4.2 3.5 - 5.5 mmol/L    Chloride 99 (L) 100 - 111 mmol/L    CO2 30 21 - 32 mmol/L    Anion gap 4 3.0 - 18 mmol/L    Glucose 194 (H) 74 - 99 mg/dL    BUN 19 (H) 7.0 - 18 MG/DL    Creatinine 1.20 0.6 - 1.3 MG/DL    BUN/Creatinine ratio 16 12 - 20      eGFR >60 >60 ml/min/1.73m2    Calcium 9.9 8.5 - 10.1 MG/DL       Signed By: Maribel Churchill MD     December 31, 2022

## 2023-01-01 LAB
ANION GAP SERPL CALC-SCNC: 7 MMOL/L (ref 3–18)
BUN SERPL-MCNC: 21 MG/DL (ref 7–18)
BUN/CREAT SERPL: 18 (ref 12–20)
CALCIUM SERPL-MCNC: 9.2 MG/DL (ref 8.5–10.1)
CHLORIDE SERPL-SCNC: 95 MMOL/L (ref 100–111)
CO2 SERPL-SCNC: 30 MMOL/L (ref 21–32)
CREAT SERPL-MCNC: 1.18 MG/DL (ref 0.6–1.3)
GLUCOSE BLD STRIP.AUTO-MCNC: 171 MG/DL (ref 70–110)
GLUCOSE BLD STRIP.AUTO-MCNC: 172 MG/DL (ref 70–110)
GLUCOSE BLD STRIP.AUTO-MCNC: 218 MG/DL (ref 70–110)
GLUCOSE SERPL-MCNC: 178 MG/DL (ref 74–99)
POTASSIUM SERPL-SCNC: 3.9 MMOL/L (ref 3.5–5.5)
SODIUM SERPL-SCNC: 132 MMOL/L (ref 136–145)

## 2023-01-01 PROCEDURE — 74011250637 HC RX REV CODE- 250/637: Performed by: ORTHOPAEDIC SURGERY

## 2023-01-01 PROCEDURE — 82962 GLUCOSE BLOOD TEST: CPT

## 2023-01-01 PROCEDURE — 74011250637 HC RX REV CODE- 250/637: Performed by: FAMILY MEDICINE

## 2023-01-01 PROCEDURE — 65270000046 HC RM TELEMETRY

## 2023-01-01 PROCEDURE — 74011250636 HC RX REV CODE- 250/636: Performed by: INTERNAL MEDICINE

## 2023-01-01 PROCEDURE — 74011636637 HC RX REV CODE- 636/637: Performed by: INTERNAL MEDICINE

## 2023-01-01 PROCEDURE — 80048 BASIC METABOLIC PNL TOTAL CA: CPT

## 2023-01-01 PROCEDURE — 2709999900 HC NON-CHARGEABLE SUPPLY

## 2023-01-01 PROCEDURE — 74011250636 HC RX REV CODE- 250/636: Performed by: ORTHOPAEDIC SURGERY

## 2023-01-01 PROCEDURE — 74011000250 HC RX REV CODE- 250: Performed by: INTERNAL MEDICINE

## 2023-01-01 PROCEDURE — 99232 SBSQ HOSP IP/OBS MODERATE 35: CPT | Performed by: FAMILY MEDICINE

## 2023-01-01 PROCEDURE — 74011000250 HC RX REV CODE- 250: Performed by: ORTHOPAEDIC SURGERY

## 2023-01-01 RX ADMIN — SODIUM CHLORIDE, PRESERVATIVE FREE 10 ML: 5 INJECTION INTRAVENOUS at 22:58

## 2023-01-01 RX ADMIN — OXYCODONE HYDROCHLORIDE 10 MG: 5 TABLET ORAL at 22:52

## 2023-01-01 RX ADMIN — ACETAMINOPHEN 1000 MG: 500 TABLET ORAL at 22:45

## 2023-01-01 RX ADMIN — ACETAMINOPHEN 1000 MG: 500 TABLET ORAL at 16:48

## 2023-01-01 RX ADMIN — POLYETHYLENE GLYCOL 3350 17 G: 17 POWDER, FOR SOLUTION ORAL at 10:56

## 2023-01-01 RX ADMIN — ACETAMINOPHEN 1000 MG: 500 TABLET ORAL at 02:02

## 2023-01-01 RX ADMIN — SODIUM CHLORIDE, PRESERVATIVE FREE 10 ML: 5 INJECTION INTRAVENOUS at 07:39

## 2023-01-01 RX ADMIN — GABAPENTIN 600 MG: 300 CAPSULE ORAL at 16:48

## 2023-01-01 RX ADMIN — SODIUM CHLORIDE, PRESERVATIVE FREE 10 ML: 5 INJECTION INTRAVENOUS at 16:47

## 2023-01-01 RX ADMIN — GABAPENTIN 600 MG: 300 CAPSULE ORAL at 22:45

## 2023-01-01 RX ADMIN — HYDROMORPHONE HYDROCHLORIDE 1 MG: 1 INJECTION, SOLUTION INTRAMUSCULAR; INTRAVENOUS; SUBCUTANEOUS at 10:57

## 2023-01-01 RX ADMIN — ACETAMINOPHEN 1000 MG: 500 TABLET ORAL at 10:57

## 2023-01-01 RX ADMIN — GABAPENTIN 600 MG: 300 CAPSULE ORAL at 10:56

## 2023-01-01 RX ADMIN — THERA TABS 1 TABLET: TAB at 10:57

## 2023-01-01 RX ADMIN — OXYCODONE HYDROCHLORIDE 10 MG: 5 TABLET ORAL at 01:25

## 2023-01-01 RX ADMIN — CHOLECALCIFEROL TAB 25 MCG (1000 UNIT) 2000 UNITS: 25 TAB at 10:56

## 2023-01-01 RX ADMIN — FAMOTIDINE 20 MG: 20 TABLET ORAL at 10:57

## 2023-01-01 RX ADMIN — WATER 2 G: 1 INJECTION INTRAMUSCULAR; INTRAVENOUS; SUBCUTANEOUS at 10:56

## 2023-01-01 RX ADMIN — ATORVASTATIN CALCIUM 20 MG: 20 TABLET, FILM COATED ORAL at 22:45

## 2023-01-01 RX ADMIN — Medication 2 UNITS: at 09:00

## 2023-01-01 RX ADMIN — Medication 4 UNITS: at 22:45

## 2023-01-01 RX ADMIN — DOCUSATE SODIUM 100 MG: 100 CAPSULE, LIQUID FILLED ORAL at 16:47

## 2023-01-01 RX ADMIN — Medication 5 MG: at 22:52

## 2023-01-01 RX ADMIN — DOCUSATE SODIUM 100 MG: 100 CAPSULE, LIQUID FILLED ORAL at 10:57

## 2023-01-01 RX ADMIN — HYDROMORPHONE HYDROCHLORIDE 1 MG: 1 INJECTION, SOLUTION INTRAMUSCULAR; INTRAVENOUS; SUBCUTANEOUS at 20:26

## 2023-01-01 RX ADMIN — FAMOTIDINE 20 MG: 20 TABLET ORAL at 22:45

## 2023-01-01 RX ADMIN — HYDROMORPHONE HYDROCHLORIDE 1 MG: 1 INJECTION, SOLUTION INTRAMUSCULAR; INTRAVENOUS; SUBCUTANEOUS at 04:28

## 2023-01-01 RX ADMIN — Medication 2 UNITS: at 16:53

## 2023-01-01 RX ADMIN — CYCLOBENZAPRINE 10 MG: 10 TABLET, FILM COATED ORAL at 22:52

## 2023-01-01 NOTE — PROGRESS NOTES
Pt declining participation in skilled OT evaluation d/t 8/10 pain RUE s/p Picc line placement previous night-nursing notified. OT to follow.

## 2023-01-01 NOTE — PROGRESS NOTES
[facility] Hospitalists  Progress Note    Patient: Brittany Fong Age: 57 y.o. : 1960 MR#: [id number] SSN: [id number]  Date: 2023     Subjective/24-hour events:     PICC placed yesterday. Continues to have issues with pain but otherwise nothing new or acute. Assessment:   Postoperative L-spine wound infection status post exploration irrigation and debridement and VAC placement 2020. Klebsiella pneumoniae bacteremia  DM2  Anemia   Hypertension  hyponatremia  Obesity, BMI 33.19    Plan:   Rocephin through 2023 to complete course of treatment for postop infection. VAC device management per wound/ostomy services. Chest x-ray done yesterday shows left-sided subsegmental atelectasis. Have reiterated importance of ICS use. Have also encouraged patient to mobilize as much as able. SSI, monitor sugars. Analgesia and bowel regimen as ordered. SNF disposition once arrangements finalized. Patient medically stable to transition once this is in place. Case discussed with:  [x]Patient  []Family  [x]Nursing  []Case Management  DVT Prophylaxis:  []Lovenox  []Hep SQ  [x]SCDs  []Coumadin   []On Heparin gtt    Objective:   VS: Visit Vitals  BP (!) 143/90 (BP 1 Location: Left upper arm, BP Patient Position: At rest)   Pulse (!) 111   Temp 100.1 °F (37.8 °C)   Resp 18   Ht 5' 11\" (1.803 m)   Wt 108 kg (238 lb)   SpO2 95%   BMI 33.19 kg/m²      Tmax/24hrs: Temp (24hrs), Av °F (37.2 °C), Min:97.4 °F (36.3 °C), Max:100.2 °F (37.9 °C)    Intake/Output Summary (Last 24 hours) at 2023 0849  Last data filed at 2022 2227  Gross per 24 hour   Intake 950 ml   Output 525 ml   Net 425 ml       General:  In NAD. Nontoxic-appearing. Cardiovascular:  RRR. Pulmonary:  Lungs clear bilaterally. No accessory muscle use. GI:  Abdomen soft, NTTP. Extremities:  Warm, no edema or ischemia. Neuro:  Awake and alert. Moves extremities spontaneously.     Labs:    Recent Results (from the past 24 hour(s))   GLUCOSE, POC    Collection Time: 12/31/22 12:09 PM   Result Value Ref Range    Glucose (POC) 226 (H) 70 - 110 mg/dL   GLUCOSE, POC    Collection Time: 12/31/22  4:49 PM   Result Value Ref Range    Glucose (POC) 153 (H) 70 - 110 mg/dL   GLUCOSE, POC    Collection Time: 12/31/22 10:45 PM   Result Value Ref Range    Glucose (POC) 175 (H) 70 - 243 mg/dL   METABOLIC PANEL, BASIC    Collection Time: 01/01/23  4:30 AM   Result Value Ref Range    Sodium 132 (L) 136 - 145 mmol/L    Potassium 3.9 3.5 - 5.5 mmol/L    Chloride 95 (L) 100 - 111 mmol/L    CO2 30 21 - 32 mmol/L    Anion gap 7 3.0 - 18 mmol/L    Glucose 178 (H) 74 - 99 mg/dL    BUN 21 (H) 7.0 - 18 MG/DL    Creatinine 1.18 0.6 - 1.3 MG/DL    BUN/Creatinine ratio 18 12 - 20      eGFR >60 >60 ml/min/1.73m2    Calcium 9.2 8.5 - 10.1 MG/DL   GLUCOSE, POC    Collection Time: 01/01/23  8:18 AM   Result Value Ref Range    Glucose (POC) 171 (H) 70 - 110 mg/dL       Signed By: Madelin Beckman MD     January 1, 2023

## 2023-01-01 NOTE — PROGRESS NOTES
Bedside report given to SHERRY Crain. Pt resting quietly in bed at this time, call bell within reach, vital signs stable.

## 2023-01-01 NOTE — PROGRESS NOTES
Problem: Patient Education: Go to Patient Education Activity  Goal: Patient/Family Education  Outcome: Progressing Towards Goal     Problem: Falls - Risk of  Goal: *Absence of Falls  Description: Document Denny Raza Fall Risk and appropriate interventions in the flowsheet.   Outcome: Progressing Towards Goal  Note: Fall Risk Interventions:  Mobility Interventions: Bed/chair exit alarm, Assess mobility with egress test, Strengthening exercises (ROM-active/passive)         Medication Interventions: Evaluate medications/consider consulting pharmacy, Patient to call before getting OOB         History of Falls Interventions: Bed/chair exit alarm         Problem: Patient Education: Go to Patient Education Activity  Goal: Patient/Family Education  Outcome: Progressing Towards Goal     Problem: Patient Education: Go to Patient Education Activity  Goal: Patient/Family Education  Outcome: Progressing Towards Goal     Problem: Nutrition Deficit  Goal: *Optimize nutritional status  Outcome: Progressing Towards Goal

## 2023-01-01 NOTE — ROUTINE PROCESS
Patient is awake, alert, medicated for pain. Labs drawn from right arm picc. Flushes well. Wound vac in place to back. Call bell in reach.

## 2023-01-02 LAB
ANION GAP SERPL CALC-SCNC: 8 MMOL/L (ref 3–18)
BUN SERPL-MCNC: 20 MG/DL (ref 7–18)
BUN/CREAT SERPL: 17 (ref 12–20)
CALCIUM SERPL-MCNC: 10 MG/DL (ref 8.5–10.1)
CHLORIDE SERPL-SCNC: 92 MMOL/L (ref 100–111)
CO2 SERPL-SCNC: 30 MMOL/L (ref 21–32)
CREAT SERPL-MCNC: 1.18 MG/DL (ref 0.6–1.3)
GLUCOSE BLD STRIP.AUTO-MCNC: 140 MG/DL (ref 70–110)
GLUCOSE BLD STRIP.AUTO-MCNC: 152 MG/DL (ref 70–110)
GLUCOSE BLD STRIP.AUTO-MCNC: 170 MG/DL (ref 70–110)
GLUCOSE BLD STRIP.AUTO-MCNC: 262 MG/DL (ref 70–110)
GLUCOSE SERPL-MCNC: 159 MG/DL (ref 74–99)
POTASSIUM SERPL-SCNC: 4.1 MMOL/L (ref 3.5–5.5)
SODIUM SERPL-SCNC: 130 MMOL/L (ref 136–145)

## 2023-01-02 PROCEDURE — 74011250637 HC RX REV CODE- 250/637: Performed by: ORTHOPAEDIC SURGERY

## 2023-01-02 PROCEDURE — 80048 BASIC METABOLIC PNL TOTAL CA: CPT

## 2023-01-02 PROCEDURE — 65270000046 HC RM TELEMETRY

## 2023-01-02 PROCEDURE — 94762 N-INVAS EAR/PLS OXIMTRY CONT: CPT

## 2023-01-02 PROCEDURE — 82962 GLUCOSE BLOOD TEST: CPT

## 2023-01-02 PROCEDURE — 74011636637 HC RX REV CODE- 636/637: Performed by: INTERNAL MEDICINE

## 2023-01-02 PROCEDURE — 74011000250 HC RX REV CODE- 250: Performed by: INTERNAL MEDICINE

## 2023-01-02 PROCEDURE — 97168 OT RE-EVAL EST PLAN CARE: CPT

## 2023-01-02 PROCEDURE — 99232 SBSQ HOSP IP/OBS MODERATE 35: CPT | Performed by: FAMILY MEDICINE

## 2023-01-02 PROCEDURE — 74011250637 HC RX REV CODE- 250/637: Performed by: FAMILY MEDICINE

## 2023-01-02 PROCEDURE — 97535 SELF CARE MNGMENT TRAINING: CPT

## 2023-01-02 PROCEDURE — 74011250636 HC RX REV CODE- 250/636: Performed by: INTERNAL MEDICINE

## 2023-01-02 PROCEDURE — 74011000250 HC RX REV CODE- 250: Performed by: ORTHOPAEDIC SURGERY

## 2023-01-02 PROCEDURE — 36415 COLL VENOUS BLD VENIPUNCTURE: CPT

## 2023-01-02 RX ADMIN — SODIUM CHLORIDE, PRESERVATIVE FREE 10 ML: 5 INJECTION INTRAVENOUS at 08:43

## 2023-01-02 RX ADMIN — SODIUM CHLORIDE, PRESERVATIVE FREE 10 ML: 5 INJECTION INTRAVENOUS at 21:45

## 2023-01-02 RX ADMIN — ACETAMINOPHEN 1000 MG: 500 TABLET ORAL at 17:16

## 2023-01-02 RX ADMIN — OXYCODONE HYDROCHLORIDE 10 MG: 5 TABLET ORAL at 20:03

## 2023-01-02 RX ADMIN — GABAPENTIN 600 MG: 300 CAPSULE ORAL at 17:16

## 2023-01-02 RX ADMIN — CHOLECALCIFEROL TAB 25 MCG (1000 UNIT) 2000 UNITS: 25 TAB at 08:42

## 2023-01-02 RX ADMIN — FAMOTIDINE 20 MG: 20 TABLET ORAL at 08:41

## 2023-01-02 RX ADMIN — DOCUSATE SODIUM 100 MG: 100 CAPSULE, LIQUID FILLED ORAL at 17:16

## 2023-01-02 RX ADMIN — Medication 2 UNITS: at 08:42

## 2023-01-02 RX ADMIN — GABAPENTIN 600 MG: 300 CAPSULE ORAL at 21:44

## 2023-01-02 RX ADMIN — Medication 6 UNITS: at 22:10

## 2023-01-02 RX ADMIN — GABAPENTIN 600 MG: 300 CAPSULE ORAL at 08:42

## 2023-01-02 RX ADMIN — THERA TABS 1 TABLET: TAB at 08:41

## 2023-01-02 RX ADMIN — ATORVASTATIN CALCIUM 20 MG: 20 TABLET, FILM COATED ORAL at 21:44

## 2023-01-02 RX ADMIN — Medication 2 UNITS: at 12:45

## 2023-01-02 RX ADMIN — DOCUSATE SODIUM 100 MG: 100 CAPSULE, LIQUID FILLED ORAL at 08:42

## 2023-01-02 RX ADMIN — POLYETHYLENE GLYCOL 3350 17 G: 17 POWDER, FOR SOLUTION ORAL at 08:42

## 2023-01-02 RX ADMIN — OXYCODONE HYDROCHLORIDE 10 MG: 5 TABLET ORAL at 12:48

## 2023-01-02 RX ADMIN — ACETAMINOPHEN 1000 MG: 500 TABLET ORAL at 05:38

## 2023-01-02 RX ADMIN — SODIUM CHLORIDE, PRESERVATIVE FREE 10 ML: 5 INJECTION INTRAVENOUS at 17:16

## 2023-01-02 RX ADMIN — FAMOTIDINE 20 MG: 20 TABLET ORAL at 20:03

## 2023-01-02 RX ADMIN — WATER 2 G: 1 INJECTION INTRAMUSCULAR; INTRAVENOUS; SUBCUTANEOUS at 08:42

## 2023-01-02 RX ADMIN — ACETAMINOPHEN 1000 MG: 500 TABLET ORAL at 23:55

## 2023-01-02 NOTE — PROGRESS NOTES
[facility] Hospitalists  Progress Note    Patient: Ehsan Sumner Age: 57 y.o. : 1960 MR#: [id number] SSN: [id number]  Date: 2023     Subjective/24-hour events:     Sleepy but no new complaints. Pain in acceptable control currently. No SOB or fevers, denies N/V/D. Assessment:   Postoperative L-spine wound infection status post exploration irrigation and debridement and VAC placement 2020. Klebsiella pneumoniae bacteremia  DM2  Anemia   Hypertension  hyponatremia  Obesity, BMI 33.19    Plan:   Rocephin through  per ID recs. PICC in place. VAC device management per [id number] services. Participation with therapy and frequent ICS use encouraged. SSI. Analagesia, bowel regimen. Medically stable for discharge once SNF arrangements finalized. Supportive care o/w. Follow. Case discussed with:  [x]Patient  []Family  [x]Nursing  []Case Management  DVT Prophylaxis:  []Lovenox  []Hep SQ  [x]SCDs  []Coumadin   []On Heparin gtt    Objective:   VS: Visit Vitals  BP (!) 153/92   Pulse (!) 107   Temp 100.3 °F (37.9 °C)   Resp 18   Ht 5' 11\" (1.803 m)   Wt 108 kg (238 lb)   SpO2 92%   BMI 33.19 kg/m²      Tmax/24hrs: Temp (24hrs), Av.5 °F (37.5 °C), Min:98.6 °F (37 °C), Max:100.3 °F (37.9 °C)    Intake/Output Summary (Last 24 hours) at 2023 0856  Last data filed at 2023 0829  Gross per 24 hour   Intake --   Output 750 ml   Net -750 ml       General:  In NAD. Nontoxic-appearing. Cardiovascular:  RRR. Pulmonary:  Lungs clear bilaterally. No accessory muscle use. GI:  Abdomen soft, NTTP. Extremities:  Warm, no edema or ischemia. Neuro:  Awake and alert. Moves extremities spontaneously.     Labs:    Recent Results (from the past 24 hour(s))   GLUCOSE, POC    Collection Time: 23  4:51 PM   Result Value Ref Range    Glucose (POC) 172 (H) 70 - 110 mg/dL   GLUCOSE, POC    Collection Time: 23 10:43 PM   Result Value Ref Range    Glucose (POC) 218 (H) 70 - 110 mg/dL   GLUCOSE, POC    Collection Time: 01/02/23  7:29 AM   Result Value Ref Range    Glucose (POC) 152 (H) 70 - 110 mg/dL       Signed By: Mc Sainz MD     January 2, 2023

## 2023-01-02 NOTE — PROGRESS NOTES
Problem: Patient Education: Go to Patient Education Activity  Goal: Patient/Family Education  Outcome: Progressing Towards Goal     Problem: Falls - Risk of  Goal: *Absence of Falls  Description: Document Connerchristine Velasquezshalini Fall Risk and appropriate interventions in the flowsheet.   Outcome: Progressing Towards Goal  Note: Fall Risk Interventions:  Mobility Interventions: Bed/chair exit alarm, Strengthening exercises (ROM-active/passive)         Medication Interventions: Bed/chair exit alarm, Teach patient to arise slowly, Patient to call before getting OOB    Elimination Interventions: Call light in reach, Urinal in reach, Toileting schedule/hourly rounds    History of Falls Interventions: Bed/chair exit alarm         Problem: Patient Education: Go to Patient Education Activity  Goal: Patient/Family Education  Outcome: Progressing Towards Goal     Problem: Patient Education: Go to Patient Education Activity  Goal: Patient/Family Education  Outcome: Progressing Towards Goal     Problem: Nutrition Deficit  Goal: *Optimize nutritional status  Outcome: Progressing Towards Goal

## 2023-01-02 NOTE — PROGRESS NOTES
Problem: Self Care Deficits Care Plan (Adult)  Goal: *Acute Goals and Plan of Care (Insert Text)  Description: Occupational Therapy Goals  Initiated 12/24/2022 within 7 day(s), re-evaluation 1/2/2023- pt making slow progress towards goals, he is inhibited by pain     1. Patient will perform grooming with supervision/set-up standing at the sink for > 4 min with Good balance. 2.  Patient will perform bathing with supervision/set-up using AE. 3.  Patient will perform lower body dressing with supervision/set-up using AE. 4.  Patient will perform toilet transfers with supervision/set-up. 5.  Patient will perform all aspects of toileting with supervision/set-up. 6.  Patient will participate in upper extremity therapeutic exercise/activities with supervision/set-up for 8 minutes to improve endurance and UB strength needed for ADLs    7. Patient will utilize energy conservation techniques during functional activities with verbal cues. 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 Outcome: Progressing Towards Goal   OCCUPATIONAL THERAPY RE-EVALUATION    Patient: Ly Bill (86 y.o. male)  Date: 1/2/2023  Primary Diagnosis: VIPIN (acute kidney injury) ([facility] [address] [N17.9]  Dehydration [E86.0]  Procedure(s) (LRB):  INCISION AND DRAINAGE LUMBAR SPINE/ APPLICATION OF WOUND VAC (N/A) 10 Days Post-Op   Precautions:   Fall, Spinal, Skin      ASSESSMENT :  Nursing/RN cleared for pt to participate in OT  re-evaluation and tx session. Bed mobility: supv using log roll technique for supine -> sit edge of bed d/t spinal precautions, pt c/o back pain 8/10-nursing present. STS with CGA, side stepping towards right using RW w/ CGA,  pt declined need to use bedside commode. Upon sitting edge of bed for rest break, pt agreed that mobility helped make him feel better.  Sit -> supine using log roll technique and Min A BLE mgmt. Pt laying semi-reclined in bed at end of tx session ,call bell within reach & pt verbalized understanding following repetition with education for how to utilize for assist e.g. functional transfers in order to prevent falls. Patient will benefit from skilled intervention to address the above impairments. Patient's rehabilitation potential is considered to be Good  Factors which may influence rehabilitation potential include:   []             None noted  []             Mental ability/status  [x]             Medical condition  []             Home/family situation and support systems  []             Safety awareness  [x]             Pain tolerance/management  []             Other:      PLAN :  Recommendations and Planned Interventions:   [x]               Self Care Training                  [x]      Therapeutic Activities  [x]               Functional Mobility Training   []      Cognitive Retraining  [x]               Therapeutic Exercises           [x]      Endurance Activities  [x]               Balance Training                    [x]      Neuromuscular Re-Education  []               Visual/Perceptual Training     [x]      Home Safety Training  [x]               Patient Education                   [x]      Family Training/Education  []               Other (comment):    Frequency/Duration: Patient will be followed by occupational therapy 3-5 times a week to address goals. Further Equipment Recommendations for Discharge: bedside commode, hospital bed, shower chair, rolling walker, and wheelchair     AMPAC: Current research shows that an AM-PAC score of 17 or less is not associated with a discharge to the patient's home setting. Based on an AM-PAC score of 17/24 and their current ADL deficits; it is recommended that the patient have 3-5 sessions per week of Occupational Therapy at d/c to increase the patient's independence.       This AMPAC score should be considered in conjunction with interdisciplinary team recommendations to determine the most appropriate discharge setting. Patient's social support, diagnosis, medical stability, and prior level of function should also be taken into consideration. SUBJECTIVE:   Patient stated I wish I hadn't gotten this surgery.     OBJECTIVE DATA SUMMARY:   Hospital course since last seen and reason for reevaluation: pt making slow progress towards goals, he is inhibited by pain   Past Medical History:   Diagnosis Date    Arthritis     Back pain     from previous back injury    Colon polyps     Diabetes ([facility] [address]     9357    Hernia, umbilical     Hyperlipidemia     Hypertension     Pneumonia      Past Surgical History:   Procedure Laterality Date    HX COLONOSCOPY  2014    polyps    HX HEENT      left lazy eye procedure during childhood    HX HEMORRHOIDECTOMY      HX HERNIA REPAIR      2021    HX ORTHOPAEDIC  01/09/2017    right knee    HX ORTHOPAEDIC  1985    torn cartilage knee    HX TONSILLECTOMY  1964     Barriers to Learning/Limitations: None  Compensate with: visual, verbal, tactile, kinesthetic cues/model    Home Situation:   Home Situation  Home Environment: Private residence  # Steps to Enter: 3  Rails to Enter: Yes  One/Two Story Residence: One story  Living Alone: Yes  Support Systems: Other Family Member(s), Friend/Neighbor  Patient Expects to be Discharged to[de-identified] Skilled nursing facility  Current DME Used/Available at Home: Saratha McDonald, rollator  Tub or Shower Type: Tub/Shower combination (upstairs)  []  Right hand dominant   []  Left hand dominant    Cognitive/Behavioral Status:  Neurologic State: Alert  Orientation Level: Oriented X4  Cognition: Follows commands  Safety/Judgement: Fall prevention    Skin: back surgical wound with wound vac intact  Edema: none noted    Vision/Perceptual:  glasses, appears intact      Coordination: BUE  Coordination: Within functional limits  Fine Motor Skills-Upper: Left Intact; Right Intact    Gross Motor Skills-Upper: Left Intact; Right Intact    Balance:  Sitting: Intact  Standing: Impaired; With support  Standing - Static: Good  Standing - Dynamic : Fair    Strength: BUE  Strength: Generally decreased, functional     Tone & Sensation: BUE  Tone: Normal     Range of Motion: BUE  AROM: Within functional limits     Functional Mobility and Transfers for ADLs:  Bed Mobility:     Supine to Sit: Supervision  Sit to Supine: Minimum assistance     ADL Assessment:   Feeding: Modified independent    Oral Facial Hygiene/Grooming: Supervision    Bathing: Moderate assistance    Upper Body Dressing: Supervision    Lower Body Dressing: Maximum assistance    Toileting: Minimum assistance     Cognitive Retraining  Safety/Judgement: Fall prevention    Pain:  Pain level pre-treatment: 8/10   Pain level post-treatment: 8/10  Pain Intervention(s): Medication (see MAR); Rest, Ice, Repositioning   Response to intervention: Nurse notified, See doc flow    Activity Tolerance:   poor  Please refer to the flowsheet for vital signs taken during this treatment. After treatment:   [] Patient left in no apparent distress sitting up in chair  [x] Patient left in no apparent distress in bed  [x] Call bell left within reach  [x] Nursing notified  [] Caregiver present  [x] Bed alarm activated    COMMUNICATION/EDUCATION:   [x] Role of Occupational Therapy in the acute care setting  [x] Home safety education was provided and the patient/caregiver indicated understanding. [x] Patient/family have participated as able in goal setting and plan of care. [x] Patient/family agree to work toward stated goals and plan of care. [] Patient understands intent and goals of therapy, but is neutral about his/her participation. [] Patient is unable to participate in goal setting and plan of care.     Thank you for this referral.  Chase Moore  Time Calculation: 16 mins    Venancio Toro AM-PAC® Daily Activity Inpatient Short Form (6-Clicks)*    How much HELP from another person does the patient currently need    (If the patient hasn't done an activity recently, how much help from another person do you think he/she would need if he/she tried?)   Total (Total A or Dep)   A Lot  (Mod to Max A)   A Little (Sup or Min A)   None (Mod I to I)   Putting on and taking off regular lower body clothing? [] 1 [x] 2 [] 3 [] 4   2. Bathing (including washing, rinsing,      drying)? [] 1 [] 2 [x] 3 [] 4   3. Toileting, which includes using toilet, bedpan or urinal?   [] 1 [] 2 [x] 3 [] 4   4. Putting on and taking off regular upper body clothing? [] 1 [] 2 [x] 3 [] 4   5. Taking care of personal grooming such as brushing teeth? [] 1 [] 2 [x] 3 [] 4   6. Eating meals?    [] 1 [] 2 [x] 3 [] 4

## 2023-01-02 NOTE — PROGRESS NOTES
PT treatment attempted at 1452. Pt declined to participate, reporting OOB with OT earlier. Will continue to follow.    Jaye Person PT DPT

## 2023-01-03 ENCOUNTER — APPOINTMENT (OUTPATIENT)
Dept: VASCULAR SURGERY | Age: 63
DRG: 857 | End: 2023-01-03
Attending: INTERNAL MEDICINE
Payer: COMMERCIAL

## 2023-01-03 ENCOUNTER — APPOINTMENT (OUTPATIENT)
Dept: GENERAL RADIOLOGY | Age: 63
DRG: 857 | End: 2023-01-03
Attending: INTERNAL MEDICINE
Payer: COMMERCIAL

## 2023-01-03 ENCOUNTER — APPOINTMENT (OUTPATIENT)
Dept: MRI IMAGING | Age: 63
DRG: 857 | End: 2023-01-03
Attending: ORTHOPAEDIC SURGERY
Payer: COMMERCIAL

## 2023-01-03 LAB
ANION GAP SERPL CALC-SCNC: 7 MMOL/L (ref 3–18)
APPEARANCE UR: CLEAR
B PERT DNA SPEC QL NAA+PROBE: NOT DETECTED
BACTERIA URNS QL MICRO: ABNORMAL /HPF
BILIRUB UR QL: NEGATIVE
BORDETELLA PARAPERTUSSIS PCR, BORPAR: NOT DETECTED
BUN SERPL-MCNC: 22 MG/DL (ref 7–18)
BUN/CREAT SERPL: 19 (ref 12–20)
C PNEUM DNA SPEC QL NAA+PROBE: NOT DETECTED
CALCIUM SERPL-MCNC: 9.5 MG/DL (ref 8.5–10.1)
CHLORIDE SERPL-SCNC: 93 MMOL/L (ref 100–111)
CO2 SERPL-SCNC: 32 MMOL/L (ref 21–32)
COLOR UR: YELLOW
CREAT SERPL-MCNC: 1.17 MG/DL (ref 0.6–1.3)
CRP SERPL HS-MCNC: >9.5 MG/L
EPITH CASTS URNS QL MICRO: ABNORMAL /LPF (ref 0–5)
ERYTHROCYTE [DISTWIDTH] IN BLOOD BY AUTOMATED COUNT: 14.3 % (ref 11.6–14.5)
EST. AVERAGE GLUCOSE BLD GHB EST-MCNC: 148 MG/DL
FLUAV H1 2009 PAND RNA SPEC QL NAA+PROBE: NOT DETECTED
FLUAV H1 RNA SPEC QL NAA+PROBE: NOT DETECTED
FLUAV H3 RNA SPEC QL NAA+PROBE: NOT DETECTED
FLUAV SUBTYP SPEC NAA+PROBE: NOT DETECTED
FLUBV RNA SPEC QL NAA+PROBE: NOT DETECTED
GLUCOSE BLD STRIP.AUTO-MCNC: 149 MG/DL (ref 70–110)
GLUCOSE BLD STRIP.AUTO-MCNC: 168 MG/DL (ref 70–110)
GLUCOSE BLD STRIP.AUTO-MCNC: 174 MG/DL (ref 70–110)
GLUCOSE BLD STRIP.AUTO-MCNC: 182 MG/DL (ref 70–110)
GLUCOSE BLD STRIP.AUTO-MCNC: 185 MG/DL (ref 70–110)
GLUCOSE SERPL-MCNC: 141 MG/DL (ref 74–99)
GLUCOSE UR STRIP.AUTO-MCNC: NEGATIVE MG/DL
HADV DNA SPEC QL NAA+PROBE: NOT DETECTED
HBA1C MFR BLD: 6.8 % (ref 4.2–5.6)
HCOV 229E RNA SPEC QL NAA+PROBE: NOT DETECTED
HCOV HKU1 RNA SPEC QL NAA+PROBE: NOT DETECTED
HCOV NL63 RNA SPEC QL NAA+PROBE: NOT DETECTED
HCOV OC43 RNA SPEC QL NAA+PROBE: NOT DETECTED
HCT VFR BLD AUTO: 27.6 % (ref 36–48)
HGB BLD-MCNC: 8.8 G/DL (ref 13–16)
HGB UR QL STRIP: NEGATIVE
HMPV RNA SPEC QL NAA+PROBE: NOT DETECTED
HPIV1 RNA SPEC QL NAA+PROBE: NOT DETECTED
HPIV2 RNA SPEC QL NAA+PROBE: NOT DETECTED
HPIV3 RNA SPEC QL NAA+PROBE: NOT DETECTED
HPIV4 RNA SPEC QL NAA+PROBE: NOT DETECTED
HYALINE CASTS URNS QL MICRO: ABNORMAL /LPF (ref 0–2)
KETONES UR QL STRIP.AUTO: NEGATIVE MG/DL
LEUKOCYTE ESTERASE UR QL STRIP.AUTO: NEGATIVE
M PNEUMO DNA SPEC QL NAA+PROBE: NOT DETECTED
MCH RBC QN AUTO: 30.3 PG (ref 24–34)
MCHC RBC AUTO-ENTMCNC: 31.9 G/DL (ref 31–37)
MCV RBC AUTO: 95.2 FL (ref 78–100)
NITRITE UR QL STRIP.AUTO: NEGATIVE
NRBC # BLD: 0 K/UL (ref 0–0.01)
NRBC BLD-RTO: 0 PER 100 WBC
PH UR STRIP: 5.5 (ref 5–8)
PLATELET # BLD AUTO: 329 K/UL (ref 135–420)
PMV BLD AUTO: 9 FL (ref 9.2–11.8)
POTASSIUM SERPL-SCNC: 4.1 MMOL/L (ref 3.5–5.5)
PROT UR STRIP-MCNC: 30 MG/DL
RBC # BLD AUTO: 2.9 M/UL (ref 4.35–5.65)
RBC #/AREA URNS HPF: ABNORMAL /HPF (ref 0–5)
RSV RNA SPEC QL NAA+PROBE: NOT DETECTED
RV+EV RNA SPEC QL NAA+PROBE: NOT DETECTED
SARS-COV-2 PCR, COVPCR: NOT DETECTED
SODIUM SERPL-SCNC: 132 MMOL/L (ref 136–145)
SP GR UR REFRACTOMETRY: 1.02 (ref 1–1.03)
UROBILINOGEN UR QL STRIP.AUTO: 0.2 EU/DL (ref 0.2–1)
WBC # BLD AUTO: 6.3 K/UL (ref 4.6–13.2)
WBC URNS QL MICRO: ABNORMAL /HPF (ref 0–4)

## 2023-01-03 PROCEDURE — 81001 URINALYSIS AUTO W/SCOPE: CPT

## 2023-01-03 PROCEDURE — 74011000250 HC RX REV CODE- 250: Performed by: INTERNAL MEDICINE

## 2023-01-03 PROCEDURE — 86141 C-REACTIVE PROTEIN HS: CPT

## 2023-01-03 PROCEDURE — 74011250637 HC RX REV CODE- 250/637: Performed by: ORTHOPAEDIC SURGERY

## 2023-01-03 PROCEDURE — 2709999900 HC NON-CHARGEABLE SUPPLY

## 2023-01-03 PROCEDURE — 85027 COMPLETE CBC AUTOMATED: CPT

## 2023-01-03 PROCEDURE — 72158 MRI LUMBAR SPINE W/O & W/DYE: CPT

## 2023-01-03 PROCEDURE — 74011250636 HC RX REV CODE- 250/636: Performed by: FAMILY MEDICINE

## 2023-01-03 PROCEDURE — 74011250636 HC RX REV CODE- 250/636: Performed by: ORTHOPAEDIC SURGERY

## 2023-01-03 PROCEDURE — 82962 GLUCOSE BLOOD TEST: CPT

## 2023-01-03 PROCEDURE — 71045 X-RAY EXAM CHEST 1 VIEW: CPT

## 2023-01-03 PROCEDURE — 74011250636 HC RX REV CODE- 250/636: Performed by: INTERNAL MEDICINE

## 2023-01-03 PROCEDURE — A9577 INJ MULTIHANCE: HCPCS | Performed by: FAMILY MEDICINE

## 2023-01-03 PROCEDURE — 77030025414 HC DRSG VAC ASST SMPLC KCON -B

## 2023-01-03 PROCEDURE — 93970 EXTREMITY STUDY: CPT

## 2023-01-03 PROCEDURE — 83036 HEMOGLOBIN GLYCOSYLATED A1C: CPT

## 2023-01-03 PROCEDURE — 97606 NEG PRS WND THER DME>50 SQCM: CPT

## 2023-01-03 PROCEDURE — 77030040162

## 2023-01-03 PROCEDURE — 77030015696

## 2023-01-03 PROCEDURE — 74011636637 HC RX REV CODE- 636/637: Performed by: INTERNAL MEDICINE

## 2023-01-03 PROCEDURE — 74011000250 HC RX REV CODE- 250: Performed by: ORTHOPAEDIC SURGERY

## 2023-01-03 PROCEDURE — 87040 BLOOD CULTURE FOR BACTERIA: CPT

## 2023-01-03 PROCEDURE — 65270000046 HC RM TELEMETRY

## 2023-01-03 PROCEDURE — 80048 BASIC METABOLIC PNL TOTAL CA: CPT

## 2023-01-03 PROCEDURE — 36415 COLL VENOUS BLD VENIPUNCTURE: CPT

## 2023-01-03 PROCEDURE — 74011250637 HC RX REV CODE- 250/637: Performed by: FAMILY MEDICINE

## 2023-01-03 PROCEDURE — 0202U NFCT DS 22 TRGT SARS-COV-2: CPT

## 2023-01-03 PROCEDURE — 99232 SBSQ HOSP IP/OBS MODERATE 35: CPT | Performed by: FAMILY MEDICINE

## 2023-01-03 RX ORDER — ENOXAPARIN SODIUM 150 MG/ML
1 INJECTION SUBCUTANEOUS EVERY 12 HOURS
Status: DISCONTINUED | OUTPATIENT
Start: 2023-01-03 | End: 2023-01-06

## 2023-01-03 RX ADMIN — SODIUM CHLORIDE, PRESERVATIVE FREE 10 ML: 5 INJECTION INTRAVENOUS at 21:43

## 2023-01-03 RX ADMIN — FAMOTIDINE 20 MG: 20 TABLET ORAL at 08:43

## 2023-01-03 RX ADMIN — DOCUSATE SODIUM 100 MG: 100 CAPSULE, LIQUID FILLED ORAL at 17:15

## 2023-01-03 RX ADMIN — ATORVASTATIN CALCIUM 20 MG: 20 TABLET, FILM COATED ORAL at 21:42

## 2023-01-03 RX ADMIN — Medication 2 UNITS: at 08:43

## 2023-01-03 RX ADMIN — ENOXAPARIN SODIUM 110 MG: 150 INJECTION SUBCUTANEOUS at 16:54

## 2023-01-03 RX ADMIN — Medication 2 UNITS: at 16:57

## 2023-01-03 RX ADMIN — POLYETHYLENE GLYCOL 3350 17 G: 17 POWDER, FOR SOLUTION ORAL at 08:42

## 2023-01-03 RX ADMIN — SODIUM CHLORIDE, PRESERVATIVE FREE 10 ML: 5 INJECTION INTRAVENOUS at 14:58

## 2023-01-03 RX ADMIN — HYDROMORPHONE HYDROCHLORIDE 1 MG: 1 INJECTION, SOLUTION INTRAMUSCULAR; INTRAVENOUS; SUBCUTANEOUS at 11:41

## 2023-01-03 RX ADMIN — Medication 2 UNITS: at 11:51

## 2023-01-03 RX ADMIN — FAMOTIDINE 20 MG: 20 TABLET ORAL at 20:26

## 2023-01-03 RX ADMIN — GADOBENATE DIMEGLUMINE 20 ML: 529 INJECTION, SOLUTION INTRAVENOUS at 13:36

## 2023-01-03 RX ADMIN — GABAPENTIN 600 MG: 300 CAPSULE ORAL at 08:43

## 2023-01-03 RX ADMIN — OXYCODONE HYDROCHLORIDE 10 MG: 5 TABLET ORAL at 10:45

## 2023-01-03 RX ADMIN — GABAPENTIN 600 MG: 300 CAPSULE ORAL at 21:42

## 2023-01-03 RX ADMIN — CYCLOBENZAPRINE 10 MG: 10 TABLET, FILM COATED ORAL at 21:48

## 2023-01-03 RX ADMIN — DOCUSATE SODIUM 100 MG: 100 CAPSULE, LIQUID FILLED ORAL at 08:43

## 2023-01-03 RX ADMIN — ACETAMINOPHEN 1000 MG: 500 TABLET ORAL at 05:14

## 2023-01-03 RX ADMIN — ACETAMINOPHEN 650 MG: 500 TABLET ORAL at 08:50

## 2023-01-03 RX ADMIN — THERA TABS 1 TABLET: TAB at 08:43

## 2023-01-03 RX ADMIN — CHOLECALCIFEROL TAB 25 MCG (1000 UNIT) 2000 UNITS: 25 TAB at 08:43

## 2023-01-03 RX ADMIN — WATER 2 G: 1 INJECTION INTRAMUSCULAR; INTRAVENOUS; SUBCUTANEOUS at 08:42

## 2023-01-03 RX ADMIN — GABAPENTIN 600 MG: 300 CAPSULE ORAL at 16:55

## 2023-01-03 RX ADMIN — OXYCODONE HYDROCHLORIDE 10 MG: 5 TABLET ORAL at 21:42

## 2023-01-03 RX ADMIN — SODIUM CHLORIDE, PRESERVATIVE FREE 10 ML: 5 INJECTION INTRAVENOUS at 05:15

## 2023-01-03 RX ADMIN — HYDROMORPHONE HYDROCHLORIDE 1 MG: 1 INJECTION, SOLUTION INTRAMUSCULAR; INTRAVENOUS; SUBCUTANEOUS at 20:26

## 2023-01-03 NOTE — WOUND CARE
Physical Exam  Room 216: focused wound re-assess  Discussed with Dr. Cindy Marsh on rounds as pt is pale & sweaty. Pt was premedicated for pain by primary nurse Rhina POWELL. Spine incision, 15x3. 5x3.3cm, base looks improved, less yellow tissue on base, no odor,  Previous dressing was moistened with wound spray prior to removal, applied single layer adaptic to wound bed, then 1 piece black granufoam, bridged to right flank with skin protection in place using skin preps & vac drape. Continue current vac settings. 2 flattened blisters on right flank, much improved appearance, skin prep applied & baby silicones applied. 400cc reddish-tea colored drainage in current canister dated 12/27/2022, new canister applied. Pt is not feeling well, but did participate in turning over for dressing change. Bed pad changed. Educated pt on progress of wound & next dressing change due on Friday. Wound vac dressing change due Friday 1-6-2023: Wound Care Orders for Hospital: Wound vac dressing changes by unit staff nurses every Monday, Wednesday, & Friday on day shift. Measure wound size & document with each dressing change. Place adaptic on wound bed first. Settings: 125mmHG low continuous suction. Bridge to right flank with drape on skin for protection. Upon discharge from [facility], place hospital machine in dirty utility room under white laminated sign. May apply daily saline dressing if home wound vac unavailable. Wound Care Orders for [address] [place] [address] if needed: Skilled home health nursing care for Wound vac dressing changes three times per week, single layer adaptic on wound bed first, settings 125mmHG continuous suction. Wound Care Orders for SNF if needed: Remove wound vac dressing upon discharge from [facility], place saline wet to dry dressing in wound, SNF will place their wound vac when pt arrives to their facility. Will turn over care to nursing staff at this time.   Cain Koyanagi BSN, RN, [facility], [address]

## 2023-01-03 NOTE — PROGRESS NOTES
Problem: Patient Education: Go to Patient Education Activity  Goal: Patient/Family Education  Outcome: Progressing Towards Goal     Problem: Falls - Risk of  Goal: *Absence of Falls  Description: Document George Lagos Fall Risk and appropriate interventions in the flowsheet.   Outcome: Progressing Towards Goal  Note: Fall Risk Interventions:  Mobility Interventions: Communicate number of staff needed for ambulation/transfer, Patient to call before getting OOB         Medication Interventions: Assess postural VS orthostatic hypotension, Evaluate medications/consider consulting pharmacy, Patient to call before getting OOB, Teach patient to arise slowly    Elimination Interventions: Bed/chair exit alarm, Elevated toilet seat, Patient to call for help with toileting needs, Stay With Me (per policy), Toilet paper/wipes in reach, Toileting schedule/hourly rounds    History of Falls Interventions: Consult care management for discharge planning, Door open when patient unattended, Investigate reason for fall, Utilize gait belt for transfer/ambulation         Problem: Patient Education: Go to Patient Education Activity  Goal: Patient/Family Education  Outcome: Progressing Towards Goal     Problem: Patient Education: Go to Patient Education Activity  Goal: Patient/Family Education  Outcome: Progressing Towards Goal     Problem: Nutrition Deficit  Goal: *Optimize nutritional status  Outcome: Progressing Towards Goal

## 2023-01-03 NOTE — PROGRESS NOTES
[facility] Hospitalists  Progress Note    Patient: Florence Weber Age: 57 y.o. : 1960 MR#: [id number] SSN: [id number]  Date: 1/3/2023     Subjective/24-hour events:     Ran fever overnight to 102.9. Complains of feeling tired, weak. Assessment:   Postoperative L-spine wound infection status post exploration irrigation and debridement and VAC placement 2020. Klebsiella pneumoniae bacteremia  DM2  Anemia   Hypertension  hyponatremia  Obesity, BMI 33.19    Plan:   COVID and influenza testing negative. MRI ordered, await results. Continue Rocephin as ordered per ID recommendations. VAC management per wound care services. Plan for VAC change today. Continue ICS use. Analgesia and bowel regimen as ordered. Have continued to encourage patient to participate with PT/OT on each visit. Mood is depressed and would consider initiating treatment for this if patient is willing to consider. Will discuss. Disposition once arrangements finalized. Case discussed with:  [x]Patient  []Family  [x]Nursing  [x]Case Management  DVT Prophylaxis:  []Lovenox  []Hep SQ  [x]SCDs  []Coumadin   []On Heparin gtt    Objective:   VS: Visit Vitals  /85 (BP 1 Location: Left upper arm, BP Patient Position: At rest)   Pulse (!) 107   Temp 100.1 °F (37.8 °C)   Resp 18   Ht 5' 11\" (1.803 m)   Wt 106 kg (233 lb 9.6 oz)   SpO2 95%   BMI 32.58 kg/m²      Tmax/24hrs: Temp (24hrs), Av.6 °F (37.6 °C), Min:98.2 °F (36.8 °C), Max:101.9 °F (38.8 °C)    Intake/Output Summary (Last 24 hours) at 1/3/2023 0920  Last data filed at 1/3/2023 0851  Gross per 24 hour   Intake 1570 ml   Output 980 ml   Net 590 ml       General:  In NAD. Nontoxic-appearing. Cardiovascular:  Regular, mildly tachy. Pulmonary:  Lungs clear bilaterally. No accessory muscle use. GI:  Abdomen soft, NTTP. Extremities:  Warm, no edema or ischemia. Neuro:  Awake and alert. Moves extremities spontaneously.   Affect is depressed.     Labs:    Recent Results (from the past 24 hour(s))   GLUCOSE, POC    Collection Time: 01/02/23 11:16 AM   Result Value Ref Range    Glucose (POC) 170 (H) 70 - 876 mg/dL   METABOLIC PANEL, BASIC    Collection Time: 01/02/23  2:33 PM   Result Value Ref Range    Sodium 130 (L) 136 - 145 mmol/L    Potassium 4.1 3.5 - 5.5 mmol/L    Chloride 92 (L) 100 - 111 mmol/L    CO2 30 21 - 32 mmol/L    Anion gap 8 3.0 - 18 mmol/L    Glucose 159 (H) 74 - 99 mg/dL    BUN 20 (H) 7.0 - 18 MG/DL    Creatinine 1.18 0.6 - 1.3 MG/DL    BUN/Creatinine ratio 17 12 - 20      eGFR >60 >60 ml/min/1.73m2    Calcium 10.0 8.5 - 10.1 MG/DL   GLUCOSE, POC    Collection Time: 01/02/23  4:38 PM   Result Value Ref Range    Glucose (POC) 140 (H) 70 - 110 mg/dL   GLUCOSE, POC    Collection Time: 01/02/23 10:09 PM   Result Value Ref Range    Glucose (POC) 262 (H) 70 - 441 mg/dL   METABOLIC PANEL, BASIC    Collection Time: 01/03/23  5:01 AM   Result Value Ref Range    Sodium 132 (L) 136 - 145 mmol/L    Potassium 4.1 3.5 - 5.5 mmol/L    Chloride 93 (L) 100 - 111 mmol/L    CO2 32 21 - 32 mmol/L    Anion gap 7 3.0 - 18 mmol/L    Glucose 141 (H) 74 - 99 mg/dL    BUN 22 (H) 7.0 - 18 MG/DL    Creatinine 1.17 0.6 - 1.3 MG/DL    BUN/Creatinine ratio 19 12 - 20      eGFR >60 >60 ml/min/1.73m2    Calcium 9.5 8.5 - 10.1 MG/DL   CBC W/O DIFF    Collection Time: 01/03/23  5:01 AM   Result Value Ref Range    WBC 6.3 4.6 - 13.2 K/uL    RBC 2.90 (L) 4.35 - 5.65 M/uL    HGB 8.8 (L) 13.0 - 16.0 g/dL    HCT 27.6 (L) 36.0 - 48.0 %    MCV 95.2 78.0 - 100.0 FL    MCH 30.3 24.0 - 34.0 PG    MCHC 31.9 31.0 - 37.0 g/dL    RDW 14.3 11.6 - 14.5 %    PLATELET 982 056 - 144 K/uL    MPV 9.0 (L) 9.2 - 11.8 FL    NRBC 0.0 0  WBC    ABSOLUTE NRBC 0.00 0.00 - 0.01 K/uL   URINALYSIS W/MICROSCOPIC    Collection Time: 01/03/23  6:45 AM   Result Value Ref Range    Color YELLOW      Appearance CLEAR      Specific gravity 1.021 1.005 - 1.030      pH (UA) 5.5 5.0 - 8.0 Protein 30 (A) NEG mg/dL    Glucose Negative NEG mg/dL    Ketone Negative NEG mg/dL    Bilirubin Negative NEG      Blood Negative NEG      Urobilinogen 0.2 0.2 - 1.0 EU/dL    Nitrites Negative NEG      Leukocyte Esterase Negative NEG      WBC PENDING /hpf    RBC PENDING /hpf    Epithelial cells PENDING /lpf    Bacteria PENDING /hpf   GLUCOSE, POC    Collection Time: 01/03/23  7:52 AM   Result Value Ref Range    Glucose (POC) 182 (H) 70 - 110 mg/dL       Signed By: Kym Alexander MD     January 3, 2023

## 2023-01-03 NOTE — PROGRESS NOTES
Bedside shift report received from SHERRY Burnett  Pt is a/ox4, denies pain at the moment  Pt c/o wound vac not being changed yesterday and concerned about worsening wound infection,  No signs of distress noted, will continue to monitor.        3447 pt with temp of 100.1 tylenol given    1045 10 mg of lotus given    1141 dilaudid given pre wound vac change    1330 pt off of floor for MRI, wound vac disconnected     1402 pt back in room, wound vac connected and working properly

## 2023-01-03 NOTE — PROGRESS NOTES
Problem: Patient Education: Go to Patient Education Activity  Goal: Patient/Family Education  Outcome: Progressing Towards Goal     Problem: Falls - Risk of  Goal: *Absence of Falls  Description: Document Miki Hendrixcarter Fall Risk and appropriate interventions in the flowsheet.   Outcome: Progressing Towards Goal  Note: Fall Risk Interventions:  Mobility Interventions: Communicate number of staff needed for ambulation/transfer, Patient to call before getting OOB         Medication Interventions: Assess postural VS orthostatic hypotension, Evaluate medications/consider consulting pharmacy, Patient to call before getting OOB, Teach patient to arise slowly    Elimination Interventions: Bed/chair exit alarm, Elevated toilet seat, Patient to call for help with toileting needs, Stay With Me (per policy), Toilet paper/wipes in reach, Toileting schedule/hourly rounds    History of Falls Interventions: Consult care management for discharge planning, Door open when patient unattended, Investigate reason for fall, Utilize gait belt for transfer/ambulation         Problem: Patient Education: Go to Patient Education Activity  Goal: Patient/Family Education  Outcome: Progressing Towards Goal     Problem: Patient Education: Go to Patient Education Activity  Goal: Patient/Family Education  Outcome: Progressing Towards Goal     Problem: Nutrition Deficit  Goal: *Optimize nutritional status  Outcome: Progressing Towards Goal     Problem: Patient Education: Go to Patient Education Activity  Goal: Patient/Family Education  Outcome: Progressing Towards Goal

## 2023-01-03 NOTE — PROGRESS NOTES
MRI Screening form needs to be filled out and faxed to [phone] MRI can be scheduled. If unable to obtain information from pt, [facility] needs to be contacted.  If pt is claustro or will need pain meds, please have ordered in advance in order to facilitate exam.

## 2023-01-03 NOTE — PROGRESS NOTES
PT note. PT attempted treatment at [phone], pt refusing and stating, \"I have a temperature of 104, that's my excuse. \" Pt educated on benefits of mobility, pt verbalized agreement yet continued to refuse, stating, \"I want to get out and walk more than anyone else in this hospital but my body is tired. \"     Will follow up as able. Thank you.   Alfred Duarte PT, DPT

## 2023-01-03 NOTE — PROGRESS NOTES
Infectious Disease progress Note        Reason: lumbar abscess, gram-negative bloodstream infection    Current abx Prior abx   Zosyn, vancomycin since 12/22-12/25  Ceftriaxone since 12/26      Lines:       Assessment :  76-year-old man with past medical history significant for type 2 diabetes, hypertension, spinal stenosis s/p lumbar laminectomy/fusion surgery on 12/7/2022 presented to [facility] on 12/22/22 for incontinence and back pain. Now with gram-negative bacteremia, significant purulence noted with IR guided aspiration lumbar fluid collection on 12/23/2022    Clinical presentation consistent with klebsiella bloodstream infection (positive blood culture 12/22, negative blood culture 12/24), lumbar surgical site infected fluid collection/abscess likely due to indolent pathogens. Single positive blood culture for Klebsiella in blood cx 12/22 likely due to lumbar abscess    Status post I&D lumbar abscess on 12/23/2022. Intraoperative cultures Klebsiella    Acute kidney injury-likely secondary to volume depletion. Improving    Now with fever with tm:101.9 on 1/2/23-admit etiology unclear. No definitive clinical evidence of worsening paraspinal infection. Rule out intercurrent viral infection including COVID-19. Rule out DVT. Blood cultures, urinalysis, MRI spine ordered for spine surgery. Recommendations:    Continue IV ceftriaxone   Follow-up results of urine analysis, blood culture  Obtain respiratory viral panel PCR with COVID-19  follow-up spine surgery recommendations regarding wound care  5.   follow-up results of MRI spine    Addendum: 15: 10 pm  Respiratory viral panel pcr negative. No evidence of infection noted on MRI. Will obtain venous duplex bilateral UE, LE. Above plan was discussed in details with patient, dr. Nic Wallace, dr. Daniele Becerra. Please call me if any further questions or concerns. Will continue to participate in the care of this patient.   HPI:      Patient denies any fever or chills, cough. Denies increasing chest pain, shortness of breath, abdominal pain. States that he is not getting up from the bed. Past Medical History:   Diagnosis Date    Arthritis     Back pain     from previous back injury    Colon polyps     Diabetes ([facility] [address]     3047    Hernia, umbilical     Hyperlipidemia     Hypertension     Pneumonia        Past Surgical History:   Procedure Laterality Date    HX COLONOSCOPY  2014    polyps    HX HEENT      left lazy eye procedure during childhood    HX HEMORRHOIDECTOMY      HX HERNIA REPAIR      2021    HX ORTHOPAEDIC  01/09/2017    right knee    HX ORTHOPAEDIC  1985    torn cartilage knee    HX TONSILLECTOMY  1964       Current Discharge Medication List        CONTINUE these medications which have NOT CHANGED    Details   HYDROmorphone (DILAUDID) 4 mg tablet Take 4 mg by mouth every six (6) hours as needed for Pain.      polyethylene glycol (MIRALAX) 17 gram/dose powder Take 17 g by mouth daily. Indications: constipation  Qty: 116 g, Refills: 0      aspirin delayed-release 81 mg tablet Take 81 mg by mouth daily. OTHER,NON-FORMULARY, Indications: maxforce prostate 1 tab twice daily      multivitamin (ONE A DAY) tablet Take 1 Tablet by mouth daily. metFORMIN (GLUCOPHAGE) 500 mg tablet TAKE 1 TABLET BY MOUTH TWICE DAILY WITH A MEAL      cholecalciferol (VITAMIN D3) (1000 Units /25 mcg) tablet Take 2,000 Units by mouth daily. gabapentin (NEURONTIN) 300 mg capsule 600 mg three (3) times daily. lisinopriL (PRINIVIL, ZESTRIL) 40 mg tablet TAKE 1 TABLET BY MOUTH ONCE DAILY FOR BLOOD PRESSURE FOR 90 DAYS      simvastatin (ZOCOR) 40 mg tablet TAKE 1 TABLET BY MOUTH ONCE DAILY IN THE EVENING FOR CHOLESTEROL FOR 90 DAYS      acetaminophen (TYLENOL) 500 mg tablet Take  by mouth every six (6) hours as needed for Pain.              Current Facility-Administered Medications   Medication Dose Route Frequency    famotidine (PEPCID) tablet 20 mg  20 mg Oral Q12H oxyCODONE IR (ROXICODONE) tablet 5-10 mg  5-10 mg Oral Q4H PRN    polyethylene glycol (MIRALAX) packet 17 g  17 g Oral DAILY    cyclobenzaprine (FLEXERIL) tablet 10 mg  10 mg Oral TID PRN    insulin lispro (HUMALOG) injection   SubCUTAneous AC&HS    cefTRIAXone (ROCEPHIN) 2 g in sterile water (preservative free) 20 mL IV syringe  2 g IntraVENous Q24H    flumazeniL (ROMAZICON) 0.1 mg/mL injection 0.2 mg  0.2 mg IntraVENous PRN    sodium chloride (NS) flush 5-40 mL  5-40 mL IntraVENous PRN    acetaminophen (TYLENOL) tablet 1,000 mg  1,000 mg Oral Q6H    HYDROmorphone (DILAUDID) injection 1 mg  1 mg IntraVENous Q4H PRN    phenol throat spray (CHLORASEPTIC) 1 Spray  1 Spray Oral PRN    benzocaine-menthoL (CEPACOL) lozenge 1 Lozenge  1 Lozenge Oral PRN    diphenhydrAMINE (BENADRYL) injection 12.5 mg  12.5 mg IntraVENous Q4H PRN    docusate sodium (COLACE) capsule 100 mg  100 mg Oral BID    naloxone (NARCAN) injection 0.4 mg  0.4 mg IntraVENous EVERY 2 MINUTES AS NEEDED    sodium chloride (NS) flush 5-40 mL  5-40 mL IntraVENous Q8H    acetaminophen (TYLENOL) tablet 650 mg  650 mg Oral Q6H PRN    Or    acetaminophen (TYLENOL) suppository 650 mg  650 mg Rectal Q6H PRN    ondansetron (ZOFRAN ODT) tablet 4 mg  4 mg Oral Q8H PRN    Or    ondansetron (ZOFRAN) injection 4 mg  4 mg IntraVENous Q6H PRN    melatonin tablet 5 mg  5 mg Oral QHS PRN    albuterol-ipratropium (DUO-NEB) 2.5 MG-0.5 MG/3 ML  3 mL Nebulization Q6H PRN    glucose chewable tablet 16 g  4 Tablet Oral PRN    glucagon (GLUCAGEN) injection 1 mg  1 mg IntraMUSCular PRN    dextrose 10% infusion 0-250 mL  0-250 mL IntraVENous PRN    cholecalciferol (VITAMIN D3) (1000 Units /25 mcg) tablet 2,000 Units  2,000 Units Oral DAILY    gabapentin (NEURONTIN) capsule 600 mg  600 mg Oral TID    therapeutic multivitamin (THERAGRAN) tablet 1 Tablet  1 Tablet Oral DAILY    atorvastatin (LIPITOR) tablet 20 mg  20 mg Oral QHS       Allergies: Patient has no known allergies. History reviewed. No pertinent family history. Social History     Socioeconomic History    Marital status:      Spouse name: Not on file    Number of children: Not on file    Years of education: Not on file    Highest education level: Not on file   Occupational History    Not on file   Tobacco Use    Smoking status: Former     Years: 5.00     Types: Cigarettes     Quit date: 2021     Years since quittin.9    Smokeless tobacco: Never    Tobacco comments:     3-4 cig per day, told not to smoke or drink 24/hrs prior to surgery   Vaping Use    Vaping Use: Never used   Substance and Sexual Activity    Alcohol use: Yes     Alcohol/week: 2.0 standard drinks     Types: 2 Cans of beer per week     Comment: 2 per month    Drug use: Never    Sexual activity: Never   Other Topics Concern    Not on file   Social History Narrative    Not on file     Social Determinants of Health     Financial Resource Strain: Not on file   Food Insecurity: Not on file   Transportation Needs: Not on file   Physical Activity: Not on file   Stress: Not on file   Social Connections: Not on file   Intimate Partner Violence: Not on file   Housing Stability: Not on file     Social History     Tobacco Use   Smoking Status Former    Years: .00    Types: Cigarettes    Quit date: 2021    Years since quittin.9   Smokeless Tobacco Never   Tobacco Comments    3-4 cig per day, told not to smoke or drink 24/hrs prior to surgery        Temp (24hrs), Av.6 °F (37.6 °C), Min:98.2 °F (36.8 °C), Max:101.9 °F (38.8 °C)    Visit Vitals  BP (!) 144/90   Pulse 100   Temp 98.2 °F (36.8 °C)   Resp 18   Ht 5' 11\" (1.803 m)   Wt 106 kg (233 lb 9.6 oz)   SpO2 95%   BMI 32.58 kg/m²       ROS: 12 point ROS obtained in details. Pertinent positives as mentioned in HPI,   otherwise negative    Physical Exam:    General: Well developed, well nourished male laying on the bed AAOx3 in no acute distress.     HEENT:  Normocephalic, atraumatic,EOMI, no scleral icterus or pallor; no conjunctival hemmohage;  nasal and oral mucous are moist and without evidence of lesions. Neck supple, no bruits. Lymph Nodes:   no cervical, axillary or inguinal adenopathy   Lungs:   non-labored, bilateral clear to auscultation- no crackles wheezes rales or rhonchi   Heart:  RRR, s1 and s2; no rubs or gallops, no edema, + pedal pulses   Abdomen:  soft, non-distended, active bowel sounds, no hepatomegaly, no splenomegaly. Non-tender   Genitourinary:  deferred   Extremities:   no clubbing, cyanosis; no joint effusions or swelling;  muscle mass appropriate for age   Neurologic:  No gross focal sensory abnormalities; 5/5 muscle strength to upper and lower extremities. Speech appropriate. Cranial nerves intact                        Skin:  No rash or ulcers noted   Back: Tsurgical site covered with dressing     Psychiatric:  No suicidal or homicidal ideations, appropriate mood and affect         Labs: Results:   Chemistry Recent Labs     01/03/23  0501 01/02/23  1433 01/01/23  0430   * 159* 178*   * 130* 132*   K 4.1 4.1 3.9   CL 93* 92* 95*   CO2 32 30 30   BUN 22* 20* 21*   CREA 1.17 1.18 1.18   CA 9.5 10.0 9.2   AGAP 7 8 7   BUCR 19 17 18        CBC w/Diff Recent Labs     01/03/23  0501   WBC 6.3   RBC 2.90*   HGB 8.8*   HCT 27.6*           Microbiology No results for input(s): CULT in the last 72 hours. RADIOLOGY:    All available imaging studies/reports in [facility] for this admission were reviewed    Total time spent >35 minutes.  High complexity decision making was performed during the evaluation of this patient at high risk for decompensation with multiple organ involvement     Above mentioned total time spent on reviewing the case/medical record/data/notes/EMR/patient examination/documentation/coordinating care with nurse/consultants, exclusive of procedures with complex decision making performed and > 50% time spent in face to face evaluation. Disclaimer: Sections of this note are dictated utilizing voice recognition software, which may have resulted in some phonetic based errors in grammar and contents. Even though attempts were made to correct all the mistakes, some may have been missed, and remained in the body of the document. If questions arise, please contact our department.     Dr. Blossom Rushing, Infectious Disease Specialist  [phone]  January 3, 2023  11:30 AM

## 2023-01-03 NOTE — ROUTINE PROCESS
Patient is alert and oriented times three with no signs or symptoms of distress other than pain which was treated with prescribed pain medication.  Neuro is intact and wound vac is working

## 2023-01-04 LAB
ANION GAP SERPL CALC-SCNC: 10 MMOL/L (ref 3–18)
BUN SERPL-MCNC: 28 MG/DL (ref 7–18)
BUN/CREAT SERPL: 22 (ref 12–20)
CALCIUM SERPL-MCNC: 9.6 MG/DL (ref 8.5–10.1)
CHLORIDE SERPL-SCNC: 93 MMOL/L (ref 100–111)
CO2 SERPL-SCNC: 28 MMOL/L (ref 21–32)
CREAT SERPL-MCNC: 1.25 MG/DL (ref 0.6–1.3)
GLUCOSE BLD STRIP.AUTO-MCNC: 176 MG/DL (ref 70–110)
GLUCOSE BLD STRIP.AUTO-MCNC: 197 MG/DL (ref 70–110)
GLUCOSE BLD STRIP.AUTO-MCNC: 203 MG/DL (ref 70–110)
GLUCOSE BLD STRIP.AUTO-MCNC: 234 MG/DL (ref 70–110)
GLUCOSE SERPL-MCNC: 154 MG/DL (ref 74–99)
POTASSIUM SERPL-SCNC: 4.1 MMOL/L (ref 3.5–5.5)
SODIUM SERPL-SCNC: 131 MMOL/L (ref 136–145)

## 2023-01-04 PROCEDURE — 82962 GLUCOSE BLOOD TEST: CPT

## 2023-01-04 PROCEDURE — 74011636637 HC RX REV CODE- 636/637: Performed by: INTERNAL MEDICINE

## 2023-01-04 PROCEDURE — 74011250637 HC RX REV CODE- 250/637: Performed by: INTERNAL MEDICINE

## 2023-01-04 PROCEDURE — 65270000046 HC RM TELEMETRY

## 2023-01-04 PROCEDURE — 74011250636 HC RX REV CODE- 250/636: Performed by: ORTHOPAEDIC SURGERY

## 2023-01-04 PROCEDURE — 74011000250 HC RX REV CODE- 250: Performed by: INTERNAL MEDICINE

## 2023-01-04 PROCEDURE — 74011250637 HC RX REV CODE- 250/637: Performed by: ORTHOPAEDIC SURGERY

## 2023-01-04 PROCEDURE — 36415 COLL VENOUS BLD VENIPUNCTURE: CPT

## 2023-01-04 PROCEDURE — 74011250636 HC RX REV CODE- 250/636: Performed by: INTERNAL MEDICINE

## 2023-01-04 PROCEDURE — 74011250637 HC RX REV CODE- 250/637: Performed by: FAMILY MEDICINE

## 2023-01-04 PROCEDURE — 99232 SBSQ HOSP IP/OBS MODERATE 35: CPT | Performed by: FAMILY MEDICINE

## 2023-01-04 PROCEDURE — 80048 BASIC METABOLIC PNL TOTAL CA: CPT

## 2023-01-04 PROCEDURE — 97116 GAIT TRAINING THERAPY: CPT

## 2023-01-04 PROCEDURE — 74011250636 HC RX REV CODE- 250/636: Performed by: FAMILY MEDICINE

## 2023-01-04 PROCEDURE — 97164 PT RE-EVAL EST PLAN CARE: CPT

## 2023-01-04 PROCEDURE — 74011000250 HC RX REV CODE- 250: Performed by: ORTHOPAEDIC SURGERY

## 2023-01-04 RX ORDER — DOCUSATE SODIUM 100 MG/1
100 CAPSULE, LIQUID FILLED ORAL
Status: DISCONTINUED | OUTPATIENT
Start: 2023-01-04 | End: 2023-01-06 | Stop reason: SDUPTHER

## 2023-01-04 RX ORDER — FACIAL-BODY WIPES
10 EACH TOPICAL DAILY PRN
Status: DISCONTINUED | OUTPATIENT
Start: 2023-01-04 | End: 2023-01-13 | Stop reason: HOSPADM

## 2023-01-04 RX ADMIN — OXYCODONE HYDROCHLORIDE 10 MG: 5 TABLET ORAL at 16:56

## 2023-01-04 RX ADMIN — Medication 4 UNITS: at 11:41

## 2023-01-04 RX ADMIN — OXYCODONE HYDROCHLORIDE 10 MG: 5 TABLET ORAL at 23:22

## 2023-01-04 RX ADMIN — POLYETHYLENE GLYCOL 3350 17 G: 17 POWDER, FOR SOLUTION ORAL at 08:55

## 2023-01-04 RX ADMIN — ENOXAPARIN SODIUM 110 MG: 150 INJECTION SUBCUTANEOUS at 04:31

## 2023-01-04 RX ADMIN — Medication 5 MG: at 20:25

## 2023-01-04 RX ADMIN — ACETAMINOPHEN 1000 MG: 500 TABLET ORAL at 00:26

## 2023-01-04 RX ADMIN — CYCLOBENZAPRINE 10 MG: 10 TABLET, FILM COATED ORAL at 20:25

## 2023-01-04 RX ADMIN — Medication 2 UNITS: at 23:23

## 2023-01-04 RX ADMIN — ACETAMINOPHEN 1000 MG: 500 TABLET ORAL at 11:38

## 2023-01-04 RX ADMIN — FAMOTIDINE 20 MG: 20 TABLET ORAL at 08:56

## 2023-01-04 RX ADMIN — WATER 2 G: 1 INJECTION INTRAMUSCULAR; INTRAVENOUS; SUBCUTANEOUS at 08:57

## 2023-01-04 RX ADMIN — SODIUM CHLORIDE, PRESERVATIVE FREE 10 ML: 5 INJECTION INTRAVENOUS at 20:25

## 2023-01-04 RX ADMIN — THERA TABS 1 TABLET: TAB at 08:56

## 2023-01-04 RX ADMIN — HYDROMORPHONE HYDROCHLORIDE 1 MG: 1 INJECTION, SOLUTION INTRAMUSCULAR; INTRAVENOUS; SUBCUTANEOUS at 20:24

## 2023-01-04 RX ADMIN — Medication 4 UNITS: at 17:43

## 2023-01-04 RX ADMIN — OXYCODONE HYDROCHLORIDE 10 MG: 5 TABLET ORAL at 04:31

## 2023-01-04 RX ADMIN — GABAPENTIN 600 MG: 300 CAPSULE ORAL at 20:24

## 2023-01-04 RX ADMIN — CHOLECALCIFEROL TAB 25 MCG (1000 UNIT) 2000 UNITS: 25 TAB at 08:55

## 2023-01-04 RX ADMIN — ACETAMINOPHEN 1000 MG: 500 TABLET ORAL at 23:24

## 2023-01-04 RX ADMIN — DOCUSATE SODIUM 100 MG: 100 CAPSULE, LIQUID FILLED ORAL at 23:22

## 2023-01-04 RX ADMIN — SODIUM CHLORIDE, PRESERVATIVE FREE 10 ML: 5 INJECTION INTRAVENOUS at 06:07

## 2023-01-04 RX ADMIN — GABAPENTIN 600 MG: 300 CAPSULE ORAL at 17:39

## 2023-01-04 RX ADMIN — ATORVASTATIN CALCIUM 20 MG: 20 TABLET, FILM COATED ORAL at 20:25

## 2023-01-04 RX ADMIN — CYCLOBENZAPRINE 10 MG: 10 TABLET, FILM COATED ORAL at 13:04

## 2023-01-04 RX ADMIN — SODIUM CHLORIDE, PRESERVATIVE FREE 10 ML: 5 INJECTION INTRAVENOUS at 16:56

## 2023-01-04 RX ADMIN — ENOXAPARIN SODIUM 110 MG: 150 INJECTION SUBCUTANEOUS at 17:39

## 2023-01-04 RX ADMIN — SODIUM CHLORIDE, PRESERVATIVE FREE 10 ML: 5 INJECTION INTRAVENOUS at 23:24

## 2023-01-04 RX ADMIN — HYDROMORPHONE HYDROCHLORIDE 1 MG: 1 INJECTION, SOLUTION INTRAMUSCULAR; INTRAVENOUS; SUBCUTANEOUS at 00:25

## 2023-01-04 RX ADMIN — DOCUSATE SODIUM 100 MG: 100 CAPSULE, LIQUID FILLED ORAL at 08:55

## 2023-01-04 RX ADMIN — GABAPENTIN 600 MG: 300 CAPSULE ORAL at 22:00

## 2023-01-04 RX ADMIN — GABAPENTIN 600 MG: 300 CAPSULE ORAL at 08:55

## 2023-01-04 RX ADMIN — Medication 2 UNITS: at 08:56

## 2023-01-04 RX ADMIN — ACETAMINOPHEN 1000 MG: 500 TABLET ORAL at 06:07

## 2023-01-04 RX ADMIN — FAMOTIDINE 20 MG: 20 TABLET ORAL at 20:25

## 2023-01-04 RX ADMIN — HYDROMORPHONE HYDROCHLORIDE 1 MG: 1 INJECTION, SOLUTION INTRAMUSCULAR; INTRAVENOUS; SUBCUTANEOUS at 06:07

## 2023-01-04 RX ADMIN — LACTULOSE 15 ML: 20 SOLUTION ORAL at 23:22

## 2023-01-04 RX ADMIN — DOCUSATE SODIUM 100 MG: 100 CAPSULE, LIQUID FILLED ORAL at 17:39

## 2023-01-04 NOTE — PROGRESS NOTES
[facility] Hospitalists  Progress Note    Patient: aKren Weber Age: 57 y.o. : 1960 MR#: [id number] SSN: [id number]  Date: 2023     Subjective/24-hour events:     US yesterday afternoon + for DVT in LLE. Feeling better today, no worsening shortness of breath, denies chest pain. Ran fever again overnight to 101.2. Assessment:   Postoperative L-spine wound infection status post exploration irrigation and debridement and VAC placement 2020. LLE DVT   Fever, suspect secondary to DVT  Klebsiella pneumoniae bacteremia  DM2  Anemia   Hypertension  hyponatremia  Obesity, BMI 33.19    Plan:   MRI reviewed, results noted. Wound improving both clinically and radiographically, no indication for worsening infection. Continue anticoagulation therapy as ordered for DVT. Transition to p.o. therapy if no need for any further procedures. Based on MRI, do not anticipate this. Continue IV antibiotic therapy per ID recommendations. Follow repeat cultures done yesterday. PICC line already in place. VAC management per wound/ostomy care services. Continue to encourage ICS use and mobilization. Analgesia and bowel regimen as ordered. Other care primarily supportive. Disposition once arrangements finalized. Case discussed with:  [x]Patient  []Family  [x]Nursing  [x]Case Management  DVT Prophylaxis:  []Lovenox  []Hep SQ  [x]SCDs  []Coumadin   []On Heparin gtt    Objective:   VS: Visit Vitals  /73 (BP 1 Location: Left upper arm, BP Patient Position: At rest)   Pulse 91   Temp 98.7 °F (37.1 °C)   Resp 16   Ht 5' 11\" (1.803 m)   Wt 105.9 kg (233 lb 6.4 oz)   SpO2 94%   BMI 32.55 kg/m²      Tmax/24hrs: Temp (24hrs), Av.8 °F (37.1 °C), Min:97.8 °F (36.6 °C), Max:101.2 °F (38.4 °C)    Intake/Output Summary (Last 24 hours) at 2023 1419  Last data filed at 2023 1345  Gross per 24 hour   Intake 480 ml   Output 900 ml   Net -420 ml       General:  In NAD. Nontoxic-appearing. Cardiovascular:  RRR. Pulmonary:  Lungs clear bilaterally. No accessory muscle use. GI:  Abdomen soft, NTTP. Extremities:  Warm, no edema or ischemia. Neuro:  Awake and alert. Moves extremities spontaneously.       Labs:    Recent Results (from the past 24 hour(s))   GLUCOSE, POC    Collection Time: 01/03/23  4:32 PM   Result Value Ref Range    Glucose (POC) 185 (H) 70 - 110 mg/dL   GLUCOSE, POC    Collection Time: 01/03/23  9:36 PM   Result Value Ref Range    Glucose (POC) 149 (H) 70 - 110 mg/dL   GLUCOSE, POC    Collection Time: 01/04/23  8:06 AM   Result Value Ref Range    Glucose (POC) 176 (H) 70 - 110 mg/dL   GLUCOSE, POC    Collection Time: 01/04/23 10:43 AM   Result Value Ref Range    Glucose (POC) 203 (H) 70 - 110 mg/dL       Signed By: Jaron Polanco MD     January 4, 2023

## 2023-01-04 NOTE — PROGRESS NOTES
Infectious Disease progress Note        Reason: lumbar abscess, gram-negative bloodstream infection    Current abx Prior abx   Zosyn, vancomycin since 12/22-12/25  Ceftriaxone since 12/26      Lines:       Assessment :  78-year-old man with past medical history significant for type 2 diabetes, hypertension, spinal stenosis s/p lumbar laminectomy/fusion surgery on 12/7/2022 presented to [facility] on 12/22/22 for incontinence and back pain. Now with gram-negative bacteremia, significant purulence noted with IR guided aspiration lumbar fluid collection on 12/23/2022    Clinical presentation consistent with klebsiella bloodstream infection (positive blood culture 12/22, negative blood culture 12/24), lumbar surgical site infected fluid collection/abscess likely due to indolent pathogens. Single positive blood culture for Klebsiella in blood cx 12/22 likely due to lumbar abscess    Status post I&D lumbar abscess on 12/23/2022. Intraoperative cultures Klebsiella    Acute kidney injury-likely secondary to volume depletion. Improving    Now with fever with tm:101.9 on 1/2/23 likely due to LE DVT as seen on venous duplex 1/3/23. No definitive clinical/radiographic evidence of worsening paraspinal infection. Negative respiratory viral panel PCR 1/3/2023 argues against viral infections. No evidence of secondary bloodstream infection     Recommendations:    Continue IV ceftriaxone till 2/6/2023  Follow-up results of  blood culture  Anticoagulation/management of DVT per primary team  follow-up spine surgery recommendations regarding wound care  5. Discharge planning per primary team       Above plan was discussed in details with patient, dr. Cindy Marsh, dr. Tiff Ryan. Please call me if any further questions or concerns. Will continue to participate in the care of this patient. HPI:  Feels fine. No new complaints    Patient denies any fever or chills, cough. Denies increasing chest pain, shortness of breath, abdominal pain. Past Medical History:   Diagnosis Date    Arthritis     Back pain     from previous back injury    Colon polyps     Diabetes ([facility] [address]     7065    Hernia, umbilical     Hyperlipidemia     Hypertension     Pneumonia        Past Surgical History:   Procedure Laterality Date    HX COLONOSCOPY  2014    polyps    HX HEENT      left lazy eye procedure during childhood    HX HEMORRHOIDECTOMY      HX HERNIA REPAIR      2021    HX ORTHOPAEDIC  01/09/2017    right knee    HX ORTHOPAEDIC  1985    torn cartilage knee    HX TONSILLECTOMY  1964       Current Discharge Medication List        CONTINUE these medications which have NOT CHANGED    Details   HYDROmorphone (DILAUDID) 4 mg tablet Take 4 mg by mouth every six (6) hours as needed for Pain.      polyethylene glycol (MIRALAX) 17 gram/dose powder Take 17 g by mouth daily. Indications: constipation  Qty: 116 g, Refills: 0      aspirin delayed-release 81 mg tablet Take 81 mg by mouth daily. OTHER,NON-FORMULARY, Indications: maxforce prostate 1 tab twice daily      multivitamin (ONE A DAY) tablet Take 1 Tablet by mouth daily. metFORMIN (GLUCOPHAGE) 500 mg tablet TAKE 1 TABLET BY MOUTH TWICE DAILY WITH A MEAL      cholecalciferol (VITAMIN D3) (1000 Units /25 mcg) tablet Take 2,000 Units by mouth daily. gabapentin (NEURONTIN) 300 mg capsule 600 mg three (3) times daily. lisinopriL (PRINIVIL, ZESTRIL) 40 mg tablet TAKE 1 TABLET BY MOUTH ONCE DAILY FOR BLOOD PRESSURE FOR 90 DAYS      simvastatin (ZOCOR) 40 mg tablet TAKE 1 TABLET BY MOUTH ONCE DAILY IN THE EVENING FOR CHOLESTEROL FOR 90 DAYS      acetaminophen (TYLENOL) 500 mg tablet Take  by mouth every six (6) hours as needed for Pain.              Current Facility-Administered Medications   Medication Dose Route Frequency    enoxaparin (LOVENOX) injection 110 mg  1 mg/kg SubCUTAneous Q12H    famotidine (PEPCID) tablet 20 mg  20 mg Oral Q12H    oxyCODONE IR (ROXICODONE) tablet 5-10 mg  5-10 mg Oral Q4H PRN polyethylene glycol (MIRALAX) packet 17 g  17 g Oral DAILY    cyclobenzaprine (FLEXERIL) tablet 10 mg  10 mg Oral TID PRN    insulin lispro (HUMALOG) injection   SubCUTAneous AC&HS    cefTRIAXone (ROCEPHIN) 2 g in sterile water (preservative free) 20 mL IV syringe  2 g IntraVENous Q24H    flumazeniL (ROMAZICON) 0.1 mg/mL injection 0.2 mg  0.2 mg IntraVENous PRN    sodium chloride (NS) flush 5-40 mL  5-40 mL IntraVENous PRN    acetaminophen (TYLENOL) tablet 1,000 mg  1,000 mg Oral Q6H    HYDROmorphone (DILAUDID) injection 1 mg  1 mg IntraVENous Q4H PRN    phenol throat spray (CHLORASEPTIC) 1 Spray  1 Spray Oral PRN    benzocaine-menthoL (CEPACOL) lozenge 1 Lozenge  1 Lozenge Oral PRN    diphenhydrAMINE (BENADRYL) injection 12.5 mg  12.5 mg IntraVENous Q4H PRN    docusate sodium (COLACE) capsule 100 mg  100 mg Oral BID    naloxone (NARCAN) injection 0.4 mg  0.4 mg IntraVENous EVERY 2 MINUTES AS NEEDED    sodium chloride (NS) flush 5-40 mL  5-40 mL IntraVENous Q8H    acetaminophen (TYLENOL) tablet 650 mg  650 mg Oral Q6H PRN    Or    acetaminophen (TYLENOL) suppository 650 mg  650 mg Rectal Q6H PRN    ondansetron (ZOFRAN ODT) tablet 4 mg  4 mg Oral Q8H PRN    Or    ondansetron (ZOFRAN) injection 4 mg  4 mg IntraVENous Q6H PRN    melatonin tablet 5 mg  5 mg Oral QHS PRN    albuterol-ipratropium (DUO-NEB) 2.5 MG-0.5 MG/3 ML  3 mL Nebulization Q6H PRN    glucose chewable tablet 16 g  4 Tablet Oral PRN    glucagon (GLUCAGEN) injection 1 mg  1 mg IntraMUSCular PRN    dextrose 10% infusion 0-250 mL  0-250 mL IntraVENous PRN    cholecalciferol (VITAMIN D3) (1000 Units /25 mcg) tablet 2,000 Units  2,000 Units Oral DAILY    gabapentin (NEURONTIN) capsule 600 mg  600 mg Oral TID    therapeutic multivitamin (THERAGRAN) tablet 1 Tablet  1 Tablet Oral DAILY    atorvastatin (LIPITOR) tablet 20 mg  20 mg Oral QHS       Allergies: Patient has no known allergies. History reviewed. No pertinent family history.   Social History Socioeconomic History    Marital status:      Spouse name: Not on file    Number of children: Not on file    Years of education: Not on file    Highest education level: Not on file   Occupational History    Not on file   Tobacco Use    Smoking status: Former     Years: 5.00     Types: Cigarettes     Quit date: 2021     Years since quittin.9    Smokeless tobacco: Never    Tobacco comments:     3-4 cig per day, told not to smoke or drink 24/hrs prior to surgery   Vaping Use    Vaping Use: Never used   Substance and Sexual Activity    Alcohol use: Yes     Alcohol/week: 2.0 standard drinks     Types: 2 Cans of beer per week     Comment: 2 per month    Drug use: Never    Sexual activity: Never   Other Topics Concern    Not on file   Social History Narrative    Not on file     Social Determinants of Health     Financial Resource Strain: Not on file   Food Insecurity: Not on file   Transportation Needs: Not on file   Physical Activity: Not on file   Stress: Not on file   Social Connections: Not on file   Intimate Partner Violence: Not on file   Housing Stability: Not on file     Social History     Tobacco Use   Smoking Status Former    Years:     Types: Cigarettes    Quit date: 2021    Years since quittin.9   Smokeless Tobacco Never   Tobacco Comments    3-4 cig per day, told not to smoke or drink 24/hrs prior to surgery        Temp (24hrs), Av.9 °F (37.2 °C), Min:97.8 °F (36.6 °C), Max:101.2 °F (38.4 °C)    Visit Vitals  /82   Pulse 92   Temp 97.8 °F (36.6 °C)   Resp 16   Ht 5' 11\" (1.803 m)   Wt 105.9 kg (233 lb 6.4 oz)   SpO2 95%   BMI 32.55 kg/m²       ROS: 12 point ROS obtained in details. Pertinent positives as mentioned in HPI,   otherwise negative    Physical Exam:    General: Well developed, well nourished male laying on the bed AAOx3 in no acute distress.     HEENT:  Normocephalic, atraumatic,EOMI, no scleral icterus or pallor; no conjunctival hemmohage;  nasal and oral mucous are moist and without evidence of lesions. Neck supple, no bruits. Lymph Nodes:   no cervical, axillary or inguinal adenopathy   Lungs:   non-labored, bilateral clear to auscultation- no crackles wheezes rales or rhonchi   Heart:  RRR, s1 and s2; no rubs or gallops, no edema, + pedal pulses   Abdomen:  soft, non-distended, active bowel sounds, no hepatomegaly, no splenomegaly. Non-tender   Genitourinary:  deferred   Extremities:   no clubbing, cyanosis; no joint effusions or swelling;  muscle mass appropriate for age   Neurologic:  No gross focal sensory abnormalities; 5/5 muscle strength to upper and lower extremities. Speech appropriate. Cranial nerves intact                        Skin:  No rash or ulcers noted   Back: Tsurgical site covered with dressing     Psychiatric:  No suicidal or homicidal ideations, appropriate mood and affect         Labs: Results:   Chemistry Recent Labs     01/03/23  0501 01/02/23  1433   * 159*   * 130*   K 4.1 4.1   CL 93* 92*   CO2 32 30   BUN 22* 20*   CREA 1.17 1.18   CA 9.5 10.0   AGAP 7 8   BUCR 19 17        CBC w/Diff Recent Labs     01/03/23  0501   WBC 6.3   RBC 2.90*   HGB 8.8*   HCT 27.6*           Microbiology Recent Labs     01/03/23  0935   CULT NO GROWTH AFTER 19 HOURS            RADIOLOGY:    All available imaging studies/reports in [facility] for this admission were reviewed      Disclaimer: Sections of this note are dictated utilizing voice recognition software, which may have resulted in some phonetic based errors in grammar and contents. Even though attempts were made to correct all the mistakes, some may have been missed, and remained in the body of the document. If questions arise, please contact our department.     Dr. Palma Verdugo, Infectious Disease Specialist  [phone]  January 4, 2023  11:30 AM

## 2023-01-04 NOTE — PROGRESS NOTES
Diabetes education has requested that we change from Humalog to Fiasp insulin. Order was sent to Farida Matthews. Gave patient a bath and change bed and clothes.

## 2023-01-04 NOTE — PROGRESS NOTES
Patient c/o not being able to move, and requires extensive care, pt later observed moving and sitting up on side of bed w/o assistance   Pt also observed transferring to bedside commode w/o assistance  Pt also complains of excruciating pain to right arm where picc line is, no warmness noted, no swelling, no bruising. MD made aware, will continue to monitor.

## 2023-01-04 NOTE — PROGRESS NOTES
Comprehensive Nutrition Assessment    Type and Reason for Visit: Reassess, RD nutrition re-screen/LOS    Nutrition Recommendations/Plan:   Add oral nutrition supplement to optimize nutrition intake opportunity: Jose (each provides 80 kcals, 2.5 g collagen protein, 300 mg vitamin C, 9.5 mg zinc) BID    Continue oral nutrition supplement to optimize nutrition intake opportunity: Glucerna (each provides 220 kcal, 10g protein) BID    Continue current diet as tolerated by patient   Monitor PO intake, compliance of oral supplement, weight, labs, and plan of care during admission. Malnutrition Assessment:  Malnutrition Status: At risk for malnutrition (specify) (r/t varied PO intake PTA, advance age and wounds) (12/29/22 1301)      Nutrition Assessment:    Pt admitted for back pain; s/p spinal fusion on 12/07/2022 per MD note. Pt reported   fecal incontinence, urinary incontinence (occurred before back surgery as well), loss of appetite, weakness, diarrhea, and intermittent, chronic pressure with urination; fevers, chills, and diaphoresis over the last 3 days PTA. Pt continues to tolerate current diet with at least 75% PO intake at most meals. Per chart review, pt now has fever and with wound vac pending to be changed. Current lab shows low Na (132) and elevated BUN (22). Wound noted. Nutrition Related Findings:    Last BM 12/31. Output: 250mL (urine). Pertinent Medications: lovenox, pepcid, miralax, humalog, colace, zofran, vitamin D3, lipitor. Wound Type: Wound vac, Multiple, Skin tears, Surgical incision     Current Nutrition Intake & Therapies:  Average Meal Intake: 51-75%  Average Supplement Intake: 51-75%  ADULT DIET Regular; 5 carb choices (75 gm/meal); Pt requested double protein at each meal. Thanks!   ADULT ORAL NUTRITION SUPPLEMENT Breakfast, Dinner; Diabetic Supplement    Anthropometric Measures:  Height: 5' 11\" (180.3 cm)  Ideal Body Weight (IBW): 172 lbs (78 kg)  Admission Body Weight: 238 lb 1.6 oz  Current Body Wt:  108 kg (238 lb 1.6 oz), 138.4 % IBW. Not specified  Current BMI (kg/m2): 33.2        Weight Adjustment: No adjustment  BMI Category: Obese class 1 (BMI 30.0-34. 9)    Estimated Daily Nutrient Needs:  Energy Requirements Based On: Formula (MSJ x 1.2-1.4)  Weight Used for Energy Requirements: Current  Energy (kcal/day): 2954-1717  Weight Used for Protein Requirements: Current (1.0-1.3)  Protein (g/day): 108-140  Method Used for Fluid Requirements: 1 ml/kcal  Fluid (ml/day): 1313-4975    Nutrition Diagnosis:   Increased nutrient needs related to acute injury/trauma, increased demand for energy/nutrients as evidenced by poor intake prior to admission, wounds    Nutrition Interventions:   Food and/or Nutrient Delivery: Continue current diet, Modify oral nutrition supplement, Mineral supplement, Vitamin supplement  Nutrition Education/Counseling: Education not indicated, No recommendations at this time  Coordination of Nutrition Care: Continue to monitor while inpatient  Plan of Care discussed with: patient    Goals:  Previous Goal Met: Progressing toward goal(s)  Goals: Meet at least 75% of estimated needs, by next RD assessment       Nutrition Monitoring and Evaluation:   Behavioral-Environmental Outcomes: None identified  Food/Nutrient Intake Outcomes: Diet advancement/tolerance, Food and nutrient intake, Supplement intake, Vitamin/mineral intake  Physical Signs/Symptoms Outcomes: Biochemical data, Meal time behavior, Nutrition focused physical findings, Weight, GI status    Discharge Planning:    Continue current diet, Continue oral nutrition supplement    Claudene Dauphin, MA, RDN, LD   Contact: [phone]

## 2023-01-04 NOTE — PROGRESS NOTES
Bedside shift report received from [facility], RN  Pt is a/ox4, denies pain, resting quietly in bed watching television  Pt c/o not receiving bath, pt was offered bath this morning and refused, stating he had a bath last night. Darian Cortez RN, CCL made aware.

## 2023-01-04 NOTE — PROGRESS NOTES
Problem: Patient Education: Go to Patient Education Activity  Goal: Patient/Family Education  Outcome: Progressing Towards Goal     Problem: Falls - Risk of  Goal: *Absence of Falls  Description: Document Ximena Ground Fall Risk and appropriate interventions in the flowsheet. Outcome: Progressing Towards Goal  Note: Fall Risk Interventions:  Mobility Interventions: Patient to call before getting OOB         Medication Interventions: Teach patient to arise slowly, Patient to call before getting OOB, Evaluate medications/consider consulting pharmacy    Elimination Interventions: Call light in reach, Patient to call for help with toileting needs    History of Falls Interventions: Consult care management for discharge planning, Door open when patient unattended, Investigate reason for fall, Utilize gait belt for transfer/ambulation         Problem: Patient Education: Go to Patient Education Activity  Goal: Patient/Family Education  Outcome: Progressing Towards Goal     Problem: Patient Education: Go to Patient Education Activity  Goal: Patient/Family Education  Outcome: Progressing Towards Goal     Problem: Nutrition Deficit  Goal: *Optimize nutritional status  Outcome: Progressing Towards Goal     Problem: Risk for Spread of Infection  Goal: Prevent transmission of infectious organism to others  Description: Prevent the transmission of infectious organisms to other patients, staff members, and visitors. Outcome: Progressing Towards Goal     Problem: Patient Education:  Go to Education Activity  Goal: Patient/Family Education  Outcome: Progressing Towards Goal     Problem: Pressure Injury - Risk of  Goal: *Prevention of pressure injury  Description: Document Bharathi Scale and appropriate interventions in the flowsheet.   Outcome: Progressing Towards Goal  Note: Pressure Injury Interventions:  Sensory Interventions: Assess need for specialty bed, Pressure redistribution bed/mattress (bed type)    Moisture Interventions: Absorbent underpads    Activity Interventions: PT/OT evaluation, Pressure redistribution bed/mattress(bed type), Increase time out of bed    Mobility Interventions: PT/OT evaluation, Pressure redistribution bed/mattress (bed type), HOB 30 degrees or less    Nutrition Interventions: Document food/fluid/supplement intake    Friction and Shear Interventions: HOB 30 degrees or less                Problem: Patient Education: Go to Patient Education Activity  Goal: Patient/Family Education  Outcome: Progressing Towards Goal

## 2023-01-04 NOTE — PROGRESS NOTES
Problem: Mobility Impaired (Adult and Pediatric)  Goal: *Acute Goals and Plan of Care (Insert Text)  Description: Physical Therapy Goals  Initiated 12/23/2022, re-evaluated 12/29/22 and goals adjusted as needed and to be accomplished within 7 day(s)  1. Patient will move from supine to sit and sit to supine , scoot up and down, and roll side to side in bed with modified independence. 2.  Patient will transfer from bed to chair and chair to bed with modified independence using the least restrictive device. 3.  Patient will perform sit to stand with modified independence. 4.  Patient will ambulate with modified independence for 200 feet with the least restrictive device. 5.  Patient will ascend/descend 12 stairs with unilateral handrail(s) with modified independence. PLOF: Pt reporting living in 2 story house with 3 [address]REA with handrails, alone but has [de-identified]year old neighbor that is taking him home. Reports he did not change his socks/shower/go upstairs since discharge. Outcome: Progressing Towards Goal    PHYSICAL THERAPY RE-EVALUATION    Patient: Larry Cruz (63 y.o. male)  Date: 1/4/2023  Primary Diagnosis: VIPIN (acute kidney injury) ([facility] [address] [N17.9]  Dehydration [E86.0]  Procedure(s) (LRB):  INCISION AND DRAINAGE LUMBAR SPINE/ APPLICATION OF WOUND VAC (N/A) 12 Days Post-Op   Precautions:   Fall, Spinal, Skin      ASSESSMENT :  Based on the objective data described below, the patient presents with decreased strength, decreased activity tolerance, and gait deficits. Patient agreeable to get OOB at third attempt with max encouragement. He c/o increased right shoulder pain following PICC placement. Min A supine to sit transfer. He stands up with min A pulling at RW despite verbal cues for safe hand placement. Patient ambulates 40 ft using RW with very slow gait and decreased step clearance. He reports feeling weak. Patient returns to bed with call bell in reach and bed alarm activated.        Patient will benefit from skilled intervention to address the above impairments. Patient's rehabilitation potential is considered to be Good  Factors which may influence rehabilitation potential include:   []         None noted  [x]         Mental ability/status  []         Medical condition  [x]         Home/family situation and support systems  [x]         Safety awareness  [x]         Pain tolerance/management  []         Other:      PLAN :  Recommendations and Planned Interventions:   [x]           Bed Mobility Training             [x]    Neuromuscular Re-Education  [x]           Transfer Training                   []    Orthotic/Prosthetic Training  [x]           Gait Training                          []    Modalities  [x]           Therapeutic Exercises           [x]    Edema Management/Control  [x]           Therapeutic Activities            []    Family Training/Education  [x]           Patient Education  []           Other (comment):    Frequency/Duration: Patient will be followed by physical therapy 1-2 times per day/4-7 days per week to address goals. Further Equipment Recommendations for Discharge: rolling walker    AMPAC:Current research shows that an AM-PAC score of 17 (13 without stairs) or less is not associated with a discharge to the patient's home setting. Based on an AM-PAC score of 16/24 (**/20 if omitting stairs) and their current functional mobility deficits, it is recommended that the patient have 3-5 sessions per week of Physical Therapy at d/c to increase the patient's independence. This AMPAC score should be considered in conjunction with interdisciplinary team recommendations to determine the most appropriate discharge setting. Patient's social support, diagnosis, medical stability, and prior level of function should also be taken into consideration. SUBJECTIVE:   Patient stated Im going to wear sneakers next time  .     OBJECTIVE DATA SUMMARY:   Hospital course since last seen and reason for re-evaluation: Pt making slow, varying progress. Past Medical History:   Diagnosis Date    Arthritis     Back pain     from previous back injury    Colon polyps     Diabetes ([facility] [address]     5897    Hernia, umbilical     Hyperlipidemia     Hypertension     Pneumonia      Past Surgical History:   Procedure Laterality Date    HX COLONOSCOPY  2014    polyps    HX HEENT      left lazy eye procedure during childhood    HX HEMORRHOIDECTOMY      HX HERNIA REPAIR      2021    HX ORTHOPAEDIC  01/09/2017    right knee    HX ORTHOPAEDIC  1985    torn cartilage knee    HX TONSILLECTOMY  1964     Barriers to Learning/Limitations: yes;  impulsive  Compensate with: Visual Cues and Verbal Cues  Home Situation:   Home Situation  Home Environment: Private residence  # Steps to Enter: 3  Rails to Enter: Yes  One/Two Story Residence: One story  Living Alone: Yes  Support Systems: Other Family Member(s), Friend/Neighbor  Patient Expects to be Discharged to[de-identified] Skilled nursing facility  Current DME Used/Available at Home: [employer], rollator  Tub or Shower Type: Tub/Shower combination (upstairs)  Critical Behavior:  Neurologic State: Alert  Orientation Level: Oriented X4  Cognition: Follows commands; Appropriate decision making; Appropriate for age attention/concentration; Appropriate safety awareness     Psychosocial  Patient Behaviors: Calm  Purposeful Interaction: Yes  Pt Identified Daily Priority: Clinical issues (comment)  Car[employer] Process: Nurture loving kindness; Teaching/learning; Attend basic human needs;Create healing environment  Caring Interventions: Reassure  Reassure: Informing; Acceptance;Quiet presence  Therapeutic Modalities: Deep breathing  Skin Condition/Temp: Dry;Warm     Skin Integrity: Wound (add Wound LDA)  Skin Integumentary  Skin Color: Appropriate for ethnicity  Skin Condition/Temp: Dry;Warm  Skin Integrity: Wound (add Wound LDA)     Strength:    Strength: Generally decreased, functional                    Tone & Sensation: Tone: Normal              Sensation: Intact               Range Of Motion:  AROM: Generally decreased, functional           PROM: Within functional limits             Functional Mobility:  Bed Mobility:     Supine to Sit: Minimum assistance  Sit to Supine: Contact guard assistance     Transfers:  Sit to Stand: Minimum assistance  Stand to Sit: Contact guard assistance                    Balance:   Sitting: Impaired; Without support  Standing: Impaired; With support  Standing - Static: Fair  Standing - Dynamic : Fair      Ambulation/Gait Training:  Distance (ft): 40 Feet (ft)  Assistive Device: Walker, rolling  Ambulation - Level of Assistance: Contact guard assistance        Gait Abnormalities: Decreased step clearance                  Pain:  Pain level pre-treatment: 10/10   Pain level post-treatment: 10/10   Pain Intervention(s) : Medication (see MAR); Rest, Ice, Repositioning\   Response to intervention: Nurse notified, See doc flow    Activity Tolerance:   Fair  Please refer to the flowsheet for vital signs taken during this treatment. After treatment:   []         Patient left in no apparent distress sitting up in chair  [x]         Patient left in no apparent distress in bed  [x]         Call bell left within reach  [x]         Nursing notified  []         Caregiver present  [x]         Bed alarm activated  []         SCDs applied    COMMUNICATION/EDUCATION:   [x]         Role of Physical Therapy in the acute care setting. [x]         Fall prevention education was provided and the patient/caregiver indicated understanding. [x]         Patient/family have participated as able in goal setting and plan of care. [x]         Patient/family agree to work toward stated goals and plan of care. []         Patient understands intent and goals of therapy, but is neutral about his/her participation. []         Patient is unable to participate in goal setting/plan of care: ongoing with therapy staff.   [] Other:    Thank you for this referral.  Elisa Setting, PT   Time Calculation: 23 mins    Hung Howell AM-PAC® Basic Mobility Inpatient Short Form (6-Clicks) Version 2    How much HELP from another person does the patient currently need    (If the patient hasn't done an activity recently, how much help from another person do you think he/she would need if he/she tried?)   Total (Total A or Dep)   A Lot  (Mod to Max A)   A Little (Sup or Min A)   None (Mod I to I)   Turning from your back to your side while in a flat bed without using bedrails? [] 1 [] 2 [x] 3 [] 4   2. Moving from lying on your back to sitting on the side of a flat bed without using bedrails? [] 1 [] 2 [x] 3 [] 4   3. Moving to and from a bed to a chair (including a wheelchair)? [] 1 [] 2 [x] 3 [] 4   4. Standing up from a chair using your arms (e.g., wheelchair, or bedside chair)? [] 1 [] 2 [x] 3 [] 4   5. Walking in hospital room? [] 1 [] 2 [x] 3 [] 4   6. Climbing 3-5 steps with a railing?+   [] 1 [x] 2 [] 3 [] 4   +If stair climbing cannot be assessed, skip item #6. Sum responses from items 1-5.

## 2023-01-05 LAB
ANION GAP SERPL CALC-SCNC: 6 MMOL/L (ref 3–18)
BASOPHILS # BLD: 0 K/UL (ref 0–0.1)
BASOPHILS NFR BLD: 1 % (ref 0–2)
BUN SERPL-MCNC: 25 MG/DL (ref 7–18)
BUN/CREAT SERPL: 22 (ref 12–20)
CALCIUM SERPL-MCNC: 9.5 MG/DL (ref 8.5–10.1)
CHLORIDE SERPL-SCNC: 94 MMOL/L (ref 100–111)
CO2 SERPL-SCNC: 31 MMOL/L (ref 21–32)
CREAT SERPL-MCNC: 1.14 MG/DL (ref 0.6–1.3)
DIFFERENTIAL METHOD BLD: ABNORMAL
EOSINOPHIL # BLD: 0 K/UL (ref 0–0.4)
EOSINOPHIL NFR BLD: 1 % (ref 0–5)
ERYTHROCYTE [DISTWIDTH] IN BLOOD BY AUTOMATED COUNT: 14.5 % (ref 11.6–14.5)
GLUCOSE BLD STRIP.AUTO-MCNC: 176 MG/DL (ref 70–110)
GLUCOSE BLD STRIP.AUTO-MCNC: 193 MG/DL (ref 70–110)
GLUCOSE BLD STRIP.AUTO-MCNC: 195 MG/DL (ref 70–110)
GLUCOSE BLD STRIP.AUTO-MCNC: 232 MG/DL (ref 70–110)
GLUCOSE SERPL-MCNC: 146 MG/DL (ref 74–99)
HCT VFR BLD AUTO: 26.4 % (ref 36–48)
HGB BLD-MCNC: 8.4 G/DL (ref 13–16)
IMM GRANULOCYTES # BLD AUTO: 0 K/UL (ref 0–0.04)
IMM GRANULOCYTES NFR BLD AUTO: 0 % (ref 0–0.5)
LYMPHOCYTES # BLD: 1.3 K/UL (ref 0.9–3.6)
LYMPHOCYTES NFR BLD: 22 % (ref 21–52)
MCH RBC QN AUTO: 29.6 PG (ref 24–34)
MCHC RBC AUTO-ENTMCNC: 31.8 G/DL (ref 31–37)
MCV RBC AUTO: 93 FL (ref 78–100)
MONOCYTES # BLD: 0.8 K/UL (ref 0.05–1.2)
MONOCYTES NFR BLD: 13 % (ref 3–10)
NEUTS SEG # BLD: 3.8 K/UL (ref 1.8–8)
NEUTS SEG NFR BLD: 64 % (ref 40–73)
NRBC # BLD: 0 K/UL (ref 0–0.01)
NRBC BLD-RTO: 0 PER 100 WBC
PLATELET # BLD AUTO: 346 K/UL (ref 135–420)
PMV BLD AUTO: 9.4 FL (ref 9.2–11.8)
POTASSIUM SERPL-SCNC: 3.9 MMOL/L (ref 3.5–5.5)
RBC # BLD AUTO: 2.84 M/UL (ref 4.35–5.65)
SODIUM SERPL-SCNC: 131 MMOL/L (ref 136–145)
WBC # BLD AUTO: 5.9 K/UL (ref 4.6–13.2)

## 2023-01-05 PROCEDURE — 65270000046 HC RM TELEMETRY

## 2023-01-05 PROCEDURE — 97164 PT RE-EVAL EST PLAN CARE: CPT

## 2023-01-05 PROCEDURE — 97535 SELF CARE MNGMENT TRAINING: CPT

## 2023-01-05 PROCEDURE — 97116 GAIT TRAINING THERAPY: CPT

## 2023-01-05 PROCEDURE — 74011250636 HC RX REV CODE- 250/636: Performed by: ORTHOPAEDIC SURGERY

## 2023-01-05 PROCEDURE — 74011000250 HC RX REV CODE- 250: Performed by: INTERNAL MEDICINE

## 2023-01-05 PROCEDURE — 97530 THERAPEUTIC ACTIVITIES: CPT

## 2023-01-05 PROCEDURE — 74011000250 HC RX REV CODE- 250: Performed by: ORTHOPAEDIC SURGERY

## 2023-01-05 PROCEDURE — 74011250637 HC RX REV CODE- 250/637: Performed by: ORTHOPAEDIC SURGERY

## 2023-01-05 PROCEDURE — 74011250636 HC RX REV CODE- 250/636: Performed by: FAMILY MEDICINE

## 2023-01-05 PROCEDURE — 74011250637 HC RX REV CODE- 250/637: Performed by: FAMILY MEDICINE

## 2023-01-05 PROCEDURE — 74011250636 HC RX REV CODE- 250/636: Performed by: INTERNAL MEDICINE

## 2023-01-05 PROCEDURE — 74011636637 HC RX REV CODE- 636/637: Performed by: INTERNAL MEDICINE

## 2023-01-05 PROCEDURE — 82962 GLUCOSE BLOOD TEST: CPT

## 2023-01-05 PROCEDURE — 80048 BASIC METABOLIC PNL TOTAL CA: CPT

## 2023-01-05 PROCEDURE — 85025 COMPLETE CBC W/AUTO DIFF WBC: CPT

## 2023-01-05 RX ORDER — OXYCODONE AND ACETAMINOPHEN 5; 325 MG/1; MG/1
1-2 TABLET ORAL
Status: DISCONTINUED | OUTPATIENT
Start: 2023-01-05 | End: 2023-01-10

## 2023-01-05 RX ORDER — HYDROMORPHONE HYDROCHLORIDE 1 MG/ML
1 INJECTION, SOLUTION INTRAMUSCULAR; INTRAVENOUS; SUBCUTANEOUS
Status: DISCONTINUED | OUTPATIENT
Start: 2023-01-05 | End: 2023-01-06

## 2023-01-05 RX ADMIN — GABAPENTIN 600 MG: 300 CAPSULE ORAL at 08:30

## 2023-01-05 RX ADMIN — Medication 5 MG: at 23:18

## 2023-01-05 RX ADMIN — FAMOTIDINE 20 MG: 20 TABLET ORAL at 08:29

## 2023-01-05 RX ADMIN — THERA TABS 1 TABLET: TAB at 08:29

## 2023-01-05 RX ADMIN — OXYCODONE HYDROCHLORIDE 10 MG: 5 TABLET ORAL at 16:32

## 2023-01-05 RX ADMIN — DOCUSATE SODIUM 100 MG: 100 CAPSULE, LIQUID FILLED ORAL at 08:30

## 2023-01-05 RX ADMIN — CYCLOBENZAPRINE 10 MG: 10 TABLET, FILM COATED ORAL at 12:48

## 2023-01-05 RX ADMIN — GABAPENTIN 600 MG: 300 CAPSULE ORAL at 16:32

## 2023-01-05 RX ADMIN — ATORVASTATIN CALCIUM 20 MG: 20 TABLET, FILM COATED ORAL at 23:18

## 2023-01-05 RX ADMIN — SODIUM CHLORIDE, PRESERVATIVE FREE 10 ML: 5 INJECTION INTRAVENOUS at 14:51

## 2023-01-05 RX ADMIN — ENOXAPARIN SODIUM 110 MG: 150 INJECTION SUBCUTANEOUS at 05:46

## 2023-01-05 RX ADMIN — ACETAMINOPHEN 1000 MG: 500 TABLET ORAL at 12:47

## 2023-01-05 RX ADMIN — CYCLOBENZAPRINE 10 MG: 10 TABLET, FILM COATED ORAL at 23:18

## 2023-01-05 RX ADMIN — DOCUSATE SODIUM 100 MG: 100 CAPSULE, LIQUID FILLED ORAL at 17:58

## 2023-01-05 RX ADMIN — Medication 2 UNITS: at 17:58

## 2023-01-05 RX ADMIN — CHOLECALCIFEROL TAB 25 MCG (1000 UNIT) 2000 UNITS: 25 TAB at 08:30

## 2023-01-05 RX ADMIN — GABAPENTIN 600 MG: 300 CAPSULE ORAL at 23:18

## 2023-01-05 RX ADMIN — HYDROMORPHONE HYDROCHLORIDE 1 MG: 1 INJECTION, SOLUTION INTRAMUSCULAR; INTRAVENOUS; SUBCUTANEOUS at 18:19

## 2023-01-05 RX ADMIN — Medication 2 UNITS: at 12:00

## 2023-01-05 RX ADMIN — WATER 2 G: 1 INJECTION INTRAMUSCULAR; INTRAVENOUS; SUBCUTANEOUS at 08:30

## 2023-01-05 RX ADMIN — ACETAMINOPHEN 1000 MG: 500 TABLET ORAL at 05:47

## 2023-01-05 RX ADMIN — POLYETHYLENE GLYCOL 3350 17 G: 17 POWDER, FOR SOLUTION ORAL at 08:30

## 2023-01-05 RX ADMIN — SODIUM CHLORIDE, PRESERVATIVE FREE 10 ML: 5 INJECTION INTRAVENOUS at 07:10

## 2023-01-05 RX ADMIN — ENOXAPARIN SODIUM 110 MG: 150 INJECTION SUBCUTANEOUS at 16:33

## 2023-01-05 RX ADMIN — ACETAMINOPHEN 1000 MG: 500 TABLET ORAL at 17:58

## 2023-01-05 RX ADMIN — OXYCODONE HYDROCHLORIDE 10 MG: 5 TABLET ORAL at 23:18

## 2023-01-05 RX ADMIN — SODIUM CHLORIDE, PRESERVATIVE FREE 10 ML: 5 INJECTION INTRAVENOUS at 23:25

## 2023-01-05 RX ADMIN — OXYCODONE HYDROCHLORIDE 10 MG: 5 TABLET ORAL at 05:47

## 2023-01-05 RX ADMIN — Medication 4 UNITS: at 23:18

## 2023-01-05 RX ADMIN — Medication 2 UNITS: at 08:30

## 2023-01-05 RX ADMIN — FAMOTIDINE 20 MG: 20 TABLET ORAL at 23:18

## 2023-01-05 NOTE — PROGRESS NOTES
Problem: Self Care Deficits Care Plan (Adult)  Goal: *Acute Goals and Plan of Care (Insert Text)  Description: Occupational Therapy Goals  Initiated 12/24/2022 within 7 day(s), re-evaluation 1/2/2023- pt making slow progress towards goals, he is inhibited by pain     1. Patient will perform grooming with supervision/set-up standing at the sink for > 4 min with Good balance. 2.  Patient will perform bathing with supervision/set-up using AE. 3.  Patient will perform lower body dressing with supervision/set-up using AE. 4.  Patient will perform toilet transfers with supervision/set-up. 5.  Patient will perform all aspects of toileting with supervision/set-up. 6.  Patient will participate in upper extremity therapeutic exercise/activities with supervision/set-up for 8 minutes to improve endurance and UB strength needed for ADLs    7. Patient will utilize energy conservation techniques during functional activities with verbal cues. 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 Outcome: Progressing Towards Goal   OCCUPATIONAL THERAPY TREATMENT    Patient: Jessica Clark (30 y.o. 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 Chart, occupational therapy assessment, plan of care, and goals were reviewed. ASSESSMENT:  PT co tx to maximize pt safety and participation. Pt presented supine in bed upon entry and agreeable for participation. Pt transitioned to EOB from supine SBA utilizing log roll technique to adhere to spinal precautions.  Once sitting, pt required MOD A donning clothed lace shoes 2/2 increased lower back and increased pain at PICC site. STS transfer MIN A with RW and maneuvered in room and hallway ~ 55 ft with CGA, simulating BR mobility. Pt requesting to use BSC and performed BSC transfer with CGA. Pt able to perform toileting ADL Supervision while seated. Pt requesting to return back to supine. Pt returned back to supine MIN A and positioned for comfort. He was left with HOB slightly elevated, bed alarm active, and all needs left within reach. RN made aware. Progression toward goals:  []          Improving appropriately and progressing toward goals  [x]          Improving slowly and progressing toward goals  []          Not making progress toward goals and plan of care will be adjusted     PLAN:  Patient continues to benefit from skilled intervention to address the above impairments. Continue treatment per established plan of care. Further Equipment Recommendations for Discharge:  BSC, RW, W/C, shower chair, hospital bed    AMPAC: Current research shows that an AM-PAC score of 17 or less is not associated with a discharge to the patient's home setting. Based on an AM-PAC score of 17/24 and their current ADL deficits; it is recommended that the patient have 3-5 sessions per week of Occupational Therapy at d/c to increase the patient's independence. This AMPAC score should be considered in conjunction with interdisciplinary team recommendations to determine the most appropriate discharge setting. Patient's social support, diagnosis, medical stability, and prior level of function should also be taken into consideration. SUBJECTIVE:   Patient stated I am ready to leave.     OBJECTIVE DATA SUMMARY:   Cognitive/Behavioral Status:  Neurologic State: Alert  Orientation Level: Oriented to person  Cognition: Follows commands  Safety/Judgement: Fall prevention    Functional Mobility and Transfers for ADLs:   Bed Mobility:     Supine to Sit: Stand-by assistance; Additional time  Sit to Supine: Minimum assistance  Scooting: Stand-by assistance   Transfers:  Sit to Stand: Minimum assistance  Stand to Sit: Contact guard assistance      Toilet Transfer : Contact guard assistance (to [facility] w/ RW)            Balance:  Sitting: Intact  Standing: Impaired; With support  Standing - Static: Good  Standing - Dynamic : Fair    ADL Intervention:       Lower Body Dressing Assistance  Shoes with Cloth Laces: Moderate assistance  Position Performed: Seated edge of bed    Toileting  Bowel Hygiene: Supervision (seated on BS)         Pain:  Pt did not rate pain on scale however c/o lower back pain    Activity Tolerance:    Fair   Please refer to the flowsheet for vital signs taken during this treatment. After treatment:   []  Patient left in no apparent distress sitting up in chair  [x]  Patient left in no apparent distress in bed  [x]  Call bell left within reach  [x]  Nursing notified  []  Caregiver present  [x]  Bed alarm activated    COMMUNICATION/EDUCATION:   [x] Role of Occupational Therapy in the acute care setting  [] Home safety education was provided and the patient/caregiver indicated understanding. [x] Patient/family have participated as able in working towards goals and plan of care. [x] Patient/family agree to work toward stated goals and plan of care. [] Patient understands intent and goals of therapy, but is neutral about his/her participation. [] Patient is unable to participate in goal setting and plan of care.       Thank you for this referral.  SUDHAKAR Crow  Time Calculation: 38 mins    [address] [facility] [address] [address] AM-PAC® Daily Activity Inpatient Short Form (6-Clicks)    How much HELP from another person does the patient currently need    (If the patient hasn't done an activity recently, how much help from another person do you think he/she would need if he/she tried?)   Total (Total A or Dep)   A Lot  (Mod to Max A)   A Little (Sup or Min A)   None (Mod I to I)   Putting on and taking off regular lower body clothing? [] 1 [x] 2 [] 3 [] 4   2. Bathing (including washing, rinsing,      drying)? [] 1 [] 2 [x] 3 [] 4   3. Toileting, which includes using toilet, bedpan or urinal?   [] 1 [] 2 [x] 3 [] 4   4. Putting on and taking off regular upper body clothing? [] 1 [] 2 [x] 3 [] 4   5. Taking care of personal grooming such as brushing teeth? [] 1 [] 2 [x] 3 [] 4   6. Eating meals?    [] 1 [] 2 [x] 3 [] 4

## 2023-01-05 NOTE — PROGRESS NOTES
Infectious Disease progress Note        Reason: lumbar abscess, gram-negative bloodstream infection    Current abx Prior abx   Zosyn, vancomycin since 12/22-12/25  Ceftriaxone since 12/26      Lines:       Assessment :  69-year-old man with past medical history significant for type 2 diabetes, hypertension, spinal stenosis s/p lumbar laminectomy/fusion surgery on 12/7/2022 presented to [facility] on 12/22/22 for incontinence and back pain. Now with gram-negative bacteremia, significant purulence noted with IR guided aspiration lumbar fluid collection on 12/23/2022    Clinical presentation consistent with klebsiella bloodstream infection (positive blood culture 12/22, negative blood culture 12/24), lumbar surgical site infected fluid collection/abscess likely due to indolent pathogens. Single positive blood culture for Klebsiella in blood cx 12/22 likely due to lumbar abscess    Status post I&D lumbar abscess on 12/23/2022. Intraoperative cultures Klebsiella    Acute kidney injury-likely secondary to volume depletion. Improving    Now with fever with tm:101.9 on 1/2/23 likely due to LE DVT as seen on venous duplex 1/3/23. No definitive clinical/radiographic evidence of worsening paraspinal infection. Negative respiratory viral panel PCR 1/3/2023 argues against viral infections. No evidence of secondary bloodstream infection     Resolved fevers  Complains of pain right upper extremity-no evidence of erythema/phlebitis around the PICC line. Pain gradually improving per patient report    Recommendations:    Continue IV ceftriaxone till 2/6/2023  Follow-up results of  blood culture  Anticoagulation/management of DVT per primary team  follow-up spine surgery recommendations regarding wound care  5. Discharge planning per primary team       Above plan was discussed in details with patient, dr. Sadie Barthel. Please call me if any further questions or concerns.  Will continue to participate in the care of this patient. HPI:  Feels fine. States that he has pain in the right upper extremity with started couple days ago. He thinks is related to the PICC line since PICC line was placed on Sunday and he started having pain since Tuesday. He thinks that the pain is gradually improving    Patient denies any fever or chills, cough. Denies increasing chest pain, shortness of breath, abdominal pain. Past Medical History:   Diagnosis Date    Arthritis     Back pain     from previous back injury    Colon polyps     Diabetes ([address]     2906    Hernia, umbilical     Hyperlipidemia     Hypertension     Pneumonia        Past Surgical History:   Procedure Laterality Date    HX COLONOSCOPY  2014    polyps    HX HEENT      left lazy eye procedure during childhood    HX HEMORRHOIDECTOMY      HX HERNIA REPAIR      2021    HX ORTHOPAEDIC  01/09/2017    right knee    HX ORTHOPAEDIC  1985    torn cartilage knee    HX TONSILLECTOMY  1964       Current Discharge Medication List        CONTINUE these medications which have NOT CHANGED    Details   HYDROmorphone (DILAUDID) 4 mg tablet Take 4 mg by mouth every six (6) hours as needed for Pain.      polyethylene glycol (MIRALAX) 17 gram/dose powder Take 17 g by mouth daily. Indications: constipation  Qty: 116 g, Refills: 0      aspirin delayed-release 81 mg tablet Take 81 mg by mouth daily. OTHER,NON-FORMULARY, Indications: maxforce prostate 1 tab twice daily      multivitamin (ONE A DAY) tablet Take 1 Tablet by mouth daily. metFORMIN (GLUCOPHAGE) 500 mg tablet TAKE 1 TABLET BY MOUTH TWICE DAILY WITH A MEAL      cholecalciferol (VITAMIN D3) (1000 Units /25 mcg) tablet Take 2,000 Units by mouth daily. gabapentin (NEURONTIN) 300 mg capsule 600 mg three (3) times daily.       lisinopriL (PRINIVIL, ZESTRIL) 40 mg tablet TAKE 1 TABLET BY MOUTH ONCE DAILY FOR BLOOD PRESSURE FOR 90 DAYS      simvastatin (ZOCOR) 40 mg tablet TAKE 1 TABLET BY MOUTH ONCE DAILY IN THE EVENING FOR CHOLESTEROL FOR 90 DAYS      acetaminophen (TYLENOL) 500 mg tablet Take  by mouth every six (6) hours as needed for Pain.              Current Facility-Administered Medications   Medication Dose Route Frequency    docusate sodium (COLACE) capsule 100 mg  100 mg Oral BID PRN    bisacodyL (DULCOLAX) suppository 10 mg  10 mg Rectal DAILY PRN    lactulose (CHRONULAC) 10 gram/15 mL solution 15 mL  15 mL Oral DAILY PRN    enoxaparin (LOVENOX) injection 110 mg  1 mg/kg SubCUTAneous Q12H    famotidine (PEPCID) tablet 20 mg  20 mg Oral Q12H    oxyCODONE IR (ROXICODONE) tablet 5-10 mg  5-10 mg Oral Q4H PRN    polyethylene glycol (MIRALAX) packet 17 g  17 g Oral DAILY    cyclobenzaprine (FLEXERIL) tablet 10 mg  10 mg Oral TID PRN    insulin lispro (HUMALOG) injection   SubCUTAneous AC&HS    cefTRIAXone (ROCEPHIN) 2 g in sterile water (preservative free) 20 mL IV syringe  2 g IntraVENous Q24H    flumazeniL (ROMAZICON) 0.1 mg/mL injection 0.2 mg  0.2 mg IntraVENous PRN    sodium chloride (NS) flush 5-40 mL  5-40 mL IntraVENous PRN    acetaminophen (TYLENOL) tablet 1,000 mg  1,000 mg Oral Q6H    HYDROmorphone (DILAUDID) injection 1 mg  1 mg IntraVENous Q4H PRN    phenol throat spray (CHLORASEPTIC) 1 Spray  1 Spray Oral PRN    benzocaine-menthoL (CEPACOL) lozenge 1 Lozenge  1 Lozenge Oral PRN    diphenhydrAMINE (BENADRYL) injection 12.5 mg  12.5 mg IntraVENous Q4H PRN    docusate sodium (COLACE) capsule 100 mg  100 mg Oral BID    naloxone (NARCAN) injection 0.4 mg  0.4 mg IntraVENous EVERY 2 MINUTES AS NEEDED    sodium chloride (NS) flush 5-40 mL  5-40 mL IntraVENous Q8H    acetaminophen (TYLENOL) tablet 650 mg  650 mg Oral Q6H PRN    Or    acetaminophen (TYLENOL) suppository 650 mg  650 mg Rectal Q6H PRN    ondansetron (ZOFRAN ODT) tablet 4 mg  4 mg Oral Q8H PRN    Or    ondansetron (ZOFRAN) injection 4 mg  4 mg IntraVENous Q6H PRN    melatonin tablet 5 mg  5 mg Oral QHS PRN    albuterol-ipratropium (DUO-NEB) 2.5 MG-0.5 MG/3 ML  3 mL Nebulization Q6H PRN    glucose chewable tablet 16 g  4 Tablet Oral PRN    glucagon (GLUCAGEN) injection 1 mg  1 mg IntraMUSCular PRN    dextrose 10% infusion 0-250 mL  0-250 mL IntraVENous PRN    cholecalciferol (VITAMIN D3) (1000 Units /25 mcg) tablet 2,000 Units  2,000 Units Oral DAILY    gabapentin (NEURONTIN) capsule 600 mg  600 mg Oral TID    therapeutic multivitamin (THERAGRAN) tablet 1 Tablet  1 Tablet Oral DAILY    atorvastatin (LIPITOR) tablet 20 mg  20 mg Oral QHS       Allergies: Patient has no known allergies. History reviewed. No pertinent family history. Social History     Socioeconomic History    Marital status:      Spouse name: Not on file    Number of children: Not on file    Years of education: Not on file    Highest education level: Not on file   Occupational History    Not on file   Tobacco Use    Smoking status: Former     Years:      Types: Cigarettes     Quit date: 2021     Years since quittin.9    Smokeless tobacco: Never    Tobacco comments:     3-4 cig per day, told not to smoke or drink 24/hrs prior to surgery   Vaping Use    Vaping Use: Never used   Substance and Sexual Activity    Alcohol use:  Yes     Alcohol/week: 2.0 standard drinks     Types: 2 Cans of beer per week     Comment: 2 per month    Drug use: Never    Sexual activity: Never   Other Topics Concern    Not on file   Social History Narrative    Not on file     Social Determinants of Health     Financial Resource Strain: Not on file   Food Insecurity: Not on file   Transportation Needs: Not on file   Physical Activity: Not on file   Stress: Not on file   Social Connections: Not on file   Intimate Partner Violence: Not on file   Housing Stability: Not on file     Social History     Tobacco Use   Smoking Status Former    Years:     Types: Cigarettes    Quit date: 2021    Years since quittin.9   Smokeless Tobacco Never   Tobacco Comments    3-4 cig per day, told not to smoke or drink 24/hrs prior to surgery        Temp (24hrs), Av.2 °F (36.8 °C), Min:97.9 °F (36.6 °C), Max:98.7 °F (37.1 °C)    Visit Vitals  BP (!) 154/91 (BP 1 Location: Left upper arm, BP Patient Position: At rest;Semi fowlers)   Pulse 90   Temp 98.2 °F (36.8 °C)   Resp 20   Ht 5' 11\" (1.803 m)   Wt 105.9 kg (233 lb 6.4 oz)   SpO2 98%   BMI 32.55 kg/m²       ROS: 12 point ROS obtained in details. Pertinent positives as mentioned in HPI,   otherwise negative    Physical Exam:    General: Well developed, well nourished male laying on the bed AAOx3 in no acute distress. HEENT:  Normocephalic, atraumatic,EOMI, no scleral icterus or pallor; no conjunctival hemmohage;  nasal and oral mucous are moist and without evidence of lesions. Neck supple, no bruits. Lymph Nodes:   no cervical, axillary or inguinal adenopathy   Lungs:   non-labored, bilateral clear to auscultation- no crackles wheezes rales or rhonchi   Heart:  RRR, s1 and s2; no rubs or gallops, no edema, + pedal pulses   Abdomen:  soft, non-distended, active bowel sounds, no hepatomegaly, no splenomegaly. Non-tender   Genitourinary:  deferred   Extremities:   no clubbing, cyanosis; no joint effusions or swelling;  muscle mass appropriate for age   Neurologic:  No gross focal sensory abnormalities; 5/5 muscle strength to upper and lower extremities. Speech appropriate.  Cranial nerves intact                        Skin:  No rash or ulcers noted   Back: Tsurgical site covered with dressing     Psychiatric:  No suicidal or homicidal ideations, appropriate mood and affect         Labs: Results:   Chemistry Recent Labs     23  0630 23  0443 23  0501   * 154* 141*   * 131* 132*   K 3.9 4.1 4.1   CL 94* 93* 93*   CO2 31 28 32   BUN 25* 28* 22*   CREA 1.14 1.25 1.17   CA 9.5 9.6 9.5   AGAP 6 10 7   BUCR 22* 22* 19        CBC w/Diff Recent Labs     23  0630 23  0501   WBC 5.9 6.3   RBC 2.84* 2.90*   HGB 8.4* 8.8*   HCT 26.4* 27.6*    329   GRANS 64  --    LYMPH 22  --    EOS 1  --         Microbiology Recent Labs     01/03/23  0935   CULT NO GROWTH 2 DAYS            RADIOLOGY:    All available imaging studies/reports in [facility] care for this admission were reviewed      Disclaimer: Sections of this note are dictated utilizing voice recognition software, which may have resulted in some phonetic based errors in grammar and contents. Even though attempts were made to correct all the mistakes, some may have been missed, and remained in the body of the document. If questions arise, please contact our department.     Dr. Shyann Philippe, Infectious Disease Specialist  [phone]  January 5, 2023  11:30 AM

## 2023-01-05 NOTE — PROGRESS NOTES
Problem: Mobility Impaired (Adult and Pediatric)  Goal: *Acute Goals and Plan of Care (Insert Text)  Description: Physical Therapy Goals  Initiated 12/23/2022, re-evaluated 12/29/22 and goals adjusted as needed and to be accomplished within 7 day(s), re-evaluated 1/5/23  1. Patient will move from supine to sit and sit to supine , scoot up and down, and roll side to side in bed with modified independence. 2.  Patient will transfer from bed to chair and chair to bed with modified independence using the least restrictive device. 3.  Patient will perform sit to stand with modified independence. 4.  Patient will ambulate with modified independence for 200 feet with the least restrictive device. 5.  Patient will ascend/descend 12 stairs with unilateral handrail(s) with modified independence. PLOF: Pt reporting living in 2 story house with 3 [address] with handrails, alone but has [de-identified]year old neighbor that is taking him home. Reports he did not change his socks/shower/go upstairs since discharge. Outcome: Progressing Towards Goal     PHYSICAL THERAPY RE-EVALUATION    Patient: Villa Burciaga (78 y.o. male)  Date: 1/5/2023  Primary Diagnosis: VIPIN (acute kidney injury) ([facility] [address] [N17.9]  Dehydration [E86.0]  Procedure(s) (LRB):  INCISION AND DRAINAGE LUMBAR SPINE/ APPLICATION OF WOUND VAC (N/A) 13 Days Post-Op   Precautions:   Fall, Spinal, Skin  PLOF: see above     ASSESSMENT :  Based on the objective data described below, the patient presents with continued generalized weakness, decreased activity tolerance, impaired balance and gait, decreased functional mobility from baseline. Pt noted to be able to perform log roll for supine <> sit to maintain back precautions. Pt endorses pain at low back and PICC line site but does not rate. Pt progressed to approx 55 ft of gait this date at decreased speed with shortened step lengths and clearance noted, no LOB.  At end of session, pt was able to have BM on [facility] however declines to sit up in recliner despite encouragement for OOB. Left in bed with all needs met and call bell within reach. Patient will benefit from skilled intervention to address the above impairments. Patient's rehabilitation potential is considered to be Good    Factors which may influence rehabilitation potential include:   []         None noted  []         Mental ability/status  [x]         Medical condition  [x]         Home/family situation and support systems  []         Safety awareness  []         Pain tolerance/management  []         Other:      PLAN :  Recommendations and Planned Interventions:   [x]           Bed Mobility Training             [x]    Neuromuscular Re-Education  [x]           Transfer Training                   []    Orthotic/Prosthetic Training  [x]           Gait Training                          []    Modalities  [x]           Therapeutic Exercises           []    Edema Management/Control  [x]           Therapeutic Activities            [x]    Family Training/Education  [x]           Patient Education  []           Other (comment):    Frequency/Duration: Patient will be followed by physical therapy 1-2 times per day/4-7 days per week to address goals. Further Equipment Recommendations for Discharge: rolling walker    AMPAC: 17/24  Current research shows that an AM-PAC score of 17 (13 without stairs) or less is not associated with a discharge to the patient's home setting. Based on an AM-PAC score of 17/24 (**/20 if omitting stairs) and their current functional mobility deficits, it is recommended that the patient have 3-5 sessions per week of Physical Therapy at d/c to increase the patient's independence. This AMPAC score should be considered in conjunction with interdisciplinary team recommendations to determine the most appropriate discharge setting. Patient's social support, diagnosis, medical stability, and prior level of function should also be taken into consideration. SUBJECTIVE:   Patient stated I am sick of being here.     OBJECTIVE DATA SUMMARY:   Hospital course since last seen and reason for re-evaluation: pt making slow but steady progress towards goals. Past Medical History:   Diagnosis Date    Arthritis     Back pain     from previous back injury    Colon polyps     Diabetes ([address]     1031    Hernia, umbilical     Hyperlipidemia     Hypertension     Pneumonia      Past Surgical History:   Procedure Laterality Date    HX COLONOSCOPY  2014    polyps    HX HEENT      left lazy eye procedure during childhood    HX HEMORRHOIDECTOMY      HX HERNIA REPAIR      2021    HX ORTHOPAEDIC  01/09/2017    right knee    HX ORTHOPAEDIC  1985    torn cartilage knee    HX TONSILLECTOMY  1964     Barriers to Learning/Limitations: yes;  physical  Compensate with: Visual Cues, Verbal Cues, and Tactile Cues  Home Situation:   Home Situation  Home Environment: Private residence  # Steps to Enter: 3  Rails to Enter: Yes  One/Two Story Residence: One story  Living Alone: Yes  Support Systems: Other Family Member(s), Friend/Neighbor  Patient Expects to be Discharged to[de-identified] Skilled nursing facility  Current DME Used/Available at Home: [employer], rollator  Tub or Shower Type: Tub/Shower combination (upstairs)  Critical Behavior:  Neurologic State: Alert  Orientation Level: Oriented to person  Cognition: Follows commands     Psychosocial  Patient Behaviors: Calm; Cooperative  Purposeful Interaction: Yes  Pt Identified Daily Priority: Clinical issues (comment)  Caritas Process: Nurture loving kindness; Teaching/learning; Attend basic human needs;Create healing environment  Skin Condition/Temp: Dry;Warm     Skin Integrity: Wound (add Wound LDA)  Skin Integumentary  Skin Color: Appropriate for ethnicity  Skin Condition/Temp: Dry;Warm  Skin Integrity: Wound (add Wound LDA)     Strength:    Strength: Generally decreased, functional                    Tone & Sensation:   Tone: Normal              Sensation: Intact               Range Of Motion:  AROM: Within functional limits           PROM: Within functional limits       Functional Mobility:  Bed Mobility:     Supine to Sit: Stand-by assistance; Additional time  Sit to Supine: Minimum assistance  Scooting: Stand-by assistance  Transfers:  Sit to Stand: Minimum assistance  Stand to Sit: Contact guard assistance       Balance:   Sitting: Intact  Standing: Impaired; With support  Standing - Static: Good  Standing - Dynamic : Fair       Ambulation/Gait Training:  Distance (ft): 55 Feet (ft)  Assistive Device: Walker, rolling  Ambulation - Level of Assistance: Contact guard assistance        Gait Abnormalities: Decreased step clearance        Base of Support: Narrowed     Speed/Bing: Slow;Shuffled    Pain:  Pain level pre-treatment: not rated, low back pain /10   Pain level post-treatment: \"    \" /10   Pain Intervention(s) : Medication (see MAR); Rest, Ice, Repositioning   Response to intervention: Nurse notified    Activity Tolerance:   Good    Please refer to the flowsheet for vital signs taken during this treatment. After treatment:   []         Patient left in no apparent distress sitting up in chair  [x]         Patient left in no apparent distress in bed  [x]         Call bell left within reach  [x]         Nursing notified  []         Caregiver present  []         Bed alarm activated  []         SCDs applied    COMMUNICATION/EDUCATION:   [x]         Role of Physical Therapy in the acute care setting. [x]         Fall prevention education was provided and the patient/caregiver indicated understanding. [x]         Patient/family have participated as able in goal setting and plan of care. [x]         Patient/family agree to work toward stated goals and plan of care. []         Patient understands intent and goals of therapy, but is neutral about his/her participation.   []         Patient is unable to participate in goal setting/plan of care: ongoing with therapy staff.  []         Other: Thank you for this referral.  Xiomara Hamm   Time Calculation: 38 mins    [address] [facility] [address] [address] AM-PAC® Basic Mobility Inpatient Short Form (6-Clicks) Version 2    How much HELP from another person does the patient currently need    (If the patient hasn't done an activity recently, how much help from another person do you think he/she would need if he/she tried?)   Total (Total A or Dep)   A Lot  (Mod to Max A)   A Little (Sup or Min A)   None (Mod I to I)   Turning from your back to your side while in a flat bed without using bedrails? [] 1 [] 2 [x] 3 [] 4   2. Moving from lying on your back to sitting on the side of a flat bed without using bedrails? [] 1 [] 2 [x] 3 [] 4   3. Moving to and from a bed to a chair (including a wheelchair)? [] 1 [] 2 [x] 3 [] 4   4. Standing up from a chair using your arms (e.g., wheelchair, or bedside chair)? [] 1 [] 2 [x] 3 [] 4   5. Walking in hospital room? [] 1 [] 2 [x] 3 [] 4   6. Climbing 3-5 steps with a railing?+   [] 1 [x] 2 [] 3 [] 4   +If stair climbing cannot be assessed, skip item #6. Sum responses from items 1-5. Current research shows that an AM-PAC score of 17 (13 without stairs) or less is not associated with a discharge to the patient's home setting. Based on an AM-PAC score of 17/24 (**/20 if omitting stairs) and their current functional mobility deficits, it is recommended that the patient have 3-5 sessions per week of Physical Therapy at d/c to increase the patient's independence.

## 2023-01-05 NOTE — PROGRESS NOTES
[facility] Hospitalists  Progress Note    Patient: Kalie Clements Age: 57 y.o. : 1960 MR#: [id number] SSN: [id number]  Date: 2023     Subjective/24-hour events:     Seen earlier today. Still with multiple complaints - nursing reports continued complaint of R arm pain. Afebrile overnight. Assessment:   Postoperative L-spine wound infection status post exploration irrigation and debridement and VAC placement 2020. LLE DVT   Fever, suspect secondary to DVT  Klebsiella pneumoniae bacteremia  DM2  Anemia   Hypertension  hyponatremia  Obesity, BMI 33.19    Plan: Will obtain RUE venous PVL - order placed. No swelling, redness or warmth of UE appreciated on exam.  Already on [facility] therapy for LE DVT. Wound continues to heal well, VAC changes per WOC schedule. Continue rocephin through 2023 to complete course of therapy per ID recs. PT/OT eval.  Progress has been somewhat slow due to poor motivation and ongoing pain complaints. Wean IV narcotic therapy - not helping overall cause. Order to decrease frequency placed, percocet added. Bowel regimen. Continue ICS use. Have encouraged continued participation with therapy. Patient does not have SNF benefit and will need to return home at discharge. CM working on disposition arrangements - VAC device on order and awaiting delivery. Supportive care o/w. Follow.     Case discussed with:  [x]Patient  []Family  [x]Nursing  [x]Case Management  DVT Prophylaxis:  []Lovenox  []Hep SQ  [x]SCDs  []Coumadin   []On Heparin gtt    Objective:   VS: Visit Vitals  /80 (BP 1 Location: Left upper arm, BP Patient Position: At rest)   Pulse (!) 101   Temp 98.5 °F (36.9 °C)   Resp 20   Ht 5' 11\" (1.803 m)   Wt 105.9 kg (233 lb 6.4 oz)   SpO2 93%   BMI 32.55 kg/m²      Tmax/24hrs: Temp (24hrs), Av.3 °F (36.8 °C), Min:98.1 °F (36.7 °C), Max:98.5 °F (36.9 °C)    Intake/Output Summary (Last 24 hours) at 2023 1851  Last data filed at 1/5/2023 0300  Gross per 24 hour   Intake --   Output 500 ml   Net -500 ml       General:  In NAD. Nontoxic-appearing. Cardiovascular:  RRR. Pulmonary:  Lungs clear bilaterally. No accessory muscle use. GI:  Abdomen soft, NTTP. Extremities:  No RUE swelling, redness, edema or ischemia. Neuro:  Awake and alert. Moves extremities spontaneously. Labs:    Recent Results (from the past 24 hour(s))   GLUCOSE, POC    Collection Time: 01/04/23 10:55 PM   Result Value Ref Range    Glucose (POC) 197 (H) 70 - 072 mg/dL   METABOLIC PANEL, BASIC    Collection Time: 01/05/23  6:30 AM   Result Value Ref Range    Sodium 131 (L) 136 - 145 mmol/L    Potassium 3.9 3.5 - 5.5 mmol/L    Chloride 94 (L) 100 - 111 mmol/L    CO2 31 21 - 32 mmol/L    Anion gap 6 3.0 - 18 mmol/L    Glucose 146 (H) 74 - 99 mg/dL    BUN 25 (H) 7.0 - 18 MG/DL    Creatinine 1.14 0.6 - 1.3 MG/DL    BUN/Creatinine ratio 22 (H) 12 - 20      eGFR >60 >60 ml/min/1.73m2    Calcium 9.5 8.5 - 10.1 MG/DL   CBC WITH AUTOMATED DIFF    Collection Time: 01/05/23  6:30 AM   Result Value Ref Range    WBC 5.9 4.6 - 13.2 K/uL    RBC 2.84 (L) 4.35 - 5.65 M/uL    HGB 8.4 (L) 13.0 - 16.0 g/dL    HCT 26.4 (L) 36.0 - 48.0 %    MCV 93.0 78.0 - 100.0 FL    MCH 29.6 24.0 - 34.0 PG    MCHC 31.8 31.0 - 37.0 g/dL    RDW 14.5 11.6 - 14.5 %    PLATELET 437 256 - 010 K/uL    MPV 9.4 9.2 - 11.8 FL    NRBC 0.0 0  WBC    ABSOLUTE NRBC 0.00 0.00 - 0.01 K/uL    NEUTROPHILS 64 40 - 73 %    LYMPHOCYTES 22 21 - 52 %    MONOCYTES 13 (H) 3 - 10 %    EOSINOPHILS 1 0 - 5 %    BASOPHILS 1 0 - 2 %    IMMATURE GRANULOCYTES 0 0.0 - 0.5 %    ABS. NEUTROPHILS 3.8 1.8 - 8.0 K/UL    ABS. LYMPHOCYTES 1.3 0.9 - 3.6 K/UL    ABS. MONOCYTES 0.8 0.05 - 1.2 K/UL    ABS. EOSINOPHILS 0.0 0.0 - 0.4 K/UL    ABS. BASOPHILS 0.0 0.0 - 0.1 K/UL    ABS. IMM.  GRANS. 0.0 0.00 - 0.04 K/UL    DF AUTOMATED     GLUCOSE, POC    Collection Time: 01/05/23  7:47 AM   Result Value Ref Range    Glucose (POC) 176 (H) 70 - 110 mg/dL   GLUCOSE, POC    Collection Time: 01/05/23 11:49 AM   Result Value Ref Range    Glucose (POC) 195 (H) 70 - 110 mg/dL   GLUCOSE, POC    Collection Time: 01/05/23  4:42 PM   Result Value Ref Range    Glucose (POC) 193 (H) 70 - 110 mg/dL       Signed By: Frankey Bureau, MD     January 5, 2023

## 2023-01-06 ENCOUNTER — APPOINTMENT (OUTPATIENT)
Dept: VASCULAR SURGERY | Age: 63
DRG: 857 | End: 2023-01-06
Attending: FAMILY MEDICINE
Payer: COMMERCIAL

## 2023-01-06 LAB
ALBUMIN SERPL-MCNC: 2.3 G/DL (ref 3.4–5)
ALBUMIN/GLOB SERPL: 0.4 (ref 0.8–1.7)
ALP SERPL-CCNC: 83 U/L (ref 45–117)
ALT SERPL-CCNC: 97 U/L (ref 16–61)
ANION GAP SERPL CALC-SCNC: 6 MMOL/L (ref 3–18)
AST SERPL-CCNC: 59 U/L (ref 10–38)
BILIRUB DIRECT SERPL-MCNC: <0.1 MG/DL (ref 0–0.2)
BILIRUB SERPL-MCNC: 0.3 MG/DL (ref 0.2–1)
BUN SERPL-MCNC: 23 MG/DL (ref 7–18)
BUN/CREAT SERPL: 24 (ref 12–20)
CALCIUM SERPL-MCNC: 9.5 MG/DL (ref 8.5–10.1)
CHLORIDE SERPL-SCNC: 97 MMOL/L (ref 100–111)
CO2 SERPL-SCNC: 30 MMOL/L (ref 21–32)
CREAT SERPL-MCNC: 0.95 MG/DL (ref 0.6–1.3)
GLOBULIN SER CALC-MCNC: 5.2 G/DL (ref 2–4)
GLUCOSE BLD STRIP.AUTO-MCNC: 160 MG/DL (ref 70–110)
GLUCOSE BLD STRIP.AUTO-MCNC: 170 MG/DL (ref 70–110)
GLUCOSE BLD STRIP.AUTO-MCNC: 180 MG/DL (ref 70–110)
GLUCOSE BLD STRIP.AUTO-MCNC: 194 MG/DL (ref 70–110)
GLUCOSE SERPL-MCNC: 164 MG/DL (ref 74–99)
POTASSIUM SERPL-SCNC: 3.9 MMOL/L (ref 3.5–5.5)
PROT SERPL-MCNC: 7.5 G/DL (ref 6.4–8.2)
SODIUM SERPL-SCNC: 133 MMOL/L (ref 136–145)

## 2023-01-06 PROCEDURE — 74011250637 HC RX REV CODE- 250/637: Performed by: INTERNAL MEDICINE

## 2023-01-06 PROCEDURE — 36415 COLL VENOUS BLD VENIPUNCTURE: CPT

## 2023-01-06 PROCEDURE — 74011000250 HC RX REV CODE- 250: Performed by: ORTHOPAEDIC SURGERY

## 2023-01-06 PROCEDURE — 74011250637 HC RX REV CODE- 250/637: Performed by: FAMILY MEDICINE

## 2023-01-06 PROCEDURE — 99232 SBSQ HOSP IP/OBS MODERATE 35: CPT | Performed by: INTERNAL MEDICINE

## 2023-01-06 PROCEDURE — 97530 THERAPEUTIC ACTIVITIES: CPT

## 2023-01-06 PROCEDURE — 82962 GLUCOSE BLOOD TEST: CPT

## 2023-01-06 PROCEDURE — 74011636637 HC RX REV CODE- 636/637: Performed by: INTERNAL MEDICINE

## 2023-01-06 PROCEDURE — 74011250637 HC RX REV CODE- 250/637: Performed by: ORTHOPAEDIC SURGERY

## 2023-01-06 PROCEDURE — 80076 HEPATIC FUNCTION PANEL: CPT

## 2023-01-06 PROCEDURE — 80048 BASIC METABOLIC PNL TOTAL CA: CPT

## 2023-01-06 PROCEDURE — 74011000250 HC RX REV CODE- 250: Performed by: INTERNAL MEDICINE

## 2023-01-06 PROCEDURE — 74011250636 HC RX REV CODE- 250/636: Performed by: ORTHOPAEDIC SURGERY

## 2023-01-06 PROCEDURE — 74011250636 HC RX REV CODE- 250/636: Performed by: INTERNAL MEDICINE

## 2023-01-06 PROCEDURE — 65270000046 HC RM TELEMETRY

## 2023-01-06 PROCEDURE — 74011250636 HC RX REV CODE- 250/636: Performed by: FAMILY MEDICINE

## 2023-01-06 RX ORDER — HYDROMORPHONE HYDROCHLORIDE 1 MG/ML
0.5 INJECTION, SOLUTION INTRAMUSCULAR; INTRAVENOUS; SUBCUTANEOUS
Status: DISCONTINUED | OUTPATIENT
Start: 2023-01-06 | End: 2023-01-09

## 2023-01-06 RX ORDER — DICLOFENAC SODIUM 10 MG/G
2 GEL TOPICAL 4 TIMES DAILY
Status: DISCONTINUED | OUTPATIENT
Start: 2023-01-06 | End: 2023-01-09

## 2023-01-06 RX ORDER — SODIUM CHLORIDE 1 G/1
1 TABLET ORAL 3 TIMES DAILY
Status: DISCONTINUED | OUTPATIENT
Start: 2023-01-06 | End: 2023-01-08

## 2023-01-06 RX ORDER — NYSTATIN 100000 [USP'U]/G
POWDER TOPICAL 2 TIMES DAILY
Status: DISCONTINUED | OUTPATIENT
Start: 2023-01-06 | End: 2023-01-13 | Stop reason: HOSPADM

## 2023-01-06 RX ORDER — ACETAMINOPHEN 500 MG
500 TABLET ORAL EVERY 6 HOURS
Status: DISCONTINUED | OUTPATIENT
Start: 2023-01-06 | End: 2023-01-08

## 2023-01-06 RX ADMIN — ATORVASTATIN CALCIUM 20 MG: 20 TABLET, FILM COATED ORAL at 23:01

## 2023-01-06 RX ADMIN — ENOXAPARIN SODIUM 110 MG: 150 INJECTION SUBCUTANEOUS at 06:20

## 2023-01-06 RX ADMIN — SODIUM CHLORIDE, PRESERVATIVE FREE 10 ML: 5 INJECTION INTRAVENOUS at 15:00

## 2023-01-06 RX ADMIN — Medication 2 UNITS: at 17:14

## 2023-01-06 RX ADMIN — GABAPENTIN 600 MG: 300 CAPSULE ORAL at 23:01

## 2023-01-06 RX ADMIN — THERA TABS 1 TABLET: TAB at 09:30

## 2023-01-06 RX ADMIN — CYCLOBENZAPRINE 10 MG: 10 TABLET, FILM COATED ORAL at 09:30

## 2023-01-06 RX ADMIN — SODIUM CHLORIDE, PRESERVATIVE FREE 10 ML: 5 INJECTION INTRAVENOUS at 22:00

## 2023-01-06 RX ADMIN — WATER 2 G: 1 INJECTION INTRAMUSCULAR; INTRAVENOUS; SUBCUTANEOUS at 09:33

## 2023-01-06 RX ADMIN — DOCUSATE SODIUM 100 MG: 100 CAPSULE, LIQUID FILLED ORAL at 09:30

## 2023-01-06 RX ADMIN — FAMOTIDINE 20 MG: 20 TABLET ORAL at 09:31

## 2023-01-06 RX ADMIN — ACETAMINOPHEN 1000 MG: 500 TABLET ORAL at 06:20

## 2023-01-06 RX ADMIN — DICLOFENAC SODIUM 2 G: 10 GEL TOPICAL at 17:18

## 2023-01-06 RX ADMIN — DOCUSATE SODIUM 100 MG: 100 CAPSULE, LIQUID FILLED ORAL at 17:14

## 2023-01-06 RX ADMIN — HYDROMORPHONE HYDROCHLORIDE 0.5 MG: 1 INJECTION, SOLUTION INTRAMUSCULAR; INTRAVENOUS; SUBCUTANEOUS at 15:02

## 2023-01-06 RX ADMIN — NYSTATIN: 100000 POWDER TOPICAL at 20:00

## 2023-01-06 RX ADMIN — OXYCODONE HYDROCHLORIDE AND ACETAMINOPHEN 2 TABLET: 5; 325 TABLET ORAL at 17:11

## 2023-01-06 RX ADMIN — DICLOFENAC SODIUM 2 G: 10 GEL TOPICAL at 14:00

## 2023-01-06 RX ADMIN — Medication 5 MG: at 23:01

## 2023-01-06 RX ADMIN — SODIUM CHLORIDE, PRESERVATIVE FREE 10 ML: 5 INJECTION INTRAVENOUS at 06:21

## 2023-01-06 RX ADMIN — Medication 2 UNITS: at 08:00

## 2023-01-06 RX ADMIN — Medication 2 UNITS: at 23:05

## 2023-01-06 RX ADMIN — POLYETHYLENE GLYCOL 3350 17 G: 17 POWDER, FOR SOLUTION ORAL at 09:30

## 2023-01-06 RX ADMIN — SODIUM CHLORIDE 1000 MG: 1 TABLET ORAL at 23:01

## 2023-01-06 RX ADMIN — DIPHENHYDRAMINE HYDROCHLORIDE 12.5 MG: 50 INJECTION, SOLUTION INTRAMUSCULAR; INTRAVENOUS at 15:02

## 2023-01-06 RX ADMIN — APIXABAN 10 MG: 5 TABLET, FILM COATED ORAL at 17:18

## 2023-01-06 RX ADMIN — SODIUM CHLORIDE 1000 MG: 1 TABLET ORAL at 16:00

## 2023-01-06 RX ADMIN — SODIUM CHLORIDE 1000 MG: 1 TABLET ORAL at 09:39

## 2023-01-06 RX ADMIN — CYCLOBENZAPRINE 10 MG: 10 TABLET, FILM COATED ORAL at 23:01

## 2023-01-06 RX ADMIN — OXYCODONE HYDROCHLORIDE AND ACETAMINOPHEN 2 TABLET: 5; 325 TABLET ORAL at 23:01

## 2023-01-06 RX ADMIN — CHOLECALCIFEROL TAB 25 MCG (1000 UNIT) 2000 UNITS: 25 TAB at 09:30

## 2023-01-06 RX ADMIN — Medication 2 UNITS: at 12:00

## 2023-01-06 RX ADMIN — GABAPENTIN 600 MG: 300 CAPSULE ORAL at 09:30

## 2023-01-06 RX ADMIN — DICLOFENAC SODIUM 2 G: 10 GEL TOPICAL at 22:00

## 2023-01-06 RX ADMIN — GABAPENTIN 600 MG: 300 CAPSULE ORAL at 16:00

## 2023-01-06 RX ADMIN — FAMOTIDINE 20 MG: 20 TABLET ORAL at 23:01

## 2023-01-06 NOTE — PROGRESS NOTES
Problem: Mobility Impaired (Adult and Pediatric)  Goal: *Acute Goals and Plan of Care (Insert Text)  Description: Physical Therapy Goals  Initiated 12/23/2022, re-evaluated 12/29/22 and goals adjusted as needed and to be accomplished within 7 day(s), re-evaluated 1/5/23  1. Patient will move from supine to sit and sit to supine , scoot up and down, and roll side to side in bed with modified independence. 2.  Patient will transfer from bed to chair and chair to bed with modified independence using the least restrictive device. 3.  Patient will perform sit to stand with modified independence. 4.  Patient will ambulate with modified independence for 200 feet with the least restrictive device. 5.  Patient will ascend/descend 12 stairs with unilateral handrail(s) with modified independence. PLOF: Pt reporting living in 2 story house with 3 [address]RE[address] with handrails, alone but has [de-identified]year old neighbor that is taking him home. Reports he did not change his socks/shower/go upstairs since discharge. Outcome: Progressing Towards Goal    PHYSICAL THERAPY TREATMENT    Patient: Zita Rodríguez (69 y.o. male)  Date: 1/6/2023  Diagnosis: VIPIN (acute kidney injury) ([facility] [address] [N17.9]  Dehydration [E86.0] Fluid collection at surgical site  Procedure(s) (LRB):  INCISION AND DRAINAGE LUMBAR SPINE/ APPLICATION OF WOUND VAC (N/A) 14 Days Post-Op  Precautions: Fall, Spinal, Skin      ASSESSMENT:  Patient received in bed upon arrival. Mobility limited due to inability to unplug wound vac as it was flashing that it had no battery. Pt requests to use BSC. Min  A supine to transfers. He stands up with CGA by pulling on walker despite verbal cues to push from the bed. CGA transfer to [facility]. Patient then transfers to the chair with poor safety awareness. He abandons RW early and reaches for arm rest. Call bell left within reach at end of session.      Progression toward goals:   []      Improving appropriately and progressing toward goals  [x]      Improving slowly and progressing toward goals  []      Not making progress toward goals and plan of care will be adjusted     PLAN:  Patient continues to benefit from skilled intervention to address the above impairments. Continue treatment per established plan of care. Further Equipment Recommendations for Discharge:  rolling walker     AMPAC: Current research shows that an AM-PAC score of 17 (13 without stairs) or less is not associated with a discharge to the patient's home setting. Based on an AM-PAC score of 17/24 (**/20 if omitting stairs) and their current functional mobility deficits, it is recommended that the patient have 3-5 sessions per week of Physical Therapy at d/c to increase the patient's independence. This AMPAC score should be considered in conjunction with interdisciplinary team recommendations to determine the most appropriate discharge setting. Patient's social support, diagnosis, medical stability, and prior level of function should also be taken into consideration. SUBJECTIVE:   Patient stated I dont know where Im going.     OBJECTIVE DATA SUMMARY:   Critical Behavior:  Neurologic State: Alert  Orientation Level: Oriented X4  Cognition: Follows commands  Safety/Judgement: Fall prevention  Functional Mobility Training:  Bed Mobility:     Supine to Sit: Minimum assistance     Scooting: Stand-by assistance         Transfers:  Sit to Stand: Contact guard assistance  Stand to Sit: Contact guard assistance                      Balance:  Sitting: Intact  Standing: Impaired; With support  Standing - Static: Good  Standing - Dynamic : Fair                        Ambulation/Gait Training:  Distance (ft): 6 Feet (ft)  Assistive Device: Walker, rolling  Ambulation - Level of Assistance: Contact guard assistance        Gait Abnormalities: Decreased step clearance              Pain:  Pain level pre-treatment: 8/10  Pain level post-treatment: 8/10   Pain Intervention(s): Medication (see MAR); Rest, Ice, Repositioning   Response to intervention: Nurse notified, See doc flow    Activity Tolerance:   Fair  Please refer to the flowsheet for vital signs taken during this treatment. After treatment:   [x] Patient left in no apparent distress sitting up in chair  [] Patient left in no apparent distress in bed  [x] Call bell left within reach  [x] Nursing notified  [] Caregiver present  [] Bed alarm activated  [] SCDs applied      COMMUNICATION/EDUCATION:   []         Role of Physical Therapy in the acute care setting. [x]         Fall prevention education was provided and the patient/caregiver indicated understanding. [x]         Patient/family have participated as able in working toward goals and plan of care. [x]         Patient/family agree to work toward stated goals and plan of care. []         Patient understands intent and goals of therapy, but is neutral about his/her participation. []         Patient is unable to participate in stated goals/plan of care: ongoing with therapy staff.  []         Other:        Kylie Paul, PT   Time Calculation: 23 mins    Julissa Cardenas AM-PAC® Basic Mobility Inpatient Short Form (6-Clicks) Version 2    How much HELP from another person does the patient currently need    (If the patient hasn't done an activity recently, how much help from another person do you think he/she would need if he/she tried?)   Total (Total A or Dep)   A Lot  (Mod to Max A)   A Little (Sup or Min A)   None (Mod I to I)   Turning from your back to your side while in a flat bed without using bedrails? [] 1 [] 2 [x] 3 [] 4   2. Moving from lying on your back to sitting on the side of a flat bed without using bedrails? [] 1 [] 2 [x] 3 [] 4   3. Moving to and from a bed to a chair (including a wheelchair)? [] 1 [] 2 [x] 3 [] 4   4. Standing up from a chair using your arms (e.g., wheelchair, or bedside chair)? [] 1 [] 2 [x] 3 [] 4   5.  Walking in hospital room?   [] 1 [] 2 [x] 3 [] 4   6. Climbing 3-5 steps with a railing?+   [] 1 [x] 2 [] 3 [] 4   +If stair climbing cannot be assessed, skip item #6. Sum responses from items 1-5.

## 2023-01-06 NOTE — PROGRESS NOTES
Infectious Disease progress Note        Reason: lumbar abscess, gram-negative bloodstream infection    Current abx Prior abx   Zosyn, vancomycin since 12/22-12/25  Ceftriaxone since 12/26      Lines:       Assessment :  49-year-old man with past medical history significant for type 2 diabetes, hypertension, spinal stenosis s/p lumbar laminectomy/fusion surgery on 12/7/2022 presented to [facility] on 12/22/22 for incontinence and back pain. Now with gram-negative bacteremia, significant purulence noted with IR guided aspiration lumbar fluid collection on 12/23/2022    Clinical presentation consistent with klebsiella bloodstream infection (positive blood culture 12/22, negative blood culture 12/24), lumbar surgical site infected fluid collection/abscess likely due to indolent pathogens. Single positive blood culture for Klebsiella in blood cx 12/22 likely due to lumbar abscess    Status post I&D lumbar abscess on 12/23/2022. Intraoperative cultures Klebsiella    Acute kidney injury-likely secondary to volume depletion. Improving    Now with fever with tm:101.9 on 1/2/23 likely due to LE DVT as seen on venous duplex 1/3/23. No definitive clinical/radiographic evidence of worsening paraspinal infection. Negative respiratory viral panel PCR 1/3/2023 argues against viral infections. No evidence of secondary bloodstream infection     Resolved fevers  Complains of pain right upper extremity-no evidence of erythema/phlebitis around the PICC line. Pain gradually improving per patient report    Recommendations:    Continue IV ceftriaxone till 2/6/2023  Follow-up results of  blood culture  Anticoagulation/management of DVT per primary team  follow-up spine surgery recommendations regarding wound care  5. Discharge planning per primary team       Above plan was discussed in details with patient. . Please call me if any further questions or concerns. Will continue to participate in the care of this patient.   HPI:  Feels fine.  States that he has pain in the right upper extremity with started couple days ago. He thinks is related to the PICC line since PICC line was placed on Sunday and he started having pain since Tuesday. He thinks that the pain is gradually improving    Patient denies any fever or chills, cough. Denies increasing chest pain, shortness of breath, abdominal pain. Past Medical History:   Diagnosis Date    Arthritis     Back pain     from previous back injury    Colon polyps     Diabetes ([facility] [address]     7372    Hernia, umbilical     Hyperlipidemia     Hypertension     Pneumonia        Past Surgical History:   Procedure Laterality Date    HX COLONOSCOPY  2014    polyps    HX HEENT      left lazy eye procedure during childhood    HX HEMORRHOIDECTOMY      HX HERNIA REPAIR      2021    HX ORTHOPAEDIC  01/09/2017    right knee    HX ORTHOPAEDIC  1985    torn cartilage knee    HX TONSILLECTOMY  1964       Current Discharge Medication List        CONTINUE these medications which have NOT CHANGED    Details   HYDROmorphone (DILAUDID) 4 mg tablet Take 4 mg by mouth every six (6) hours as needed for Pain.      polyethylene glycol (MIRALAX) 17 gram/dose powder Take 17 g by mouth daily. Indications: constipation  Qty: 116 g, Refills: 0      aspirin delayed-release 81 mg tablet Take 81 mg by mouth daily. OTHER,NON-FORMULARY, Indications: maxforce prostate 1 tab twice daily      multivitamin (ONE A DAY) tablet Take 1 Tablet by mouth daily. metFORMIN (GLUCOPHAGE) 500 mg tablet TAKE 1 TABLET BY MOUTH TWICE DAILY WITH A MEAL      cholecalciferol (VITAMIN D3) (1000 Units /25 mcg) tablet Take 2,000 Units by mouth daily. gabapentin (NEURONTIN) 300 mg capsule 600 mg three (3) times daily.       lisinopriL (PRINIVIL, ZESTRIL) 40 mg tablet TAKE 1 TABLET BY MOUTH ONCE DAILY FOR BLOOD PRESSURE FOR 90 DAYS      simvastatin (ZOCOR) 40 mg tablet TAKE 1 TABLET BY MOUTH ONCE DAILY IN THE EVENING FOR CHOLESTEROL FOR 90 DAYS acetaminophen (TYLENOL) 500 mg tablet Take  by mouth every six (6) hours as needed for Pain.              Current Facility-Administered Medications   Medication Dose Route Frequency    HYDROmorphone (DILAUDID) injection 1 mg  1 mg IntraVENous Q6H PRN    oxyCODONE-acetaminophen (PERCOCET) 5-325 mg per tablet 1-2 Tablet  1-2 Tablet Oral Q4H PRN    docusate sodium (COLACE) capsule 100 mg  100 mg Oral BID PRN    bisacodyL (DULCOLAX) suppository 10 mg  10 mg Rectal DAILY PRN    lactulose (CHRONULAC) 10 gram/15 mL solution 15 mL  15 mL Oral DAILY PRN    enoxaparin (LOVENOX) injection 110 mg  1 mg/kg SubCUTAneous Q12H    famotidine (PEPCID) tablet 20 mg  20 mg Oral Q12H    oxyCODONE IR (ROXICODONE) tablet 5-10 mg  5-10 mg Oral Q4H PRN    polyethylene glycol (MIRALAX) packet 17 g  17 g Oral DAILY    cyclobenzaprine (FLEXERIL) tablet 10 mg  10 mg Oral TID PRN    insulin lispro (HUMALOG) injection   SubCUTAneous AC&HS    cefTRIAXone (ROCEPHIN) 2 g in sterile water (preservative free) 20 mL IV syringe  2 g IntraVENous Q24H    flumazeniL (ROMAZICON) 0.1 mg/mL injection 0.2 mg  0.2 mg IntraVENous PRN    sodium chloride (NS) flush 5-40 mL  5-40 mL IntraVENous PRN    acetaminophen (TYLENOL) tablet 1,000 mg  1,000 mg Oral Q6H    phenol throat spray (CHLORASEPTIC) 1 Spray  1 Spray Oral PRN    benzocaine-menthoL (CEPACOL) lozenge 1 Lozenge  1 Lozenge Oral PRN    diphenhydrAMINE (BENADRYL) injection 12.5 mg  12.5 mg IntraVENous Q4H PRN    docusate sodium (COLACE) capsule 100 mg  100 mg Oral BID    naloxone (NARCAN) injection 0.4 mg  0.4 mg IntraVENous EVERY 2 MINUTES AS NEEDED    sodium chloride (NS) flush 5-40 mL  5-40 mL IntraVENous Q8H    acetaminophen (TYLENOL) tablet 650 mg  650 mg Oral Q6H PRN    Or    acetaminophen (TYLENOL) suppository 650 mg  650 mg Rectal Q6H PRN    ondansetron (ZOFRAN ODT) tablet 4 mg  4 mg Oral Q8H PRN    Or    ondansetron (ZOFRAN) injection 4 mg  4 mg IntraVENous Q6H PRN    melatonin tablet 5 mg  5 mg Oral QHS PRN    albuterol-ipratropium (DUO-NEB) 2.5 MG-0.5 MG/3 ML  3 mL Nebulization Q6H PRN    glucose chewable tablet 16 g  4 Tablet Oral PRN    glucagon (GLUCAGEN) injection 1 mg  1 mg IntraMUSCular PRN    dextrose 10% infusion 0-250 mL  0-250 mL IntraVENous PRN    cholecalciferol (VITAMIN D3) (1000 Units /25 mcg) tablet 2,000 Units  2,000 Units Oral DAILY    gabapentin (NEURONTIN) capsule 600 mg  600 mg Oral TID    therapeutic multivitamin (THERAGRAN) tablet 1 Tablet  1 Tablet Oral DAILY    atorvastatin (LIPITOR) tablet 20 mg  20 mg Oral QHS       Allergies: Patient has no known allergies. History reviewed. No pertinent family history. Social History     Socioeconomic History    Marital status:      Spouse name: Not on file    Number of children: Not on file    Years of education: Not on file    Highest education level: Not on file   Occupational History    Not on file   Tobacco Use    Smoking status: Former     Years:      Types: Cigarettes     Quit date: 2021     Years since quittin.0    Smokeless tobacco: Never    Tobacco comments:     3-4 cig per day, told not to smoke or drink 24/hrs prior to surgery   Vaping Use    Vaping Use: Never used   Substance and Sexual Activity    Alcohol use:  Yes     Alcohol/week: 2.0 standard drinks     Types: 2 Cans of beer per week     Comment: 2 per month    Drug use: Never    Sexual activity: Never   Other Topics Concern    Not on file   Social History Narrative    Not on file     Social Determinants of Health     Financial Resource Strain: Not on file   Food Insecurity: Not on file   Transportation Needs: Not on file   Physical Activity: Not on file   Stress: Not on file   Social Connections: Not on file   Intimate Partner Violence: Not on file   Housing Stability: Not on file     Social History     Tobacco Use   Smoking Status Former    Years: 00    Types: Cigarettes    Quit date: 2021    Years since quittin.0   Smokeless Tobacco Never Tobacco Comments    3-4 cig per day, told not to smoke or drink 24/hrs prior to surgery        Temp (24hrs), Av.4 °F (36.9 °C), Min:98.1 °F (36.7 °C), Max:99 °F (37.2 °C)    Visit Vitals  /75 (BP 1 Location: Left upper arm, BP Patient Position: At rest)   Pulse (!) 103   Temp 99 °F (37.2 °C)   Resp 16   Ht 5' 11\" (1.803 m)   Wt 105.9 kg (233 lb 6.4 oz)   SpO2 93%   BMI 32.55 kg/m²       ROS: 12 point ROS obtained in details. Pertinent positives as mentioned in HPI,   otherwise negative    Physical Exam:    General: Well developed, well nourished male laying on the bed AAOx3 in no acute distress. HEENT:  Normocephalic, atraumatic,EOMI, no scleral icterus or pallor; no conjunctival hemmohage;  nasal and oral mucous are moist and without evidence of lesions. Neck supple, no bruits. Lymph Nodes:   no cervical, axillary or inguinal adenopathy   Lungs:   non-labored, bilateral clear to auscultation- no crackles wheezes rales or rhonchi   Heart:  RRR, s1 and s2; no rubs or gallops, no edema, + pedal pulses   Abdomen:  soft, non-distended, active bowel sounds, no hepatomegaly, no splenomegaly. Non-tender   Genitourinary:  deferred   Extremities:   no clubbing, cyanosis; no joint effusions or swelling;  muscle mass appropriate for age   Neurologic:  No gross focal sensory abnormalities; 5/5 muscle strength to upper and lower extremities. Speech appropriate.  Cranial nerves intact                        Skin:  No rash or ulcers noted   Back: Tsurgical site covered with dressing     Psychiatric:  No suicidal or homicidal ideations, appropriate mood and affect         Labs: Results:   Chemistry Recent Labs     23  0630 23  0443   * 154*   * 131*   K 3.9 4.1   CL 94* 93*   CO2 31 28   BUN 25* 28*   CREA 1.14 1.25   CA 9.5 9.6   AGAP 6 10   BUCR 22* 22*        CBC w/Diff Recent Labs     23  0630   WBC 5.9   RBC 2.84*   HGB 8.4*   HCT 26.4*      GRANS 64   LYMPH 22   EOS 1 Microbiology Recent Labs     01/03/23  0935   CULT NO GROWTH 3 DAYS            RADIOLOGY:    All available imaging studies/reports in [facility] for this admission were reviewed      Disclaimer: Sections of this note are dictated utilizing voice recognition software, which may have resulted in some phonetic based errors in grammar and contents. Even though attempts were made to correct all the mistakes, some may have been missed, and remained in the body of the document. If questions arise, please contact our department.     Dr. Arabella Harmon, Infectious Disease Specialist  [phone]  January 6, 2023  11:30 AM

## 2023-01-06 NOTE — PROGRESS NOTES
[facility]  Progress Note    Patient: Augustus De Leon Age: 57 y.o. : 1960 MR#: [id number] SSN: [id number]  Date: 2023     Subjective/24-hour events:     Patient was seen in presence of nursing. Continues to have some back pain. Has wound VAC in situ. Complain of right wrist pain. Denies any chest or abdominal pain. No nausea or vomiting. No shortness of breath. Assessment:     1. Postoperative L-spine wound infection status post exploration irrigation and debridement and VAC placement 2020.  2.  LLE DVT   3. Low-grade fever, suspect secondary to DVT-currently resolved  4. Klebsiella pneumoniae bacteremia, resolved  5. Diabetes mellitus  6. Anemia of chronic medical disease  7. Hypertension  8. Hyponatremia  9. Obesity with BMI of 33.19      Plan:     Bilateral upper extremity venous Doppler negative for DVT  Discussed with patient about risk and benefits of Eliquis versus Coumadin. He opted to go with Eliquis. We will stop Lovenox and start patient on Eliquis. Will place him on diclofenac cream for right wrist pain management  Discussed with patient about switching him from IV to p.o. pain medications. He verbalized understanding about it. Discussed with  to continue set up wound VAC. Continue rocephin through 2023 to complete course of therapy per ID recs.   PT/OT to follow  We will add sodium chloride tablet and monitor hyponatremia  Continue current bowel regimen with incentive spirometry  Continue Lipitor, Pepcid, Neurontin, MiraLAX for other comorbidities    Anticipated date of discharge: 2023 with home health care once wound VAC is set up and hyponatremia gets better    Case discussed with:  [x]Patient  []Family  [x]Nursing  [x]Case Management  DVT Prophylaxis:  []Lovenox  []Hep SQ  [x]SCDs  []Coumadin   []On Heparin gtt    Objective:   VS: Visit Vitals  /75 (BP 1 Location: Left upper arm, BP Patient Position: At rest)   Pulse (!) 103   Temp 99 °F (37.2 °C)   Resp 16   Ht 5' 11\" (1.803 m)   Wt 105.9 kg (233 lb 6.4 oz)   SpO2 93%   BMI 32.55 kg/m²      Tmax/24hrs: Temp (24hrs), Av.5 °F (36.9 °C), Min:98.1 °F (36.7 °C), Max:99 °F (37.2 °C)  No intake or output data in the 24 hours ending 23 0919      General: Awake, follows verbal commands appropriately, not in acute distress  Cardiovascular:  RRR. Pulmonary: CTA bilaterally without wheezes or rhonchi currently  GI: Soft, nontender, bowel sounds present. Wound VAC present on the backside. Extremities:  No RUE swelling, redness, edema or ischemia. Range of motion is limited at right wrist without any erythema. No pedal edema. Neuro:  Awake and alert. Moves extremities spontaneously. No tremors noted currently. Labs:    Recent Results (from the past 24 hour(s))   GLUCOSE, POC    Collection Time: 23 11:49 AM   Result Value Ref Range    Glucose (POC) 195 (H) 70 - 110 mg/dL   GLUCOSE, POC    Collection Time: 23  4:42 PM   Result Value Ref Range    Glucose (POC) 193 (H) 70 - 110 mg/dL   GLUCOSE, POC    Collection Time: 23  9:56 PM   Result Value Ref Range    Glucose (POC) 232 (H) 70 - 110 mg/dL   GLUCOSE, POC    Collection Time: 23  7:51 AM   Result Value Ref Range    Glucose (POC) 194 (H) 70 - 110 mg/dL     Total time to take care of this patient was 35 minutes and more than 50% of time was spent counseling and coordinating care. Signed By: Wendy Talbot MD     2023       Disclaimer: Sections of this note are dictated using utilizing voice recognition software, which may have resulted in some phonetic based errors in grammar and contents. Even though attempts were made to correct all the mistakes, some may have been missed, and remained in the body of the document. If questions arise, please contact our department.

## 2023-01-07 ENCOUNTER — APPOINTMENT (OUTPATIENT)
Dept: VASCULAR SURGERY | Age: 63
DRG: 857 | End: 2023-01-07
Attending: FAMILY MEDICINE
Payer: COMMERCIAL

## 2023-01-07 LAB
GLUCOSE BLD STRIP.AUTO-MCNC: 121 MG/DL (ref 70–110)
GLUCOSE BLD STRIP.AUTO-MCNC: 135 MG/DL (ref 70–110)
GLUCOSE BLD STRIP.AUTO-MCNC: 172 MG/DL (ref 70–110)
GLUCOSE BLD STRIP.AUTO-MCNC: 215 MG/DL (ref 70–110)

## 2023-01-07 PROCEDURE — 74011000250 HC RX REV CODE- 250: Performed by: INTERNAL MEDICINE

## 2023-01-07 PROCEDURE — 74011250637 HC RX REV CODE- 250/637: Performed by: INTERNAL MEDICINE

## 2023-01-07 PROCEDURE — 65270000046 HC RM TELEMETRY

## 2023-01-07 PROCEDURE — 82962 GLUCOSE BLOOD TEST: CPT

## 2023-01-07 PROCEDURE — 74011250637 HC RX REV CODE- 250/637: Performed by: ORTHOPAEDIC SURGERY

## 2023-01-07 PROCEDURE — 74011250636 HC RX REV CODE- 250/636: Performed by: INTERNAL MEDICINE

## 2023-01-07 PROCEDURE — 93971 EXTREMITY STUDY: CPT

## 2023-01-07 PROCEDURE — 74011000250 HC RX REV CODE- 250: Performed by: ORTHOPAEDIC SURGERY

## 2023-01-07 PROCEDURE — 74011636637 HC RX REV CODE- 636/637: Performed by: INTERNAL MEDICINE

## 2023-01-07 PROCEDURE — 99232 SBSQ HOSP IP/OBS MODERATE 35: CPT | Performed by: INTERNAL MEDICINE

## 2023-01-07 PROCEDURE — 74011250637 HC RX REV CODE- 250/637: Performed by: FAMILY MEDICINE

## 2023-01-07 RX ORDER — AMOXICILLIN 250 MG
1 CAPSULE ORAL DAILY
Status: DISCONTINUED | OUTPATIENT
Start: 2023-01-07 | End: 2023-01-13 | Stop reason: HOSPADM

## 2023-01-07 RX ADMIN — SODIUM CHLORIDE 1000 MG: 1 TABLET ORAL at 17:24

## 2023-01-07 RX ADMIN — DOCUSATE SODIUM 100 MG: 100 CAPSULE, LIQUID FILLED ORAL at 09:36

## 2023-01-07 RX ADMIN — OXYCODONE HYDROCHLORIDE AND ACETAMINOPHEN 1 TABLET: 5; 325 TABLET ORAL at 13:59

## 2023-01-07 RX ADMIN — SODIUM CHLORIDE 1000 MG: 1 TABLET ORAL at 22:07

## 2023-01-07 RX ADMIN — FAMOTIDINE 20 MG: 20 TABLET ORAL at 20:37

## 2023-01-07 RX ADMIN — CHOLECALCIFEROL TAB 25 MCG (1000 UNIT) 2000 UNITS: 25 TAB at 09:36

## 2023-01-07 RX ADMIN — APIXABAN 10 MG: 5 TABLET, FILM COATED ORAL at 17:24

## 2023-01-07 RX ADMIN — SODIUM CHLORIDE 1000 MG: 1 TABLET ORAL at 09:36

## 2023-01-07 RX ADMIN — THERA TABS 1 TABLET: TAB at 09:36

## 2023-01-07 RX ADMIN — DICLOFENAC SODIUM 2 G: 10 GEL TOPICAL at 22:11

## 2023-01-07 RX ADMIN — FAMOTIDINE 20 MG: 20 TABLET ORAL at 09:36

## 2023-01-07 RX ADMIN — POLYETHYLENE GLYCOL 3350 17 G: 17 POWDER, FOR SOLUTION ORAL at 09:36

## 2023-01-07 RX ADMIN — Medication 2 UNITS: at 13:46

## 2023-01-07 RX ADMIN — ACETAMINOPHEN 500 MG: 500 TABLET ORAL at 17:24

## 2023-01-07 RX ADMIN — DICLOFENAC SODIUM 2 G: 10 GEL TOPICAL at 17:27

## 2023-01-07 RX ADMIN — SODIUM CHLORIDE, PRESERVATIVE FREE 10 ML: 5 INJECTION INTRAVENOUS at 06:00

## 2023-01-07 RX ADMIN — NYSTATIN: 100000 POWDER TOPICAL at 17:35

## 2023-01-07 RX ADMIN — Medication 4 UNITS: at 22:17

## 2023-01-07 RX ADMIN — WATER 2 G: 1 INJECTION INTRAMUSCULAR; INTRAVENOUS; SUBCUTANEOUS at 09:36

## 2023-01-07 RX ADMIN — CYCLOBENZAPRINE 10 MG: 10 TABLET, FILM COATED ORAL at 23:32

## 2023-01-07 RX ADMIN — ATORVASTATIN CALCIUM 20 MG: 20 TABLET, FILM COATED ORAL at 22:07

## 2023-01-07 RX ADMIN — ACETAMINOPHEN 500 MG: 500 TABLET ORAL at 23:30

## 2023-01-07 RX ADMIN — IPRATROPIUM BROMIDE AND ALBUTEROL SULFATE 3 ML: .5; 3 SOLUTION RESPIRATORY (INHALATION) at 13:59

## 2023-01-07 RX ADMIN — GABAPENTIN 600 MG: 300 CAPSULE ORAL at 17:24

## 2023-01-07 RX ADMIN — GABAPENTIN 600 MG: 300 CAPSULE ORAL at 22:06

## 2023-01-07 RX ADMIN — APIXABAN 10 MG: 5 TABLET, FILM COATED ORAL at 09:36

## 2023-01-07 RX ADMIN — GABAPENTIN 600 MG: 300 CAPSULE ORAL at 09:36

## 2023-01-07 RX ADMIN — SENNOSIDES AND DOCUSATE SODIUM 1 TABLET: 50; 8.6 TABLET ORAL at 13:59

## 2023-01-07 NOTE — PROGRESS NOTES
[facility]  Progress Note    Patient: Jim Todd Age: 57 y.o. : 1960 MR#: [id number] SSN: [id number]  Date: 2023     Subjective/24-hour events:     Patient continues to have right hand pain and intermittent back pain. He says the only thing helps is p.o. pain medication. He slept well last night. He had to use muscle relaxants. Denies any chest or abdominal pain. No nausea or vomiting currently. Assessment:     1. Postoperative L-spine wound infection status post exploration irrigation and debridement and VAC placement 2020.  2.  LLE DVT   3. Low-grade fever, suspect secondary to DVT-currently resolved  4. Klebsiella pneumoniae bacteremia, resolved  5. Diabetes mellitus  6. Anemia of chronic medical disease  7. Hypertension  8. Hyponatremia  9. Obesity with BMI of 33.19  10. Right upper extremity DVT at PICC line site. Plan:     Bilateral upper extremity venous Doppler report reviewed. We will ask nursing to remove PICC line once PIV's often and put warm compression for 24 hours. If no new issues by tomorrow, we will put PICC line in left upper extremity. Right forearm/wrist pain could be due to #10. Continue Eliquis. Discussed with  to continue set up wound VAC. Continue rocephin through 2023 to complete course of therapy per ID recs.   PT/OT to follow  We will add sodium chloride tablet and monitor hyponatremia  Continue current bowel regimen with incentive spirometry  Continue Lipitor, Pepcid, Neurontin, MiraLAX for other comorbidities    Discharge barriers for today: Right PICC line site DVT    Anticipated date of discharge: 2023 with home health care once wound VAC is set up, PICC line is replaced and hyponatremia gets better    Case discussed with:  [x]Patient  []Family  [x]Nursing  [x]Case Management  DVT Prophylaxis:  []Lovenox  []Hep SQ  [x]SCDs  []Coumadin   []On Heparin gtt    Objective: VS: Visit Vitals  BP (!) 163/82   Pulse 92   Temp 98.7 °F (37.1 °C)   Resp 17   Ht 5' 11\" (1.803 m)   Wt 105.9 kg (233 lb 6.4 oz)   SpO2 98%   BMI 32.55 kg/m²      Tmax/24hrs: Temp (24hrs), Av.2 °F (36.8 °C), Min:97.6 °F (36.4 °C), Max:98.8 °F (37.1 °C)  No intake or output data in the 24 hours ending 23 0947      General: Awake, follows verbal commands appropriately, not in acute distress  Cardiovascular:  RRR. Pulmonary: CTA bilaterally without wheezes or rhonchi currently  GI: Soft, nontender, bowel sounds present. Wound VAC present on the backside. Extremities: Right wrist edema present, no erythema. No pedal edema. Neuro:  Awake and alert. Moves extremities spontaneously. No tremors noted currently. Labs:    Recent Results (from the past 24 hour(s))   GLUCOSE, POC    Collection Time: 23 12:25 PM   Result Value Ref Range    Glucose (POC) 180 (H) 70 - 110 mg/dL   GLUCOSE, POC    Collection Time: 23  4:34 PM   Result Value Ref Range    Glucose (POC) 170 (H) 70 - 110 mg/dL   HEPATIC FUNCTION PANEL    Collection Time: 23  4:46 PM   Result Value Ref Range    Protein, total 7.5 6.4 - 8.2 g/dL    Albumin 2.3 (L) 3.4 - 5.0 g/dL    Globulin 5.2 (H) 2.0 - 4.0 g/dL    A-G Ratio 0.4 (L) 0.8 - 1.7      Bilirubin, total 0.3 0.2 - 1.0 MG/DL    Bilirubin, direct <0.1 0.0 - 0.2 MG/DL    Alk.  phosphatase 83 45 - 117 U/L    AST (SGOT) 59 (H) 10 - 38 U/L    ALT (SGPT) 97 (H) 16 - 61 U/L   METABOLIC PANEL, BASIC    Collection Time: 23  4:46 PM   Result Value Ref Range    Sodium 133 (L) 136 - 145 mmol/L    Potassium 3.9 3.5 - 5.5 mmol/L    Chloride 97 (L) 100 - 111 mmol/L    CO2 30 21 - 32 mmol/L    Anion gap 6 3.0 - 18 mmol/L    Glucose 164 (H) 74 - 99 mg/dL    BUN 23 (H) 7.0 - 18 MG/DL    Creatinine 0.95 0.6 - 1.3 MG/DL    BUN/Creatinine ratio 24 (H) 12 - 20      eGFR >60 >60 ml/min/1.73m2    Calcium 9.5 8.5 - 10.1 MG/DL   GLUCOSE, POC    Collection Time: 23  9:55 PM   Result Value Ref Range    Glucose (POC) 160 (H) 70 - 110 mg/dL   GLUCOSE, POC    Collection Time: 01/07/23  7:28 AM   Result Value Ref Range    Glucose (POC) 135 (H) 70 - 110 mg/dL     Total time to take care of this patient was 35 minutes and more than 50% of time was spent counseling and coordinating care. Signed By: Viraj Loving MD     January 7, 2023       Disclaimer: Sections of this note are dictated using utilizing voice recognition software, which may have resulted in some phonetic based errors in grammar and contents. Even though attempts were made to correct all the mistakes, some may have been missed, and remained in the body of the document. If questions arise, please contact our department.

## 2023-01-07 NOTE — PROGRESS NOTES
Problem: Patient Education: Go to Patient Education Activity  Goal: Patient/Family Education  Outcome: Progressing Towards Goal     Problem: Falls - Risk of  Goal: *Absence of Falls  Description: Document Sneha Colder Fall Risk and appropriate interventions in the flowsheet. Outcome: Progressing Towards Goal  Note: Fall Risk Interventions:  Mobility Interventions: Patient to call before getting OOB         Medication Interventions: Teach patient to arise slowly, Patient to call before getting OOB, Evaluate medications/consider consulting pharmacy    Elimination Interventions: Call light in reach, Patient to call for help with toileting needs    History of Falls Interventions: Consult care management for discharge planning, Door open when patient unattended, Investigate reason for fall, Utilize gait belt for transfer/ambulation         Problem: Patient Education: Go to Patient Education Activity  Goal: Patient/Family Education  Outcome: Progressing Towards Goal     Problem: Patient Education: Go to Patient Education Activity  Goal: Patient/Family Education  Outcome: Progressing Towards Goal     Problem: Nutrition Deficit  Goal: *Optimize nutritional status  Outcome: Progressing Towards Goal     Problem: Risk for Spread of Infection  Goal: Prevent transmission of infectious organism to others  Description: Prevent the transmission of infectious organisms to other patients, staff members, and visitors. Outcome: Progressing Towards Goal     Problem: Pressure Injury - Risk of  Goal: *Prevention of pressure injury  Description: Document Bharathi Scale and appropriate interventions in the flowsheet.   Outcome: Progressing Towards Goal  Note: Pressure Injury Interventions:  Sensory Interventions: Assess changes in LOC, Minimize linen layers, Monitor skin under medical devices, Pad between skin to skin    Moisture Interventions: Absorbent underpads    Activity Interventions: Pressure redistribution bed/mattress(bed type), PT/OT evaluation    Mobility Interventions: PT/OT evaluation, HOB 30 degrees or less, Pressure redistribution bed/mattress (bed type)    Nutrition Interventions: Document food/fluid/supplement intake    Friction and Shear Interventions: HOB 30 degrees or less                Problem: Patient Education: Go to Patient Education Activity  Goal: Patient/Family Education  Outcome: Progressing Towards Goal

## 2023-01-07 NOTE — PROGRESS NOTES
Per MD order, will remove picc line     1315 Picc line removed w/o issue, no distress noted, will continue to monitor.     Pt was educated on the indication for removal of picc line, pt verbalizes understanding, Alert and . normal mood and affect. no apparent risk to self or others.

## 2023-01-07 NOTE — PROGRESS NOTES
Per vascular, pt has acute right brachial occlusive dvt to right arm. MD made aware, pt does c/o of painto right arm and hand, no distress noted, no new orders noted at this time, will continue to monitor.

## 2023-01-07 NOTE — PROGRESS NOTES
OCCUPATIONAL THERAPY NOTE    Patient: Roni Alvarez (78 y.o. male)  Date: 1/7/2023  Diagnosis: VIPIN (acute kidney injury) ([facility] [address] [N17.9]  Dehydration [E86.0] Fluid collection at surgical site  Procedure(s) (LRB):  INCISION AND DRAINAGE LUMBAR SPINE/ APPLICATION OF WOUND VAC (N/A) 15 Days Post-Op  Precautions: Fall, Spinal, Skin  Chart, occupational therapy assessment, plan of care, and goals were reviewed. Occupational Therapy treatment attempted. Patient is unable to participate due to:  []  Nausea/vomiting  [x]  Eating  []  Pain  []  Pt lethargic  [x]  Off Unit  [] Other:  Attemptx1 pt is eating breakfast  Attempt x2 pt is off the floor to vascular  Will f/u later as schedule allows. Thank you.     Maria Isabel Nieto MS, OTR/L

## 2023-01-08 LAB
ANION GAP SERPL CALC-SCNC: 5 MMOL/L (ref 3–18)
BASOPHILS # BLD: 0 K/UL (ref 0–0.1)
BASOPHILS NFR BLD: 0 % (ref 0–2)
BUN SERPL-MCNC: 20 MG/DL (ref 7–18)
BUN/CREAT SERPL: 22 (ref 12–20)
CALCIUM SERPL-MCNC: 9.6 MG/DL (ref 8.5–10.1)
CHLORIDE SERPL-SCNC: 99 MMOL/L (ref 100–111)
CO2 SERPL-SCNC: 31 MMOL/L (ref 21–32)
CREAT SERPL-MCNC: 0.93 MG/DL (ref 0.6–1.3)
DIFFERENTIAL METHOD BLD: ABNORMAL
EOSINOPHIL # BLD: 0.1 K/UL (ref 0–0.4)
EOSINOPHIL NFR BLD: 2 % (ref 0–5)
ERYTHROCYTE [DISTWIDTH] IN BLOOD BY AUTOMATED COUNT: 15 % (ref 11.6–14.5)
GLUCOSE BLD STRIP.AUTO-MCNC: 127 MG/DL (ref 70–110)
GLUCOSE BLD STRIP.AUTO-MCNC: 151 MG/DL (ref 70–110)
GLUCOSE BLD STRIP.AUTO-MCNC: 183 MG/DL (ref 70–110)
GLUCOSE BLD STRIP.AUTO-MCNC: 200 MG/DL (ref 70–110)
GLUCOSE SERPL-MCNC: 115 MG/DL (ref 74–99)
HCT VFR BLD AUTO: 26.6 % (ref 36–48)
HGB BLD-MCNC: 8.1 G/DL (ref 13–16)
IMM GRANULOCYTES # BLD AUTO: 0 K/UL (ref 0–0.04)
IMM GRANULOCYTES NFR BLD AUTO: 1 % (ref 0–0.5)
LYMPHOCYTES # BLD: 1.2 K/UL (ref 0.9–3.6)
LYMPHOCYTES NFR BLD: 24 % (ref 21–52)
MCH RBC QN AUTO: 29.5 PG (ref 24–34)
MCHC RBC AUTO-ENTMCNC: 30.5 G/DL (ref 31–37)
MCV RBC AUTO: 96.7 FL (ref 78–100)
MONOCYTES # BLD: 0.6 K/UL (ref 0.05–1.2)
MONOCYTES NFR BLD: 13 % (ref 3–10)
NEUTS SEG # BLD: 3 K/UL (ref 1.8–8)
NEUTS SEG NFR BLD: 61 % (ref 40–73)
NRBC # BLD: 0 K/UL (ref 0–0.01)
NRBC BLD-RTO: 0 PER 100 WBC
PLATELET # BLD AUTO: 455 K/UL (ref 135–420)
PMV BLD AUTO: 9.1 FL (ref 9.2–11.8)
POTASSIUM SERPL-SCNC: 4.2 MMOL/L (ref 3.5–5.5)
RBC # BLD AUTO: 2.75 M/UL (ref 4.35–5.65)
SODIUM SERPL-SCNC: 135 MMOL/L (ref 136–145)
WBC # BLD AUTO: 4.9 K/UL (ref 4.6–13.2)

## 2023-01-08 PROCEDURE — 74011000250 HC RX REV CODE- 250: Performed by: INTERNAL MEDICINE

## 2023-01-08 PROCEDURE — 65270000046 HC RM TELEMETRY

## 2023-01-08 PROCEDURE — 36573 INSJ PICC RS&I 5 YR+: CPT | Performed by: INTERNAL MEDICINE

## 2023-01-08 PROCEDURE — 74011250637 HC RX REV CODE- 250/637: Performed by: ORTHOPAEDIC SURGERY

## 2023-01-08 PROCEDURE — 74011250637 HC RX REV CODE- 250/637: Performed by: INTERNAL MEDICINE

## 2023-01-08 PROCEDURE — 74011636637 HC RX REV CODE- 636/637: Performed by: INTERNAL MEDICINE

## 2023-01-08 PROCEDURE — 74011000250 HC RX REV CODE- 250: Performed by: ORTHOPAEDIC SURGERY

## 2023-01-08 PROCEDURE — 74011250637 HC RX REV CODE- 250/637: Performed by: FAMILY MEDICINE

## 2023-01-08 PROCEDURE — 36415 COLL VENOUS BLD VENIPUNCTURE: CPT

## 2023-01-08 PROCEDURE — 82962 GLUCOSE BLOOD TEST: CPT

## 2023-01-08 PROCEDURE — 80048 BASIC METABOLIC PNL TOTAL CA: CPT

## 2023-01-08 PROCEDURE — 2709999900 HC NON-CHARGEABLE SUPPLY

## 2023-01-08 PROCEDURE — 99232 SBSQ HOSP IP/OBS MODERATE 35: CPT | Performed by: INTERNAL MEDICINE

## 2023-01-08 PROCEDURE — 74011250636 HC RX REV CODE- 250/636: Performed by: INTERNAL MEDICINE

## 2023-01-08 PROCEDURE — 85025 COMPLETE CBC W/AUTO DIFF WBC: CPT

## 2023-01-08 RX ORDER — ACETAMINOPHEN 500 MG
500 TABLET ORAL
Status: DISCONTINUED | OUTPATIENT
Start: 2023-01-08 | End: 2023-01-13 | Stop reason: HOSPADM

## 2023-01-08 RX ORDER — BISACODYL 5 MG
10 TABLET, DELAYED RELEASE (ENTERIC COATED) ORAL
Status: COMPLETED | OUTPATIENT
Start: 2023-01-08 | End: 2023-01-08

## 2023-01-08 RX ORDER — SODIUM CHLORIDE 1 G/1
1 TABLET ORAL 2 TIMES DAILY
Status: DISCONTINUED | OUTPATIENT
Start: 2023-01-08 | End: 2023-01-13 | Stop reason: HOSPADM

## 2023-01-08 RX ADMIN — CYCLOBENZAPRINE 10 MG: 10 TABLET, FILM COATED ORAL at 21:55

## 2023-01-08 RX ADMIN — OXYCODONE HYDROCHLORIDE AND ACETAMINOPHEN 1 TABLET: 5; 325 TABLET ORAL at 06:29

## 2023-01-08 RX ADMIN — SODIUM CHLORIDE 1000 MG: 1 TABLET ORAL at 17:08

## 2023-01-08 RX ADMIN — NYSTATIN: 100000 POWDER TOPICAL at 17:11

## 2023-01-08 RX ADMIN — OXYCODONE HYDROCHLORIDE AND ACETAMINOPHEN 1 TABLET: 5; 325 TABLET ORAL at 09:36

## 2023-01-08 RX ADMIN — ATORVASTATIN CALCIUM 20 MG: 20 TABLET, FILM COATED ORAL at 21:36

## 2023-01-08 RX ADMIN — SODIUM CHLORIDE, PRESERVATIVE FREE 10 ML: 5 INJECTION INTRAVENOUS at 21:38

## 2023-01-08 RX ADMIN — NYSTATIN: 100000 POWDER TOPICAL at 09:32

## 2023-01-08 RX ADMIN — ACETAMINOPHEN 500 MG: 500 TABLET ORAL at 05:18

## 2023-01-08 RX ADMIN — DICLOFENAC SODIUM 2 G: 10 GEL TOPICAL at 17:11

## 2023-01-08 RX ADMIN — Medication 4 UNITS: at 07:44

## 2023-01-08 RX ADMIN — BISACODYL 10 MG: 5 TABLET, COATED ORAL at 12:11

## 2023-01-08 RX ADMIN — DICLOFENAC SODIUM 2 G: 10 GEL TOPICAL at 12:31

## 2023-01-08 RX ADMIN — GABAPENTIN 600 MG: 300 CAPSULE ORAL at 17:02

## 2023-01-08 RX ADMIN — APIXABAN 10 MG: 5 TABLET, FILM COATED ORAL at 17:02

## 2023-01-08 RX ADMIN — WATER 2 G: 1 INJECTION INTRAMUSCULAR; INTRAVENOUS; SUBCUTANEOUS at 09:33

## 2023-01-08 RX ADMIN — GABAPENTIN 600 MG: 300 CAPSULE ORAL at 21:36

## 2023-01-08 RX ADMIN — Medication 2 UNITS: at 17:01

## 2023-01-08 RX ADMIN — DICLOFENAC SODIUM 2 G: 10 GEL TOPICAL at 21:37

## 2023-01-08 RX ADMIN — THERA TABS 1 TABLET: TAB at 09:33

## 2023-01-08 RX ADMIN — CHOLECALCIFEROL TAB 25 MCG (1000 UNIT) 2000 UNITS: 25 TAB at 09:32

## 2023-01-08 RX ADMIN — DICLOFENAC SODIUM 2 G: 10 GEL TOPICAL at 09:35

## 2023-01-08 RX ADMIN — FAMOTIDINE 20 MG: 20 TABLET ORAL at 21:36

## 2023-01-08 RX ADMIN — FAMOTIDINE 20 MG: 20 TABLET ORAL at 09:33

## 2023-01-08 RX ADMIN — POLYETHYLENE GLYCOL 3350 17 G: 17 POWDER, FOR SOLUTION ORAL at 09:33

## 2023-01-08 RX ADMIN — SENNOSIDES AND DOCUSATE SODIUM 1 TABLET: 50; 8.6 TABLET ORAL at 09:33

## 2023-01-08 RX ADMIN — Medication 2 UNITS: at 21:48

## 2023-01-08 RX ADMIN — SODIUM CHLORIDE 1000 MG: 1 TABLET ORAL at 09:33

## 2023-01-08 RX ADMIN — SODIUM CHLORIDE, PRESERVATIVE FREE 10 ML: 5 INJECTION INTRAVENOUS at 16:10

## 2023-01-08 RX ADMIN — GABAPENTIN 600 MG: 300 CAPSULE ORAL at 09:33

## 2023-01-08 RX ADMIN — APIXABAN 10 MG: 5 TABLET, FILM COATED ORAL at 09:32

## 2023-01-08 RX ADMIN — OXYCODONE HYDROCHLORIDE AND ACETAMINOPHEN 1 TABLET: 5; 325 TABLET ORAL at 17:02

## 2023-01-08 NOTE — PROGRESS NOTES
Problem: Patient Education: Go to Patient Education Activity  Goal: Patient/Family Education  Outcome: Progressing Towards Goal     Problem: Patient Education: Go to Patient Education Activity  Goal: Patient/Family Education  Outcome: Progressing Towards Goal     Problem: Falls - Risk of  Goal: *Absence of Falls  Description: Document Clearence Skates Fall Risk and appropriate interventions in the flowsheet. Outcome: Progressing Towards Goal  Note: Fall Risk Interventions:  Mobility Interventions: Patient to call before getting OOB, PT Consult for mobility concerns         Medication Interventions: Patient to call before getting OOB    Elimination Interventions: Call light in reach    History of Falls Interventions: Consult care management for discharge planning, Door open when patient unattended, Investigate reason for fall, Utilize gait belt for transfer/ambulation         Problem: Patient Education: Go to Patient Education Activity  Goal: Patient/Family Education  Outcome: Progressing Towards Goal     Problem: Patient Education: Go to Patient Education Activity  Goal: Patient/Family Education  Outcome: Progressing Towards Goal     Problem: Nutrition Deficit  Goal: *Optimize nutritional status  Outcome: Progressing Towards Goal     Problem: Risk for Spread of Infection  Goal: Prevent transmission of infectious organism to others  Description: Prevent the transmission of infectious organisms to other patients, staff members, and visitors. Outcome: Progressing Towards Goal     Problem: Patient Education:  Go to Education Activity  Goal: Patient/Family Education  Outcome: Progressing Towards Goal     Problem: Diabetes Self-Management  Goal: *Disease process and treatment process  Description: Define diabetes and identify own type of diabetes; list 3 options for treating diabetes.   Outcome: Progressing Towards Goal  Goal: *Incorporating nutritional management into lifestyle  Description: Describe effect of type, amount and timing of food on blood glucose; list 3 methods for planning meals. Outcome: Progressing Towards Goal  Goal: *Incorporating physical activity into lifestyle  Description: State effect of exercise on blood glucose levels. Outcome: Progressing Towards Goal  Goal: *Developing strategies to promote health/change behavior  Description: Define the ABC's of diabetes; identify appropriate screenings, schedule and personal plan for screenings. Outcome: Progressing Towards Goal  Goal: *Using medications safely  Description: State effect of diabetes medications on diabetes; name diabetes medication taking, action and side effects. Outcome: Progressing Towards Goal  Goal: *Monitoring blood glucose, interpreting and using results  Description: Identify recommended blood glucose targets  and personal targets. Outcome: Progressing Towards Goal  Goal: *Prevention, detection, treatment of acute complications  Description: List symptoms of hyper- and hypoglycemia; describe how to treat low blood sugar and actions for lowering  high blood glucose level. Outcome: Progressing Towards Goal  Goal: *Prevention, detection and treatment of chronic complications  Description: Define the natural course of diabetes and describe the relationship of blood glucose levels to long term complications of diabetes.   Outcome: Progressing Towards Goal  Goal: *Developing strategies to address psychosocial issues  Description: Describe feelings about living with diabetes; identify support needed and support network  Outcome: Progressing Towards Goal  Goal: *Insulin pump training  Outcome: Progressing Towards Goal  Goal: *Sick day guidelines  Outcome: Progressing Towards Goal  Goal: *Patient Specific Goal (EDIT GOAL, INSERT TEXT)  Outcome: Progressing Towards Goal     Problem: Patient Education: Go to Patient Education Activity  Goal: Patient/Family Education  Outcome: Progressing Towards Goal     Problem: Pressure Injury - Risk of  Goal: *Prevention of pressure injury  Description: Document Bharathi Scale and appropriate interventions in the flowsheet.   Outcome: Progressing Towards Goal     Problem: Patient Education: Go to Patient Education Activity  Goal: Patient/Family Education  Outcome: Progressing Towards Goal

## 2023-01-08 NOTE — PROGRESS NOTES
[facility]  Progress Note    Patient: Vivi Whitehead Age: 57 y.o. : 1960 MR#: [id number] SSN: [id number]  Date: 2023     Subjective/24-hour events:     Patient was seen in presence of nursing. Right hand and forearm pain is getting better since PICC line was removed. Patient is hard stick per nursing. No bowel movement in last 2 days. Denies any abdominal pain or nausea or vomiting. His back pain is currently better with pain management. No headaches or dizziness. Assessment:     1. Postoperative L-spine wound infection status post exploration irrigation and debridement and VAC placement 2020.  2.  LLE DVT   3. Low-grade fever, suspect secondary to DVT-currently resolved  4. Klebsiella pneumoniae bacteremia, resolved  5. Diabetes mellitus  6. Anemia of chronic medical disease, stable  7. Hypertension  8. Hyponatremia, slowly improving  9. Obesity with BMI of 33.19  10. Right upper extremity DVT at PICC line site. 11.  Constipation      Plan:     PICC line placement by vascular access team in left upper extremity  Right forearm/wrist pain could be due to #10 and it is improving today. Continue Eliquis. Continue local wound care and wound VAC management  Will give Dulcolax p.o. x1 now  Continue rocephin through 2023 to complete course of therapy per ID recs. PT/OT to follow  Hyponatremia is improving on sodium chloride tablet.   Continue current bowel regimen with incentive spirometry  Continue Lipitor, Pepcid, Neurontin, MiraLAX for other comorbidities    Discharge barriers for today: Right PICC line site DVT    Anticipated date of discharge: 2023 with home health care once wound VAC is set up, PICC line is replaced     Case discussed with:  [x]Patient  []Family  [x]Nursing  [x]Case Management  DVT Prophylaxis:  []Lovenox  []Hep SQ  [x]SCDs  []Coumadin   []On Heparin gtt    Objective:   VS: Visit Vitals  /77   Pulse 70 Temp 98.9 °F (37.2 °C)   Resp 18   Ht 5' 11\" (1.803 m)   Wt 105 kg (231 lb 7.7 oz)   SpO2 96%   BMI 32.29 kg/m²      Tmax/24hrs: Temp (24hrs), Av.6 °F (37 °C), Min:98.2 °F (36.8 °C), Max:98.9 °F (37.2 °C)    Intake/Output Summary (Last 24 hours) at 2023 1006  Last data filed at 2023 0403  Gross per 24 hour   Intake 1540 ml   Output 1300 ml   Net 240 ml         General: Awake, follows verbal commands appropriately, not in acute distress  Cardiovascular:  RRR. Pulmonary: CTA bilaterally without wheezes or rhonchi currently  GI: Soft, nontender, bowel sounds present. Wound VAC present on the back. Extremities: Right wrist edema present, no erythema. No pedal edema. Neuro:  Awake and alert. Moves extremities spontaneously. No tremors noted currently. Labs:    Recent Results (from the past 24 hour(s))   GLUCOSE, POC    Collection Time: 23 12:20 PM   Result Value Ref Range    Glucose (POC) 172 (H) 70 - 110 mg/dL   GLUCOSE, POC    Collection Time: 23  4:23 PM   Result Value Ref Range    Glucose (POC) 121 (H) 70 - 110 mg/dL   GLUCOSE, POC    Collection Time: 23 10:16 PM   Result Value Ref Range    Glucose (POC) 215 (H) 70 - 110 mg/dL   CBC WITH AUTOMATED DIFF    Collection Time: 23  3:56 AM   Result Value Ref Range    WBC 4.9 4.6 - 13.2 K/uL    RBC 2.75 (L) 4.35 - 5.65 M/uL    HGB 8.1 (L) 13.0 - 16.0 g/dL    HCT 26.6 (L) 36.0 - 48.0 %    MCV 96.7 78.0 - 100.0 FL    MCH 29.5 24.0 - 34.0 PG    MCHC 30.5 (L) 31.0 - 37.0 g/dL    RDW 15.0 (H) 11.6 - 14.5 %    PLATELET 063 (H) 238 - 420 K/uL    MPV 9.1 (L) 9.2 - 11.8 FL    NRBC 0.0 0  WBC    ABSOLUTE NRBC 0.00 0.00 - 0.01 K/uL    NEUTROPHILS 61 40 - 73 %    LYMPHOCYTES 24 21 - 52 %    MONOCYTES 13 (H) 3 - 10 %    EOSINOPHILS 2 0 - 5 %    BASOPHILS 0 0 - 2 %    IMMATURE GRANULOCYTES 1 (H) 0.0 - 0.5 %    ABS. NEUTROPHILS 3.0 1.8 - 8.0 K/UL    ABS. LYMPHOCYTES 1.2 0.9 - 3.6 K/UL    ABS. MONOCYTES 0.6 0.05 - 1.2 K/UL    ABS. EOSINOPHILS 0.1 0.0 - 0.4 K/UL    ABS. BASOPHILS 0.0 0.0 - 0.1 K/UL    ABS. IMM. GRANS. 0.0 0.00 - 0.04 K/UL    DF AUTOMATED     METABOLIC PANEL, BASIC    Collection Time: 01/08/23  3:56 AM   Result Value Ref Range    Sodium 135 (L) 136 - 145 mmol/L    Potassium 4.2 3.5 - 5.5 mmol/L    Chloride 99 (L) 100 - 111 mmol/L    CO2 31 21 - 32 mmol/L    Anion gap 5 3.0 - 18 mmol/L    Glucose 115 (H) 74 - 99 mg/dL    BUN 20 (H) 7.0 - 18 MG/DL    Creatinine 0.93 0.6 - 1.3 MG/DL    BUN/Creatinine ratio 22 (H) 12 - 20      eGFR >60 >60 ml/min/1.73m2    Calcium 9.6 8.5 - 10.1 MG/DL   GLUCOSE, POC    Collection Time: 01/08/23  7:29 AM   Result Value Ref Range    Glucose (POC) 200 (H) 70 - 110 mg/dL     Total time to take care of this patient was 35 minutes and more than 50% of time was spent counseling and coordinating care. Signed By: Jonathan Henderson MD     January 8, 2023       Disclaimer: Sections of this note are dictated using utilizing voice recognition software, which may have resulted in some phonetic based errors in grammar and contents. Even though attempts were made to correct all the mistakes, some may have been missed, and remained in the body of the document. If questions arise, please contact our department.

## 2023-01-08 NOTE — ROUTINE PROCESS
Patient is alert and oriented times three with no signs or symptoms of distress. Wound vac is intact with 250 of serous fluid in the chamber.  Neuro intact

## 2023-01-08 NOTE — ROUTINE PROCESS
Patient is alert and oriented times three with no signs o rsymptoms of distress.  Wound vac remains intact and neuro is intact for this patient

## 2023-01-08 NOTE — PROGRESS NOTES
Problem: Patient Education: Go to Patient Education Activity  Goal: Patient/Family Education  Outcome: Progressing Towards Goal     Problem: Falls - Risk of  Goal: *Absence of Falls  Description: Document Montezelva Rodriguez Fall Risk and appropriate interventions in the flowsheet. Outcome: Progressing Towards Goal  Note: Fall Risk Interventions:  Mobility Interventions: Patient to call before getting OOB, PT Consult for mobility concerns         Medication Interventions: Patient to call before getting OOB    Elimination Interventions: Call light in reach    History of Falls Interventions: Consult care management for discharge planning, Door open when patient unattended, Investigate reason for fall, Utilize gait belt for transfer/ambulation         Problem: Patient Education: Go to Patient Education Activity  Goal: Patient/Family Education  Outcome: Progressing Towards Goal     Problem: Pressure Injury - Risk of  Goal: *Prevention of pressure injury  Description: Document Bharathi Scale and appropriate interventions in the flowsheet.   Outcome: Progressing Towards Goal  Note: Pressure Injury Interventions:  Sensory Interventions: Assess changes in LOC, Avoid rigorous massage over bony prominences, Discuss PT/OT consult with provider, Check visual cues for pain, Keep linens dry and wrinkle-free    Moisture Interventions: Absorbent underpads    Activity Interventions: Assess need for specialty bed, Increase time out of bed, Pressure redistribution bed/mattress(bed type)    Mobility Interventions: Assess need for specialty bed, HOB 30 degrees or less, Pressure redistribution bed/mattress (bed type)    Nutrition Interventions: Document food/fluid/supplement intake, Offer support with meals,snacks and hydration    Friction and Shear Interventions: Apply protective barrier, creams and emollients, Foam dressings/transparent film/skin sealants, HOB 30 degrees or less

## 2023-01-08 NOTE — ROUTINE PROCESS
Bedside shift change report given to Rhina tierney (oncoming nurse) by Lynda Murphy (offgoing nurse). Report included the following information SBAR and Kardex [id number].

## 2023-01-09 LAB
ANION GAP SERPL CALC-SCNC: 5 MMOL/L (ref 3–18)
BACTERIA SPEC CULT: NORMAL
BUN SERPL-MCNC: 18 MG/DL (ref 7–18)
BUN/CREAT SERPL: 23 (ref 12–20)
CALCIUM SERPL-MCNC: 9.5 MG/DL (ref 8.5–10.1)
CHLORIDE SERPL-SCNC: 101 MMOL/L (ref 100–111)
CO2 SERPL-SCNC: 30 MMOL/L (ref 21–32)
CREAT SERPL-MCNC: 0.8 MG/DL (ref 0.6–1.3)
GLUCOSE BLD STRIP.AUTO-MCNC: 134 MG/DL (ref 70–110)
GLUCOSE BLD STRIP.AUTO-MCNC: 142 MG/DL (ref 70–110)
GLUCOSE BLD STRIP.AUTO-MCNC: 142 MG/DL (ref 70–110)
GLUCOSE BLD STRIP.AUTO-MCNC: 187 MG/DL (ref 70–110)
GLUCOSE SERPL-MCNC: 133 MG/DL (ref 74–99)
POTASSIUM SERPL-SCNC: 4 MMOL/L (ref 3.5–5.5)
SERVICE CMNT-IMP: NORMAL
SODIUM SERPL-SCNC: 136 MMOL/L (ref 136–145)

## 2023-01-09 PROCEDURE — 74011000250 HC RX REV CODE- 250: Performed by: INTERNAL MEDICINE

## 2023-01-09 PROCEDURE — 97168 OT RE-EVAL EST PLAN CARE: CPT

## 2023-01-09 PROCEDURE — 74011250637 HC RX REV CODE- 250/637: Performed by: ORTHOPAEDIC SURGERY

## 2023-01-09 PROCEDURE — 99232 SBSQ HOSP IP/OBS MODERATE 35: CPT | Performed by: INTERNAL MEDICINE

## 2023-01-09 PROCEDURE — 82962 GLUCOSE BLOOD TEST: CPT

## 2023-01-09 PROCEDURE — 74011000250 HC RX REV CODE- 250: Performed by: ORTHOPAEDIC SURGERY

## 2023-01-09 PROCEDURE — 80048 BASIC METABOLIC PNL TOTAL CA: CPT

## 2023-01-09 PROCEDURE — 36592 COLLECT BLOOD FROM PICC: CPT

## 2023-01-09 PROCEDURE — 74011636637 HC RX REV CODE- 636/637: Performed by: INTERNAL MEDICINE

## 2023-01-09 PROCEDURE — 74011250637 HC RX REV CODE- 250/637: Performed by: INTERNAL MEDICINE

## 2023-01-09 PROCEDURE — 74011250636 HC RX REV CODE- 250/636: Performed by: INTERNAL MEDICINE

## 2023-01-09 PROCEDURE — 2709999900 HC NON-CHARGEABLE SUPPLY

## 2023-01-09 PROCEDURE — 65270000046 HC RM TELEMETRY

## 2023-01-09 PROCEDURE — 74011250637 HC RX REV CODE- 250/637: Performed by: FAMILY MEDICINE

## 2023-01-09 PROCEDURE — 97535 SELF CARE MNGMENT TRAINING: CPT

## 2023-01-09 RX ORDER — DICLOFENAC SODIUM 10 MG/G
2 GEL TOPICAL
Status: DISCONTINUED | OUTPATIENT
Start: 2023-01-09 | End: 2023-01-13 | Stop reason: HOSPADM

## 2023-01-09 RX ORDER — FACIAL-BODY WIPES
10 EACH TOPICAL
Status: COMPLETED | OUTPATIENT
Start: 2023-01-09 | End: 2023-01-09

## 2023-01-09 RX ADMIN — SODIUM CHLORIDE, PRESERVATIVE FREE 10 ML: 5 INJECTION INTRAVENOUS at 22:43

## 2023-01-09 RX ADMIN — SODIUM CHLORIDE, PRESERVATIVE FREE 10 ML: 5 INJECTION INTRAVENOUS at 05:25

## 2023-01-09 RX ADMIN — NYSTATIN: 100000 POWDER TOPICAL at 18:00

## 2023-01-09 RX ADMIN — BISACODYL 10 MG: 10 SUPPOSITORY RECTAL at 10:25

## 2023-01-09 RX ADMIN — SODIUM CHLORIDE 1000 MG: 1 TABLET ORAL at 10:24

## 2023-01-09 RX ADMIN — THERA TABS 1 TABLET: TAB at 10:24

## 2023-01-09 RX ADMIN — SENNOSIDES AND DOCUSATE SODIUM 1 TABLET: 50; 8.6 TABLET ORAL at 10:24

## 2023-01-09 RX ADMIN — WATER 2 G: 1 INJECTION INTRAMUSCULAR; INTRAVENOUS; SUBCUTANEOUS at 10:24

## 2023-01-09 RX ADMIN — GABAPENTIN 600 MG: 300 CAPSULE ORAL at 10:24

## 2023-01-09 RX ADMIN — OXYCODONE HYDROCHLORIDE AND ACETAMINOPHEN 1 TABLET: 5; 325 TABLET ORAL at 21:36

## 2023-01-09 RX ADMIN — CHOLECALCIFEROL TAB 25 MCG (1000 UNIT) 2000 UNITS: 25 TAB at 10:24

## 2023-01-09 RX ADMIN — GABAPENTIN 600 MG: 300 CAPSULE ORAL at 21:33

## 2023-01-09 RX ADMIN — GABAPENTIN 600 MG: 300 CAPSULE ORAL at 17:18

## 2023-01-09 RX ADMIN — FAMOTIDINE 20 MG: 20 TABLET ORAL at 10:24

## 2023-01-09 RX ADMIN — SODIUM CHLORIDE 1000 MG: 1 TABLET ORAL at 17:18

## 2023-01-09 RX ADMIN — LACTULOSE 30 ML: 20 SOLUTION ORAL at 10:26

## 2023-01-09 RX ADMIN — FAMOTIDINE 20 MG: 20 TABLET ORAL at 21:33

## 2023-01-09 RX ADMIN — SODIUM CHLORIDE, PRESERVATIVE FREE 10 ML: 5 INJECTION INTRAVENOUS at 16:34

## 2023-01-09 RX ADMIN — Medication 2 UNITS: at 22:42

## 2023-01-09 RX ADMIN — NYSTATIN: 100000 POWDER TOPICAL at 09:00

## 2023-01-09 RX ADMIN — DICLOFENAC SODIUM 2 G: 10 GEL TOPICAL at 09:00

## 2023-01-09 RX ADMIN — OXYCODONE HYDROCHLORIDE AND ACETAMINOPHEN 2 TABLET: 5; 325 TABLET ORAL at 10:29

## 2023-01-09 RX ADMIN — APIXABAN 10 MG: 5 TABLET, FILM COATED ORAL at 17:18

## 2023-01-09 RX ADMIN — POLYETHYLENE GLYCOL 3350 17 G: 17 POWDER, FOR SOLUTION ORAL at 10:25

## 2023-01-09 RX ADMIN — CYCLOBENZAPRINE 10 MG: 10 TABLET, FILM COATED ORAL at 10:24

## 2023-01-09 RX ADMIN — ATORVASTATIN CALCIUM 20 MG: 20 TABLET, FILM COATED ORAL at 21:33

## 2023-01-09 RX ADMIN — APIXABAN 10 MG: 5 TABLET, FILM COATED ORAL at 10:24

## 2023-01-09 RX ADMIN — OXYCODONE HYDROCHLORIDE AND ACETAMINOPHEN 2 TABLET: 5; 325 TABLET ORAL at 17:18

## 2023-01-09 NOTE — PROGRESS NOTES
Spoke with REGINE Voss [facility], patient stated that he does not have any help at home, patient would like to go to a facility. CM made aware.

## 2023-01-09 NOTE — PROGRESS NOTES
[facility]  Progress Note    Patient: Rachel Post Age: 57 y.o. : 1960 MR#: [id number] SSN: [id number]  Date: 2023     Subjective/24-hour events:     Patient does not have any new symptoms other than having constipation. No headaches or dizziness. No nausea or vomiting. Patient says he is fine to go to nursing home and would like to talk to  about it    Assessment:     1. Postoperative L-spine wound infection status post exploration irrigation and debridement and VAC placement 2020.  2.  LLE DVT   3. Low-grade fever, suspect secondary to DVT-currently resolved  4. Klebsiella pneumoniae bacteremia, resolved  5. Diabetes mellitus  6. Anemia of chronic medical disease, stable  7. Hypertension  8. Hyponatremia, slowly improving  9. Obesity with BMI of 33.19  10. Right upper extremity DVT at PICC line site. 11.  Constipation      Plan:       Continue Eliquis. Continue local wound care and wound VAC management  Will give Dulcolax suppository x1 now  Continue rocephin through 2023 to complete course of therapy per ID recs. PT/OT to follow  Hyponatremia is improving on sodium chloride tablet. Continue current bowel regimen with incentive spirometry  Continue Lipitor, Pepcid, Neurontin, MiraLAX for other comorbidities  Discussed with case management about talking to this patient for skilled nursing home option otherwise home with home health care.     Discharge barriers for today: Placement    Anticipated date of discharge:  or 10, 2023 with home health care once wound VAC is set up    Case discussed with:  [x]Patient  []Family  [x]Nursing  [x]Case Management  DVT Prophylaxis:  []Lovenox  []Hep SQ  [x]SCDs  []Coumadin   []On Heparin gtt    Objective:   VS: Visit Vitals  BP (!) 162/97   Pulse 90   Temp 98.5 °F (36.9 °C)   Resp 18   Ht 5' 11\" (1.803 m)   Wt 105 kg (231 lb 7.7 oz)   SpO2 92%   BMI 32.29 kg/m²      Tmax/24hrs: Temp (24hrs), Av.6 °F (37 °C), Min:97.9 °F (36.6 °C), Max:99.8 °F (37.7 °C)    Intake/Output Summary (Last 24 hours) at 2023 0915  Last data filed at 2023 0324  Gross per 24 hour   Intake 1580 ml   Output 1000 ml   Net 580 ml         General: Awake, follows verbal commands appropriately, not in acute distress  Cardiovascular:  RRR. Pulmonary: CTA bilaterally without wheezes or rhonchi currently  GI: Soft, nontender, bowel sounds present. Wound VAC present on the back. Extremities: Right wrist edema present, no erythema. No pedal edema. Neuro:  Awake and alert. Moves extremities spontaneously. No tremors noted currently. Labs:    Recent Results (from the past 24 hour(s))   GLUCOSE, POC    Collection Time: 23 11:50 AM   Result Value Ref Range    Glucose (POC) 127 (H) 70 - 110 mg/dL   GLUCOSE, POC    Collection Time: 23  4:51 PM   Result Value Ref Range    Glucose (POC) 151 (H) 70 - 110 mg/dL   GLUCOSE, POC    Collection Time: 23  9:33 PM   Result Value Ref Range    Glucose (POC) 183 (H) 70 - 617 mg/dL   METABOLIC PANEL, BASIC    Collection Time: 23  3:00 AM   Result Value Ref Range    Sodium 136 136 - 145 mmol/L    Potassium 4.0 3.5 - 5.5 mmol/L    Chloride 101 100 - 111 mmol/L    CO2 30 21 - 32 mmol/L    Anion gap 5 3.0 - 18 mmol/L    Glucose 133 (H) 74 - 99 mg/dL    BUN 18 7.0 - 18 MG/DL    Creatinine 0.80 0.6 - 1.3 MG/DL    BUN/Creatinine ratio 23 (H) 12 - 20      eGFR >60 >60 ml/min/1.73m2    Calcium 9.5 8.5 - 10.1 MG/DL   GLUCOSE, POC    Collection Time: 23  8:32 AM   Result Value Ref Range    Glucose (POC) 134 (H) 70 - 110 mg/dL     Total time to take care of this patient was 35 minutes and more than 50% of time was spent counseling and coordinating care.      Signed By: Reagan Limon MD     2023       Disclaimer: Sections of this note are dictated using utilizing voice recognition software, which may have resulted in some phonetic based errors in grammar and contents. Even though attempts were made to correct all the mistakes, some may have been missed, and remained in the body of the document. If questions arise, please contact our department.

## 2023-01-09 NOTE — ROUTINE PROCESS
Bedside shift change report given to Teri tierney (oncoming nurse) by Joelle Russell (offgoing nurse). Report included the following information SBAR and Kardex.

## 2023-01-09 NOTE — ROUTINE PROCESS
Patient is alert and oriented times three with no signs or symptoms of distress. Neuro intact patient moving more.  Wound vac is intact

## 2023-01-09 NOTE — PROGRESS NOTES
Problem: Patient Education: Go to Patient Education Activity  Goal: Patient/Family Education  Outcome: Progressing Towards Goal     Problem: Falls - Risk of  Goal: *Absence of Falls  Description: Document Marcelino Pedraza Fall Risk and appropriate interventions in the flowsheet. Outcome: Progressing Towards Goal  Note: Fall Risk Interventions:  Mobility Interventions: Patient to call before getting OOB         Medication Interventions: Assess postural VS orthostatic hypotension, Evaluate medications/consider consulting pharmacy, Patient to call before getting OOB    Elimination Interventions: Call light in reach, Elevated toilet seat, Patient to call for help with toileting needs, Stay With Me (per policy), Toilet paper/wipes in reach, Toileting schedule/hourly rounds    History of Falls Interventions: Consult care management for discharge planning, Door open when patient unattended, Evaluate medications/consider consulting pharmacy         Problem: Patient Education: Go to Patient Education Activity  Goal: Patient/Family Education  Outcome: Progressing Towards Goal     Problem: Patient Education: Go to Patient Education Activity  Goal: Patient/Family Education  Outcome: Progressing Towards Goal     Problem: Nutrition Deficit  Goal: *Optimize nutritional status  Outcome: Progressing Towards Goal     Problem: Risk for Spread of Infection  Goal: Prevent transmission of infectious organism to others  Description: Prevent the transmission of infectious organisms to other patients, staff members, and visitors. Outcome: Progressing Towards Goal     Problem: Patient Education:  Go to Education Activity  Goal: Patient/Family Education  Outcome: Progressing Towards Goal     Problem: Diabetes Self-Management  Goal: *Disease process and treatment process  Description: Define diabetes and identify own type of diabetes; list 3 options for treating diabetes.   Outcome: Progressing Towards Goal  Goal: *Incorporating nutritional management into lifestyle  Description: Describe effect of type, amount and timing of food on blood glucose; list 3 methods for planning meals. Outcome: Progressing Towards Goal  Goal: *Incorporating physical activity into lifestyle  Description: State effect of exercise on blood glucose levels. Outcome: Progressing Towards Goal  Goal: *Developing strategies to promote health/change behavior  Description: Define the ABC's of diabetes; identify appropriate screenings, schedule and personal plan for screenings. Outcome: Progressing Towards Goal  Goal: *Using medications safely  Description: State effect of diabetes medications on diabetes; name diabetes medication taking, action and side effects. Outcome: Progressing Towards Goal  Goal: *Monitoring blood glucose, interpreting and using results  Description: Identify recommended blood glucose targets  and personal targets. Outcome: Progressing Towards Goal  Goal: *Prevention, detection, treatment of acute complications  Description: List symptoms of hyper- and hypoglycemia; describe how to treat low blood sugar and actions for lowering  high blood glucose level. Outcome: Progressing Towards Goal  Goal: *Prevention, detection and treatment of chronic complications  Description: Define the natural course of diabetes and describe the relationship of blood glucose levels to long term complications of diabetes.   Outcome: Progressing Towards Goal  Goal: *Developing strategies to address psychosocial issues  Description: Describe feelings about living with diabetes; identify support needed and support network  Outcome: Progressing Towards Goal  Goal: *Insulin pump training  Outcome: Progressing Towards Goal  Goal: *Sick day guidelines  Outcome: Progressing Towards Goal  Goal: *Patient Specific Goal (EDIT GOAL, INSERT TEXT)  Outcome: Progressing Towards Goal     Problem: Patient Education: Go to Patient Education Activity  Goal: Patient/Family Education  Outcome: Progressing Towards Goal     Problem: Pressure Injury - Risk of  Goal: *Prevention of pressure injury  Description: Document Bharathi Scale and appropriate interventions in the flowsheet.   Outcome: Progressing Towards Goal     Problem: Patient Education: Go to Patient Education Activity  Goal: Patient/Family Education  Outcome: Progressing Towards Goal

## 2023-01-09 NOTE — PROGRESS NOTES
Problem: Self Care Deficits Care Plan (Adult)  Goal: *Acute Goals and Plan of Care (Insert Text)  Description: Occupational Therapy Goals  Initiated 12/24/2022 within 7 day(s), re-evaluation 1/9/2023- pt making slow progress towards goals, he is inhibited by pain     1. Patient will perform grooming with supervision/set-up standing at the sink for > 4 min with Good balance. 2.  Patient will perform bathing with supervision/set-up using AE. 3.  Patient will perform lower body dressing with supervision/set-up using AE. 4.  Patient will perform toilet transfers with supervision/set-up. 5.  Patient will perform all aspects of toileting with supervision/set-up. 6.  Patient will participate in upper extremity therapeutic exercise/activities with supervision/set-up for 8 minutes to improve endurance and UB strength needed for ADLs    7. Patient will utilize energy conservation techniques during functional activities with verbal cues. 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 Outcome: Progressing Towards Goal   OCCUPATIONAL THERAPY RE-EVALUATION    Patient: Cyndy Yuan (62 y.o. male)  Date: 1/9/2023  Primary Diagnosis: VIPIN (acute kidney injury) ([facility] [address] [N17.9]  Dehydration [E86.0]  Procedure(s) (LRB):  INCISION AND DRAINAGE LUMBAR SPINE/ APPLICATION OF WOUND VAC (N/A) 17 Days Post-Op   Precautions:   Spinal, Fall  PLOF: see above    ASSESSMENT :  Based on the objective data described below, the patient presents with decreased functional activity tolerance, decreased standing balance, decreased safety with movement and decreased ability to reach B feet impacting independence in self care. Pt semi reclined in bed and complaining of constipation and requiring moderate amount of encouragement for OT participation. Pt performed supine>sit with log roll technique with SBA.  Pt performed sit>stand with v/c for hand placement and SBA. Pt ambulating to bathroom for oral care when stated he needed to have urgent BM. BSC moved behind pt and pt performed toilet transfer SBA. Pt required extra time on commode and performed hygiene in stance with pt demonstrating decreased ablity to reach posterior zana area without twisting. Pt may benefit from demonstration/education on toileting aide for maintaining spinal precautions. Pt requested to return to bedside due to dizziness in stance and performed hand hygiene and cleaning glasses with set up. Pt returned to supine with SBA and became tearful talking about his course of stay at the hospital. Pt provided with reassurance. Patient will benefit from skilled intervention to address the above impairments. Patient's rehabilitation potential is considered to be Good  Factors which may influence rehabilitation potential include:   []             None noted  []             Mental ability/status  [x]             Medical condition  [x]             Home/family situation and support systems  [x]             Safety awareness  [x]             Pain tolerance/management  []             Other:      PLAN :  Recommendations and Planned Interventions:   [x]               Self Care Training                  [x]      Therapeutic Activities  [x]               Functional Mobility Training   []      Cognitive Retraining  [x]               Therapeutic Exercises           [x]      Endurance Activities  [x]               Balance Training                    [x]      Neuromuscular Re-Education  []               Visual/Perceptual Training     [x]      Home Safety Training  [x]               Patient Education                   [x]      Family Training/Education  []               Other (comment):    Frequency/Duration: Patient will be followed by occupational therapy 1-2 times per day/4-7 days per week to address goals.     Further Equipment Recommendations for Discharge: bariatric bedside commode and tub transfer bench    AMPAC: Current research shows that an AM-PAC score of 17 or less is not associated with a discharge to the patient's home setting. Based on an AM-PAC score of 17/24 and their current ADL deficits; it is recommended that the patient have 3-5 sessions per week of Occupational Therapy at d/c to increase the patient's independence    This AMPA score should be considered in conjunction with interdisciplinary team recommendations to determine the most appropriate discharge setting. Patient's social support, diagnosis, medical stability, and prior level of function should also be taken into consideration. SUBJECTIVE:   Patient stated I want to do it.     OBJECTIVE DATA SUMMARY:   Hospital course since last seen and reason for reevaluation: Pt has experienced medical set back of DVT in [facility] with painful picc line removed and relocated to [facility]. Pt continues to have wound vac and requires education on spinal precautions. PT has continued to make slow progress towards OT POC goals.    Past Medical History:   Diagnosis Date    Arthritis     Back pain     from previous back injury    Colon polyps     Diabetes ([facility] [address]     7758    Hernia, umbilical     Hyperlipidemia     Hypertension     Pneumonia      Past Surgical History:   Procedure Laterality Date    HX COLONOSCOPY  2014    polyps    HX HEENT      left lazy eye procedure during childhood    HX HEMORRHOIDECTOMY      HX HERNIA REPAIR      2021    HX ORTHOPAEDIC  01/09/2017    right knee    HX ORTHOPAEDIC  1985    torn cartilage knee    HX TONSILLECTOMY  1964     Barriers to Learning/Limitations: None  Compensate with: visual, verbal, tactile, kinesthetic cues/model    Home Situation:   Home Situation  Home Environment: Private residence  # Steps to Enter: 3  Rails to Enter: Yes  One/Two Story Residence: One story  Living Alone: Yes  Support Systems: Other Family Member(s), Friend/Neighbor  Patient Expects to be Discharged to[de-identified] Skilled nursing facility  Current DME Used/Available at Home: Cammie Aguillon, rollator  Tub or Shower Type: Tub/Shower combination (upstairs)  [x]  Right hand dominant   []  Left hand dominant    Cognitive/Behavioral Status:  Neurologic State: Alert  Orientation Level: Oriented X4  Cognition: Appropriate decision making; Appropriate for age attention/concentration  Safety/Judgement: Fall prevention;Home safety    Skin: intact  Edema: none noted    Vision/Perceptual:            glasses     Coordination: BUE  Coordination: Generally decreased, functional  Fine Motor Skills-Upper: Left Intact; Right Intact    Gross Motor Skills-Upper: Left Intact; Right Intact    Balance:  Sitting: Intact  Standing: Impaired  Standing - Static: Good  Standing - Dynamic : Fair  Strength: BUE    Strength: Within functional limits     Tone & Sensation: BUE    Tone: Normal  Sensation: Intact     Range of Motion: BUE    AROM: Within functional limits    Functional Mobility and Transfers for ADLs:  Bed Mobility:     Supine to Sit: Stand-by assistance  Sit to Supine: Stand-by assistance     Transfers:  Sit to Stand: Stand-by assistance  Stand to Sit: Stand-by assistance        Toilet Transfer : Stand-by assistance        ADL Assessment:    Oral Facial Hygiene/Grooming: Setup  Toileting: Stand by assistance     ADL Intervention:       Grooming  Washing Hands: Set-up    Toileting  Bowel Hygiene: Stand-by assistance    Cognitive Retraining  Safety/Judgement: Fall prevention;Home safety    Pain:  Pain level pre-treatment: 0/10   Pain level post-treatment: 0/10  Pain Intervention(s): Medication (see MAR); Rest, Ice, Repositioning   Response to intervention: Nurse notified, See doc flow    Activity Tolerance:   fair  Please refer to the flowsheet for vital signs taken during this treatment.   After treatment:   [] Patient left in no apparent distress sitting up in chair  [x] Patient left in no apparent distress in bed  [x] Call bell left within reach  [] Nursing notified  [] Caregiver present  [] Bed alarm activated    COMMUNICATION/EDUCATION:   [x] Role of Occupational Therapy in the acute care setting  [x] Home safety education was provided and the patient/caregiver indicated understanding. [x] Patient/family have participated as able in goal setting and plan of care. [x] Patient/family agree to work toward stated goals and plan of care. [] Patient understands intent and goals of therapy, but is neutral about his/her participation. [] Patient is unable to participate in goal setting and plan of care. Thank you for this referral.  Carole Aguilar OT  Time Calculation: 29 mins    [employer] AM-PAC® Daily Activity Inpatient Short Form (6-Clicks)*    How much HELP from another person does the patient currently need    (If the patient hasn't done an activity recently, how much help from another person do you think he/she would need if he/she tried?)   Total (Total A or Dep)   A Lot  (Mod to Max A)   A Little (Sup or Min A)   None (Mod I to I)   Putting on and taking off regular lower body clothing? [] 1 [x] 2 [] 3 [] 4   2. Bathing (including washing, rinsing,      drying)? [] 1 [x] 2 [] 3 [] 4   3. Toileting, which includes using toilet, bedpan or urinal?   [] 1 [] 2 [x] 3 [] 4   4. Putting on and taking off regular upper body clothing? [] 1 [] 2 [x] 3 [] 4   5. Taking care of personal grooming such as brushing teeth? [] 1 [] 2 [x] 3 [] 4   6. Eating meals?    [] 1 [] 2 [] 3 [x] 4

## 2023-01-09 NOTE — HOME CARE
Received call from  Dami Lynn) regarding [facility] orders for SN for IV abx and wound vac ; IV abx orders,clinicals and demographics faxed over to [facility] to check patients coverage for IV abx; Upon taking to patient in room about home health care and needing a caregiver to learn how to administer IV abx and wound care; patient states he lives alone,he has no family,no children or friends who can help him at this time ,he states that he is unable learn how to do his own IV abx himself, patient states that he wants to go to a SNF  so he can get the help he needs, Notified  Sherry Shelby) that [facility] requires that patient have a caregiver when it comes to IV abx and wound care ,a [address] cannot come to see patient every day until 2/6/23 ( when IV abx ends). LOUISE JAMES. 14:00- this liaison attempted to call contacts listed on patients chart to see if they would be willing to assist patient with learning IV abx and wound care , spoke to patients Mark Butler) ,he states he works 12 or more hours  per day and is unable to help patient, he also states that patients sister lives out of town in [place] and can't help, this liaison attemted to call  patients other friend that's listed in chart [employer]) but no answer , also spoke to another friend Serjio Cho [phone]), she states that sometimes she cleans patients house but she is unable to help patient  to the extent that he needs ; At present time, patient has no caregivers that are willing to assist him when he discharges;  Sherry Shelby) made aware that no caregivers available for patient to assist him with his home health IV abx LOUISE JAMES.

## 2023-01-10 LAB
GLUCOSE BLD STRIP.AUTO-MCNC: 134 MG/DL (ref 70–110)
GLUCOSE BLD STRIP.AUTO-MCNC: 170 MG/DL (ref 70–110)
GLUCOSE BLD STRIP.AUTO-MCNC: 180 MG/DL (ref 70–110)

## 2023-01-10 PROCEDURE — 74011000250 HC RX REV CODE- 250: Performed by: INTERNAL MEDICINE

## 2023-01-10 PROCEDURE — 74011250637 HC RX REV CODE- 250/637: Performed by: FAMILY MEDICINE

## 2023-01-10 PROCEDURE — 74011250637 HC RX REV CODE- 250/637: Performed by: INTERNAL MEDICINE

## 2023-01-10 PROCEDURE — 82962 GLUCOSE BLOOD TEST: CPT

## 2023-01-10 PROCEDURE — 99232 SBSQ HOSP IP/OBS MODERATE 35: CPT | Performed by: INTERNAL MEDICINE

## 2023-01-10 PROCEDURE — 74011636637 HC RX REV CODE- 636/637: Performed by: INTERNAL MEDICINE

## 2023-01-10 PROCEDURE — 97530 THERAPEUTIC ACTIVITIES: CPT

## 2023-01-10 PROCEDURE — 74011250637 HC RX REV CODE- 250/637: Performed by: ORTHOPAEDIC SURGERY

## 2023-01-10 PROCEDURE — 65270000046 HC RM TELEMETRY

## 2023-01-10 PROCEDURE — 74011250636 HC RX REV CODE- 250/636: Performed by: INTERNAL MEDICINE

## 2023-01-10 PROCEDURE — 74011000250 HC RX REV CODE- 250: Performed by: ORTHOPAEDIC SURGERY

## 2023-01-10 PROCEDURE — 97116 GAIT TRAINING THERAPY: CPT

## 2023-01-10 RX ORDER — OXYCODONE AND ACETAMINOPHEN 5; 325 MG/1; MG/1
1 TABLET ORAL
Status: DISCONTINUED | OUTPATIENT
Start: 2023-01-10 | End: 2023-01-13 | Stop reason: HOSPADM

## 2023-01-10 RX ADMIN — NYSTATIN: 100000 POWDER TOPICAL at 10:07

## 2023-01-10 RX ADMIN — GABAPENTIN 600 MG: 300 CAPSULE ORAL at 15:10

## 2023-01-10 RX ADMIN — WATER 2 G: 1 INJECTION INTRAMUSCULAR; INTRAVENOUS; SUBCUTANEOUS at 10:00

## 2023-01-10 RX ADMIN — SODIUM CHLORIDE, PRESERVATIVE FREE 10 ML: 5 INJECTION INTRAVENOUS at 05:12

## 2023-01-10 RX ADMIN — OXYCODONE HYDROCHLORIDE AND ACETAMINOPHEN 1 TABLET: 5; 325 TABLET ORAL at 02:57

## 2023-01-10 RX ADMIN — SODIUM CHLORIDE, PRESERVATIVE FREE 40 ML: 5 INJECTION INTRAVENOUS at 15:10

## 2023-01-10 RX ADMIN — SODIUM CHLORIDE, PRESERVATIVE FREE 10 ML: 5 INJECTION INTRAVENOUS at 22:24

## 2023-01-10 RX ADMIN — OXYCODONE HYDROCHLORIDE AND ACETAMINOPHEN 1 TABLET: 5; 325 TABLET ORAL at 19:44

## 2023-01-10 RX ADMIN — GABAPENTIN 600 MG: 300 CAPSULE ORAL at 10:00

## 2023-01-10 RX ADMIN — Medication 2 UNITS: at 22:24

## 2023-01-10 RX ADMIN — Medication 2 UNITS: at 16:27

## 2023-01-10 RX ADMIN — POLYETHYLENE GLYCOL 3350 17 G: 17 POWDER, FOR SOLUTION ORAL at 09:59

## 2023-01-10 RX ADMIN — APIXABAN 10 MG: 5 TABLET, FILM COATED ORAL at 19:09

## 2023-01-10 RX ADMIN — FAMOTIDINE 20 MG: 20 TABLET ORAL at 22:23

## 2023-01-10 RX ADMIN — SENNOSIDES AND DOCUSATE SODIUM 1 TABLET: 50; 8.6 TABLET ORAL at 10:00

## 2023-01-10 RX ADMIN — GABAPENTIN 600 MG: 300 CAPSULE ORAL at 22:23

## 2023-01-10 RX ADMIN — ATORVASTATIN CALCIUM 20 MG: 20 TABLET, FILM COATED ORAL at 22:24

## 2023-01-10 RX ADMIN — SODIUM CHLORIDE 1000 MG: 1 TABLET ORAL at 10:00

## 2023-01-10 RX ADMIN — SODIUM CHLORIDE 1000 MG: 1 TABLET ORAL at 19:09

## 2023-01-10 RX ADMIN — FAMOTIDINE 20 MG: 20 TABLET ORAL at 10:00

## 2023-01-10 RX ADMIN — CHOLECALCIFEROL TAB 25 MCG (1000 UNIT) 2000 UNITS: 25 TAB at 10:00

## 2023-01-10 RX ADMIN — APIXABAN 10 MG: 5 TABLET, FILM COATED ORAL at 10:00

## 2023-01-10 RX ADMIN — THERA TABS 1 TABLET: TAB at 10:00

## 2023-01-10 RX ADMIN — OXYCODONE HYDROCHLORIDE AND ACETAMINOPHEN 1 TABLET: 5; 325 TABLET ORAL at 23:49

## 2023-01-10 NOTE — HOME CARE
Received return call from patient's friend Jagdeep Geiger [phone])  about being willing to help patient with IV abx infusion at home, he states that he is quite busy and not able to commit to helping patient every day, also patient's friend Myles Li [phone]) called also states that she works and cares for someone else, she states  she's not able to stay and help patient every day ; Notified  Caridad Clark) of  patient's situation  regarding [facility] care and needing a caregiver to learn IV abx and wound care ,also [facility] until February 2/6/23; Also  Dario Kumar from [facility] states that patient has 100 % coverage under his insurance for home health IV abx and supplies, but at present time patient has no caregiver available at this time. LOUISE JAMES.

## 2023-01-10 NOTE — PROGRESS NOTES
[facility] Hospitalists  Progress Note    Patient: Avis Way Age: 57 y.o. : 1960 MR#: [id number] SSN: [id number]  Date: 1/10/2023     Subjective/24-hour events:     Patient states he had a bowel movement yesterday. No new issues. He does not feel comfortable going home and told me he will be back to the hospital if we let him go home. He is requesting me to send him to skilled nursing facility. I have informed him that we will let  know and see what they can do for him. Assessment:     1. Postoperative L-spine wound infection status post exploration irrigation and debridement and VAC placement 2020.  2.  LLE DVT   3. Low-grade fever, suspect secondary to DVT-currently resolved  4. Klebsiella pneumoniae bacteremia, resolved  5. Diabetes mellitus  6. Anemia of chronic medical disease, stable  7. Hypertension  8. Hyponatremia, slowly improving  9. Obesity with BMI of 33.19  10. Right upper extremity acute DVT at PICC line site s/p PICC removal  11. Constipation, resolved      Plan:       Continue Eliquis. Continue local wound care and wound VAC management  Continue rocephin through 2023 to complete course of therapy per ID recs. PT/OT to follow  Hyponatremia is improving on sodium chloride tablet. Continue current bowel regimen with incentive spirometry  Continue Lipitor, Pepcid, Neurontin, MiraLAX for other co-morbidities    Discussed with case management about talking to this patient for skilled nursing home option     Discharge barriers for today: Placement    Anticipated date of discharge: January 10 or 2023.   Patient wants to go to nursing home    Case discussed with:  [x]Patient  []Family  [x]Nursing  [x]Case Management  DVT Prophylaxis:  []Lovenox  []Hep SQ  [x]SCDs  []Coumadin   []On Heparin gtt    Objective:   VS: Visit Vitals  BP (!) 155/92 (BP 1 Location: Left leg, BP Patient Position: At rest)   Pulse 95   Temp 98.9 °F (37.2 °C)   Resp 20   Ht 5' 11\" (1.803 m)   Wt 105 kg (231 lb 7.7 oz)   SpO2 96%   BMI 32.29 kg/m²      Tmax/24hrs: Temp (24hrs), Av.3 °F (36.8 °C), Min:97.9 °F (36.6 °C), Max:98.9 °F (37.2 °C)  No intake or output data in the 24 hours ending 01/10/23 0900        General: Awake, follows verbal commands appropriately, not in acute distress  Cardiovascular:  RRR. Pulmonary: CTA bilaterally without wheezes or rhonchi currently  GI: Soft, nontender, bowel sounds present. Wound VAC present on the back. Extremities: Right wrist edema present, no erythema. No pedal edema. Neuro:  Awake and alert. Moves extremities spontaneously. No tremors noted currently. Labs:    Recent Results (from the past 24 hour(s))   GLUCOSE, POC    Collection Time: 23 11:25 AM   Result Value Ref Range    Glucose (POC) 142 (H) 70 - 110 mg/dL   GLUCOSE, POC    Collection Time: 23  5:12 PM   Result Value Ref Range    Glucose (POC) 142 (H) 70 - 110 mg/dL   GLUCOSE, POC    Collection Time: 23  9:39 PM   Result Value Ref Range    Glucose (POC) 187 (H) 70 - 110 mg/dL     Total time to take care of this patient was 35 minutes and more than 50% of time was spent counseling and coordinating care. Signed By: Shen Cadet MD     January 10, 2023       Disclaimer: Sections of this note are dictated using utilizing voice recognition software, which may have resulted in some phonetic based errors in grammar and contents. Even though attempts were made to correct all the mistakes, some may have been missed, and remained in the body of the document. If questions arise, please contact our department.

## 2023-01-10 NOTE — PROGRESS NOTES
Problem: Mobility Impaired (Adult and Pediatric)  Goal: *Acute Goals and Plan of Care (Insert Text)  Description: Physical Therapy Goals  Initiated 12/23/2022, re-evaluated 12/29/22 and goals adjusted as needed and to be accomplished within 7 day(s), re-evaluated 1/5/23  1. Patient will move from supine to sit and sit to supine , scoot up and down, and roll side to side in bed with modified independence. 2.  Patient will transfer from bed to chair and chair to bed with modified independence using the least restrictive device. 3.  Patient will perform sit to stand with modified independence. 4.  Patient will ambulate with modified independence for 200 feet with the least restrictive device. 5.  Patient will ascend/descend 12 stairs with unilateral handrail(s) with modified independence. PLOF: Pt reporting living in 2 story house with 3 [address]REA with handrails, alone but has [de-identified]year old neighbor that is taking him home. Reports he did not change his socks/shower/go upstairs since discharge. Outcome: Progressing Towards Goal     PHYSICAL THERAPY TREATMENT    Patient: Lashonda Sahu (57 y.o. male)  Date: 1/10/2023  Diagnosis: VIPIN (acute kidney injury) ([facility] [address] [N17.9]  Dehydration [E86.0] Fluid collection at surgical site  Procedure(s) (LRB):  INCISION AND DRAINAGE LUMBAR SPINE/ APPLICATION OF WOUND VAC (N/A) 18 Days Post-Op  Precautions: Spinal, Fall  PLOF: see above     ASSESSMENT:  Pt received in bed in NAD and motivated for OOB this date. Pt needing min A to get to EOB with good sitting balance noted. Pt stands to RW and amb 50 ft x 2 with RW and SBA this date, standing rest break after each trial this date. Pt had one LOB when turning to look and become distracted, CGA for safety during recovery. At end of session, pt agreeable to sit up in chair, will continue to follow. Left in bed with all needs met and call bell within reach.    Progression toward goals:   []      Improving appropriately and progressing toward goals  []      Improving slowly and progressing toward goals  []      Not making progress toward goals and plan of care will be adjusted     PLAN:  Patient continues to benefit from skilled intervention to address the above impairments. Continue treatment per established plan of care. Further Equipment Recommendations for Discharge:  rolling walker    AMPAC: Current research shows that an AM-PAC score of 17 (13 without stairs) or less is not associated with a discharge to the patient's home setting. Based on an AM-PAC score of 17/24 (**/20 if omitting stairs) and their current functional mobility deficits, it is recommended that the patient have 3-5 sessions per week of Physical Therapy at d/c to increase the patient's independence. This AMPAC score should be considered in conjunction with interdisciplinary team recommendations to determine the most appropriate discharge setting. Patient's social support, diagnosis, medical stability, and prior level of function should also be taken into consideration. SUBJECTIVE:   Patient stated I will get up.     OBJECTIVE DATA SUMMARY:   Critical Behavior:  Neurologic State: Alert  Orientation Level: Oriented X4  Cognition: Appropriate decision making  Safety/Judgement: Fall prevention, Home safety  Functional Mobility Training:  Bed Mobility:     Supine to Sit: Minimum assistance     Scooting: Stand-by assistance         Transfers:  Sit to Stand: Stand-by assistance  Stand to Sit: Stand-by assistance             Balance:  Sitting: Intact  Standing: Intact; With support  Standing - Static: Good  Standing - Dynamic : Fair     Ambulation/Gait Training:  Distance: 50 ft x 2   Assistive Device: Walker, rolling  Ambulation - Level of Assistance: Stand-by assistance;Contact guard assistance        Gait Abnormalities: Decreased step clearance        Base of Support: Narrowed     Speed/Bing: Slow  Step Length: Right shortened;Left shortened        Pain:  Pain level pre-treatment: not rated, endorses pain while moving /10  Pain level post-treatment: \"   \" /10   Pain Intervention(s): Medication (see MAR); Rest, Ice, Repositioning   Response to intervention: Nurse notified    Activity Tolerance:   Good    Please refer to the flowsheet for vital signs taken during this treatment. After treatment:   [x] Patient left in no apparent distress sitting up in chair  [] Patient left in no apparent distress in bed  [x] Call bell left within reach  [x] Nursing notified  [] Caregiver present  [] Bed alarm activated  [] SCDs applied      COMMUNICATION/EDUCATION:   [x]         Role of Physical Therapy in the acute care setting. [x]         Fall prevention education was provided and the patient/caregiver indicated understanding. [x]         Patient/family have participated as able in working toward goals and plan of care. [x]         Patient/family agree to work toward stated goals and plan of care. []         Patient understands intent and goals of therapy, but is neutral about his/her participation. []         Patient is unable to participate in stated goals/plan of care: ongoing with therapy staff.  []         Other:        Glendy Jarquin   Time Calculation: 23 mins    [address] [facility] [address] [address] AM-PAC® Basic Mobility Inpatient Short Form (6-Clicks) Version 2    How much HELP from another person does the patient currently need    (If the patient hasn't done an activity recently, how much help from another person do you think he/she would need if he/she tried?)   Total (Total A or Dep)   A Lot  (Mod to Max A)   A Little (Sup or Min A)   None (Mod I to I)   Turning from your back to your side while in a flat bed without using bedrails? [] 1 [] 2 [x] 3 [] 4   2. Moving from lying on your back to sitting on the side of a flat bed without using bedrails? [] 1 [] 2 [x] 3 [] 4   3. Moving to and from a bed to a chair (including a wheelchair)?    [] 1 [] 2 [x] 3 [] 4   4. Standing up from a chair using your arms (e.g., wheelchair, or bedside chair)? [] 1 [] 2 [x] 3 [] 4   5. Walking in hospital room? [] 1 [] 2 [x] 3 [] 4   6. Climbing 3-5 steps with a railing?+   [] 1 [x] 2 [] 3 [] 4   +If stair climbing cannot be assessed, skip item #6. Sum responses from items 1-5. Current research shows that an AM-PAC score of 17 (13 without stairs) or less is not associated with a discharge to the patient's home setting. Based on an AM-PAC score of 17/24 (**/20 if omitting stairs) and their current functional mobility deficits, it is recommended that the patient have 3-5 sessions per week of Physical Therapy at d/c to increase the patient's independence.

## 2023-01-10 NOTE — PROGRESS NOTES
Physician Progress Note      PATIENT:               Jose E Bassett  CSN #:                  [id number]  :                       1960  ADMIT DATE:       2022 10:42 AM  DISCH DATE:  RESPONDING  PROVIDER #:        Julio Cooper MD          QUERY TEXT:    Pt admitted with Complication post spinal surgery and has DVT documented. If possible, please document in progress notes and discharge summary further specificity regarding the DVT to include POA status, laterality, acuity/chronicity and vessels affected/involved: The medical record reflects the following:  Risk Factors: 58 yo male s/p lumbar surgery, admitted with infection  Clinical Indicators: 1/3 Hospitalist note Notified by nursing staff of + LE DVT on PVL study. Lovenox 1mg/kg BID initiated. 1/3 LE Duplex Sub-Acute Deep vein thrombosis noted in the left Common femoral, Spheno femoral junction and Mid femoral vein. Treatment: LE Duplex studies, Lovenox      Thank you for your time,    Jacqueline CARDONA, RN, [address], [facility] [address]  C: [phone]    [email or web address]  Options provided:  -- Acute DVT left Common femoral, Spheno femoral junction and Mid femoral vein present on admission  -- Chronic DVT left Common femoral, Spheno femoral junction and Mid femoral  present on admission  -- Other - I will add my own diagnosis  -- Disagree - Not applicable / Not valid  -- Disagree - Clinically unable to determine / Unknown  -- Refer to Clinical Documentation Reviewer    PROVIDER RESPONSE TEXT:    Acute DVT left Common femoral, Spheno femoral junction and Mid femoral was present on admission.     Query created by: Nba Alcantara on 1/10/2023 2:27 PM      Electronically signed by:  Julio Cooper MD 1/10/2023 2:30 PM

## 2023-01-10 NOTE — ROUTINE PROCESS
Bedside and Verbal shift change report given to Ed RN (oncoming nurse) by Jacob Strauss RN   (offgoing nurse). Report given with SBAR, Kardex, Intake/Output, MAR, Accordion and Recent Results.

## 2023-01-11 LAB
GLUCOSE BLD STRIP.AUTO-MCNC: 118 MG/DL (ref 70–110)
GLUCOSE BLD STRIP.AUTO-MCNC: 133 MG/DL (ref 70–110)
GLUCOSE BLD STRIP.AUTO-MCNC: 152 MG/DL (ref 70–110)
GLUCOSE BLD STRIP.AUTO-MCNC: 163 MG/DL (ref 70–110)

## 2023-01-11 PROCEDURE — 74011250637 HC RX REV CODE- 250/637: Performed by: ORTHOPAEDIC SURGERY

## 2023-01-11 PROCEDURE — 74011250637 HC RX REV CODE- 250/637: Performed by: FAMILY MEDICINE

## 2023-01-11 PROCEDURE — 99232 SBSQ HOSP IP/OBS MODERATE 35: CPT | Performed by: INTERNAL MEDICINE

## 2023-01-11 PROCEDURE — 77030019934 HC DRSG VAC ASST KCON -B

## 2023-01-11 PROCEDURE — 65270000046 HC RM TELEMETRY

## 2023-01-11 PROCEDURE — 2709999900 HC NON-CHARGEABLE SUPPLY

## 2023-01-11 PROCEDURE — 82962 GLUCOSE BLOOD TEST: CPT

## 2023-01-11 PROCEDURE — 97606 NEG PRS WND THER DME>50 SQCM: CPT

## 2023-01-11 PROCEDURE — 97535 SELF CARE MNGMENT TRAINING: CPT

## 2023-01-11 PROCEDURE — 74011250637 HC RX REV CODE- 250/637: Performed by: INTERNAL MEDICINE

## 2023-01-11 PROCEDURE — 74011636637 HC RX REV CODE- 636/637: Performed by: INTERNAL MEDICINE

## 2023-01-11 PROCEDURE — 74011250636 HC RX REV CODE- 250/636: Performed by: INTERNAL MEDICINE

## 2023-01-11 PROCEDURE — 74011000250 HC RX REV CODE- 250: Performed by: ORTHOPAEDIC SURGERY

## 2023-01-11 PROCEDURE — 74011000250 HC RX REV CODE- 250: Performed by: INTERNAL MEDICINE

## 2023-01-11 RX ADMIN — GABAPENTIN 600 MG: 300 CAPSULE ORAL at 17:23

## 2023-01-11 RX ADMIN — THERA TABS 1 TABLET: TAB at 08:31

## 2023-01-11 RX ADMIN — SENNOSIDES AND DOCUSATE SODIUM 1 TABLET: 50; 8.6 TABLET ORAL at 08:32

## 2023-01-11 RX ADMIN — APIXABAN 10 MG: 5 TABLET, FILM COATED ORAL at 17:23

## 2023-01-11 RX ADMIN — Medication 2 UNITS: at 22:24

## 2023-01-11 RX ADMIN — GABAPENTIN 600 MG: 300 CAPSULE ORAL at 08:31

## 2023-01-11 RX ADMIN — WATER 2 G: 1 INJECTION INTRAMUSCULAR; INTRAVENOUS; SUBCUTANEOUS at 08:32

## 2023-01-11 RX ADMIN — GABAPENTIN 600 MG: 300 CAPSULE ORAL at 22:23

## 2023-01-11 RX ADMIN — ATORVASTATIN CALCIUM 20 MG: 20 TABLET, FILM COATED ORAL at 22:23

## 2023-01-11 RX ADMIN — APIXABAN 10 MG: 5 TABLET, FILM COATED ORAL at 08:31

## 2023-01-11 RX ADMIN — OXYCODONE HYDROCHLORIDE AND ACETAMINOPHEN 1 TABLET: 5; 325 TABLET ORAL at 08:38

## 2023-01-11 RX ADMIN — SODIUM CHLORIDE, PRESERVATIVE FREE 10 ML: 5 INJECTION INTRAVENOUS at 06:38

## 2023-01-11 RX ADMIN — SODIUM CHLORIDE, PRESERVATIVE FREE 10 ML: 5 INJECTION INTRAVENOUS at 22:30

## 2023-01-11 RX ADMIN — CHOLECALCIFEROL TAB 25 MCG (1000 UNIT) 2000 UNITS: 25 TAB at 08:32

## 2023-01-11 RX ADMIN — FAMOTIDINE 20 MG: 20 TABLET ORAL at 08:31

## 2023-01-11 RX ADMIN — NYSTATIN: 100000 POWDER TOPICAL at 17:26

## 2023-01-11 RX ADMIN — SODIUM CHLORIDE 1000 MG: 1 TABLET ORAL at 08:32

## 2023-01-11 RX ADMIN — SODIUM CHLORIDE, PRESERVATIVE FREE 10 ML: 5 INJECTION INTRAVENOUS at 18:37

## 2023-01-11 RX ADMIN — Medication 2 UNITS: at 17:23

## 2023-01-11 RX ADMIN — OXYCODONE HYDROCHLORIDE AND ACETAMINOPHEN 1 TABLET: 5; 325 TABLET ORAL at 20:19

## 2023-01-11 RX ADMIN — SODIUM CHLORIDE 1000 MG: 1 TABLET ORAL at 17:23

## 2023-01-11 RX ADMIN — FAMOTIDINE 20 MG: 20 TABLET ORAL at 20:19

## 2023-01-11 RX ADMIN — NYSTATIN: 100000 POWDER TOPICAL at 08:50

## 2023-01-11 RX ADMIN — POLYETHYLENE GLYCOL 3350 17 G: 17 POWDER, FOR SOLUTION ORAL at 08:32

## 2023-01-11 RX ADMIN — OXYCODONE HYDROCHLORIDE AND ACETAMINOPHEN 1 TABLET: 5; 325 TABLET ORAL at 12:44

## 2023-01-11 NOTE — WOUND CARE
Physical Exam  Room 203: focused wound re-assess  Dressing changed with primary nurse Bouchra RN. Spine wound, open surgical incision, 14x4x2.8cm, base looks much improved,  Single layer adaptic cut to fit & placed in wound, small black granufoam cut to fit, boat shaped, & placed into wound bed. Same settings, canister changed. Encouragement & emotional support provided to pt, teary-eyed about going home without assistance. Vac change orders are on the chart, next dressing change due on Friday. Will turn over care to nursing staff at this time.   Cammie CROOKN, RN, [facility], [address]

## 2023-01-11 NOTE — MED STUDENT NOTES
[facility]    Patient: Rachel Post Age: 57 y.o. : 1960 MR#: [id number] [id number]: [id number]  Date/Time: 2023     C/C: back pain      Subjective:   Patient was sitting upright in bed. He has no complaints and is feeling well. He is waiting on  for assignment of stay after discharge. Denies fever, chest pain, shortness of breath, nausea, vomiting, diarrhea, constipation, no numbness or tingling. Review of Systems:  positive responses in bold type   Constitutional: Negative for fever, chills, diaphoresis and unexpected weight change. HENT: Negative for ear pain, congestion, sore throat, rhinorrhea, drooling, trouble swallowing, neck pain and tinnitus. Eyes: Negative for photophobia, pain, redness and visual disturbance. Respiratory: negative for shortness of breath, cough, choking, chest tightness, wheezing or stridor. Cardiovascular: Negative for chest pain, palpitations and leg swelling. Gastrointestinal: Negative for nausea, vomiting, abdominal pain, diarrhea, constipation, blood in stool, abdominal distention and anal bleeding. Genitourinary: Negative for dysuria, urgency, frequency, hematuria, flank pain and difficulty urinating. Musculoskeletal: Negative for back pain and arthralgias. Skin: Negative for color change, rash and wound. Neurological: Negative for dizziness, seizures, syncope, speech difficulty, light-headedness or headaches. Hematological: Does not bruise/bleed easily. Psychiatric/Behavioral: Negative for suicidal ideas, hallucinations, behavioral problems, self-injury or agitation     Assessment/Plan:     1. Postoperative L-spine wound infection status post exploration irrigation and debridement and VAC placement 2020.  2.  LLE DVT   3. Low-grade fever, suspect secondary to DVT-currently resolved  4. Klebsiella pneumoniae bacteremia, resolved  5. Diabetes mellitus  6.   Anemia of chronic medical disease, stable  7. Hypertension  8. Hyponatremia, slowly improving  9. Obesity with BMI of 33.19  10. Right upper extremity acute DVT at PICC line site s/p PICC removal  11. Constipation, resolved     Continue Eliquis. Continue local wound care and wound VAC management  Continue rocephin through 2023 to complete course of therapy per ID recs. PT/OT to follow  Hyponatremia is improving on sodium chloride tablet. Continue current bowel regimen with incentive spirometry  Continue Lipitor, Pepcid, Neurontin, MiraLAX for other co-morbidities         Objective:       General:  Alert, cooperative, no acute distress   HEENT: No facial asymmetry, RACH Dave, External ears - WNL    Cardiovascular: S1S2 - regular , No Murmur   Pulmonary: Equal expansion , No Use of accessory muscles , No Rales No Rhonchi    GI:  +BS in all four quadrants, soft, non-tender  Extremities:  No edema; 2+ dorsalis pedis pulses bilaterally  Neuro: Alert and oriented X 2.        DVT Prophylaxis:  []Lovenox  []Hep SQ  []SCDs  []Coumadin   []On Heparin gtt    [x] Eliquis [] Xarelto     Vitals:         VS: Visit Vitals  BP (!) 147/86 (BP 1 Location: Left leg) Comment: Reported to RN Bouchra   Pulse 86   Temp 97.9 °F (36.6 °C)   Resp 20   Ht 5' 11\" (1.803 m)   Wt 100.9 kg (222 lb 8 oz)   SpO2 96%   BMI 31.03 kg/m²      Tmax/24hrs: Temp (24hrs), Av.2 °F (36.8 °C), Min:97.4 °F (36.3 °C), Max:98.7 °F (37.1 °C)        Medications:   Current Facility-Administered Medications   Medication Dose Route Frequency    oxyCODONE-acetaminophen (PERCOCET) 5-325 mg per tablet 1 Tablet  1 Tablet Oral Q4H PRN    diclofenac (VOLTAREN) 1 % topical gel 2 g  2 g Topical QID PRN    acetaminophen (TYLENOL) tablet 500 mg  500 mg Oral Q6H PRN    sodium chloride soluble tablet 1,000 mg  1 g Oral BID    senna-docusate (PERICOLACE) 8.6-50 mg per tablet 1 Tablet  1 Tablet Oral DAILY    apixaban (ELIQUIS) tablet 10 mg  10 mg Oral BID    [START ON 1/13/2023] apixaban (ELIQUIS) tablet 5 mg  5 mg Oral BID    nystatin (MYCOSTATIN) 100,000 unit/gram powder   Topical BID    bisacodyL (DULCOLAX) suppository 10 mg  10 mg Rectal DAILY PRN    lactulose (CHRONULAC) 10 gram/15 mL solution 15 mL  15 mL Oral DAILY PRN    famotidine (PEPCID) tablet 20 mg  20 mg Oral Q12H    polyethylene glycol (MIRALAX) packet 17 g  17 g Oral DAILY    cyclobenzaprine (FLEXERIL) tablet 10 mg  10 mg Oral TID PRN    insulin lispro (HUMALOG) injection   SubCUTAneous AC&HS    cefTRIAXone (ROCEPHIN) 2 g in sterile water (preservative free) 20 mL IV syringe  2 g IntraVENous Q24H    sodium chloride (NS) flush 5-40 mL  5-40 mL IntraVENous PRN    phenol throat spray (CHLORASEPTIC) 1 Spray  1 Spray Oral PRN    benzocaine-menthoL (CEPACOL) lozenge 1 Lozenge  1 Lozenge Oral PRN    naloxone (NARCAN) injection 0.4 mg  0.4 mg IntraVENous EVERY 2 MINUTES AS NEEDED    sodium chloride (NS) flush 5-40 mL  5-40 mL IntraVENous Q8H    acetaminophen (TYLENOL) suppository 650 mg  650 mg Rectal Q6H PRN    ondansetron (ZOFRAN ODT) tablet 4 mg  4 mg Oral Q8H PRN    Or    ondansetron (ZOFRAN) injection 4 mg  4 mg IntraVENous Q6H PRN    melatonin tablet 5 mg  5 mg Oral QHS PRN    albuterol-ipratropium (DUO-NEB) 2.5 MG-0.5 MG/3 ML  3 mL Nebulization Q6H PRN    glucose chewable tablet 16 g  4 Tablet Oral PRN    glucagon (GLUCAGEN) injection 1 mg  1 mg IntraMUSCular PRN    dextrose 10% infusion 0-250 mL  0-250 mL IntraVENous PRN    cholecalciferol (VITAMIN D3) (1000 Units /25 mcg) tablet 2,000 Units  2,000 Units Oral DAILY    gabapentin (NEURONTIN) capsule 600 mg  600 mg Oral TID    therapeutic multivitamin (THERAGRAN) tablet 1 Tablet  1 Tablet Oral DAILY    atorvastatin (LIPITOR) tablet 20 mg  20 mg Oral QHS       Labs:    No results for input(s): WBC, HGB, HCT, PLT, HGBEXT, HCTEXT, PLTEXT in the last 72 hours.   Recent Labs     01/09/23  0300      K 4.0      CO2 30   *   BUN 18   CREA 0.80   CA 9.5 Time spent on direct patient care >35 mints     Complexity : High complex - due to multiple medical issues outlined above. CODE Status :     Case discussed with:  [x]Patient  [] Family  []Nursing  []Case Management         Disclaimer: Sections of this note are dictated utilizing voice recognition software, which may have resulted in some phonetic based errors in grammar and contents. Even though attempts were made to correct all the mistakes, some may have been missed, and remained in the body of the document. If questions arise, please contact our department. Signed By: Caroline Adams     January 11, 2023       *ATTENTION:  This note has been created by a medical student for educational purposes only. Please do not refer to the content of this note for clinical decision-making, billing, or other purposes. Please see attending physicians note to obtain clinical information on this patient. *

## 2023-01-11 NOTE — PROGRESS NOTES
Comprehensive Nutrition Assessment    Type and Reason for Visit: Reassess, Wound, RD nutrition re-screen/LOS    Nutrition Recommendations/Plan:   Add oral nutrition supplement to optimize nutrition intake opportunity: Jose (each provides 80 kcals, 2.5 g collagen protein, 300 mg vitamin C, 9.5 mg zinc) BID    Add oral nutrition supplement to optimize nutrition intake opportunity: Glucerna (each provides 220 kcal, 10g protein) BID    Modify current diet - pt requested to remove double protein   Monitor PO intake, compliance of oral supplement, weight, labs, and plan of care during admission. Malnutrition Assessment:  Malnutrition Status: At risk for malnutrition (specify) (r/t varied PO intake PTA, advance age and wounds) (12/29/22 1301)      Nutrition Assessment:    Pt admitted for back pain; s/p spinal fusion on 12/07/2022 per MD note. Pt reported   fecal incontinence, urinary incontinence (occurred before back surgery as well), loss of appetite, weakness, diarrhea, and intermittent, chronic pressure with urination; fevers, chills, and diaphoresis over the last 3 days PTA. Visited pt in room, laying in bed, alert and able to communicate. Pt reported tolerated current diet with at least 76% PO intake and good acceptance of oral supplements. Dietary came into room during visit to take order for lunch/dinner. Verified Glucerna and Jose on patient's chart with staff d/t patient reported not receiving them. Current lab shows elevated BUN/Creatinine ratio (23). Pt denies d/c/n/v nor abdominal pain. Pt with discharge pending. Nutrition Related Findings:    Last BM 1/10. Output: 600mL (urine). Pertinent Medications:  sodium chloride tablets, senna-docusate, eliquis, dulcolax, lactulose, lovenox, pepcid, miralax, humalog, colace, zofran, vitamin D3, lipitor Wound Type:  Wound vac, Multiple, Skin tears, Surgical incision     Current Nutrition Intake & Therapies:  Average Meal Intake: %  Average Supplement Intake: %  ADULT ORAL NUTRITION SUPPLEMENT Breakfast, Dinner; Diabetic Supplement  ADULT ORAL NUTRITION SUPPLEMENT Lunch, Dinner; Wound Healing Supplement  ADULT DIET Regular; 5 carb choices (75 gm/meal); 1500 ml; Pt requested double protein at each meal. Thanks! Anthropometric Measures:  Height: 5' 11\" (180.3 cm)  Ideal Body Weight (IBW): 172 lbs (78 kg)  Admission Body Weight: 238 lb 1.6 oz  Current Body Wt:  108 kg (238 lb 1.6 oz), 138.4 % IBW. Not specified  Current BMI (kg/m2): 33.2        Weight Adjustment: No adjustment  BMI Category: Obese class 1 (BMI 30.0-34. 9)    Estimated Daily Nutrient Needs:  Energy Requirements Based On: Formula (MSJ x 1.2-1.4)  Weight Used for Energy Requirements: Current  Energy (kcal/day): 5488-6021  Weight Used for Protein Requirements: Current (1.0-1.3)  Protein (g/day): 108-140  Method Used for Fluid Requirements: 1 ml/kcal  Fluid (ml/day): 8336-3924    Nutrition Diagnosis:   Increased nutrient needs related to acute injury/trauma, increased demand for energy/nutrients as evidenced by poor intake prior to admission, wounds    Nutrition Interventions:   Food and/or Nutrient Delivery: Continue oral nutrition supplement, Modify current diet, Mineral supplement, Vitamin supplement  Nutrition Education/Counseling: Education not indicated, No recommendations at this time  Coordination of Nutrition Care: Continue to monitor while inpatient  Plan of Care discussed with: patient    Goals:  Previous Goal Met: Goal(s) achieved  Goals: Meet at least 75% of estimated needs, by next RD assessment       Nutrition Monitoring and Evaluation:   Behavioral-Environmental Outcomes: None identified  Food/Nutrient Intake Outcomes: Diet advancement/tolerance, Food and nutrient intake, Supplement intake, Vitamin/mineral intake  Physical Signs/Symptoms Outcomes: Biochemical data, Meal time behavior, Nutrition focused physical findings, Skin    Discharge Planning:    Continue current diet, Continue oral nutrition supplement    Chris Valdez, RDN, LD   Contact: [phone]

## 2023-01-11 NOTE — PROGRESS NOTES
Problem: Self Care Deficits Care Plan (Adult)  Goal: *Acute Goals and Plan of Care (Insert Text)  Description: Occupational Therapy Goals  Initiated 12/24/2022 within 7 day(s), re-evaluation 1/9/2023- pt making slow progress towards goals, he is inhibited by pain     1. Patient will perform grooming with supervision/set-up standing at the sink for > 4 min with Good balance. 2.  Patient will perform bathing with supervision/set-up using AE. 3.  Patient will perform lower body dressing with supervision/set-up using AE. 4.  Patient will perform toilet transfers with supervision/set-up. 5.  Patient will perform all aspects of toileting with supervision/set-up. 6.  Patient will participate in upper extremity therapeutic exercise/activities with supervision/set-up for 8 minutes to improve endurance and UB strength needed for ADLs    7. Patient will utilize energy conservation techniques during functional activities with verbal cues. 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 Outcome: Progressing Towards Goal   OCCUPATIONAL THERAPY TREATMENT    Patient: Jim Todd (9 y.o. male)  Date: 1/11/2023  Diagnosis: VIPIN (acute kidney injury) ([facility] [address] [N17.9]  Dehydration [E86.0] Fluid collection at surgical site  Procedure(s) (LRB):  INCISION AND DRAINAGE LUMBAR SPINE/ APPLICATION OF WOUND VAC (N/A) 19 Days Post-Op  Precautions: Spinal, Fall    Chart, occupational therapy assessment, plan of care, and goals were reviewed. ASSESSMENT:  Pt motivated for OOB activity. Reviewed use of adaptive equipment to increase independence w/LB dressing ADL. Pt w/LOB w/functional mobility requiring Min Assist to recover. Pt requires min vc's for safety w/RW and functional mobility and hand placement w/functional transfers. Reviewed energy conservation techniques w/ADLs , importance OOB and pacing time OOB. No c/o pain. Progression toward goals:  [x]          Improving appropriately and progressing toward goals  []          Improving slowly and progressing toward goals  []          Not making progress toward goals and plan of care will be adjusted     PLAN:  Patient continues to benefit from skilled intervention to address the above impairments. Continue treatment per established plan of care. Further Equipment Recommendations for Discharge:  shower chair and rolling walker    AMPAC: Current research shows that an AM-PAC score of 18 or greater is associated with a discharge to the patient's home setting. Based on an AM-PAC score of 18/24 and their current ADL deficits; it is recommended that the patient have 2-3 sessions per week of Occupational Therapy at d/c to increase the patient's independence. This AMPAC score should be considered in conjunction with interdisciplinary team recommendations to determine the most appropriate discharge setting. Patient's social support, diagnosis, medical stability, and prior level of function should also be taken into consideration. SUBJECTIVE:   Patient stated I can't go home like this. I'm not strong enough.     OBJECTIVE DATA SUMMARY:   Cognitive/Behavioral Status:  Neurologic State: Alert  Orientation Level: Oriented X4  Cognition: Follows commands  Safety/Judgement: Fall prevention, Home safety    Functional Mobility and Transfers for ADLs:   Bed Mobility:  Supine to Sit: Additional time;Stand-by assistance  Scooting: Modified independent     Transfers:  Sit to Stand: Modified independent (w/RW)  Bed to Chair: Supervision (w/RW)    Balance:  Sitting: Intact  Standing: Impaired; With support    ADL Intervention:  Grooming  Position Performed: Seated in chair  Washing Face: Set-up  Washing Hands: Set-up    Lower Body Dressing Assistance  Slip on Shoes with Back: Minimum assistance  Position Performed: Seated edge of bed  Cues: Alveta  Equipment Used: Long handled shoe janette    UMIKI Therapeutic Exercises:   Pt performs functional mobility w/RW to increase  activity tolerance for carryover w/ADLs. Pain:  Pain level pre-treatment: 0/10   Pain level post-treatment: 0/10    Activity Tolerance:    Good    Please refer to the flowsheet for vital signs taken during this treatment. After treatment:   [x]  Patient left in no apparent distress sitting up in chair  []  Patient left in no apparent distress in bed  [x]  Call bell left within reach  []  Nursing notified  []  Caregiver present  []  Bed alarm activated    COMMUNICATION/EDUCATION:   [] Role of Occupational Therapy in the acute care setting  [] Home safety education was provided and the patient/caregiver indicated understanding. [] Patient/family have participated as able in working towards goals and plan of care. [x] Patient/family agree to work toward stated goals and plan of care. [] Patient understands intent and goals of therapy, but is neutral about his/her participation. [] Patient is unable to participate in goal setting and plan of care. Thank you for this referral.  USDHAKAR Ferraro  Time Calculation: 31 mins    [address] [facility] [address] [address] AM-PAC® Daily Activity Inpatient Short Form (6-Clicks)*    How much HELP from another person does the patient currently need    (If the patient hasn't done an activity recently, how much help from another person do you think he/she would need if he/she tried?)   Total (Total A or Dep)   A Lot  (Mod to Max A)   A Little (Sup or Min A)   None (Mod I to I)   Putting on and taking off regular lower body clothing? [] 1 [] 2 [x] 3 [] 4   2. Bathing (including washing, rinsing,      drying)? [] 1 [] 2 [x] 3 [] 4   3. Toileting, which includes using toilet, bedpan or urinal?   [] 1 [] 2 [x] 3 [] 4   4. Putting on and taking off regular upper body clothing? [] 1 [] 2 [x] 3 [] 4   5. Taking care of personal grooming such as brushing teeth? [] 1 [] 2 [x] 3 [] 4   6.  Eating meals?   [] 1 [] 2 [x] 3 [] 4

## 2023-01-11 NOTE — DISCHARGE INSTR - DIET
Good nutrition is important when healing from an illness, injury, or surgery. Follow any nutrition recommendations given to you during your hospital stay. If you were given an oral nutrition supplement while in the hospital, continue to take this supplement at home. You can take it with meals, in-between meals, and/or before bedtime. These supplements can be purchased at most local grocery stores, pharmacies, and chain [employer]-stores. If you have any questions about your diet or nutrition, call the hospital and ask for the dietitian. Continue Oral Nutrition Supplement (ONS) at discharge. Recommend Cleaning Back or a comparable/similiar product Twice daily for 90 days unless otherwise directed by your Primary Care Physician.      Arianne Ramey RD

## 2023-01-11 NOTE — PROGRESS NOTES
[facility]  Progress Note    Patient: Zita Rodríguez Age: 57 y.o. : 1960 MR#: [id number] SSN: [id number]  Date: 2023     Subjective/24-hour events:     Patient continues to have off-and-on back pain. No chest or abdominal pain. Does not have any new symptoms. Patient stated that home health care is trying to arrange antibiotics at home. Patient given permission to talk to his friend, Mr. El Bloom. Spoke to Mr. El Bloom over the phone: He stated he cannot help patient every day as he is busy and lives 10 miles away from his home. He can do once or max twice a week. Assessment:     1. Postoperative L-spine wound infection status post exploration irrigation and debridement and VAC placement 2020.  2.  LLE DVT   3. Low-grade fever, suspect secondary to DVT-currently resolved  4. Klebsiella pneumoniae bacteremia, resolved  5. Diabetes mellitus  6. Anemia of chronic medical disease, stable  7. Hypertension  8. Hyponatremia, slowly improving  9. Obesity with BMI of 33.19  10. Right upper extremity acute DVT at PICC line site s/p PICC removal  11. Constipation, resolved      Plan:       Continue Eliquis. Continue local wound care and wound VAC management  Continue rocephin through 2023 to complete course of therapy per ID recs. PT/OT to follow  Hyponatremia is improving on sodium chloride tablet. Continue current bowel regimen with incentive spirometry  Continue Lipitor, Pepcid, Neurontin, MiraLAX for other co-morbidities    Patient is otherwise medically stable once  figures it out stable discharge plan to do IV antibiotic and wound care management outpatient. Discharge barriers for today: Complex social issues    Anticipated date of discharge:  or 2023.       Case discussed with:  [x]Patient  []Family  [x]Nursing  [x]Case Management  DVT Prophylaxis:  []Lovenox  []Hep SQ  [x]SCDs  []Coumadin   []On Heparin gtt    Objective:   VS: Visit Vitals  BP (!) 147/86 (BP 1 Location: Left leg) Comment: Reported to RN Bouchra   Pulse 86   Temp 97.9 °F (36.6 °C)   Resp 20   Ht 5' 11\" (1.803 m)   Wt 100.9 kg (222 lb 8 oz)   SpO2 96%   BMI 31.03 kg/m²      Tmax/24hrs: Temp (24hrs), Av.2 °F (36.8 °C), Min:97.4 °F (36.3 °C), Max:98.7 °F (37.1 °C)    Intake/Output Summary (Last 24 hours) at 2023 09  Last data filed at 2023 0330  Gross per 24 hour   Intake --   Output 300 ml   Net -300 ml       General: Awake, follows verbal commands appropriately, not in acute distress  Cardiovascular:  RRR. Pulmonary: CTA bilaterally without wheezes or rhonchi currently  GI: Soft, nontender, bowel sounds present. Wound VAC present on the back. Extremities: Right wrist edema present, no erythema. No pedal edema. Neuro:  Awake and alert. Moves extremities spontaneously. No tremors noted currently. Labs:    Recent Results (from the past 24 hour(s))   GLUCOSE, POC    Collection Time: 01/10/23 11:32 AM   Result Value Ref Range    Glucose (POC) 134 (H) 70 - 110 mg/dL   GLUCOSE, POC    Collection Time: 01/10/23  4:25 PM   Result Value Ref Range    Glucose (POC) 180 (H) 70 - 110 mg/dL   GLUCOSE, POC    Collection Time: 01/10/23 10:16 PM   Result Value Ref Range    Glucose (POC) 170 (H) 70 - 110 mg/dL   GLUCOSE, POC    Collection Time: 23  7:47 AM   Result Value Ref Range    Glucose (POC) 118 (H) 70 - 110 mg/dL     Total time to take care of this patient was 35 minutes and more than 50% of time was spent counseling and coordinating care. Signed By: Chang Diaz MD     2023       Disclaimer: Sections of this note are dictated using utilizing voice recognition software, which may have resulted in some phonetic based errors in grammar and contents. Even though attempts were made to correct all the mistakes, some may have been missed, and remained in the body of the document.  If questions arise, please contact our department.

## 2023-01-12 LAB
ANION GAP SERPL CALC-SCNC: 7 MMOL/L (ref 3–18)
BUN SERPL-MCNC: 20 MG/DL (ref 7–18)
BUN/CREAT SERPL: 22 (ref 12–20)
CALCIUM SERPL-MCNC: 8.4 MG/DL (ref 8.5–10.1)
CHLORIDE SERPL-SCNC: 104 MMOL/L (ref 100–111)
CO2 SERPL-SCNC: 29 MMOL/L (ref 21–32)
CREAT SERPL-MCNC: 0.89 MG/DL (ref 0.6–1.3)
GLUCOSE BLD STRIP.AUTO-MCNC: 111 MG/DL (ref 70–110)
GLUCOSE BLD STRIP.AUTO-MCNC: 135 MG/DL (ref 70–110)
GLUCOSE BLD STRIP.AUTO-MCNC: 144 MG/DL (ref 70–110)
GLUCOSE BLD STRIP.AUTO-MCNC: 158 MG/DL (ref 70–110)
GLUCOSE SERPL-MCNC: 164 MG/DL (ref 74–99)
POTASSIUM SERPL-SCNC: 4.4 MMOL/L (ref 3.5–5.5)
SODIUM SERPL-SCNC: 140 MMOL/L (ref 136–145)

## 2023-01-12 PROCEDURE — 80048 BASIC METABOLIC PNL TOTAL CA: CPT

## 2023-01-12 PROCEDURE — 74011636637 HC RX REV CODE- 636/637: Performed by: INTERNAL MEDICINE

## 2023-01-12 PROCEDURE — 74011250636 HC RX REV CODE- 250/636: Performed by: INTERNAL MEDICINE

## 2023-01-12 PROCEDURE — 97116 GAIT TRAINING THERAPY: CPT

## 2023-01-12 PROCEDURE — 74011250637 HC RX REV CODE- 250/637: Performed by: ORTHOPAEDIC SURGERY

## 2023-01-12 PROCEDURE — 99232 SBSQ HOSP IP/OBS MODERATE 35: CPT | Performed by: INTERNAL MEDICINE

## 2023-01-12 PROCEDURE — 97164 PT RE-EVAL EST PLAN CARE: CPT

## 2023-01-12 PROCEDURE — 82962 GLUCOSE BLOOD TEST: CPT

## 2023-01-12 PROCEDURE — 74011250637 HC RX REV CODE- 250/637: Performed by: FAMILY MEDICINE

## 2023-01-12 PROCEDURE — 74011000250 HC RX REV CODE- 250: Performed by: ORTHOPAEDIC SURGERY

## 2023-01-12 PROCEDURE — 2709999900 HC NON-CHARGEABLE SUPPLY

## 2023-01-12 PROCEDURE — 74011250637 HC RX REV CODE- 250/637: Performed by: INTERNAL MEDICINE

## 2023-01-12 PROCEDURE — 65270000046 HC RM TELEMETRY

## 2023-01-12 PROCEDURE — 74011000250 HC RX REV CODE- 250: Performed by: INTERNAL MEDICINE

## 2023-01-12 RX ADMIN — APIXABAN 10 MG: 5 TABLET, FILM COATED ORAL at 08:48

## 2023-01-12 RX ADMIN — FAMOTIDINE 20 MG: 20 TABLET ORAL at 08:47

## 2023-01-12 RX ADMIN — NYSTATIN: 100000 POWDER TOPICAL at 17:12

## 2023-01-12 RX ADMIN — GABAPENTIN 600 MG: 300 CAPSULE ORAL at 17:11

## 2023-01-12 RX ADMIN — SODIUM CHLORIDE, PRESERVATIVE FREE 10 ML: 5 INJECTION INTRAVENOUS at 22:32

## 2023-01-12 RX ADMIN — NYSTATIN: 100000 POWDER TOPICAL at 08:58

## 2023-01-12 RX ADMIN — SODIUM CHLORIDE 1000 MG: 1 TABLET ORAL at 08:47

## 2023-01-12 RX ADMIN — CHOLECALCIFEROL TAB 25 MCG (1000 UNIT) 2000 UNITS: 25 TAB at 08:47

## 2023-01-12 RX ADMIN — SODIUM CHLORIDE, PRESERVATIVE FREE 10 ML: 5 INJECTION INTRAVENOUS at 17:55

## 2023-01-12 RX ADMIN — ATORVASTATIN CALCIUM 20 MG: 20 TABLET, FILM COATED ORAL at 22:27

## 2023-01-12 RX ADMIN — SENNOSIDES AND DOCUSATE SODIUM 1 TABLET: 50; 8.6 TABLET ORAL at 08:47

## 2023-01-12 RX ADMIN — GABAPENTIN 600 MG: 300 CAPSULE ORAL at 08:47

## 2023-01-12 RX ADMIN — THERA TABS 1 TABLET: TAB at 08:47

## 2023-01-12 RX ADMIN — SODIUM CHLORIDE, PRESERVATIVE FREE 10 ML: 5 INJECTION INTRAVENOUS at 06:02

## 2023-01-12 RX ADMIN — WATER 2 G: 1 INJECTION INTRAMUSCULAR; INTRAVENOUS; SUBCUTANEOUS at 08:48

## 2023-01-12 RX ADMIN — FAMOTIDINE 20 MG: 20 TABLET ORAL at 20:55

## 2023-01-12 RX ADMIN — SODIUM CHLORIDE 1000 MG: 1 TABLET ORAL at 17:11

## 2023-01-12 RX ADMIN — OXYCODONE HYDROCHLORIDE AND ACETAMINOPHEN 1 TABLET: 5; 325 TABLET ORAL at 20:55

## 2023-01-12 RX ADMIN — APIXABAN 10 MG: 5 TABLET, FILM COATED ORAL at 17:11

## 2023-01-12 RX ADMIN — OXYCODONE HYDROCHLORIDE AND ACETAMINOPHEN 1 TABLET: 5; 325 TABLET ORAL at 08:51

## 2023-01-12 RX ADMIN — Medication 2 UNITS: at 12:00

## 2023-01-12 RX ADMIN — GABAPENTIN 600 MG: 300 CAPSULE ORAL at 22:27

## 2023-01-12 NOTE — PROGRESS NOTES
Problem: Mobility Impaired (Adult and Pediatric)  Goal: *Acute Goals and Plan of Care (Insert Text)  Description: Physical Therapy Goals  Initiated 12/23/2022, re-evaluated 12/29/22 and goals adjusted as needed and to be accomplished within 7 day(s), re-evaluated 1/5/23, re-evaluated 1/12/23  1. Patient will move from supine to sit and sit to supine , scoot up and down, and roll side to side in bed with modified independence. 2.  Patient will transfer from bed to chair and chair to bed with modified independence using the least restrictive device. 3.  Patient will perform sit to stand with modified independence. 4.  Patient will ambulate with modified independence for 200 feet with the least restrictive device. 5.  Patient will ascend/descend 12 stairs with unilateral handrail(s) with modified independence. PLOF: Pt reporting living in 2 story house with 3 ANDREA with handrails, alone but has [de-identified]year old neighbor that is taking him home. Reports he did not change his socks/shower/go upstairs since discharge. Outcome: Resolved/Met     PHYSICAL THERAPY RE-EVALUATION AND DISCHARGE    Patient: Paloma Delatorre (9 y.o. male)  Date: 1/12/2023  Primary Diagnosis: VIPIN (acute kidney injury) ([facility] [address] [N17.9]  Dehydration [E86.0]  Procedure(s) (LRB):  INCISION AND DRAINAGE LUMBAR SPINE/ APPLICATION OF WOUND VAC (N/A) 20 Days Post-Op   Precautions:   Spinal  WBAT  PLOF: see above     ASSESSMENT :  Based on the objective data described below, the patient presents with close to baseline. Pt performs bed mobility and transfers with mod ind. Pt given supervision for safety with wound vac for ambulation in which he tolerated apptox 250 ft this date, no LOB and safe RW management noted. Pt adheres to spinal precautions. Reports he can sleep on first floor of home and has 3 ANDREA, pt reports has no concerns over being able to enter home, is motivated to return home.  Will sign off from caseload at this time, recommend continued ambulation with RW around unit for remainder of admission. Patient does not require further skilled intervention at this level of care. PLAN :  Recommendations and Planned Interventions:   No formal PT needs identified at this time. Further Equipment Recommendations for Discharge: rolling walker and N/A    AMPAC: Current research shows that an AM-PAC score of 18 (14 without stairs) or greater is associated with a discharge to the patient's home setting. Based on an AM-PAC score of 22/24 (or **/20 if omitting stairs) and their current functional mobility deficits, it is recommended that the patient have 2-3 sessions per week of Physical Therapy at d/c to increase the patient's independence. This AMPAC score should be considered in conjunction with interdisciplinary team recommendations to determine the most appropriate discharge setting. Patient's social support, diagnosis, medical stability, and prior level of function should also be taken into consideration. SUBJECTIVE:   Patient stated I want to go home, I have bills to pay.     OBJECTIVE DATA SUMMARY:   Hospital course since last seen and reason for re-evaluation: pt has maximized functional mobility and met goals in acute setting, will sign off from caseload  Past Medical History:   Diagnosis Date    Arthritis     Back pain     from previous back injury    Colon polyps     Diabetes ([facility] [address]     8378    Hernia, umbilical     Hyperlipidemia     Hypertension     Pneumonia      Past Surgical History:   Procedure Laterality Date    HX COLONOSCOPY  2014    polyps    HX HEENT      left lazy eye procedure during childhood    HX HEMORRHOIDECTOMY      HX HERNIA REPAIR      2021    HX ORTHOPAEDIC  01/09/2017    right knee    HX ORTHOPAEDIC  1985    torn cartilage knee    HX TONSILLECTOMY  1964     Barriers to Learning/Limitations: yes;  physical  Compensate with: Visual Cues, Verbal Cues, and Tactile Cues  Home Situation:   Home Situation  Home Environment: Private residence  # Steps to Enter: 3  Rails to Enter: Yes  One/Two Story Residence: One story  Living Alone: Yes  Support Systems: Other Family Member(s), Friend/Neighbor  Patient Expects to be Discharged to[de-identified] Skilled nursing facility  Current DME Used/Available at Home: Jodeen Risen, rollator  Tub or Shower Type: Tub/Shower combination (upstairs)  Critical Behavior:  Neurologic State: Alert  Orientation Level: Oriented X4  Cognition: Follows commands     Psychosocial  Patient Behaviors: Calm; Cooperative  Purposeful Interaction: Yes  Pt Identified Daily Priority: Clinical issues (comment)  Caritas Process: Establish trust;Teaching/learning; Attend basic human needs; Supportive expression  Caring Interventions: Reassure  Reassure: Therapeutic listening; Informing;Caring rounds  Therapeutic Modalities: Deep breathing; Intentional therapeutic touch  Skin Condition/Temp: Warm     Skin Integrity: Incision (comment)  Skin Integumentary  Skin Color: Red  Skin Condition/Temp: Warm  Skin Integrity: Incision (comment)     Strength:    Strength: Within functional limits                    Tone & Sensation:   Tone: Normal              Sensation: Intact               Range Of Motion:  AROM: Within functional limits       Functional Mobility:  Bed Mobility:     Supine to Sit: Modified independent  Sit to Supine: Modified independent  Scooting: Modified independent  Transfers:  Sit to Stand: Modified independent  Stand to Sit: Modified independent                Balance:   Sitting: Intact  Standing: Intact; With support    Ambulation/Gait Training:  Distance (ft): 250 Feet (ft)  Assistive Device: Walker, rolling  Ambulation - Level of Assistance: Supervision        Gait Abnormalities: Decreased step clearance              Speed/Bing: Pace decreased (<100 feet/min)  Step Length: Right shortened;Left shortened       Pain:  Pain level pre-treatment: not rated, endorses mild back pain /10   Pain level post-treatment: \"   \" /10   Pain Intervention(s): Medication (see MAR); Rest, Ice, Repositioning   Response to intervention: Nurse notified    Activity Tolerance:   Good    Please refer to the flowsheet for vital signs taken during this treatment. After treatment:   []         Patient left in no apparent distress sitting up in chair  [x]         Patient left in no apparent distress in bed  [x]         Call bell left within reach  [x]         Nursing notified  []         Caregiver present  []         Bed alarm activated  []         SCDs applied    COMMUNICATION/EDUCATION:   [x]         Role of Physical Therapy in the acute care setting. [x]         Fall prevention education was provided and the patient/caregiver indicated understanding. [x]         Patient/family have participated as able in goal setting and plan of care. [x]         Patient/family agree to work toward stated goals and plan of care. []         Patient understands intent and goals of therapy, but is neutral about his/her participation. []         Patient is unable to participate in goal setting/plan of care: ongoing with therapy staff.  []         Other: Thank you for this referral.  Fabien Crowder   Time Calculation: 16 mins    [employer] AM-PAC® Basic Mobility Inpatient Short Form (6-Clicks) Version 2    How much HELP from another person does the patient currently need    (If the patient hasn't done an activity recently, how much help from another person do you think he/she would need if he/she tried?)   Total (Total A or Dep)   A Lot  (Mod to Max A)   A Little (Sup or Min A)   None (Mod I to I)   Turning from your back to your side while in a flat bed without using bedrails? [] 1 [] 2 [] 3 [x] 4   2. Moving from lying on your back to sitting on the side of a flat bed without using bedrails? [] 1 [] 2 [] 3 [x] 4   3. Moving to and from a bed to a chair (including a wheelchair)? [] 1 [] 2 [] 3 [x] 4   4.  Standing up from a chair using your arms (e.g., wheelchair, or bedside chair)? [] 1 [] 2 [] 3 [x] 4   5. Walking in hospital room? [] 1 [] 2 [x] 3 [] 4   6. Climbing 3-5 steps with a railing?+   [] 1 [] 2 [x] 3 [] 4   +If stair climbing cannot be assessed, skip item #6. Sum responses from items 1-5. Current research shows that an AM-PAC score of 18 (14 without stairs) or greater is associated with a discharge to the patient's home setting. Based on an AM-PAC score of 22/24 (or **/20 if omitting stairs) and their current functional mobility deficits, it is recommended that the patient have 2-3 sessions per week of Physical Therapy at d/c to increase the patient's independence.

## 2023-01-12 NOTE — PROGRESS NOTES
CM was contacted by Gita Chavez to update to [facility] to has a caregiver been identified by the patient, and according to the last discussion with the patient there is no one able to care for patient.     CAROL Hernández, HP

## 2023-01-12 NOTE — ROUTINE PROCESS
Bedside and verbal report received from [address] (offgoing nurse). Report included the following information; SBAR, MAR, LABS, Intake/output, Kardex, and summary of care. Patient alert and watching TV in bed. Call bell and bedside commode in reach. Bedside and Verbal shift change report given to [address] Mojica (oncoming nurse) by Duke Franco RN (offgoing nurse). Report included the following information SBAR, Kardex, Intake/Output, MAR, and Recent Results.

## 2023-01-13 ENCOUNTER — HOME HEALTH ADMISSION (OUTPATIENT)
Dept: HOME HEALTH SERVICES | Facility: HOME HEALTH | Age: 63
End: 2023-01-13
Payer: COMMERCIAL

## 2023-01-13 VITALS
OXYGEN SATURATION: 99 % | RESPIRATION RATE: 18 BRPM | DIASTOLIC BLOOD PRESSURE: 93 MMHG | HEART RATE: 106 BPM | HEIGHT: 71 IN | SYSTOLIC BLOOD PRESSURE: 139 MMHG | BODY MASS INDEX: 31.36 KG/M2 | TEMPERATURE: 97.9 F | WEIGHT: 224 LBS

## 2023-01-13 LAB
ANION GAP SERPL CALC-SCNC: 9 MMOL/L (ref 3–18)
BUN SERPL-MCNC: 14 MG/DL (ref 7–18)
BUN/CREAT SERPL: 16 (ref 12–20)
CALCIUM SERPL-MCNC: 9.1 MG/DL (ref 8.5–10.1)
CHLORIDE SERPL-SCNC: 104 MMOL/L (ref 100–111)
CO2 SERPL-SCNC: 23 MMOL/L (ref 21–32)
CREAT SERPL-MCNC: 0.88 MG/DL (ref 0.6–1.3)
GLUCOSE BLD STRIP.AUTO-MCNC: 129 MG/DL (ref 70–110)
GLUCOSE BLD STRIP.AUTO-MCNC: 193 MG/DL (ref 70–110)
GLUCOSE SERPL-MCNC: 91 MG/DL (ref 74–99)
POTASSIUM SERPL-SCNC: 4.6 MMOL/L (ref 3.5–5.5)
SODIUM SERPL-SCNC: 136 MMOL/L (ref 136–145)

## 2023-01-13 PROCEDURE — 2709999900 HC NON-CHARGEABLE SUPPLY

## 2023-01-13 PROCEDURE — 74011000250 HC RX REV CODE- 250: Performed by: INTERNAL MEDICINE

## 2023-01-13 PROCEDURE — 74011636637 HC RX REV CODE- 636/637: Performed by: INTERNAL MEDICINE

## 2023-01-13 PROCEDURE — 82962 GLUCOSE BLOOD TEST: CPT

## 2023-01-13 PROCEDURE — 74011250637 HC RX REV CODE- 250/637: Performed by: FAMILY MEDICINE

## 2023-01-13 PROCEDURE — 80048 BASIC METABOLIC PNL TOTAL CA: CPT

## 2023-01-13 PROCEDURE — 74011250637 HC RX REV CODE- 250/637: Performed by: ORTHOPAEDIC SURGERY

## 2023-01-13 PROCEDURE — 74011250637 HC RX REV CODE- 250/637: Performed by: INTERNAL MEDICINE

## 2023-01-13 PROCEDURE — 36415 COLL VENOUS BLD VENIPUNCTURE: CPT

## 2023-01-13 PROCEDURE — 74011250636 HC RX REV CODE- 250/636: Performed by: INTERNAL MEDICINE

## 2023-01-13 PROCEDURE — 74011000250 HC RX REV CODE- 250: Performed by: ORTHOPAEDIC SURGERY

## 2023-01-13 RX ORDER — SODIUM CHLORIDE 1 G/1
1 TABLET ORAL 2 TIMES DAILY
Qty: 60 TABLET | Refills: 0 | Status: SHIPPED | OUTPATIENT
Start: 2023-01-13 | End: 2023-02-12

## 2023-01-13 RX ORDER — AMOXICILLIN 250 MG
1 CAPSULE ORAL DAILY
Qty: 30 TABLET | Refills: 0 | Status: SHIPPED | OUTPATIENT
Start: 2023-01-14 | End: 2023-02-13

## 2023-01-13 RX ADMIN — GABAPENTIN 600 MG: 300 CAPSULE ORAL at 15:37

## 2023-01-13 RX ADMIN — SODIUM CHLORIDE, PRESERVATIVE FREE 10 ML: 5 INJECTION INTRAVENOUS at 06:01

## 2023-01-13 RX ADMIN — GABAPENTIN 600 MG: 300 CAPSULE ORAL at 08:25

## 2023-01-13 RX ADMIN — OXYCODONE HYDROCHLORIDE AND ACETAMINOPHEN 1 TABLET: 5; 325 TABLET ORAL at 08:23

## 2023-01-13 RX ADMIN — THERA TABS 1 TABLET: TAB at 08:24

## 2023-01-13 RX ADMIN — SODIUM CHLORIDE, PRESERVATIVE FREE 10 ML: 5 INJECTION INTRAVENOUS at 15:38

## 2023-01-13 RX ADMIN — NYSTATIN: 100000 POWDER TOPICAL at 09:00

## 2023-01-13 RX ADMIN — APIXABAN 10 MG: 5 TABLET, FILM COATED ORAL at 08:24

## 2023-01-13 RX ADMIN — Medication 2 UNITS: at 12:44

## 2023-01-13 RX ADMIN — SODIUM CHLORIDE 1000 MG: 1 TABLET ORAL at 08:24

## 2023-01-13 RX ADMIN — OXYCODONE HYDROCHLORIDE AND ACETAMINOPHEN 1 TABLET: 5; 325 TABLET ORAL at 12:52

## 2023-01-13 RX ADMIN — WATER 2 G: 1 INJECTION INTRAMUSCULAR; INTRAVENOUS; SUBCUTANEOUS at 08:25

## 2023-01-13 RX ADMIN — FAMOTIDINE 20 MG: 20 TABLET ORAL at 08:24

## 2023-01-13 RX ADMIN — SENNOSIDES AND DOCUSATE SODIUM 1 TABLET: 50; 8.6 TABLET ORAL at 08:25

## 2023-01-13 RX ADMIN — CHOLECALCIFEROL TAB 25 MCG (1000 UNIT) 2000 UNITS: 25 TAB at 08:24

## 2023-01-13 NOTE — ROUTINE PROCESS
Received bedside report from night shift nurse, Mark Morton RN. Patient lying in bed at this time. No distress noted at this time. Will continue to monitor.

## 2023-01-13 NOTE — ROUTINE PROCESS
Bedside and Verbal shift change report given to Josias (oncoming nurse) by Burns Shone RN (offgoing nurse). Report included the following information SBAR, Kardex, Intake/Output, MAR, Recent Results, and Med Rec Status.

## 2023-01-13 NOTE — PROGRESS NOTES
[facility]  Progress Note    Patient: Avis Way Age: 57 y.o. : 1960 MR#: [id number] SSN: [id number]  Date: 2023     Subjective/24-hour events:     Pt states he has some back pain that he says is expected given his back surgery. No other complaints. Appears comfortable. Assessment:     -Postoperative L-spine wound infection status post exploration irrigation and debridement and VAC placement 2020.  -LLE DVT: on eliquis, also w/ right upper extremity acute DVT at PICC line site s/p PICC removal  -Low-grade fever, suspect secondary to DVT-currently resolved  -Klebsiella pneumoniae bacteremia, on ABx  -Hyponatremia: resolved on sodium chloride tablet  -Constipation, resolved    HISTORY OF:  -Diabetes mellitus  -Anemia of chronic medical disease, stable  -Hypertension  -Obesity with BMI of 33.19          Plan:     Medically stable  Continue Eliquis. Continue back wound care and wound VAC management  Continue rocephin through 2023 to complete course of therapy per ID recs. PT/OT to follow  Cont sodium chloride tablet.   Continue current bowel regimen          Dispo: TBD,per PT home care setting disposition recommended, however pt needs wound care, IV ABx, will need pt/ot,     Anticipated date of discharge: TBD    Case discussed with:  [x]Patient  []Family  [x]Nursing  [x]Case Management  DVT Prophylaxis:  []Lovenox  []Hep SQ  [x]SCDs  []Coumadin   []On Heparin gtt    Objective:   VS: Visit Vitals  BP (!) 143/84 (BP 1 Location: Left upper arm, BP Patient Position: At rest)   Pulse 97   Temp 98.1 °F (36.7 °C)   Resp 18   Ht 5' 11\" (1.803 m)   Wt 100.9 kg (222 lb 8 oz)   SpO2 96%   BMI 31.03 kg/m²      Tmax/24hrs: Temp (24hrs), Av °F (36.7 °C), Min:97.6 °F (36.4 °C), Max:98.3 °F (36.8 °C)    Intake/Output Summary (Last 24 hours) at 2023 2140  Last data filed at 2023 2101  Gross per 24 hour   Intake 480 ml   Output 700 ml   Net -220 ml General: Awake, follows verbal commands appropriately, not in acute distress  Cardiovascular:  RRR. Pulmonary: CTA bilaterally without wheezes or rhonchi currently  GI: Soft, nontender, bowel sounds present. Wound VAC present on the back. Extremities: No edema  Neuro:  Awake and alert. Moves extremities spontaneously. No tremors noted currently.     Labs:    Recent Results (from the past 24 hour(s))   METABOLIC PANEL, BASIC    Collection Time: 01/12/23  3:54 AM   Result Value Ref Range    Sodium 140 136 - 145 mmol/L    Potassium 4.4 3.5 - 5.5 mmol/L    Chloride 104 100 - 111 mmol/L    CO2 29 21 - 32 mmol/L    Anion gap 7 3.0 - 18 mmol/L    Glucose 164 (H) 74 - 99 mg/dL    BUN 20 (H) 7.0 - 18 MG/DL    Creatinine 0.89 0.6 - 1.3 MG/DL    BUN/Creatinine ratio 22 (H) 12 - 20      eGFR >60 >60 ml/min/1.73m2    Calcium 8.4 (L) 8.5 - 10.1 MG/DL   GLUCOSE, POC    Collection Time: 01/12/23  8:23 AM   Result Value Ref Range    Glucose (POC) 111 (H) 70 - 110 mg/dL   GLUCOSE, POC    Collection Time: 01/12/23 11:44 AM   Result Value Ref Range    Glucose (POC) 158 (H) 70 - 110 mg/dL   GLUCOSE, POC    Collection Time: 01/12/23  4:40 PM   Result Value Ref Range    Glucose (POC) 144 (H) 70 - 110 mg/dL         Signed By: Elena Godwin MD     January 12, 2023

## 2023-01-13 NOTE — PROGRESS NOTES
Discharge/Transition Planning       Notified by Trina Wu with [facility] that [facility] had made contact with caregiver who will assist with IV antibiotics and wound education if wound vac alarm goes off. [employer] can mix and deliver the IV antibiotics today when dc order in. Just need home wound vac installed today. Went into [facility]. Home wound vac order was not completed and submitted in system. Completed order and submitted . Faxed prescription, face sheet, H&P wound note to 3 [facility]       Placed home health in work que and updated AVS       1200:  Will see if unit can get the 7 day disposable wound vac and have other wound vac deliver to house when approved by insurance

## 2023-01-13 NOTE — DISCHARGE INSTRUCTIONS
Make sure you follow up with your primary care physician regarding your sodium levels. In the hospital your blood sodium level was noted to be low and you are being discharged on sodium tablets. You will need continued monitoring of your blood sodium level. You have been discharged on a blood thinner, Eliquis to treat blood clots found in your leg, you have also had one in your arm associated with a PICC line. You will need to be on Eliquis for at least 3 months.  Please follow up with your primary care physician to get additional refills on this med and to get final recommendations on the duration

## 2023-01-13 NOTE — HOME CARE
Received phone call from patient this morning, states he has a caregiver David Mendiola [phone] ) who is willing to help him with his IV abx and wound care , this liaison called Chiquis James to confirm and explain the services that patient will need help with and teaching by [facility] nurses, she states she is familiar with administering IV abx because she had to do it for herself at one time, she states she lives down the street from patient and she is willing to help him ; Notified Dara Elias at . [facility]  that patient possibly will discharge today and notified  Rolando Diaz) that patient IV abx is arranged and patient will need home wound vac  set up prior to discharge today  , [facility] will await discharge orders. LOUISE JAMES. Discharge order noted for today , [facility]  will follow for SN for wound vac drsg changes,IV abx and PT ; Faxed PICC report to [facility] and informed her of patient's discharge today, ( pt has 100% IV abx coverage thru insurance) ,states patients Abx and supplies will be delivered this evening to patients home, per  Rolando Diaz) ,states that  [facility] wound vac has been ordered and submitted to  and awaiting insurance approval, in the meantime patient will receive a 7 day disposable wound vac to discharge with  until [address] is approved by patients insurance; patients caregiver Thi Hsu) states she is unable to come to hospital for  IV abx teaching ,but states she will be at patients home tomorrow for 1st teaching from [address] [place] [address] from [address] states that she will send both patient and caregiver a video via cell phone on administering IV abx/teaching  to become familiar with process, Verified patients demographics and explained [email or web address] services and answered all questions ; [facility] will follow after discharge today. LOUISE JAMES.

## 2023-01-13 NOTE — PROGRESS NOTES
7 day disposable wound vac is getting put on patient.      Pako Gallegos with 3 [facility] will change wound vac order in  website to deliver to home when approved

## 2023-01-13 NOTE — DISCHARGE SUMMARY
[facility]  Discharge Summary       Patient: Avis Way Age: 57 y.o. : 1960 MR#: [id number]: [id number]  PCP on record: Douglas Arnett MD  Admit date: 2022  Discharge date: 2023    Consults:    -   Procedures:  -     Significant Diagnostic Studies:   -    Discharge Diagnoses:                                           Patient Active Problem List   Diagnosis Code    Spinal stenosis of lumbar region at multiple levels M48.061    Lumbar spondylosis M47.816    Stage 1 acute kidney injury ([facility] [address] N17.9    Back pain M54.9    Elevated sed rate R70.0    Fluid collection at surgical site T88. 8XXA    Hypertension I10    Hyperlipidemia E78.5    Type II diabetes mellitus ([facility] [address] E11.9    Bacteremia due to Klebsiella pneumoniae R78.81, B96.1       Hospital Course by Problem   1. Today's examination of the patient revealed:     Subjective:     Objective:   VS: Visit Vitals  BP (!) 139/93 (BP 1 Location: Left lower arm, BP Patient Position: At rest)   Pulse (!) 106   Temp 97.9 °F (36.6 °C)   Resp 18   Ht 5' 11\" (1.803 m)   Wt 101.6 kg (224 lb)   SpO2 99%   BMI 31.24 kg/m²      Tmax/24hrs: Temp (24hrs), Av.3 °F (36.8 °C), Min:97.6 °F (36.4 °C), Max:99.4 °F (37.4 °C)     Input/Output:   Intake/Output Summary (Last 24 hours) at 2023 1224  Last data filed at 2023 1203  Gross per 24 hour   Intake --   Output 950 ml   Net -950 ml       General:    Cardiovascular:    Pulmonary:    GI:    Extremities:     Additional:      Labs:    Recent Results (from the past 24 hour(s))   GLUCOSE, POC    Collection Time: 23  4:40 PM   Result Value Ref Range    Glucose (POC) 144 (H) 70 - 110 mg/dL   GLUCOSE, POC    Collection Time: 23 10:31 PM   Result Value Ref Range    Glucose (POC) 135 (H) 70 - 518 mg/dL   METABOLIC PANEL, BASIC    Collection Time: 23  3:59 AM   Result Value Ref Range    Sodium 136 136 - 145 mmol/L    Potassium 4.6 3.5 - 5.5 mmol/L    Chloride 104 100 - 111 mmol/L    CO2 23 21 - 32 mmol/L    Anion gap 9 3.0 - 18 mmol/L    Glucose 91 74 - 99 mg/dL    BUN 14 7.0 - 18 MG/DL    Creatinine 0.88 0.6 - 1.3 MG/DL    BUN/Creatinine ratio 16 12 - 20      eGFR >60 >60 ml/min/1.73m2    Calcium 9.1 8.5 - 10.1 MG/DL   GLUCOSE, POC    Collection Time: 01/13/23  7:58 AM   Result Value Ref Range    Glucose (POC) 129 (H) 70 - 110 mg/dL   GLUCOSE, POC    Collection Time: 01/13/23 11:52 AM   Result Value Ref Range    Glucose (POC) 193 (H) 70 - 110 mg/dL     Additional Data Reviewed:     Condition:   Disposition:    []Home   []Home with Home Health   []SNF/NH   []Rehab   []Home with family   []Alternate Facility:____________________      Discharge Medications:     Current Discharge Medication List        START taking these medications    Details   apixaban (ELIQUIS) 5 mg tablet Take 1 Tablet by mouth two (2) times a day for 30 days. Qty: 60 Tablet, Refills: 0      cefTRIAXone 2 gram 2 g IV syringe 2 g by IntraVENous route every twenty-four (24) hours. Qty: 888 Dose, Refills: 0      senna-docusate (PERICOLACE) 8.6-50 mg per tablet Take 1 Tablet by mouth daily for 30 days. Qty: 30 Tablet, Refills: 0      sodium chloride 1,000 mg soluble tablet Take 1 Tablet by mouth two (2) times a day for 30 days. Qty: 60 Tablet, Refills: 0           CONTINUE these medications which have NOT CHANGED    Details   polyethylene glycol (MIRALAX) 17 gram/dose powder Take 17 g by mouth daily. Indications: constipation  Qty: 116 g, Refills: 0      aspirin delayed-release 81 mg tablet Take 81 mg by mouth daily. OTHER,NON-FORMULARY, Indications: maxforce prostate 1 tab twice daily      multivitamin (ONE A DAY) tablet Take 1 Tablet by mouth daily. metFORMIN (GLUCOPHAGE) 500 mg tablet TAKE 1 TABLET BY MOUTH TWICE DAILY WITH A MEAL      cholecalciferol (VITAMIN D3) (1000 Units /25 mcg) tablet Take 2,000 Units by mouth daily.       gabapentin (NEURONTIN) 300 mg capsule 600 mg three (3) times daily. lisinopriL (PRINIVIL, ZESTRIL) 40 mg tablet TAKE 1 TABLET BY MOUTH ONCE DAILY FOR BLOOD PRESSURE FOR 90 DAYS      simvastatin (ZOCOR) 40 mg tablet TAKE 1 TABLET BY MOUTH ONCE DAILY IN THE EVENING FOR CHOLESTEROL FOR 90 DAYS      acetaminophen (TYLENOL) 500 mg tablet Take  by mouth every six (6) hours as needed for Pain. STOP taking these medications       HYDROmorphone (DILAUDID) 4 mg tablet Comments:   Reason for Stopping: Follow-up Appointments:   1. Your PCP: Santa Chamorro MD, within 7-10days  2.          Please follow-up on tests/labs that are still pendin.     >30 minutes spent coordinating this discharge (review instructions/follow-up, prescriptions, preparing report for sign off)    Signed:  Tonya Osuna MD  2023  12:24 PM daily.      lisinopriL (PRINIVIL, ZESTRIL) 40 mg tablet TAKE 1 TABLET BY MOUTH ONCE DAILY FOR BLOOD PRESSURE FOR 90 DAYS      simvastatin (ZOCOR) 40 mg tablet TAKE 1 TABLET BY MOUTH ONCE DAILY IN THE EVENING FOR CHOLESTEROL FOR 90 DAYS      acetaminophen (TYLENOL) 500 mg tablet Take  by mouth every six (6) hours as needed for Pain. STOP taking these medications       HYDROmorphone (DILAUDID) 4 mg tablet Comments:   Reason for Stopping: Follow-up Appointments:   1.  Your PCP: Rebeka Kohli MD, within 7-10days      >30 minutes spent coordinating this discharge (review instructions/follow-up, prescriptions, preparing report for sign off)    Signed:  Abdiel Valerio MD  1/13/2023  12:24 PM

## 2023-01-14 ENCOUNTER — HOME CARE VISIT (OUTPATIENT)
Dept: SCHEDULING | Facility: HOME HEALTH | Age: 63
End: 2023-01-14
Payer: COMMERCIAL

## 2023-01-14 PROCEDURE — G0299 HHS/HOSPICE OF RN EA 15 MIN: HCPCS

## 2023-01-14 NOTE — ROUTINE PROCESS
Patient discharging home with home health. Discussed discharge instructions, verbalized agreement. Patient on room air. PICC flushed and changed caps. No distress noted at this time. Patient going to lobby for pickup.

## 2023-01-14 NOTE — Clinical Note
Hi Dr. Ramses Fulton,     I wanted to make you aware that Mr. Gisela Nava has been admitted to home health care services with skilled nursing in for disease/medication management and education regarding recent surgery, wound vac therapy to back wound, PICC line IV medication administration, maintenance and teaching, monitoring for signs of infection, safety. Thank you for allowing [facility] to care for your patient.       Thank you,  Gricel Oliveros, ARMAANN, RN

## 2023-01-15 ENCOUNTER — HOME CARE VISIT (OUTPATIENT)
Dept: SCHEDULING | Facility: HOME HEALTH | Age: 63
End: 2023-01-15
Payer: COMMERCIAL

## 2023-01-15 PROCEDURE — G0299 HHS/HOSPICE OF RN EA 15 MIN: HCPCS

## 2023-01-15 NOTE — HOME HEALTH
Skilled Reason for admission/summary of clinical condition:  60-year old male who recently had back surgery resulting in L-spine wound infection requiring wound vac therapy and PICC line IV antibiotic administration. Patient with PICC to left arm, red port unable to flushed at this time, purple port with positive blood return, flushing easily, no signs of infection noted. Education regarding PICC line reviewed with patient and his friend who is willing to do IV administration. Wound vac dressing in place, not to be changed until wound vac supplies/new wound vac arrives. Patient has 7-day wound vac in place that was changed on 1/13/23, clean dry and intact. Skilled nursing in for disease/medication management and education regarding recent surgery, wound vac therapy to back wound, PICC line IV medication administration, maintenance and teaching, monitoring for signs of infection, safety. This patient is homebound for the following reasons Requires considerable and taxing effort to leave the home , Requires the assistance of 1 or more persons to leave the home  and Only leaves the home for medical reasons or [employer] services and are infrequent and of short duration for other reasons . Caregiver: friend. Caregiver assists with medications, meal preparation, transportation to MD appointments, errands. Medications reconciled and all medications are available in the home this visit. The following education was provided regarding medications: side effects, dosages, purposes, frequencies. Medications are somewhat effective at this time. High risk medication teaching regarding anticoagulants, hyperglycemic agents or opiod narcotics performed (specify)   Hydromorphone (pain medication)-take only as prescribed, monitor for signs of dizziness/drowsiness/constipation that could occur with use.   IV Rocephin-monitor for any adverse reactions, monitor for signs of infection to PICC line  Hypoglycemic (Farxiga)-take only as prescribed, monitor for signs of hypoglycemia  Eliquis (blood thinner)-take only as prescribed, monitor for signs of bleeding, fall precautions. Home health supplies by type and quantity ordered/delivered this visit include: Wound care supplies have been ordered, PICC line supplies in home. Patient education provided this visit to include:  INSTRUCTED PATIENT AND CG THAT SHOULD ANY NEEDS OR CONCERNS ARISE TO FIRST CALL OUR OFFICE, OR THE DR'S OFFICE  OR GO TO AN URGENT CARE CENTER AND NOT TO THE ED FOR NON-LIFE THREATENING EVENTS. IF IT IS LIFE THREATENING THEN CALL 911 OR GO TO THE CLOSEST ER. Patient is a fall risk. Educated pateint to sit on the side of the chair/bed, take a slow deep breaths, have feet firmly planted before standing up, use cane/walker if available, or have someone to assist.  reviewed cardiac diet- monitoring sodium intake, cholesterol and fat intake. patient aware to limit sodium, no added sodium to diet. reviewed foods to avoid, how to order foods when eating out, how to read nutrition labels and measure sodium, cholesterol and fat intake. Pt instructed to follow with diabetic diet- monitoring sugar intake, limiting foods with high sugar content.  SN discussed dietary adjustments such as: reduce portion sizes, limit daily carbohydrate intake to not greater than 45-60 grams per meal for a total of <180 grams of carbohydrate daily, avoid concentrated carbohydrates such as soft drinks, sweet tea, and sweet treats and to increase physical activity along with strength training as tolerated to facilitate weight loss and build muscle mass  Pt/cg aware of disease process, s/s to monitor for, who to report to/when.  patient made aware to monitor for s/s of infection [increased swelling, increased redness around site, increased pain, foul smelling drainage, fever] aware who to report to/when.  patient instructed to maintain clear pathways in home and to minimize clutter to prevent falls from occurring/minimize fall potential.  pt aware to keep dressing clean, dry and intact as ordered. patient has medication being infused via PICC line and syringe admininistration- discussed PICC line care, keeping dressing clean dry and intact. discussed s/s of infection, who to report to/when. Patient level of understanding of education provided: Good, verbalized understanding. Patient response to procedure performed: Tolerated PICC line IV administration without complaints of pain or discomfort. Home exercise program/Homework provided: Take medications as prescribed, monitor for signs of infection to wound or with PICC line, adequate hydration and nutrtion, keep dressings clean and dry, keep all MD appointments. Pt/Caregiver instructed on plan of care and are agreeable to plan of care at this time. Physician Dr. Aida Vegas notified of patient admission to home health and plan of care including anticipated frequency of 3W8, 2 PRN and treatments/interventions/modalities of disease/medication management and education regarding recent surgery, wound vac therapy to back wound, PICC line IV medication administration, maintenance and teaching, monitoring for signs of infection, safety. Discharge planning discussed with patient and caregiver. Discharge planning as follows: Patient will be discharged once education has completed, patient is medically stable and pt/cg are able to independently manage wound care/ wound has healed or no longer requires skilled care. Patient will be discharged once education has completed, patient is medically stable and pt/cg are able to independently manage medications and disease process, no longer requires IV abx or PICC line care, weekly labs. Pt/Caregiver did verbalize understanding of discharge planning. Next MD appointment TBD.   Patient/caregiver encouraged/instructed to keep appointment as lack of follow through with physician appointment could result in discontinuation of home care services for non-compliance.

## 2023-01-16 ENCOUNTER — HOSPITAL ENCOUNTER (OUTPATIENT)
Dept: LAB | Age: 63
Discharge: HOME | End: 2023-01-16

## 2023-01-16 ENCOUNTER — HOME CARE VISIT (OUTPATIENT)
Dept: SCHEDULING | Facility: HOME HEALTH | Age: 63
End: 2023-01-16
Payer: COMMERCIAL

## 2023-01-16 VITALS
RESPIRATION RATE: 16 BRPM | HEART RATE: 100 BPM | OXYGEN SATURATION: 96 % | SYSTOLIC BLOOD PRESSURE: 104 MMHG | TEMPERATURE: 98.7 F | DIASTOLIC BLOOD PRESSURE: 80 MMHG

## 2023-01-16 LAB — SENTARA SPECIMEN COL,SENBCF: NORMAL

## 2023-01-16 PROCEDURE — G0299 HHS/HOSPICE OF RN EA 15 MIN: HCPCS

## 2023-01-16 PROCEDURE — 99001 SPECIMEN HANDLING PT-LAB: CPT

## 2023-01-16 PROCEDURE — G0151 HHCP-SERV OF PT,EA 15 MIN: HCPCS

## 2023-01-16 NOTE — Clinical Note
Therapy Functional Score Assessment  Question   Score   Grooming  1       Upper Dressing 1      Lower Dressing 2      Bathing  4      Toilet Transfer  1    Transfer  1            Ambulation  3   Dyspnea                     0       Pain Interfering with activity 3  Est number therapy visits      5

## 2023-01-16 NOTE — HOME HEALTH
PT INITIAL EVALUATION  Ever Manrique is a 57 y.o. male who presents to [facility] ER with complaint of Back Pain. Notably, Patient had a Multilevel Spinal Fusion performed on 12/07/2022. Patient reports that he was improving after back surgery and then felt issues began \"all at once\" approximately 3 days ago. Patient reports fecal incontinence, urinary incontinence (occurred before back surgery as well), loss of appetite, weakness, diarrhea, and int ermittent, chronic pressure with urination. Patient reports fevers, chills, and diaphoresis over the last 3 days. Notably, it appears that Patient declined Inpatient Rehabilitation after Surgery and elected to return home  Past Medical Hx:   Hypertension 12/22/2022   Type II diabetes mellitus ([facility] [address] 12/22/2022   Lumbar spondylosis 12/9/2022   Stage 1 acute kidney injury ([facility] [address] 12/22/2022   Hyperlipidemia 12/22/2022   Spinal stenosis of lumbar region at multiple levels 12/7/2022   Back pain 12/22/2022   Elevated sed rate 12/22/2022   Fluid collection at surgical site 12/22/2022   Bacteremia due to Klebsiella pneumoniae 12/23/2022     Recent H/o current illness:  57 year old male presents with MD referral for HHPT s/p hospitalization due to spine wound infection  Medication Management: patient manages own medication  Social hx and home eval:  Pt lives in 2 story home with PCA.    Caregiver Involvement: PCA is here daily  PLOF:  Pt PLOF is ambulation w no AD, pts base level of function independent with all ADL's  BALANCE:     Seated unsupported balance is good   Standing static balance is fair+  Standing dynamic balance is fair  Tinetti 23 /28    Patient is at risk for falls due to impaired balance  BLE Strength:  Left Hip flexion 3+/5 , hip abduction 3+/5, hip adduction 3+/5, Knee flexion 4/5  knee extension 4/5, ankle dorsiflexion 4/5  Right Hip flexion 3+/5 , hip abduction 3+/5, hip adduction 3+/5, Knee flexion 4/5  knee extension 4/5, ankle dorsiflexion 4/5  BLE ROM:  Right hip/knee/ankle: WFL  Left hip/knee/ankle: WFL  Bed mobility:  independent   Transfers:  SBA with sit<->stand for bed to chair, with no AD. no cues and instruction needed for safety and sequencing  GAIT:  Patient ambulated  50 ft  with SBA  on level  surfaces CGA. Pt demonstrates with decreased hip and knee flexion on BLE in pre and mid swing phase of gait as well as decreased stride-length and nick. Pt required SBA cues for  safety and sequencing  Stairs: NT  Patient education provided this visit: Safety with functional mobility and instruction with HEP. Patient level of understanding of education provided: good ,able to return demonstrate mobility instruction and HEP with SBA assistance  Patient response to treatment:  good with no c/o increased pain  Instructed patient with regards to signs and symptoms of infection. Assessment: Referral for HHPT following recent hospitalization due to spinal infection. HHPT is medically necessary to address the following clinical findings: decreased BLE strength and ROM, impaired gait, decreased I and safety with transfers and gait, decreased endurance, decreased balance and decreased safety in order to improve functional mobility and decrease fall risk. Pt is a fall risk as indicated by Tinetti score of 23/28. Patient will be seen for HHPT 2w2,1w1 for therapeutic exercises to increase BLE strength and ROM,  training for gait, stairs and transfers to increase I and safety with daily functional mobility and balance and endurance activities to decrease fall risk, decrease impairment and increase functional mobility and independence in the home. Pt. requires skilled PT intervention to instruct Pt. with gait training with LRAD, ROM and strengthening, stair training, transfer training, balance training, manual therapy, neuromuscular re-education, patient care-giver education, HEP, endurance training, body mechanics.  Instructed patient with HEP for BLE strengthening and left HEP handout for the patient. Patient was able to return demonstrate the exercises given. HEP includes:   Pt. received therapeutic exercises which included:  Squats, marching in place, Hip abd/add, toe raises and hip ext.  x 10 x 2, 3 times/day

## 2023-01-17 ENCOUNTER — HOME CARE VISIT (OUTPATIENT)
Dept: HOME HEALTH SERVICES | Facility: HOME HEALTH | Age: 63
End: 2023-01-17
Payer: COMMERCIAL

## 2023-01-17 VITALS
OXYGEN SATURATION: 98 % | DIASTOLIC BLOOD PRESSURE: 74 MMHG | SYSTOLIC BLOOD PRESSURE: 120 MMHG | TEMPERATURE: 98.2 F | HEART RATE: 97 BPM | RESPIRATION RATE: 20 BRPM

## 2023-01-17 VITALS
RESPIRATION RATE: 20 BRPM | SYSTOLIC BLOOD PRESSURE: 110 MMHG | DIASTOLIC BLOOD PRESSURE: 64 MMHG | TEMPERATURE: 98.4 F | OXYGEN SATURATION: 98 % | HEART RATE: 100 BPM

## 2023-01-17 VITALS — RESPIRATION RATE: 18 BRPM | OXYGEN SATURATION: 98 % | TEMPERATURE: 98.1 F | HEART RATE: 71 BPM

## 2023-01-17 NOTE — HOME HEALTH
Skilled reason for visit: skilled assessment, education, medication management, wound care, picc line dressing change, obtained ordered lab work, labeled in home and delivered to lab drop box. Caregiver involvement:  Family assists ADL's, medications, meals, transportation, errands. Medications reviewed and all medications are available in the home this visit. The following education was provided regarding medications:  SN Instructed patient about the Eliquis ( apixaban ) this is helps to prevent that platelets in your blood from sticking together and forming a blood clot. Eliquis is used to lower the risk of stroke caused by a blood clot in people with a heart rhythm disorder called atrial fibrillation. Because Eliquis keeps your blood from coagulating ( clotting ) to prevent unwanted blood clots, this medicine can also make it easier for you to bleed, even from a minor injury such as a fall or a bump on the head. Do not stop taking Eliquis unless your doctor tells you to. Stopping suddenly can increase your risk of blood clot or stroke  MD notified of any discrepancies/look a-like medications/medication interactions: na  Medications are effectoive at this time. Home health supplies by type and quantity ordered/delivered this visit include: na    Patient education provided this visit:  Instructed in materials used in wound care. However, even with proper treatment, a wound infection may occur. Check the wound daily for signs of infection like increased drainage or bleeding from the wound that wont stop with direct pressure, redness in or around the wound, foul odor or pus coming from the wound, increased swelling around the wound and ever above 101.0°F or shaking chills.     Sharps education provided: Clinician instructed patient/CG on proper disposal of sharps: Containers should be made of hard plastic, be puncture-resistant and leakproof,   such as a laundry detergent or bleach bottle.  When the container is ¾ full, it should be sealed with tape and labeled   DO NOT RECYCLE prior to discarding in the regular trash.      Patient level of understanding of education provided: patient/caregiver verbalized 100% understanding. Skilled Care Performed this visit: skilled assessment, education, medication management, wound care, picc line care    Patient response to procedure performed:  patient denied pain and discomfort during procedure/visit    Agency Progress toward goals: progressing    Patient's Progress towards personal goals: progressing    Home exercise program: Sn instructed patient on pursed lip breathing. Pursed lip breathing is one of the simplest ways to control shortness of breath. It provides a quick and easy way to slow your pace of breathing, making each breath more effective. Pursed lip breathing: Improves ventilation, releases trapped air in the lungs, keeps the airways open longer and decreases the work of breathing, prolongs exhalation to slow the breathing rate, improves breathing patterns by moving old air out of the lungs and allowing for new air to enter the lungs, relieves shortness of breath, causes general relaxation. Practice this technique 4 - 5 times a day at first so you can get the correct breathing pattern. Pursed lip breathing technique: Relax your neck and shoulder muscles, breathe in ( inhale ) slowly through your nose for two counts, keeping your mouth closed. Don't take a deep breath; a normal breath will do. It may help to count to yourself: inhale, one, two. Pucker or purse your lips as if you were going to whistle or gently flicker the flame of a candle. Breathe out ( exhale ) slowly and gently through your pursed lips while counting to four. It may help to count to yourself: exhale, one, two, three, four.     Continued need for the following skills: Nursing and Physical Therapy    Plan for next visit: skilled assessment, education, medication management, wound care    Patient and/or caregiver notified and agrees to changes in the Plan of Care YES/NO/NA: N/A      The following discharge planning was discussed with the pt/caregiver:  Discharge planning as follows: when goals met, patient/caregiver able to manage disease process, medications and pain

## 2023-01-17 NOTE — HOME HEALTH
Skilled Reason for admission/summary of clinical condition:  60-year old male who recently had back surgery resulting in L-spine wound infection requiring wound vac therapy and PICC line IV antibiotic administration. Patient with PICC to left arm, red port unable to flushed at this time, purple port with positive blood return, flushing easily, no signs of infection noted. Patient's friend was not present today, and patient stated she had not been by to see him today, and did not know how reliable she would be with administering his IV antibiotic. Wound vac dressing in place, not to be changed until wound vac supplies/new wound vac arrives. Patient has 7-day wound vac in place that was changed on 1/13/23, clean dry and intact. Skilled nursing in for disease/medication management and education regarding recent surgery, wound vac therapy to back wound, PICC line IV medication administration, maintenance and teaching, monitoring for signs of infection, safety. This patient is homebound for the following reasons Requires considerable and taxing effort to leave the home , Requires the assistance of 1 or more persons to leave the home  and Only leaves the home for medical reasons or [employer] services and are infrequent and of short duration for other reasons . Caregiver: friend. Caregiver assists with medications, meal preparation, transportation to MD appointments, errands. Medications reconciled and all medications are available in the home this visit. The following education was provided regarding medications: side effects, dosages, purposes, frequencies. Medications are somewhat effective at this time. High risk medication teaching regarding anticoagulants, hyperglycemic agents or opiod narcotics performed (specify)     Hydromorphone (pain medication)-take only as prescribed, monitor for signs of dizziness/drowsiness/constipation that could occur with use.     Home health supplies by type and quantity ordered/delivered this visit include: Wound care supplies have been ordered, PICC line supplies in home. Patient education provided this visit to include:    INSTRUCTED PATIENT AND CG THAT SHOULD ANY NEEDS OR CONCERNS ARISE TO FIRST CALL OUR OFFICE, OR THE DR'S OFFICE  OR GO TO AN URGENT CARE CENTER AND NOT TO THE ED FOR NON-LIFE THREATENING EVENTS. IF IT IS LIFE THREATENING THEN CALL 911 OR GO TO THE CLOSEST ER. Patient is a fall risk. Educated pateint to sit on the side of the chair/bed, take a slow deep breaths, have feet firmly planted before standing up, use cane/walker if available, or have someone to assist.    reviewed cardiac diet- monitoring sodium intake, cholesterol and fat intake. patient aware to limit sodium, no added sodium to diet. reviewed foods to avoid, how to order foods when eating out, how to read nutrition labels and measure sodium, cholesterol and fat intake. Pt instructed to follow with diabetic diet- monitoring sugar intake, limiting foods with high sugar content. SN discussed dietary adjustments such as: reduce portion sizes, limit daily carbohydrate intake to not greater than 45-60 grams per meal for a total of <180 grams of carbohydrate daily, avoid concentrated carbohydrates such as soft drinks, sweet tea, and sweet treats and to increase physical activity along with strength training as tolerated to facilitate weight loss and build muscle mass    Pt/cg aware of disease process, s/s to monitor for, who to report to/when.    patient made aware to monitor for s/s of infection [increased swelling, increased redness around site, increased pain, foul smelling drainage, fever] aware who to report to/when.    patient instructed to maintain clear pathways in home and to minimize clutter to prevent falls from occurring/minimize fall potential.    pt aware to keep dressing clean, dry and intact as ordered.     patient has medication being infused via PICC line and syringe admininistration- discussed PICC line care, keeping dressing clean dry and intact. discussed s/s of infection, who to report to/when. Patient level of understanding of education provided: Good, verbalized understanding. Patient response to procedure performed: Tolerated PICC line IV administration without complaints of pain or discomfort. Home exercise program/Homework provided: Take medications as prescribed, monitor for signs of infection to wound or with PICC line, adequate hydration and nutrtion, keep dressings clean and dry, keep all MD appointments. Pt/Caregiver instructed on plan of care and are agreeable to plan of care at this time. Discharge planning discussed with patient and caregiver. Discharge planning as follows: Patient will be discharged once education has completed, patient is medically stable and pt/cg are able to independently manage wound care/ wound has healed or no longer requires skilled care. Patient will be discharged once education has completed, patient is medically stable and pt/cg are able to independently manage medications and disease process, no longer requires IV abx or PICC line care, weekly labs. Pt/Caregiver did verbalize understanding of discharge planning.

## 2023-01-18 ENCOUNTER — HOME CARE VISIT (OUTPATIENT)
Dept: SCHEDULING | Facility: HOME HEALTH | Age: 63
End: 2023-01-18
Payer: COMMERCIAL

## 2023-01-18 VITALS
DIASTOLIC BLOOD PRESSURE: 80 MMHG | OXYGEN SATURATION: 98 % | TEMPERATURE: 98.2 F | SYSTOLIC BLOOD PRESSURE: 108 MMHG | HEART RATE: 92 BPM

## 2023-01-18 VITALS
SYSTOLIC BLOOD PRESSURE: 110 MMHG | HEART RATE: 100 BPM | DIASTOLIC BLOOD PRESSURE: 78 MMHG | TEMPERATURE: 98.2 F | RESPIRATION RATE: 20 BRPM | OXYGEN SATURATION: 100 %

## 2023-01-18 PROCEDURE — G0157 HHC PT ASSISTANT EA 15: HCPCS

## 2023-01-18 PROCEDURE — G0299 HHS/HOSPICE OF RN EA 15 MIN: HCPCS

## 2023-01-18 NOTE — HOME HEALTH
Skilled reason for visit: wound vac, IV therapy and teaching, mediation management. Caregiver involvement: Patient has friend who can assist with IV therapy, but not present during visit. Medications reviewed and all medications are available in the home this visit. The following education was provided regarding medications:  SN advised patient to report severe diarrhea and consult healthcare professional prior to taking anti-diarrhea medicine while on Rocephin. Patient/PCG verbalized understanding of instructions given. MD notified of any discrepancies/look a-like medications/medication interactions: none  Medications are effective at this time. Home health supplies by type and quantity ordered/delivered this visit include: ordered previous visit    Patient education provided this visit: Instructed in materials used in wound care. However, even with proper treatment, a wound infection may occur. Check the wound daily for signs of infection like increased drainage or bleeding from the wound that wont stop with direct pressure, redness in or around the wound, foul odor or pus coming from the wound, increased swelling around the wound and ever above 101.0°F or shaking chills.     Sharps education provided: n/a    Patient level of understanding of education provided: patient verbalized understanding    Skilled Care Performed this visit: Wound vac application, IV therapy & teaching    Patient response to procedure performed:  patient tolerated w/o any increased level of pain    Agency Progress toward goals: progressing    Patient's Progress towards personal goals: progressing    Home exercise program: take all medications as prescribed, finish all antibiotics, monitor for s/s of infection    Continued need for the following skills: Nursing and Physical Therapy    Plan for next visit: wound vac, IV therapy    Patient and/or caregiver notified and agrees to changes in the Plan of Care YES/NO/NA: YES The following discharge planning was discussed with the pt/caregiver: Discharge when goals are met, IV therapy completed, wound improved, patient/caregiver able to manage disease process, mediations, and pain

## 2023-01-19 NOTE — HOME HEALTH
SUBJECTIVE: Pt c/o increaed back pain, out of pain meds, no changes in medicaitons, denies falls  CAREGIVER INVOLVEMENT/ASSISTANCE NEEDED FOR: pt has cg who assits as needed   OBJECTIVE: See interventions  PATIENT EDUCATION PROVIDED THIS VISIT: Pt educated in pain management, fall prevention and diabetic foot care   PATIENT RESPONSE TO EDUCATION: Pt verbalizes understanding of education    PATIENT RESPONSE TO TREATMENT/ASSESSMENT OF PROGRESS TOWARD GOALS: Pt reports 10/10 back pain, PCA went to  meds. Performed gait tng outside on uneven surfaces pt uses stick for ambulation recommend SPC amb with decreaesed nick and stride length VCs provided to improve quality of gait with minmal change, stair negotiation tng w/walking stick, instruction for step to pattern to improve safety, pt able to return demonstrate safely. Pt declined strength tng due to back pain. PLAN FOR NEXT VISIT: Next visit will review HEP and Education will address gait and endurance   DISCHARGE PLANS: POC and Discharge plans discussed pt understands and agrees eventual DC once goals have been met or max HC benefits have been achieved.  Frequency is 2wk2, 1wk1 3 visits remaining

## 2023-01-20 ENCOUNTER — HOME CARE VISIT (OUTPATIENT)
Dept: SCHEDULING | Facility: HOME HEALTH | Age: 63
End: 2023-01-20
Payer: COMMERCIAL

## 2023-01-20 VITALS
OXYGEN SATURATION: 93 % | SYSTOLIC BLOOD PRESSURE: 140 MMHG | DIASTOLIC BLOOD PRESSURE: 82 MMHG | HEART RATE: 91 BPM | TEMPERATURE: 98 F | RESPIRATION RATE: 16 BRPM

## 2023-01-20 PROCEDURE — G0299 HHS/HOSPICE OF RN EA 15 MIN: HCPCS

## 2023-01-20 NOTE — HOME HEALTH
Skilled reason for visit: skilled assessment, education, medication management, wound care  Caregiver involvement: Family assists ADL's, medications, meals, transportation, errands. Medications reviewed and all medications are available in the home this visit. The following education was provided regarding medications: Instructed in new medication Tylenol to manage mild pain or fever. In addition, warned of possible S/E such as hemolytuc anemia, neutropenia, leukopenia, pancytopenia, liver damage, jaundice, hypoglycemia, rash and urticaria. Consult prescriber before giving drug to children younger than age 3. Tylenol is only for short-term use. Consult prescriber if it is given to children for longer than 5 days or adults for longer than 10 days. Instructed not to use for marked fever (higher than 103.1 F), fever persisting longer than 3 days, or recurrent fever unless it is directed by prescriber. Warned that high doses or unsupervised long-term use can cause hepatic damage. Excessive ingestion of alcohol may increase the risk of hepatotoxicity. MD notified of any discrepancies/look a-like medications/medication interactions: na   Medications are effectoive at this time. Home health supplies by type and quantity ordered/delivered this visit include: na   Patient education provided this visit: Instructed patient call your health hare provider immediately if you have: pain, fever, a large amount of bright red bleeding and also if you have: warmth, redness, or swelling along the arm or PICCline insertion site. A tear or break in the PICCline catheter or tubing. Sharps education provided: Clinician instructed patient/CG on proper disposal of sharps: Containers should be made of hard plastic, be puncture-resistant and leakproof,   such as a laundry detergent or bleach bottle. When the container is ¾ full, it should be sealed with tape and labeled   DO NOT RECYCLE prior to discarding in the regular trash. Patient level of understanding of education provided: patient/caregiver verbalized 100% understanding. Skilled Care Performed this visit: skilled assessment, education, medication management, wound care, picc line care   Patient response to procedure performed: patient denied pain and discomfort during procedure/visit   Agency Progress toward goals: progressing   Patient's Progress towards personal goals: progressing   Home exercise program: Continue all medications as prescribed, implement fall/infection/pressure ulcer interventions, monitor for abnormal signs/symptoms of infection and report to MD immediately. Keep all follow up appointments as prescribed.  Read all teaching packets for education of disease process and to prevent complications  Continued need for the following skills: Nursing and Physical Therapy   Plan for next visit: skilled assessment, education, medication management, wound care   Patient and/or caregiver notified and agrees to changes in the Plan of Care YES/NO/NA: N/A   The following discharge planning was discussed with the pt/caregiver: Discharge planning as follows: when goals met, patient/caregiver able to manage disease process, medications and pain

## 2023-01-23 ENCOUNTER — HOME CARE VISIT (OUTPATIENT)
Dept: SCHEDULING | Facility: HOME HEALTH | Age: 63
End: 2023-01-23
Payer: COMMERCIAL

## 2023-01-23 ENCOUNTER — HOME CARE VISIT (OUTPATIENT)
Dept: HOME HEALTH SERVICES | Facility: HOME HEALTH | Age: 63
End: 2023-01-23
Payer: COMMERCIAL

## 2023-01-23 ENCOUNTER — HOSPITAL ENCOUNTER (OUTPATIENT)
Dept: LAB | Age: 63
Discharge: HOME | End: 2023-01-23

## 2023-01-23 LAB — SENTARA SPECIMEN COL,SENBCF: NORMAL

## 2023-01-23 PROCEDURE — 99001 SPECIMEN HANDLING PT-LAB: CPT

## 2023-01-23 PROCEDURE — G0299 HHS/HOSPICE OF RN EA 15 MIN: HCPCS

## 2023-01-24 VITALS
SYSTOLIC BLOOD PRESSURE: 140 MMHG | RESPIRATION RATE: 17 BRPM | HEART RATE: 81 BPM | DIASTOLIC BLOOD PRESSURE: 82 MMHG | OXYGEN SATURATION: 97 % | TEMPERATURE: 98 F

## 2023-01-24 NOTE — HOME HEALTH
Skilled reason for visit: skilled assessment, education, medication management, wound care, picc line dressing change, obtained ordered lab work, labeled in home and delivered to lab   Caregiver involvement: Family assists ADL's, medications, meals, transportation, errands. Medications reviewed and all medications are available in the home this visit. The following education was provided regarding medications: patient knowledgeable about all medications, has no questions or concerns at this time. MD notified of any discrepancies/look a-like medications/medication interactions: na   Medications are effectoive at this time. Home health supplies by type and quantity ordered/delivered this visit include: na   Patient education provided this visit: SN instructed patient if PICCline accidentally comes out place a sterile gauze pad on the site and press firmly until the bleeding has stopped ( 2 o 5 minutes ), after the bleeding stops, apply antibiotic ointment and a bandage snugly, then call your nurse or doctor   Sharps education provided: Clinician instructed patient/CG on proper disposal of sharps: Containers should be made of hard plastic, be puncture-resistant and leakproof,   such as a laundry detergent or bleach bottle. When the container is ¾ full, it should be sealed with tape and labeled   DO NOT RECYCLE prior to discarding in the regular trash. Patient level of understanding of education provided: patient/caregiver verbalized 100% understanding.    Skilled Care Performed this visit: skilled assessment, education, medication management, wound care, picc line care   Patient response to procedure performed: patient denied pain and discomfort during procedure/visit   Agency Progress toward goals: progressing   Patient's Progress towards personal goals: progressing   Home exercise program: see careplan  Continued need for the following skills: Nursing and Physical Therapy   Plan for next visit: skilled assessment, education, medication management, wound care   Patient and/or caregiver notified and agrees to changes in the Plan of Care YES/NO/NA: N/A   The following discharge planning was discussed with the pt/caregiver: Discharge planning as follows: when goals met, patient/caregiver able to manage disease process, medications and pain

## 2023-01-25 ENCOUNTER — HOME CARE VISIT (OUTPATIENT)
Dept: SCHEDULING | Facility: HOME HEALTH | Age: 63
End: 2023-01-25
Payer: COMMERCIAL

## 2023-01-25 VITALS
OXYGEN SATURATION: 98 % | HEART RATE: 80 BPM | RESPIRATION RATE: 16 BRPM | DIASTOLIC BLOOD PRESSURE: 82 MMHG | SYSTOLIC BLOOD PRESSURE: 124 MMHG | TEMPERATURE: 97.2 F

## 2023-01-25 PROCEDURE — G0299 HHS/HOSPICE OF RN EA 15 MIN: HCPCS

## 2023-01-25 PROCEDURE — G0151 HHCP-SERV OF PT,EA 15 MIN: HCPCS

## 2023-01-27 ENCOUNTER — HOME CARE VISIT (OUTPATIENT)
Dept: SCHEDULING | Facility: HOME HEALTH | Age: 63
End: 2023-01-27
Payer: COMMERCIAL

## 2023-01-27 VITALS
DIASTOLIC BLOOD PRESSURE: 88 MMHG | OXYGEN SATURATION: 97 % | RESPIRATION RATE: 18 BRPM | TEMPERATURE: 98 F | HEART RATE: 71 BPM | SYSTOLIC BLOOD PRESSURE: 138 MMHG

## 2023-01-27 VITALS
DIASTOLIC BLOOD PRESSURE: 68 MMHG | SYSTOLIC BLOOD PRESSURE: 136 MMHG | TEMPERATURE: 97.8 F | HEART RATE: 70 BPM | OXYGEN SATURATION: 99 % | RESPIRATION RATE: 20 BRPM

## 2023-01-27 PROCEDURE — G0299 HHS/HOSPICE OF RN EA 15 MIN: HCPCS

## 2023-01-27 NOTE — HOME HEALTH
Skilled reason for visit: skilled assessment, education, medication management, wound care  Caregiver involvement: Family assists ADL's, medications, meals, transportation, errands. Medications reviewed and all medications are available in the home this visit. The following education was provided regarding medications: patient knowledgeable about all medications, has no questions or concerns at this time. MD notified of any discrepancies/look a-like medications/medication interactions: na   Medications are effectoive at this time. Home health supplies by type and quantity ordered/delivered this visit include: na   Patient education provided this visit: SN instructed patient if PICCline accidentally comes out place a sterile gauze pad on the site and press firmly until the bleeding has stopped ( 2 o 5 minutes ), after the bleeding stops, apply antibiotic ointment and a bandage snugly, then call your nurse or doctor   Sharps education provided: Clinician instructed patient/CG on proper disposal of sharps: Containers should be made of hard plastic, be puncture-resistant and leakproof,   such as a laundry detergent or bleach bottle. When the container is ¾ full, it should be sealed with tape and labeled   DO NOT RECYCLE prior to discarding in the regular trash. Patient level of understanding of education provided: patient/caregiver verbalized 100% understanding.    Skilled Care Performed this visit: skilled assessment, education, medication management, wound care, picc line care   Patient response to procedure performed: patient denied pain and discomfort during procedure/visit   Agency Progress toward goals: progressing   Patient's Progress towards personal goals: progressing   Home exercise program: see careplan   Continued need for the following skills: Nursing and Physical Therapy   Plan for next visit: skilled assessment, education, medication management, wound care   Patient and/or caregiver notified and agrees to changes in the Plan of Care YES/NO/NA: N/A   The following discharge planning was discussed with the pt/caregiver: Discharge planning as follows: when goals met, patient/caregiver able to manage disease process, medications and pain

## 2023-01-27 NOTE — HOME HEALTH
Skilled reason for visit: negative pressure wound therapy     Caregiver involvement: pt independent in home but has family and neighbors to check on him. Medications reviewed and all medications are available in the home this visit. The following education was provided regarding medications:   patient to continue to take medications as prescribed. patient aware to monitor for effectiveness and to notify staff of any adverse reactions to medications/any changes to medication regimen. .    MD notified of any discrepancies/look a-like medications/medication interactions: na  Medications are effective at this time. Home health supplies by type and quantity ordered/delivered this visit include: adequate supplies    Patient education provided this visit: discussed fall precautions in detail- having lighted hallways, removing throw rugs, monitoring medication that may alter mental status. perform skin inspections looking for any skin breakdown or redness. monitor for edema. frequent position changes. Watch for signs and symptoms of infection which include; fever, drainage, foul smell, lethargy. Sharps education provided: Containers should be made of hard plastic, be puncture-resistant and leakproof,   such as a laundry detergent or bleach bottle. When the container is ¾ full, it should be sealed with tape and labeled   DO NOT RECYCLE prior to discarding in the regular trash.           Patient level of understanding of education provided: pt verbalized understanding of education provided this visit      Skilled Care Performed this visit: same as reason for visit     Patient response to procedure performed:  pt tolerated without complaints of pain      Agency Progress toward goals: progressing    Patient's Progress towards personal goals: progressing    Home exercise program: patient made aware to monitor for s/s of infection [increased swelling, increased redness around site, increased pain, foul smelling drainage, fever] aware who to report to/when. pt instructed to follow a high protein diet for healing- to try to get 90g protein daily. Continued need for the following skills: Nursing    Plan for next visit: negative pressure wound therapy     Patient and/or caregiver notified and agrees to changes in the Plan of Care YES/NO/NA: N/A      The following discharge planning was discussed with the pt/caregiver:  Patient will be discharged once education has completed, patient is medically stable and pt is able to independently manage wound care/has healed or no longer requires skilled care.

## 2023-01-30 ENCOUNTER — HOME CARE VISIT (OUTPATIENT)
Dept: SCHEDULING | Facility: HOME HEALTH | Age: 63
End: 2023-01-30
Payer: COMMERCIAL

## 2023-01-30 ENCOUNTER — HOSPITAL ENCOUNTER (OUTPATIENT)
Dept: LAB | Age: 63
Discharge: HOME | End: 2023-01-30

## 2023-01-30 LAB — SENTARA SPECIMEN COL,SENBCF: NORMAL

## 2023-01-30 PROCEDURE — 99001 SPECIMEN HANDLING PT-LAB: CPT

## 2023-01-30 PROCEDURE — G0299 HHS/HOSPICE OF RN EA 15 MIN: HCPCS

## 2023-01-31 VITALS
HEART RATE: 71 BPM | DIASTOLIC BLOOD PRESSURE: 82 MMHG | TEMPERATURE: 98.3 F | SYSTOLIC BLOOD PRESSURE: 140 MMHG | RESPIRATION RATE: 17 BRPM | OXYGEN SATURATION: 98 %

## 2023-01-31 NOTE — HOME HEALTH
Skilled reason for visit: skilled assessment, education, medication management, wound care, picc line dressing change, obtained ordered lab work, labeled in home and delivered to lab   Caregiver involvement: Family assists ADL's, medications, meals, transportation, errands. Medications reviewed and all medications are available in the home this visit. The following education was provided regarding medications: patient knowledgeable about all medications, has no questions or concerns at this time. MD notified of any discrepancies/look a-like medications/medication interactions: na   Medications are effectoive at this time. Home health supplies by type and quantity ordered/delivered this visit include: na   Patient education provided this visit: See careplan  Sharps education provided: Clinician instructed patient/CG on proper disposal of sharps: Containers should be made of hard plastic, be puncture-resistant and leakproof,   such as a laundry detergent or bleach bottle. When the container is ¾ full, it should be sealed with tape and labeled   DO NOT RECYCLE prior to discarding in the regular trash. Patient level of understanding of education provided: patient/caregiver verbalized 100% understanding.    Skilled Care Performed this visit: skilled assessment, education, medication management, wound care, picc line care   Patient response to procedure performed: patient denied pain and discomfort during procedure/visit   Agency Progress toward goals: progressing   Patient's Progress towards personal goals: progressing   Home exercise program: see careplan   Continued need for the following skills: Nursing and Physical Therapy   Plan for next visit: skilled assessment, education, medication management, wound care   Patient and/or caregiver notified and agrees to changes in the Plan of Care YES/NO/NA: N/A   The following discharge planning was discussed with the pt/caregiver: Discharge planning as follows: when goals met, patient/caregiver able to manage disease process, medications and pain

## 2023-02-02 ENCOUNTER — HOME CARE VISIT (OUTPATIENT)
Dept: SCHEDULING | Facility: HOME HEALTH | Age: 63
End: 2023-02-02
Payer: COMMERCIAL

## 2023-02-02 VITALS
RESPIRATION RATE: 17 BRPM | DIASTOLIC BLOOD PRESSURE: 74 MMHG | HEART RATE: 76 BPM | SYSTOLIC BLOOD PRESSURE: 136 MMHG | TEMPERATURE: 98 F | OXYGEN SATURATION: 99 %

## 2023-02-02 PROCEDURE — G0299 HHS/HOSPICE OF RN EA 15 MIN: HCPCS

## 2023-02-02 NOTE — HOME HEALTH
Skilled reason for visit: skilled assessment, education, medication management, wound care  Caregiver involvement: Family assists ADL's, medications, meals, transportation, errands. Medications reviewed and all medications are available in the home this visit. The following education was provided regarding medications: patient knowledgeable about all medications, has no questions or concerns at this time. MD notified of any discrepancies/look a-like medications/medication interactions: na   Medications are effectoive at this time. Home health supplies by type and quantity ordered/delivered this visit include: na   Patient education provided this visit: See careplan   Sharps education provided: Clinician instructed patient/CG on proper disposal of sharps: Containers should be made of hard plastic, be puncture-resistant and leakproof,   such as a laundry detergent or bleach bottle. When the container is ¾ full, it should be sealed with tape and labeled   DO NOT RECYCLE prior to discarding in the regular trash. Patient level of understanding of education provided: patient/caregiver verbalized 100% understanding.    Skilled Care Performed this visit: skilled assessment, education, medication management, wound care, picc line care   Patient response to procedure performed: patient denied pain and discomfort during procedure/visit   Agency Progress toward goals: progressing   Patient's Progress towards personal goals: progressing   [facility]   Continued need for the following skills: Nursing and Physical Therapy   Plan for next visit: skilled assessment, education, medication management, wound care   Patient and/or caregiver notified and agrees to changes in the Plan of Care YES/NO/NA: N/A   The following discharge planning was discussed with the pt/caregiver: Discharge planning as follows: when goals met, patient/caregiver able to manage disease process, medications and pain No pertinent past medical history <<----- Click to add NO pertinent Past Medical History

## 2023-02-04 ENCOUNTER — HOME CARE VISIT (OUTPATIENT)
Dept: SCHEDULING | Facility: HOME HEALTH | Age: 63
End: 2023-02-04
Payer: COMMERCIAL

## 2023-02-04 PROCEDURE — G0300 HHS/HOSPICE OF LPN EA 15 MIN: HCPCS

## 2023-02-05 NOTE — HOME HEALTH
Skilled reason for visit: lower spine open incison wound  Caregiver involvement: family and friends for assistance as needed. Medications reviewed and all medications are available in the home this visit. The following education was provided regarding medications, medication interactions, and look alike medications (specify): no med changes noted or reported. Medications  are effective at this time. Home health supplies by type and quantity ordered/delivered this visit include: none  Patient education provided this visit:standard precautions for infection control are utilized  SN instructed patient to eat a balanced diet and drink fluids, eat protein like red and white meat, eggs, beans and take vitamins from vegetables/fruits , to promote wound healing. Progress toward goals: pt tolerated wound vac dressing change with no c/o pain or distress. Home exercise program: Patient was instructed on strategies that can significantly help decrease the risk of a fall such as: Good lighting throughout the home, especially in stairwells and hallways, Non-slip floors and rugs, Hand rails on stairs, next to the toilet and in the shower and bathtub.     Continued need for the following skills: Nursing  Patient and/or caregiver notified and agrees to changes in the Plan of Care YES/NO/NA: N/A    Patient will be discharged once education and wounds if applicable has been completed, patient is medically stable, and all goals met

## 2023-02-06 ENCOUNTER — HOSPITAL ENCOUNTER (OUTPATIENT)
Dept: LAB | Age: 63
Discharge: HOME | End: 2023-02-06

## 2023-02-06 ENCOUNTER — HOME CARE VISIT (OUTPATIENT)
Dept: SCHEDULING | Facility: HOME HEALTH | Age: 63
End: 2023-02-06
Payer: COMMERCIAL

## 2023-02-06 VITALS
DIASTOLIC BLOOD PRESSURE: 82 MMHG | SYSTOLIC BLOOD PRESSURE: 130 MMHG | HEART RATE: 71 BPM | RESPIRATION RATE: 18 BRPM | TEMPERATURE: 98 F | OXYGEN SATURATION: 98 %

## 2023-02-06 LAB — SENTARA SPECIMEN COL,SENBCF: NORMAL

## 2023-02-06 PROCEDURE — 99001 SPECIMEN HANDLING PT-LAB: CPT

## 2023-02-06 PROCEDURE — G0299 HHS/HOSPICE OF RN EA 15 MIN: HCPCS

## 2023-02-06 NOTE — HOME HEALTH
Skilled reason for visit: skilled assessment, education, medication management, wound care, picc line dressing change, labs collected via picc line, labs labeled in home and dropped off at lab. Caregiver involvement: Family assists ADL's, medications, meals, transportation, errands. Medications reviewed and all medications are available in the home this visit. The following education was provided regarding medications: patient knowledgeable about all medications, has no questions or concerns at this time. MD notified of any discrepancies/look a-like medications/medication interactions: na   Medications are effectoive at this time. Home health supplies by type and quantity ordered/delivered this visit include: na   Patient education provided this visit: See careplan   Sharps education provided: Clinician instructed patient/CG on proper disposal of sharps: Containers should be made of hard plastic, be puncture-resistant and leakproof,   such as a laundry detergent or bleach bottle. When the container is ¾ full, it should be sealed with tape and labeled   DO NOT RECYCLE prior to discarding in the regular trash. Patient level of understanding of education provided: patient/caregiver verbalized 100% understanding.    Skilled Care Performed this visit: skilled assessment, education, medication management, wound care, picc line care   Patient response to procedure performed: patient denied pain and discomfort during procedure/visit   Agency Progress toward goals: progressing   Patient's Progress towards personal goals: progressing   Home exercise program: see careplan   Continued need for the following skills: Nursing  Plan for next visit: skilled assessment, education, medication management, wound care   Patient and/or caregiver notified and agrees to changes in the Plan of Care YES/NO/NA: N/A   The following discharge planning was discussed with the pt/caregiver: Discharge planning as follows: when goals met, patient/caregiver able to manage disease process, medications and pain

## 2023-02-09 ENCOUNTER — HOME CARE VISIT (OUTPATIENT)
Dept: SCHEDULING | Facility: HOME HEALTH | Age: 63
End: 2023-02-09
Payer: COMMERCIAL

## 2023-02-09 VITALS
RESPIRATION RATE: 18 BRPM | SYSTOLIC BLOOD PRESSURE: 140 MMHG | TEMPERATURE: 98.8 F | DIASTOLIC BLOOD PRESSURE: 90 MMHG | OXYGEN SATURATION: 98 % | HEART RATE: 100 BPM

## 2023-02-09 PROCEDURE — G0299 HHS/HOSPICE OF RN EA 15 MIN: HCPCS

## 2023-02-09 NOTE — HOME HEALTH
Skilled reason for visit: SN PICC line removal and negative pressure dressing change     Caregiver involvement: Family to assist with errands and other needs as needed. Medications reviewed and all medications are available in the home this visit. The following education was provided regarding medications:  Patient aware of medications and how to take them. MD notified of any discrepancies/look a-like medications/medication interactions: N/A  Medications are effective at this time. Home health supplies by type and quantity ordered/delivered this visit include: N/A    Patient education provided this visit: Patient educated on pressure dressing to left arm after PICC removal, no heavy lifting, and to call MD for any s/s of DVT to left arm. Sharps education provided: Clinician instructed patient/CG on proper disposal of sharps: Containers should be made of hard plastic, be puncture-resistant and leakproof,   such as a laundry detergent or bleach bottle.  When the container is ¾ full, it should be sealed with tape and labeled   DO NOT RECYCLE prior to discarding in the regular trash.        Patient level of understanding of education provided: Verbalizes understanding     Skilled Care Performed this visit: PICC line removal. Catheter 47cm with tip intact, pressure dressing applied, no bleeding noted. Negative pressure dressing changed to lower back. Patient response to procedure performed:  No complaints during dressing change or  removal of PICC and relieved it was removed.     Agency Progress toward goals: progressing     Patient's Progress towards personal goals: progressing    Home exercise program: Continue deep breathing exercises and ambulating as tolerated     Continued need for the following skills: Nursing    Plan for next visit: dressing change     Patient and/or caregiver notified and agrees to changes in the Plan of Care YES/NO/NA: N/A      The following discharge planning was discussed with the pt/caregiver: When all goals are met and wound is healed.

## 2023-02-11 ENCOUNTER — HOME CARE VISIT (OUTPATIENT)
Dept: SCHEDULING | Facility: HOME HEALTH | Age: 63
End: 2023-02-11
Payer: COMMERCIAL

## 2023-02-11 VITALS
OXYGEN SATURATION: 99 % | RESPIRATION RATE: 20 BRPM | SYSTOLIC BLOOD PRESSURE: 130 MMHG | DIASTOLIC BLOOD PRESSURE: 78 MMHG | HEART RATE: 78 BPM | TEMPERATURE: 97.8 F

## 2023-02-11 PROCEDURE — G0299 HHS/HOSPICE OF RN EA 15 MIN: HCPCS

## 2023-02-13 ENCOUNTER — HOME CARE VISIT (OUTPATIENT)
Dept: SCHEDULING | Facility: HOME HEALTH | Age: 63
End: 2023-02-13
Payer: COMMERCIAL

## 2023-02-13 PROCEDURE — G0299 HHS/HOSPICE OF RN EA 15 MIN: HCPCS

## 2023-02-14 VITALS
OXYGEN SATURATION: 97 % | SYSTOLIC BLOOD PRESSURE: 130 MMHG | HEART RATE: 80 BPM | RESPIRATION RATE: 18 BRPM | TEMPERATURE: 98 F | DIASTOLIC BLOOD PRESSURE: 76 MMHG

## 2023-02-14 NOTE — HOME HEALTH
Skilled reason for visit: skilled assessment, education, medication management, wound care  Caregiver involvement: Family assists ADL's, medications, meals, transportation, errands. Medications reviewed and all medications are available in the home this visit. The following education was provided regarding medications: patient knowledgeable about all medications, has no questions or concerns at this time. MD notified of any discrepancies/look a-like medications/medication interactions: na   Medications are effectoive at this time. Home health supplies by type and quantity ordered/delivered this visit include: na   Patient education provided this visit: See careplan   Sharps education provided: Clinician instructed patient/CG on proper disposal of sharps: Containers should be made of hard plastic, be puncture-resistant and leakproof,   such as a laundry detergent or bleach bottle. When the container is ¾ full, it should be sealed with tape and labeled   DO NOT RECYCLE prior to discarding in the regular trash. Patient level of understanding of education provided: patient/caregiver verbalized 100% understanding.    Skilled Care Performed this visit: skilled assessment, education, medication management, wound care  Patient response to procedure performed: patient denied pain and discomfort during procedure/visit   Agency Progress toward goals: progressing   Patient's Progress towards personal goals: progressing   [facility]   Continued need for the following skills: Nursing   Plan for next visit: skilled assessment, education, medication management, wound care   Patient and/or caregiver notified and agrees to changes in the Plan of Care YES/NO/NA: N/A   The following discharge planning was discussed with the pt/caregiver: Discharge planning as follows: when goals met, patient/caregiver able to manage disease process, medications and pain

## 2023-02-15 ENCOUNTER — HOME CARE VISIT (OUTPATIENT)
Dept: SCHEDULING | Facility: HOME HEALTH | Age: 63
End: 2023-02-15
Payer: COMMERCIAL

## 2023-02-15 VITALS
HEART RATE: 82 BPM | RESPIRATION RATE: 20 BRPM | DIASTOLIC BLOOD PRESSURE: 80 MMHG | TEMPERATURE: 98.8 F | SYSTOLIC BLOOD PRESSURE: 148 MMHG | OXYGEN SATURATION: 98 %

## 2023-02-15 PROCEDURE — G0299 HHS/HOSPICE OF RN EA 15 MIN: HCPCS

## 2023-02-15 NOTE — HOME HEALTH
Skilled reason for visit: DISEASE MED MANAGEMENT, PHYSICAL ASSESSMENT, VITAL SIGNS, WOUND CARE  wound pic in the media section  Caregiver involvement: family, iadls, adls, meal prep, med management, taking to md appointments. Medications reviewed and all medications are available in the home this visit. The following education was provided regarding medications:  patient/cg reminded to continue to take medications as prescribed. patient aware to monitor for effectiveness and to notify staff of any adverse reactions to medications/any changes to medication regimen. .    MD notified of any discrepancies/look a-like medications/medication interactions: NA  Medications are EFFECTIVE at this time. Home health supplies by type and quantity ordered/delivered this visit include: NA    Patient education provided this visit: encouraged patient to get three nutritional meals daily and to stay hydrated, drink plenty of fluids. patient made aware to monitor for s/s of infection [increased swelling, increased redness around site, increased pain, foul smelling drainage, fever] aware who to report to/when. Sharps education provided: PATIENT EDUCATED ON HOW TO PROPERLY DISPOSE OF NEEDLES INTO A BLEACH BOTTLE OR DETERGENT BOTTLE WITH THE CAP TAPPED ON. Patient level of understanding of education provided: PATIENT/CG  WAS ABLE TO REPEAT BACK AND VOICED UNDERSTANDING OF ALL EDUCATION PROVIDED.     Skilled Care Performed this visit: SAME AS REASON FOR VISIT    Patient response to procedure performed:   PATIENT TOLERATED WELL WITH NO C/O OF INCREASED PAIN OR DISCOMFORT    Agency Progress toward goals: PROGRESSING     Patient's Progress towards personal goals: PROGRESSING    Home exercise program: PATIENT TO TAKE ALL MEDS AS ORDERED, DO ICS X10/HR WHILE AWAKE, DRINK PLENTY OF FLUIDS,    Continued need for the following skills: Nursing    Plan for next visit - wound care

## 2023-02-17 ENCOUNTER — HOME CARE VISIT (OUTPATIENT)
Dept: HOME HEALTH SERVICES | Facility: HOME HEALTH | Age: 63
End: 2023-02-17
Payer: COMMERCIAL

## 2023-02-17 ENCOUNTER — HOME CARE VISIT (OUTPATIENT)
Dept: SCHEDULING | Facility: HOME HEALTH | Age: 63
End: 2023-02-17
Payer: COMMERCIAL

## 2023-02-17 VITALS
RESPIRATION RATE: 18 BRPM | HEART RATE: 89 BPM | DIASTOLIC BLOOD PRESSURE: 80 MMHG | OXYGEN SATURATION: 98 % | SYSTOLIC BLOOD PRESSURE: 134 MMHG | TEMPERATURE: 98.2 F

## 2023-02-17 PROCEDURE — G0299 HHS/HOSPICE OF RN EA 15 MIN: HCPCS

## 2023-02-17 NOTE — HOME HEALTH
Skilled reason for visit: skilled assessment, education, medication management, wound care   Caregiver involvement: Family assists ADL's, medications, meals, transportation, errands. Medications reviewed and all medications are available in the home this visit. The following education was provided regarding medications: patient knowledgeable about all medications, has no questions or concerns at this time. MD notified of any discrepancies/look a-like medications/medication interactions: na   Medications are effectoive at this time. Home health supplies by type and quantity ordered/delivered this visit include: na   Patient education provided this visit: See careplan   Sharps education provided: Clinician instructed patient/CG on proper disposal of sharps: Containers should be made of hard plastic, be puncture-resistant and leakproof,   such as a laundry detergent or bleach bottle. When the container is ¾ full, it should be sealed with tape and labeled   DO NOT RECYCLE prior to discarding in the regular trash. Patient level of understanding of education provided: patient/caregiver verbalized 100% understanding.    Skilled Care Performed this visit: skilled assessment, education, medication management, wound care   Patient response to procedure performed: patient denied pain and discomfort during procedure/visit   Agency Progress toward goals: progressing   Patient's Progress towards personal goals: progressing   [facility]   Continued need for the following skills: Nursing   Plan for next visit: skilled assessment, education, medication management, wound care   Patient and/or caregiver notified and agrees to changes in the Plan of Care YES/NO/NA: N/A   The following discharge planning was discussed with the pt/caregiver: Discharge planning as follows: when goals met, patient/caregiver able to manage disease process, medications and pain

## 2023-02-20 ENCOUNTER — HOME CARE VISIT (OUTPATIENT)
Dept: SCHEDULING | Facility: HOME HEALTH | Age: 63
End: 2023-02-20
Payer: COMMERCIAL

## 2023-02-20 PROCEDURE — G0299 HHS/HOSPICE OF RN EA 15 MIN: HCPCS

## 2023-02-21 VITALS
RESPIRATION RATE: 18 BRPM | DIASTOLIC BLOOD PRESSURE: 80 MMHG | SYSTOLIC BLOOD PRESSURE: 130 MMHG | TEMPERATURE: 98.5 F | HEART RATE: 89 BPM | OXYGEN SATURATION: 99 %

## 2023-02-21 NOTE — HOME HEALTH
Skilled reason for visit: skilled assessment, education, medication management, wound care, wet to dry done due to odor noted patient to see MD tomorrow  Caregiver involvement: Family assists ADL's, medications, meals, transportation, errands. Medications reviewed and all medications are available in the home this visit. The following education was provided regarding medications: patient knowledgeable about all medications, has no questions or concerns at this time. MD notified of any discrepancies/look a-like medications/medication interactions: na   Medications are effectoive at this time. Home health supplies by type and quantity ordered/delivered this visit include: na   Patient education provided this visit: See careplan   Sharps education provided: Clinician instructed patient/CG on proper disposal of sharps: Containers should be made of hard plastic, be puncture-resistant and leakproof,   such as a laundry detergent or bleach bottle. When the container is ¾ full, it should be sealed with tape and labeled   DO NOT RECYCLE prior to discarding in the regular trash. Patient level of understanding of education provided: patient/caregiver verbalized 100% understanding.    Skilled Care Performed this visit: skilled assessment, education, medication management, wound care   Patient response to procedure performed: patient denied pain and discomfort during procedure/visit   Agency Progress toward goals: progressing   Patient's Progress towards personal goals: progressing   Home exercise program: see careplan   Continued need for the following skills: Nursing   Plan for next visit: skilled assessment, education, medication management, wound care   Patient and/or caregiver notified and agrees to changes in the Plan of Care YES/NO/NA: N/A   The following discharge planning was discussed with the pt/caregiver: Discharge planning as follows: when goals met, patient/caregiver able to manage disease process, medications and pain

## 2023-02-22 ENCOUNTER — HOME CARE VISIT (OUTPATIENT)
Dept: SCHEDULING | Facility: HOME HEALTH | Age: 63
End: 2023-02-22
Payer: COMMERCIAL

## 2023-02-22 VITALS
OXYGEN SATURATION: 99 % | SYSTOLIC BLOOD PRESSURE: 150 MMHG | DIASTOLIC BLOOD PRESSURE: 108 MMHG | HEART RATE: 126 BPM | TEMPERATURE: 98.6 F | RESPIRATION RATE: 17 BRPM

## 2023-02-22 PROCEDURE — G0299 HHS/HOSPICE OF RN EA 15 MIN: HCPCS

## 2023-02-23 NOTE — HOME HEALTH
Skilled reason for visit: l-spine wound infection requiring wound care. Caregiver involvement: Patient's cg is his family. cg lives with patient and is available as needed for assistance with iadls, adls, meal prep, medication management, taking to md appointments. Medications reviewed and all medications are available in the home this visit. Patient denies any medication changes at this time of assessment. The following education was provided regarding medications, medication interactions, and look alike medications (specify): reviewed side effects, purposes, dosage, frequencies. Medications  are effective at this time. Home health supplies by type and quantity ordered/delivered this visit include: pt has adequate supplies for wet to dry- if new wound care orders obtained will require additional supplies. Patient education provided this visit: patient/cg instructed to monitor for edema/increase in edema, to elevate extremity when edema occurs and to notify md if edema exceeds normal limits for patient, none noted at this time. patient made aware to monitor for s/s of infection [increased swelling, increased redness around site, increased pain, foul smelling drainage, fever] aware who to report to/when. no s/s of infection noted. encouraged patient to get three nutritional meals daily and to stay hydrated, drink plenty of fluids. Discussed dietary adjustments such as: Reduce portion sizes, Limit daily carbohydrate intake to not greater than 45-60 grams per meal for a total of <180 grams of carbohydrate daily, Avoid concentrated carbohydrates such as soft drinks, sweet tea, and sweet treats. Increase physical activity along with strength training as tolerated to facilitate weight loss and build muscle mass. pt does not check BS. discussed fall precautions in detail- having lighted hallways, removing throw rugs, monitoring medication that may alter mental status.  patient aware to turn every 2 hours and to keep pressure off of bony prominences, to monitor for any pressure ulcer development/worsening. pt has completed IV abx picc no longer in place. Sharps education provided: NA  Patient level of understanding of education provided: patient has a good understanding of education that was provided at this time by engaging in all education provided and is able to verbalize understanding, pt denies any questions or concerns at this time. Skilled Care Performed this visit: wound care to lower back performed- old dressing completely removed, wound cleansed with dwc and applied wet to dry, secured with abd pad and transparent dressing. pt reporting they stopped wound vac last week due to odor and patient saw Dr. Gerard Nieves office yesterday- SN called MD office to notify of recommendation for wound care and that we will be doing wet to dry until we get orders for other wound care- recommending aquacel/equivalent. MD also made aware of high bp/hr on today's visit- pt having a stressful morning dealing with family issues, may be a reason for high bp/hr- nurse from office called back and reporting they will check in with patient. Patient response to procedure performed:  patient tolerated procedure with no signs of discomfort, grimacing or pain, no complications or concerns noted. Agency Progress toward goals: goals/teaching reviewed. patient is progressing towards goals at this time, skilled need to continue monitoring status, performing wound care. Patient's Progress towards personal goals: pt reporting they are progressing towards their goals at this time. Home exercise program: patient reminded to perform sob hep 4-5 x daily and prn for sob, to promote lung expansion.    Continued need for the following skills: Nursing  Plan for next visit: wound care  Patient and/or caregiver notified and agrees to changes in the Plan of Care NA  The following discharge planning was discussed with the pt/caregiver: Patient will be discharged once education has completed, patient is medically stable and pt/cg are able to independently manage wound care/ wound has healed or no longer requires skilled care.

## 2023-02-24 ENCOUNTER — HOME CARE VISIT (OUTPATIENT)
Dept: SCHEDULING | Facility: HOME HEALTH | Age: 63
End: 2023-02-24
Payer: COMMERCIAL

## 2023-02-24 VITALS
HEART RATE: 71 BPM | SYSTOLIC BLOOD PRESSURE: 140 MMHG | TEMPERATURE: 98 F | OXYGEN SATURATION: 98 % | DIASTOLIC BLOOD PRESSURE: 80 MMHG | RESPIRATION RATE: 18 BRPM

## 2023-02-24 PROCEDURE — G0299 HHS/HOSPICE OF RN EA 15 MIN: HCPCS

## 2023-02-24 NOTE — HOME HEALTH
Skilled reason for visit: skilled assessment, education, medication management, wound care  Caregiver involvement: Family assists ADL's, medications, meals, transportation, errands. Medications reviewed and all medications are available in the home this visit. The following education was provided regarding medications: patient knowledgeable about all medications, has no questions or concerns at this time. MD notified of any discrepancies/look a-like medications/medication interactions: na   Medications are effectoive at this time. Home health supplies by type and quantity ordered/delivered this visit include: na   Patient education provided this visit: See careplan   Sharps education provided: Clinician instructed patient/CG on proper disposal of sharps: Containers should be made of hard plastic, be puncture-resistant and leakproof,   such as a laundry detergent or bleach bottle. When the container is ¾ full, it should be sealed with tape and labeled   DO NOT RECYCLE prior to discarding in the regular trash. Patient level of understanding of education provided: patient/caregiver verbalized 100% understanding.    Skilled Care Performed this visit: skilled assessment, education, medication management, wound care   Patient response to procedure performed: patient denied pain and discomfort during procedure/visit   Agency Progress toward goals: progressing   Patient's Progress towards personal goals: progressing   [facility]   Continued need for the following skills: Nursing   Plan for next visit: skilled assessment, education, medication management, wound care   Patient and/or caregiver notified and agrees to changes in the Plan of Care YES/NO/NA: N/A   The following discharge planning was discussed with the pt/caregiver: Discharge planning as follows: when goals met, patient/caregiver able to manage disease process, medications and pain

## 2023-02-27 ENCOUNTER — HOME CARE VISIT (OUTPATIENT)
Dept: SCHEDULING | Facility: HOME HEALTH | Age: 63
End: 2023-02-27
Payer: COMMERCIAL

## 2023-02-27 VITALS
OXYGEN SATURATION: 98 % | TEMPERATURE: 98 F | HEART RATE: 78 BPM | RESPIRATION RATE: 18 BRPM | SYSTOLIC BLOOD PRESSURE: 130 MMHG | DIASTOLIC BLOOD PRESSURE: 82 MMHG

## 2023-02-27 PROCEDURE — G0299 HHS/HOSPICE OF RN EA 15 MIN: HCPCS

## 2023-02-27 NOTE — HOME HEALTH
Skilled reason for visit: skilled assessment, education, medication management, wound care   Caregiver involvement: Family assists ADL's, medications, meals, transportation, errands. Medications reviewed and all medications are available in the home this visit. The following education was provided regarding medications: patient knowledgeable about all medications, has no questions or concerns at this time. MD notified of any discrepancies/look a-like medications/medication interactions: na   Medications are effectoive at this time. Home health supplies by type and quantity ordered/delivered this visit include: na   Patient education provided this visit: See careplan   Sharps education provided: Clinician instructed patient/CG on proper disposal of sharps: Containers should be made of hard plastic, be puncture-resistant and leakproof,   such as a laundry detergent or bleach bottle. When the container is ¾ full, it should be sealed with tape and labeled   DO NOT RECYCLE prior to discarding in the regular trash. Patient level of understanding of education provided: patient/caregiver verbalized 100% understanding.    Skilled Care Performed this visit: skilled assessment, education, medication management, wound care   Patient response to procedure performed: patient denied pain and discomfort during procedure/visit   Agency Progress toward goals: progressing   Patient's Progress towards personal goals: progressing   [facility]   Continued need for the following skills: Nursing   Plan for next visit: skilled assessment, education, medication management, wound care   Patient and/or caregiver notified and agrees to changes in the Plan of Care YES/NO/NA: N/A   The following discharge planning was discussed with the pt/caregiver: Discharge planning as follows: when goals met, patient/caregiver able to manage disease process, medications and pain

## 2023-03-01 ENCOUNTER — HOME CARE VISIT (OUTPATIENT)
Dept: SCHEDULING | Facility: HOME HEALTH | Age: 63
End: 2023-03-01
Payer: COMMERCIAL

## 2023-03-01 PROCEDURE — G0300 HHS/HOSPICE OF LPN EA 15 MIN: HCPCS

## 2023-03-02 VITALS
OXYGEN SATURATION: 98 % | SYSTOLIC BLOOD PRESSURE: 115 MMHG | HEART RATE: 105 BPM | RESPIRATION RATE: 20 BRPM | DIASTOLIC BLOOD PRESSURE: 86 MMHG

## 2023-03-02 NOTE — HOME HEALTH
Skilled reason for visit: Wound care, Tx and Ed. Caregiver involvement: Pt resides in home with alone, amb independently, friends assts with ADL's, medication, appts and transportation and is available 24/7 to asst as needed. Home health supplies by type and quantity ordered/delivered this visit include: N/A  Medications reviewed and all medications are available in the home this visit. MD notified of any discrepancies/look a-like medications/medication interactions: NA  Medications are effective at this time. Patient education provided this visit: Instructed pt to promptly report any redness, swelling, warmth, fever, chills, increased heart/respiratory rate, uncontrolled pain/tenderness, drainage: green/yellow/brown, or odor to MD immediately. No s/s of infection noted this visit no odor or foul drainage. Wound care provided as ordered, pt tolerated well. Pt has no follow up appointments with MD to report at this time. Sharps education provided: Containers should be made of hard plastic, be puncture-resistant and leakproof, such as a laundry detergent or bleach bottle. When the container is ¾ full, it should be sealed with tape and labeled DO NOT RECYCLE prior to discarding in the regular trash. Patient level of understanding of education provided: Pt verbalized all understanding to education provided     Agency Progress toward goals: Progressing toward established interventions     Patient's Progress towards personal goals: when patient reaches goals and medication is managed, disease processes are understood patient agrees and understand that discharge will take place.

## 2023-03-03 ENCOUNTER — HOME CARE VISIT (OUTPATIENT)
Dept: SCHEDULING | Facility: HOME HEALTH | Age: 63
End: 2023-03-03
Payer: COMMERCIAL

## 2023-03-03 VITALS
RESPIRATION RATE: 18 BRPM | HEART RATE: 88 BPM | TEMPERATURE: 98 F | DIASTOLIC BLOOD PRESSURE: 72 MMHG | SYSTOLIC BLOOD PRESSURE: 120 MMHG | OXYGEN SATURATION: 98 %

## 2023-03-03 PROCEDURE — G0299 HHS/HOSPICE OF RN EA 15 MIN: HCPCS

## 2023-03-03 NOTE — HOME HEALTH
Skilled reason for visit: skilled assessment, education, medication management, wound care   Caregiver involvement: Family assists ADL's, medications, meals, transportation, errands. Medications reviewed and all medications are available in the home this visit. The following education was provided regarding medications: patient knowledgeable about all medications, has no questions or concerns at this time. MD notified of any discrepancies/look a-like medications/medication interactions: na   Medications are effectoive at this time. Home health supplies by type and quantity ordered/delivered this visit include: na   Patient education provided this visit: See careplan   Sharps education provided: Clinician instructed patient/CG on proper disposal of sharps: Containers should be made of hard plastic, be puncture-resistant and leakproof,   such as a laundry detergent or bleach bottle. When the container is ¾ full, it should be sealed with tape and labeled   DO NOT RECYCLE prior to discarding in the regular trash. Patient level of understanding of education provided: patient/caregiver verbalized 100% understanding.    Skilled Care Performed this visit: skilled assessment, education, medication management, wound care   Patient response to procedure performed: patient denied pain and discomfort during procedure/visit   Agency Progress toward goals: progressing   Patient's Progress towards personal goals: progressing   [facility]   Continued need for the following skills: Nursing   Plan for next visit: skilled assessment, education, medication management, wound care   Patient and/or caregiver notified and agrees to changes in the Plan of Care YES/NO/NA: N/A   The following discharge planning was discussed with the pt/caregiver: Discharge planning as follows: when goals met, patient/caregiver able to manage disease process, medications and pain

## 2023-03-06 ENCOUNTER — HOME CARE VISIT (OUTPATIENT)
Dept: SCHEDULING | Facility: HOME HEALTH | Age: 63
End: 2023-03-06
Payer: COMMERCIAL

## 2023-03-06 VITALS
OXYGEN SATURATION: 98 % | HEART RATE: 102 BPM | RESPIRATION RATE: 20 BRPM | TEMPERATURE: 98.4 F | SYSTOLIC BLOOD PRESSURE: 134 MMHG | DIASTOLIC BLOOD PRESSURE: 72 MMHG

## 2023-03-06 PROCEDURE — G0299 HHS/HOSPICE OF RN EA 15 MIN: HCPCS

## 2023-03-06 NOTE — HOME HEALTH
Skilled reason for visit: Skilled RN Assessment, Education, Wound Care  Caregiver involvement: Pt lives alone,family, neighbors and friends provide assisstance as needed  Medications reviewed and all medications are available in the home this visit. The following education was provided regarding medications: Patient educated on Indications, Mechanism of Action, Interactions, S/E of medications on their current Medication List.  sn instructed patient on importance of compliance wirh prescribed meidcations   MD notified of any discrepancies/look a-like medications/medication interactionsn/a  Home health supplies by type and quantity ordered/delivered this visit include: none  Patient education provided this visit: Medications, Health Maintenance and Management, Disease Processes, Safety  Sharps education provided: no  Patient level of understanding of education provided:  Pt verbalizes understanding of schedule, indications, S/E. Needs reinforcement on iInteractions  Skilled Care Performed this visit: Skilled RN Assessment, Education, Wound Care   Patient response to procedure performed:   Tolerated dressing change well  Patient's Progress towards personal goals: Rapidly progressing, increasing exercise daily  Home exercise program: as above  Continued need for the following skills: Skilled RN Assessment, Education, [place] for next visit: Continue to assess and educate patient and provide wound care   Patient and/or caregiver notified and agrees to changes in the Plan of Care NA:    The following discharge planning was discussed with the ptDischarge when patient goals are met

## 2023-03-08 ENCOUNTER — HOME CARE VISIT (OUTPATIENT)
Dept: SCHEDULING | Facility: HOME HEALTH | Age: 63
End: 2023-03-08
Payer: COMMERCIAL

## 2023-03-08 PROCEDURE — G0300 HHS/HOSPICE OF LPN EA 15 MIN: HCPCS

## 2023-03-08 NOTE — Clinical Note
pt wound is progressing toward closing with 100% epithalizing tissue and no s/s of infection noted, only small amount of drainage noted. inquiring if patient wound care can be x2 a week with alleyvn foam adhesive dressing and aqucel ag.

## 2023-03-08 NOTE — HOME HEALTH
Skilled reason for visit: lower spine wound  Caregiver involvement: family and friends for support if needed. Medications reviewed and all medications are available in the home this visit. The following education was provided regarding medications, medication interactions, and look alike medications (specify): no med changes noted or reported. Medications  are effective at this time. Home health supplies by type and quantity ordered/delivered this visit include: none  Patient education provided this visit:standard precautions for infection control are utilized  reviewed cardiac diet- monitoring sodium intake, cholesterol and fat intake. patient aware to limit sodium, no added sodium to diet. reviewed foods to avoid, how to order foods when eating out, how to read nutrition labels and measure sodium, cholesterol and fat intake. Progress toward goals: pt tolerated wound care well, no c/o pain or distress. pt enquired about wound care going down to x2 week due to healing progress, will reach out to md to see if this is appropiate  Home exercise program: Patient was instructed on strategies that can significantly help decrease the risk of a fall such as: Good lighting throughout the home, especially in stairwells and hallways, Non-slip floors and rugs, Hand rails on stairs, next to the toilet and in the shower and bathtub.     Continued need for the following skills: Nursing  Patient and/or caregiver notified and agrees to changes in the Plan of Care YES/NO/NA: N/A    Patient will be discharged once education and wounds if applicable has been completed, patient is medically stable, and all goals met

## 2023-03-09 ENCOUNTER — HOME HEALTH ADMISSION (OUTPATIENT)
Age: 63
End: 2023-03-09
Payer: COMMERCIAL

## 2023-03-10 ENCOUNTER — HOME CARE VISIT (OUTPATIENT)
Age: 63
End: 2023-03-10
Payer: COMMERCIAL

## 2023-03-10 ENCOUNTER — HOME CARE VISIT (OUTPATIENT)
Dept: HOME HEALTH SERVICES | Facility: HOME HEALTH | Age: 63
End: 2023-03-10
Payer: COMMERCIAL

## 2023-03-10 VITALS
TEMPERATURE: 98 F | HEART RATE: 91 BPM | SYSTOLIC BLOOD PRESSURE: 124 MMHG | RESPIRATION RATE: 18 BRPM | DIASTOLIC BLOOD PRESSURE: 80 MMHG | OXYGEN SATURATION: 98 %

## 2023-03-10 PROCEDURE — G0299 HHS/HOSPICE OF RN EA 15 MIN: HCPCS

## 2023-03-10 ASSESSMENT — ENCOUNTER SYMPTOMS: PAIN LOCATION - PAIN QUALITY: ACHE

## 2023-03-10 NOTE — HOME HEALTH
Physician Notification and Justification to Continue Services:     Justification for continued intermittent care: patient with wound care concerns as follows:  lower back- surgical. Change dressing Monday, Wednesday, and Friday. Dressing change to consist of: Remove old dressing (may soak with saline prior to removing as needed). Cleanse wound with wound cleanser. Apply alginate ag. Cover with abd pad. Secure with tape. Dressing changes to be completed by clinician Monday/Wednesday/Friday. Dr. Surekha Wolf notified of the following: the need for continued Skilled Nursing home health services for above reasons, plan of care including visit frequency of 1w3 Skilled Nursing for : additional assessment and WOUND PADILLA IV ETC: wound care services to back. Skilled reason for visit: skilled ongoing assessement, medication management, education, wound care, reecertification    Caregiver involvement: friend assists ADL's, medications, meals, transportation, errands. Medications reconciled and all medications are available in the home this visit. The following education was provided regarding medications:   patient knowledgeable about all medications, has no questions or concerns at this time. MD notified of any discrepancies/look a-like medications/medication interactions: na  Medications are effective at this time. Home health supplies by type and quantity ordered/delivered this visit include: na    Patient education provided this visit: Instructed in materials used in wound care. However, even with proper treatment, a wound infection may occur. Check the wound daily for signs of infection like increased drainage or bleeding from the wound that wont stop with direct pressure, redness in or around the wound, foul odor or pus coming from the wound, increased swelling around the wound and ever above 101.0°F or shaking chills.     Sharps education provided: Clinician instructed patient/CG on proper disposal of sharps: Containers should be made of hard plastic, be puncture-resistant and leakproof,   such as a laundry detergent or bleach bottle.  When the container is ¾ full, it should be sealed with tape and labeled   DO NOT RECYCLE prior to discarding in the regular trash.      Patient level of understanding of education provided: patient/caregiver verbalized 100% understanding. Skilled Care Performed this visit: skilled ongoing assessement, medication management, education, wound care    Patient response to procedure performed:  patient denied pain and discomfort during procedure/visit    Agency Progress toward goals: patient steadily progressing towards all goals    Patient's Progress towards personal goals: patient steadily progressing towards all goals    Home exercise program: Sn instructed patient on pursed lip breathing. Pursed lip breathing is one of the simplest ways to control shortness of breath. It provides a quick and easy way to slow your pace of breathing, making each breath more effective. What does pursed lip breathing do? Pursed lip breathing: Improves ventilation, releases trapped air in the lungs, keeps the airways open longer and decreases the work of breathing, prolongs exhalation to slow the breathing rate, improves breathing patterns by moving old air out of the lungs and allowing for new air to enter the lungs, relieves shortness of breath, causes general relaxation. When should I use this technique? Use this technique during the difficult part of any activity, such as bending, lifting or stair climbing. Practice this technique 4 - 5 times a day at first so you can get the correct breathing pattern. Pursed lip breathing technique: Relax your neck and shoulder muscles, breathe in ( inhale ) slowly through your nose for two counts, keeping your mouth closed. Don't take a deep breath; a normal breath will do. It may help to count to yourself: inhale, one, two.  Pucker or purse your lips as if you were going to whistle or gently flicker the flame of a candle. Breathe out ( exhale ) slowly and gently through your pursedlips while counting to four. It may help to count to yourself: exhale, one, two, three, four. Continued need for the following skills: Nursing. Plan for next visit: skilled ongoing assessement, medication management, education, wound care    Patient and/or caregiver notified and agrees to changes in the Plan of Care: N/A. The following discharge planning was discussed with the pt/caregiver: Discharge planning as follows: when goals met, when wounds healed, pain controlled and family independent with medications.

## 2023-03-13 ENCOUNTER — HOME CARE VISIT (OUTPATIENT)
Age: 63
End: 2023-03-13
Payer: COMMERCIAL

## 2023-03-13 ENCOUNTER — HOME CARE VISIT (OUTPATIENT)
Dept: HOME HEALTH SERVICES | Facility: HOME HEALTH | Age: 63
End: 2023-03-13
Payer: COMMERCIAL

## 2023-03-13 VITALS
DIASTOLIC BLOOD PRESSURE: 84 MMHG | SYSTOLIC BLOOD PRESSURE: 120 MMHG | HEART RATE: 73 BPM | OXYGEN SATURATION: 98 % | RESPIRATION RATE: 18 BRPM | TEMPERATURE: 98.2 F

## 2023-03-13 PROCEDURE — G0299 HHS/HOSPICE OF RN EA 15 MIN: HCPCS

## 2023-03-13 NOTE — HOME HEALTH
Skilled reason for visit: skilled ongoing assessement, medication management, education, wound care    Caregiver involvement: friends assists ADL's, medications, meals, transportation, errands. .    Medications reviewed and all medications are available in the home this visit. The following education was provided regarding medications:   patient knowledgeable about all medications, has no questions or concerns at this time. .    MD notified of any discrepancies/look a-like medications/medication interactions: na  Medications are effective at this time. Home health supplies by type and quantity ordered/delivered this visit include: na    Patient education provided this visit: Instructed in materials used in wound care. However, even with proper treatment, a wound infection may occur. Check the wound daily for signs of infection like increased drainage or bleeding from the wound that wont stop with direct pressure, redness in or around the wound, foul odor or pus coming from the wound, increased swelling around the wound and ever above 101.0°F or shaking chills. Sharps education provided: na    Patient level of understanding of education provided: patient/caregiver verbalized 100% understanding. Skilled Care Performed this visit: skilled ongoing assessement, medication management, education, wound care    Patient response to procedure performed:  patient denied pain and discomfort during procedure/visit    Agency Progress toward goals: patient steadily progressing towards all goals    Patient's Progress towards personal goals: patient steadily progressing towards all goals    Home exercise program: Sn instructed patient on pursed lip breathing. Pursed lip breathing is one of the simplest ways to control shortness of breath. It provides a quick and easy way to slow your pace of breathing, making each breath more effective. What does pursed lip breathing do?  Pursed lip breathing: Improves ventilation, releases trapped air in the lungs, keeps the airways open longer and decreases the work of breathing, prolongs exhalation to slow the breathing rate, improves breathing patterns by moving old air out of the lungs and allowing for new air to enter the lungs, relieves shortness of breath, causes general relaxation. When should I use this technique? Use this technique during the difficult part of any activity, such as bending, lifting or stair climbing. Practice this technique 4 - 5 times a day at first so you can get the correct breathing pattern. Pursed lip breathing technique: Relax your neck and shoulder muscles, breathe in ( inhale ) slowly through your nose for two counts, keeping your mouth closed. Don't take a deep breath; a normal breath will do. It may help to count to yourself: inhale, one, two. Pucker or purse your lips as if you were going to whistle or gently flicker the flame of a candle. Breathe out ( exhale ) slowly and gently through your pursedlips while counting to four. It may help to count to yourself: exhale, one, two, three, four. Continued need for the following skills: Nursing. Plan for next visit: skilled ongoing assessement, medication management, education, wound care    Patient and/or caregiver notified and agrees to changes in the Plan of Care: N/A. The following discharge planning was discussed with the pt/caregiver: Discharge planning as follows: when goals met, when wounds healed, pain controlled and family independent with medications.

## 2023-03-16 ENCOUNTER — HOME CARE VISIT (OUTPATIENT)
Age: 63
End: 2023-03-16
Payer: COMMERCIAL

## 2023-03-17 ENCOUNTER — HOME CARE VISIT (OUTPATIENT)
Age: 63
End: 2023-03-17
Payer: COMMERCIAL

## 2023-03-17 PROCEDURE — G0300 HHS/HOSPICE OF LPN EA 15 MIN: HCPCS

## 2023-03-20 ENCOUNTER — HOME CARE VISIT (OUTPATIENT)
Age: 63
End: 2023-03-20
Payer: COMMERCIAL

## 2023-03-20 VITALS
SYSTOLIC BLOOD PRESSURE: 137 MMHG | RESPIRATION RATE: 21 BRPM | OXYGEN SATURATION: 99 % | TEMPERATURE: 98.7 F | DIASTOLIC BLOOD PRESSURE: 69 MMHG | HEART RATE: 72 BPM

## 2023-03-20 VITALS
HEART RATE: 87 BPM | TEMPERATURE: 98 F | RESPIRATION RATE: 16 BRPM | SYSTOLIC BLOOD PRESSURE: 124 MMHG | OXYGEN SATURATION: 98 % | DIASTOLIC BLOOD PRESSURE: 80 MMHG

## 2023-03-20 PROCEDURE — G0299 HHS/HOSPICE OF RN EA 15 MIN: HCPCS

## 2023-03-20 NOTE — HOME HEALTH
Skilled reason for visit: lower back incison wound  Caregiver involvement: family and friends for assistance as needed. Medications reviewed and all medications are available in the home this visit. The following education was provided regarding medications, medication interactions, and look alike medications (specify): no med changes noted or reported. Medications  are effective at this time. Home health supplies by type and quantity ordered/delivered this visit include: none  Patient education provided this visit:SN instructed patient on nutrients required for wound healing. To promote wound healing with good nutrition, plan healthy, balanced meals and snacks that include the right amount of foods from 5 food groups: protein, fruits, vegetables, dairy and grains. Fats and oils should be used sparingly. Choose vegetables and fruits rich in vitamin c, such as strawberries or spinach. For adequate zinc, choose whole grains and consume protein, such as eggs, meat, dairy or seafood. Progress toward goals: .pt progressing toward improving health with taking all medications as prescribed and keeping all medical appts. pt verbalizes understanding of education provided.  pt wound progressing to closing with small measurement , minimal drainage and epithliing tissue in wound bed      Home exercise program: exercise as tolerated  Continued need for the following skills: nursing  Patient and/or caregiver notified and agrees to changes in the Plan of Care n/a  Patient will be discharged once education and wounds if applicable has been completed, patient is medically stable, and all goals met

## 2023-03-20 NOTE — HOME HEALTH
Skilled reason for visit: skilled assessment, education, medication management, wound care. Caregiver involvement: Family assists ADL's, medications, meals, transportation, errands. Medications reviewed and all medications are available in the home this visit. The following education was provided regarding medications: patient knowledgeable about all medications, has no questions or concerns at this time. .     MD notified of any discrepancies/look a-like medications/medication interactions: na     Medications are effective at this time. Home health supplies by type and quantity ordered/delivered this visit include: na     [facility] this [facility]: [facility]     Sharps education provided: Clinician instructed patient/CG on proper disposal of sharps: Containers should be made of hard plastic, be puncture-resistant and leakproof,     such as a laundry detergent or bleach bottle. When the container is ¾ full, it should be sealed with tape and labeled     DO NOT RECYCLE prior to discarding in the regular trash. Patient level of understanding of education provided: patient/caregiver verbalized 100% understanding.      Skilled Care Performed this visit: skilled assessment, education, medication management, wound care     Patient response to procedure performed: patient denied pain and discomfort during procedure/visit     Agency Progress toward goals: progressing     Patient's Progress towards personal goals: progressing     [facility]     Continued need for the following skills: Nursing     Plan for next visit: skilled assessment, education, medication management     Patient and/or caregiver notified and agrees to changes in the Plan of Care YES/NO/NA: N/A     The following discharge planning was discussed with the pt/caregiver: Discharge planning as follows: when goals met, patient/caregiver able to manage disease process, medications and pain

## 2023-03-23 ENCOUNTER — HOME CARE VISIT (OUTPATIENT)
Age: 63
End: 2023-03-23
Payer: COMMERCIAL

## 2023-03-23 PROCEDURE — G0300 HHS/HOSPICE OF LPN EA 15 MIN: HCPCS

## 2023-03-24 ENCOUNTER — HOME CARE VISIT (OUTPATIENT)
Age: 63
End: 2023-03-24
Payer: COMMERCIAL

## 2023-03-27 VITALS
OXYGEN SATURATION: 99 % | DIASTOLIC BLOOD PRESSURE: 69 MMHG | SYSTOLIC BLOOD PRESSURE: 134 MMHG | TEMPERATURE: 98.4 F | HEART RATE: 72 BPM | RESPIRATION RATE: 20 BRPM

## 2023-03-28 ENCOUNTER — HOME CARE VISIT (OUTPATIENT)
Age: 63
End: 2023-03-28
Payer: COMMERCIAL

## 2023-03-28 VITALS
SYSTOLIC BLOOD PRESSURE: 144 MMHG | HEART RATE: 83 BPM | RESPIRATION RATE: 17 BRPM | TEMPERATURE: 97.7 F | DIASTOLIC BLOOD PRESSURE: 80 MMHG | OXYGEN SATURATION: 98 %

## 2023-03-28 PROCEDURE — G0299 HHS/HOSPICE OF RN EA 15 MIN: HCPCS

## 2023-03-28 NOTE — HOME HEALTH
Skilled reason for visit: spine wound incision site  Caregiver involvement: family and friends for assistance as needed. Medications reviewed and all medications are available in the home this visit. The following education was provided regarding medications, medication interactions, and look alike medications (specify): no med changes noted or reported. Medications  are effective at this time. Home health supplies by type and quantity ordered/delivered this visit include: none  Patient education provided this visit:SN advised patient to take temperature once a day before bedtime, check for bleeding, pus, hardness, swelling, odor and any color change. If any of these are present, please let your nurse or doctor know as soon as possible. Patient verbalized understanding of instructions given. Progress toward goals: Pt wound is progressing toward healing and closing with smaller measurements and no drainage noted. Home exercise program: Follow heart healthy diet with fresh veggies and lean cuts of meat, with no or low added salt and sugar.       Continued need for the following skills: nursing  Patient and/or caregiver notified and agrees to changes in the Plan of Care n/a  Patient will be discharged once education and wounds if applicable has been completed, patient is medically stable, and all goals met

## 2023-03-28 NOTE — HOME HEALTH
Skilled reason for visit: skilled assessment, education, medication management, wound care. Caregiver involvement: Family assists ADL's, medications, meals, transportation, errands. Medications reviewed and all medications are available in the home this visit. The following education was provided regarding medications: patient knowledgeable about all medications, has no questions or concerns at this time. .   MD notified of any discrepancies/look a-like medications/medication interactions: na   Medications are effective at this time. Home health supplies by type and quantity ordered/delivered this visit include: na   [facility]: [facility]   Sharps education provided: Clinician instructed patient/CG on proper disposal of sharps: Containers should be made of hard plastic, be puncture-resistant and leakproof,   such as a laundry detergent or bleach bottle. When the container is ¾ full, it should be sealed with tape and labeled   DO NOT RECYCLE prior to discarding in the regular trash. Patient level of understanding of education provided: patient/caregiver verbalized 100% understanding.    Skilled Care Performed this visit: skilled assessment, education, medication management, wound care   Patient response to procedure performed: patient denied pain and discomfort during procedure/visit   Agency Progress toward goals: progressing   Patient's Progress towards personal goals: progressing   [facility]   Continued need for the following skills: Nursing   Plan for next visit: skilled assessment, education, medication management   Patient and/or caregiver notified and agrees to changes in the Plan of Care YES/NO/NA: N/A   The following discharge planning was discussed with the pt/caregiver: Discharge planning as follows: when goals met, patient/caregiver able to manage disease process, medications and pain

## 2023-03-31 ENCOUNTER — HOME CARE VISIT (OUTPATIENT)
Age: 63
End: 2023-03-31
Payer: COMMERCIAL

## 2023-03-31 VITALS
HEART RATE: 89 BPM | RESPIRATION RATE: 18 BRPM | DIASTOLIC BLOOD PRESSURE: 74 MMHG | TEMPERATURE: 98 F | OXYGEN SATURATION: 99 % | SYSTOLIC BLOOD PRESSURE: 120 MMHG

## 2023-03-31 PROCEDURE — G0299 HHS/HOSPICE OF RN EA 15 MIN: HCPCS

## 2023-03-31 NOTE — HOME HEALTH
Skilled reason for visit: skilled assessment, education, medication management, wound care. Caregiver involvement: Family assists ADL's, medications, meals, transportation, errands. Medications reviewed and all medications are available in the home this visit. The following education was provided regarding medications: patient knowledgeable about all medications, has no questions or concerns at this time. .   MD notified of any discrepancies/look a-like medications/medication interactions: na   Medications are effective at this time. Home health supplies by type and quantity ordered/delivered this visit include: na   [facility]: [facility] [place]   Sharps education provided: Clinician instructed patient/CG on proper disposal of sharps: Containers should be made of hard plastic, be puncture-resistant and leakproof,   such as a laundry detergent or bleach bottle. When the container is ¾ full, it should be sealed with tape and labeled   DO NOT RECYCLE prior to discarding in the regular trash. Patient level of understanding of education provided: patient/caregiver verbalized 100% understanding.    Skilled Care Performed this visit: skilled assessment, education, medication management, wound care   Patient response to procedure performed: patient denied pain and discomfort during procedure/visit   Agency Progress toward goals: progressing   Patient's Progress towards personal goals: progressing   [facility] [place]   Continued need for the following skills: Nursing   Plan for next visit: skilled assessment, education, medication management   Patient and/or caregiver notified and agrees to changes in the Plan of Care YES/NO/NA: N/A   The following discharge planning was discussed with the pt/caregiver: Discharge planning as follows: when goals met, patient/caregiver able to manage disease process, medications and pain

## 2023-04-04 ENCOUNTER — HOME CARE VISIT (OUTPATIENT)
Age: 63
End: 2023-04-04
Payer: COMMERCIAL

## 2023-04-04 VITALS — HEART RATE: 80 BPM | RESPIRATION RATE: 18 BRPM | TEMPERATURE: 98.3 F | OXYGEN SATURATION: 99 %

## 2023-04-04 PROCEDURE — G0299 HHS/HOSPICE OF RN EA 15 MIN: HCPCS

## 2023-04-04 ASSESSMENT — ENCOUNTER SYMPTOMS: PAIN LOCATION - PAIN QUALITY: ACHY

## 2023-04-04 NOTE — HOME HEALTH
ALL MEDS AS ORDERED, DO ICS X10/HR WHILE AWAKE, DRINK PLENTY OF FLUIDS,    Continued need for the following skills: Nursing,   Plan for next visit - wound care

## 2023-04-07 ENCOUNTER — HOME CARE VISIT (OUTPATIENT)
Age: 63
End: 2023-04-07
Payer: COMMERCIAL

## 2023-04-07 VITALS
RESPIRATION RATE: 17 BRPM | OXYGEN SATURATION: 99 % | SYSTOLIC BLOOD PRESSURE: 126 MMHG | HEART RATE: 74 BPM | TEMPERATURE: 97.5 F | DIASTOLIC BLOOD PRESSURE: 86 MMHG

## 2023-04-07 PROCEDURE — G0299 HHS/HOSPICE OF RN EA 15 MIN: HCPCS

## 2023-04-07 NOTE — HOME HEALTH
Skilled reason for visit: skilled assessment, education, medication management, wound care. Caregiver involvement: Family assists ADL's, medications, meals, transportation, errands. Medications reviewed and all medications are available in the home this visit. The following education was provided regarding medications: patient knowledgeable about all medications, has no questions or concerns at this time. .   MD notified of any discrepancies/look a-like medications/medication interactions: na   Medications are effective at this time. Home health supplies by type and quantity ordered/delivered this visit include: na   [facility] this visit: [facility]   Sharps education provided: Clinician instructed patient/CG on proper disposal of sharps: Containers should be made of hard plastic, be puncture-resistant and leakproof,   such as a laundry detergent or bleach bottle. When the container is ¾ full, it should be sealed with tape and labeled   DO NOT RECYCLE prior to discarding in the regular trash. Patient level of understanding of education provided: patient/caregiver verbalized 100% understanding.    Skilled Care Performed this visit: skilled assessment, education, medication management, wound care   Patient response to procedure performed: patient denied pain and discomfort during procedure/visit   Agency Progress toward goals: progressing   Patient's Progress towards personal goals: progressing   [facility] program: [facility]   Continued need for the following skills: Nursing   Plan for next visit: skilled assessment, education, medication management   Patient and/or caregiver notified and agrees to changes in the Plan of Care YES/NO/NA: N/A   The following discharge planning was discussed with the pt/caregiver: Discharge planning as follows: when goals met, patient/caregiver able to manage disease process, medications and pain

## 2023-04-18 ENCOUNTER — HOME CARE VISIT (OUTPATIENT)
Age: 63
End: 2023-04-18
Payer: COMMERCIAL

## 2023-04-21 ENCOUNTER — HOME CARE VISIT (OUTPATIENT)
Age: 63
End: 2023-04-21
Payer: COMMERCIAL

## 2023-04-21 VITALS
RESPIRATION RATE: 18 BRPM | DIASTOLIC BLOOD PRESSURE: 80 MMHG | SYSTOLIC BLOOD PRESSURE: 140 MMHG | TEMPERATURE: 98 F | HEART RATE: 71 BPM | OXYGEN SATURATION: 99 %

## 2023-04-21 PROCEDURE — G0299 HHS/HOSPICE OF RN EA 15 MIN: HCPCS

## 2023-04-21 NOTE — HOME HEALTH
Reviewed with patient /PCG s/s of disease exacerbation that need to be reported to health care providers including steps on what to do in an event of an emergency. Medication pill box set up checked and emphasized the importance of timely refill of medications to prevent missing or skipping doses, pain management, continue following prescribed diet regimen. Re-instructed on infection control measures and practicing standard precautions, most importantly, frequent proper hand washing to prevent disease complications.   medications reconciled  wound healed, patient requesting to be discharged today due to wound healed and no further need for [facility] [place], operated by [facility]

## 2023-05-09 ENCOUNTER — APPOINTMENT (OUTPATIENT)
Facility: HOSPITAL | Age: 63
DRG: 454 | End: 2023-05-09
Payer: COMMERCIAL

## 2023-05-09 ENCOUNTER — HOSPITAL ENCOUNTER (INPATIENT)
Facility: HOSPITAL | Age: 63
LOS: 6 days | Discharge: HOME HEALTH | DRG: 454 | End: 2023-05-15
Attending: EMERGENCY MEDICINE | Admitting: STUDENT IN AN ORGANIZED HEALTH CARE EDUCATION/TRAINING PROGRAM
Payer: COMMERCIAL

## 2023-05-09 DIAGNOSIS — M79.605 LEG PAIN, BILATERAL: Primary | ICD-10-CM

## 2023-05-09 DIAGNOSIS — Z86.718 HISTORY OF DVT (DEEP VEIN THROMBOSIS): ICD-10-CM

## 2023-05-09 DIAGNOSIS — M79.604 LEG PAIN, BILATERAL: Primary | ICD-10-CM

## 2023-05-09 DIAGNOSIS — N17.9 ACUTE KIDNEY INJURY (HCC): ICD-10-CM

## 2023-05-09 DIAGNOSIS — Z98.1 S/P FUSION OF SACROILIAC JOINT: ICD-10-CM

## 2023-05-09 DIAGNOSIS — M48.061 SPINAL STENOSIS OF LUMBAR REGION, UNSPECIFIED WHETHER NEUROGENIC CLAUDICATION PRESENT: ICD-10-CM

## 2023-05-09 DIAGNOSIS — M54.31 SCIATICA OF RIGHT SIDE: ICD-10-CM

## 2023-05-09 DIAGNOSIS — R73.9 HYPERGLYCEMIA: ICD-10-CM

## 2023-05-09 PROBLEM — M48.00 SPINAL STENOSIS: Status: ACTIVE | Noted: 2023-05-09

## 2023-05-09 PROBLEM — M54.9 BACK PAIN: Status: ACTIVE | Noted: 2022-12-22

## 2023-05-09 PROBLEM — I10 HYPERTENSION: Status: ACTIVE | Noted: 2022-12-22

## 2023-05-09 PROBLEM — Z74.09 MOBILITY IMPAIRED: Status: ACTIVE | Noted: 2023-05-09

## 2023-05-09 PROBLEM — E11.9 TYPE II DIABETES MELLITUS (HCC): Status: ACTIVE | Noted: 2022-12-22

## 2023-05-09 PROBLEM — M54.59 INTRACTABLE LOW BACK PAIN: Status: ACTIVE | Noted: 2023-05-09

## 2023-05-09 LAB
ANION GAP SERPL CALC-SCNC: 5 MMOL/L (ref 3–18)
APPEARANCE UR: CLEAR
BASOPHILS # BLD: 0 K/UL (ref 0–0.1)
BASOPHILS NFR BLD: 0 % (ref 0–2)
BILIRUB UR QL: NEGATIVE
BUN SERPL-MCNC: 38 MG/DL (ref 7–18)
BUN/CREAT SERPL: 22 (ref 12–20)
CALCIUM SERPL-MCNC: 9.1 MG/DL (ref 8.5–10.1)
CHLORIDE SERPL-SCNC: 104 MMOL/L (ref 100–111)
CO2 SERPL-SCNC: 23 MMOL/L (ref 21–32)
COLOR UR: YELLOW
CREAT SERPL-MCNC: 1.71 MG/DL (ref 0.6–1.3)
CRP SERPL-MCNC: <0.3 MG/DL (ref 0–0.3)
DIFFERENTIAL METHOD BLD: ABNORMAL
EOSINOPHIL # BLD: 0.1 K/UL (ref 0–0.4)
EOSINOPHIL NFR BLD: 1 % (ref 0–5)
ERYTHROCYTE [DISTWIDTH] IN BLOOD BY AUTOMATED COUNT: 14.4 % (ref 11.6–14.5)
ERYTHROCYTE [SEDIMENTATION RATE] IN BLOOD: 7 MM/HR (ref 0–20)
GLUCOSE BLD STRIP.AUTO-MCNC: 113 MG/DL (ref 70–110)
GLUCOSE SERPL-MCNC: 302 MG/DL (ref 74–99)
GLUCOSE UR STRIP.AUTO-MCNC: >1000 MG/DL
HCT VFR BLD AUTO: 43.5 % (ref 36–48)
HGB BLD-MCNC: 14.2 G/DL (ref 13–16)
HGB UR QL STRIP: NEGATIVE
IMM GRANULOCYTES # BLD AUTO: 0 K/UL (ref 0–0.04)
IMM GRANULOCYTES NFR BLD AUTO: 0 % (ref 0–0.5)
KETONES UR QL STRIP.AUTO: NEGATIVE MG/DL
LEUKOCYTE ESTERASE UR QL STRIP.AUTO: NEGATIVE
LYMPHOCYTES # BLD: 1.3 K/UL (ref 0.9–3.6)
LYMPHOCYTES NFR BLD: 25 % (ref 21–52)
MCH RBC QN AUTO: 29.6 PG (ref 24–34)
MCHC RBC AUTO-ENTMCNC: 32.6 G/DL (ref 31–37)
MCV RBC AUTO: 90.8 FL (ref 78–100)
MONOCYTES # BLD: 0.8 K/UL (ref 0.05–1.2)
MONOCYTES NFR BLD: 15 % (ref 3–10)
NEUTS SEG # BLD: 2.9 K/UL (ref 1.8–8)
NEUTS SEG NFR BLD: 58 % (ref 40–73)
NITRITE UR QL STRIP.AUTO: NEGATIVE
NRBC # BLD: 0 K/UL (ref 0–0.01)
NRBC BLD-RTO: 0 PER 100 WBC
PH UR STRIP: 5.5 (ref 5–8)
PLATELET # BLD AUTO: 254 K/UL (ref 135–420)
PMV BLD AUTO: 9.1 FL (ref 9.2–11.8)
POTASSIUM SERPL-SCNC: 5.1 MMOL/L (ref 3.5–5.5)
PROT UR STRIP-MCNC: NEGATIVE MG/DL
RBC # BLD AUTO: 4.79 M/UL (ref 4.35–5.65)
SODIUM SERPL-SCNC: 132 MMOL/L (ref 136–145)
SP GR UR REFRACTOMETRY: 1.02 (ref 1–1.03)
UROBILINOGEN UR QL STRIP.AUTO: 0.2 EU/DL (ref 0.2–1)
WBC # BLD AUTO: 5.1 K/UL (ref 4.6–13.2)

## 2023-05-09 PROCEDURE — 99222 1ST HOSP IP/OBS MODERATE 55: CPT | Performed by: STUDENT IN AN ORGANIZED HEALTH CARE EDUCATION/TRAINING PROGRAM

## 2023-05-09 PROCEDURE — 82962 GLUCOSE BLOOD TEST: CPT

## 2023-05-09 PROCEDURE — 85652 RBC SED RATE AUTOMATED: CPT

## 2023-05-09 PROCEDURE — 2580000003 HC RX 258: Performed by: STUDENT IN AN ORGANIZED HEALTH CARE EDUCATION/TRAINING PROGRAM

## 2023-05-09 PROCEDURE — 72131 CT LUMBAR SPINE W/O DYE: CPT

## 2023-05-09 PROCEDURE — 93005 ELECTROCARDIOGRAM TRACING: CPT | Performed by: STUDENT IN AN ORGANIZED HEALTH CARE EDUCATION/TRAINING PROGRAM

## 2023-05-09 PROCEDURE — 2580000003 HC RX 258: Performed by: EMERGENCY MEDICINE

## 2023-05-09 PROCEDURE — 71045 X-RAY EXAM CHEST 1 VIEW: CPT

## 2023-05-09 PROCEDURE — 99285 EMERGENCY DEPT VISIT HI MDM: CPT

## 2023-05-09 PROCEDURE — 6360000004 HC RX CONTRAST MEDICATION: Performed by: EMERGENCY MEDICINE

## 2023-05-09 PROCEDURE — 85025 COMPLETE CBC W/AUTO DIFF WBC: CPT

## 2023-05-09 PROCEDURE — 72158 MRI LUMBAR SPINE W/O & W/DYE: CPT

## 2023-05-09 PROCEDURE — 6360000002 HC RX W HCPCS: Performed by: EMERGENCY MEDICINE

## 2023-05-09 PROCEDURE — 80048 BASIC METABOLIC PNL TOTAL CA: CPT

## 2023-05-09 PROCEDURE — 81003 URINALYSIS AUTO W/O SCOPE: CPT

## 2023-05-09 PROCEDURE — A9577 INJ MULTIHANCE: HCPCS | Performed by: EMERGENCY MEDICINE

## 2023-05-09 PROCEDURE — 83036 HEMOGLOBIN GLYCOSYLATED A1C: CPT

## 2023-05-09 PROCEDURE — 6370000000 HC RX 637 (ALT 250 FOR IP): Performed by: STUDENT IN AN ORGANIZED HEALTH CARE EDUCATION/TRAINING PROGRAM

## 2023-05-09 PROCEDURE — 1100000000 HC RM PRIVATE

## 2023-05-09 PROCEDURE — 87040 BLOOD CULTURE FOR BACTERIA: CPT

## 2023-05-09 PROCEDURE — 86140 C-REACTIVE PROTEIN: CPT

## 2023-05-09 PROCEDURE — 96374 THER/PROPH/DIAG INJ IV PUSH: CPT

## 2023-05-09 RX ORDER — VITAMIN B COMPLEX
2000 TABLET ORAL DAILY
Status: DISCONTINUED | OUTPATIENT
Start: 2023-05-10 | End: 2023-05-15 | Stop reason: HOSPADM

## 2023-05-09 RX ORDER — ONDANSETRON 2 MG/ML
4 INJECTION INTRAMUSCULAR; INTRAVENOUS EVERY 6 HOURS PRN
Status: DISCONTINUED | OUTPATIENT
Start: 2023-05-09 | End: 2023-05-13

## 2023-05-09 RX ORDER — INSULIN LISPRO 100 [IU]/ML
0-4 INJECTION, SOLUTION INTRAVENOUS; SUBCUTANEOUS NIGHTLY
Status: DISCONTINUED | OUTPATIENT
Start: 2023-05-09 | End: 2023-05-15 | Stop reason: HOSPADM

## 2023-05-09 RX ORDER — SODIUM CHLORIDE, SODIUM LACTATE, POTASSIUM CHLORIDE, AND CALCIUM CHLORIDE .6; .31; .03; .02 G/100ML; G/100ML; G/100ML; G/100ML
1000 INJECTION, SOLUTION INTRAVENOUS ONCE
Status: COMPLETED | OUTPATIENT
Start: 2023-05-09 | End: 2023-05-09

## 2023-05-09 RX ORDER — ENOXAPARIN SODIUM 100 MG/ML
30 INJECTION SUBCUTANEOUS 2 TIMES DAILY
Status: DISCONTINUED | OUTPATIENT
Start: 2023-05-09 | End: 2023-05-09

## 2023-05-09 RX ORDER — INSULIN GLARGINE 100 [IU]/ML
20 INJECTION, SOLUTION SUBCUTANEOUS NIGHTLY
Status: DISCONTINUED | OUTPATIENT
Start: 2023-05-09 | End: 2023-05-15 | Stop reason: HOSPADM

## 2023-05-09 RX ORDER — HYDROMORPHONE HYDROCHLORIDE 1 MG/ML
1 INJECTION, SOLUTION INTRAMUSCULAR; INTRAVENOUS; SUBCUTANEOUS EVERY 4 HOURS PRN
Status: DISCONTINUED | OUTPATIENT
Start: 2023-05-09 | End: 2023-05-13

## 2023-05-09 RX ORDER — ONDANSETRON 4 MG/1
4 TABLET, ORALLY DISINTEGRATING ORAL EVERY 8 HOURS PRN
Status: DISCONTINUED | OUTPATIENT
Start: 2023-05-09 | End: 2023-05-13

## 2023-05-09 RX ORDER — ACETAMINOPHEN 325 MG/1
650 TABLET ORAL EVERY 8 HOURS
Status: DISCONTINUED | OUTPATIENT
Start: 2023-05-09 | End: 2023-05-15 | Stop reason: HOSPADM

## 2023-05-09 RX ORDER — HYDROMORPHONE HYDROCHLORIDE 1 MG/ML
1 INJECTION, SOLUTION INTRAMUSCULAR; INTRAVENOUS; SUBCUTANEOUS
Status: COMPLETED | OUTPATIENT
Start: 2023-05-09 | End: 2023-05-09

## 2023-05-09 RX ORDER — INSULIN LISPRO 100 [IU]/ML
0-8 INJECTION, SOLUTION INTRAVENOUS; SUBCUTANEOUS
Status: DISCONTINUED | OUTPATIENT
Start: 2023-05-10 | End: 2023-05-15 | Stop reason: HOSPADM

## 2023-05-09 RX ORDER — ENOXAPARIN SODIUM 100 MG/ML
40 INJECTION SUBCUTANEOUS DAILY
Status: DISCONTINUED | OUTPATIENT
Start: 2023-05-10 | End: 2023-05-10

## 2023-05-09 RX ORDER — SODIUM CHLORIDE 0.9 % (FLUSH) 0.9 %
5-40 SYRINGE (ML) INJECTION PRN
Status: DISCONTINUED | OUTPATIENT
Start: 2023-05-09 | End: 2023-05-15 | Stop reason: HOSPADM

## 2023-05-09 RX ORDER — DICLOFENAC SODIUM 75 MG/1
75 TABLET, DELAYED RELEASE ORAL 2 TIMES DAILY
COMMUNITY
Start: 2023-04-23

## 2023-05-09 RX ORDER — SODIUM CHLORIDE 9 MG/ML
INJECTION, SOLUTION INTRAVENOUS PRN
Status: DISCONTINUED | OUTPATIENT
Start: 2023-05-09 | End: 2023-05-13

## 2023-05-09 RX ORDER — DEXTROSE MONOHYDRATE 100 MG/ML
INJECTION, SOLUTION INTRAVENOUS CONTINUOUS PRN
Status: DISCONTINUED | OUTPATIENT
Start: 2023-05-09 | End: 2023-05-15 | Stop reason: HOSPADM

## 2023-05-09 RX ORDER — ASPIRIN 81 MG/1
81 TABLET ORAL DAILY
Status: DISCONTINUED | OUTPATIENT
Start: 2023-05-10 | End: 2023-05-15 | Stop reason: HOSPADM

## 2023-05-09 RX ORDER — SODIUM CHLORIDE 0.9 % (FLUSH) 0.9 %
5-40 SYRINGE (ML) INJECTION EVERY 12 HOURS SCHEDULED
Status: DISCONTINUED | OUTPATIENT
Start: 2023-05-09 | End: 2023-05-15 | Stop reason: HOSPADM

## 2023-05-09 RX ORDER — SODIUM CHLORIDE 9 MG/ML
INJECTION, SOLUTION INTRAVENOUS ONCE
Status: COMPLETED | OUTPATIENT
Start: 2023-05-09 | End: 2023-05-09

## 2023-05-09 RX ORDER — GABAPENTIN 300 MG/1
600 CAPSULE ORAL 3 TIMES DAILY
Status: DISCONTINUED | OUTPATIENT
Start: 2023-05-09 | End: 2023-05-15 | Stop reason: HOSPADM

## 2023-05-09 RX ORDER — LISINOPRIL 40 MG/1
40 TABLET ORAL DAILY
Status: DISCONTINUED | OUTPATIENT
Start: 2023-05-10 | End: 2023-05-14

## 2023-05-09 RX ORDER — ATORVASTATIN CALCIUM 20 MG/1
20 TABLET, FILM COATED ORAL NIGHTLY
Status: DISCONTINUED | OUTPATIENT
Start: 2023-05-09 | End: 2023-05-15 | Stop reason: HOSPADM

## 2023-05-09 RX ORDER — SODIUM CHLORIDE, SODIUM LACTATE, POTASSIUM CHLORIDE, CALCIUM CHLORIDE 600; 310; 30; 20 MG/100ML; MG/100ML; MG/100ML; MG/100ML
INJECTION, SOLUTION INTRAVENOUS CONTINUOUS
Status: DISCONTINUED | OUTPATIENT
Start: 2023-05-09 | End: 2023-05-10

## 2023-05-09 RX ORDER — POLYETHYLENE GLYCOL 3350 17 G/17G
17 POWDER, FOR SOLUTION ORAL DAILY
Status: DISCONTINUED | OUTPATIENT
Start: 2023-05-10 | End: 2023-05-15 | Stop reason: HOSPADM

## 2023-05-09 RX ADMIN — SODIUM CHLORIDE, SODIUM LACTATE, POTASSIUM CHLORIDE, AND CALCIUM CHLORIDE 1000 ML: 600; 310; 30; 20 INJECTION, SOLUTION INTRAVENOUS at 14:54

## 2023-05-09 RX ADMIN — GADOBENATE DIMEGLUMINE 20 ML: 529 INJECTION, SOLUTION INTRAVENOUS at 17:34

## 2023-05-09 RX ADMIN — SODIUM CHLORIDE: 9 INJECTION, SOLUTION INTRAVENOUS at 23:24

## 2023-05-09 RX ADMIN — GABAPENTIN 600 MG: 300 CAPSULE ORAL at 23:20

## 2023-05-09 RX ADMIN — HYDROMORPHONE HYDROCHLORIDE 1 MG: 1 INJECTION, SOLUTION INTRAMUSCULAR; INTRAVENOUS; SUBCUTANEOUS at 16:04

## 2023-05-09 RX ADMIN — ACETAMINOPHEN 325MG 650 MG: 325 TABLET ORAL at 23:20

## 2023-05-09 RX ADMIN — ATORVASTATIN CALCIUM 20 MG: 20 TABLET, FILM COATED ORAL at 23:20

## 2023-05-09 RX ADMIN — INSULIN GLARGINE 20 UNITS: 100 INJECTION, SOLUTION SUBCUTANEOUS at 23:21

## 2023-05-10 ENCOUNTER — APPOINTMENT (OUTPATIENT)
Facility: HOSPITAL | Age: 63
DRG: 454 | End: 2023-05-10
Payer: COMMERCIAL

## 2023-05-10 LAB
ANION GAP SERPL CALC-SCNC: 5 MMOL/L (ref 3–18)
BASOPHILS # BLD: 0 K/UL (ref 0–0.1)
BASOPHILS NFR BLD: 0 % (ref 0–2)
BUN SERPL-MCNC: 27 MG/DL (ref 7–18)
BUN/CREAT SERPL: 25 (ref 12–20)
CALCIUM SERPL-MCNC: 9.3 MG/DL (ref 8.5–10.1)
CHLORIDE SERPL-SCNC: 106 MMOL/L (ref 100–111)
CO2 SERPL-SCNC: 25 MMOL/L (ref 21–32)
CREAT SERPL-MCNC: 1.08 MG/DL (ref 0.6–1.3)
DIFFERENTIAL METHOD BLD: ABNORMAL
ECHO BSA: 2.26 M2
EKG ATRIAL RATE: 87 BPM
EKG DIAGNOSIS: NORMAL
EKG P AXIS: 56 DEGREES
EKG P-R INTERVAL: 144 MS
EKG Q-T INTERVAL: 386 MS
EKG QRS DURATION: 134 MS
EKG QTC CALCULATION (BAZETT): 464 MS
EKG R AXIS: -53 DEGREES
EKG T AXIS: 44 DEGREES
EKG VENTRICULAR RATE: 87 BPM
EOSINOPHIL # BLD: 0.1 K/UL (ref 0–0.4)
EOSINOPHIL NFR BLD: 2 % (ref 0–5)
ERYTHROCYTE [DISTWIDTH] IN BLOOD BY AUTOMATED COUNT: 14.5 % (ref 11.6–14.5)
EST. AVERAGE GLUCOSE BLD GHB EST-MCNC: 177 MG/DL
GLUCOSE BLD STRIP.AUTO-MCNC: 117 MG/DL (ref 70–110)
GLUCOSE BLD STRIP.AUTO-MCNC: 127 MG/DL (ref 70–110)
GLUCOSE BLD STRIP.AUTO-MCNC: 129 MG/DL (ref 70–110)
GLUCOSE SERPL-MCNC: 129 MG/DL (ref 74–99)
HBA1C MFR BLD: 7.8 % (ref 4.2–5.6)
HCT VFR BLD AUTO: 43.9 % (ref 36–48)
HGB BLD-MCNC: 14.3 G/DL (ref 13–16)
IMM GRANULOCYTES # BLD AUTO: 0 K/UL (ref 0–0.04)
IMM GRANULOCYTES NFR BLD AUTO: 0 % (ref 0–0.5)
LYMPHOCYTES # BLD: 1.9 K/UL (ref 0.9–3.6)
LYMPHOCYTES NFR BLD: 28 % (ref 21–52)
MCH RBC QN AUTO: 29.7 PG (ref 24–34)
MCHC RBC AUTO-ENTMCNC: 32.6 G/DL (ref 31–37)
MCV RBC AUTO: 91.3 FL (ref 78–100)
MONOCYTES # BLD: 0.9 K/UL (ref 0.05–1.2)
MONOCYTES NFR BLD: 14 % (ref 3–10)
NEUTS SEG # BLD: 3.7 K/UL (ref 1.8–8)
NEUTS SEG NFR BLD: 56 % (ref 40–73)
NRBC # BLD: 0 K/UL (ref 0–0.01)
NRBC BLD-RTO: 0 PER 100 WBC
PLATELET # BLD AUTO: 237 K/UL (ref 135–420)
PMV BLD AUTO: 9.1 FL (ref 9.2–11.8)
POTASSIUM SERPL-SCNC: 4.4 MMOL/L (ref 3.5–5.5)
RBC # BLD AUTO: 4.81 M/UL (ref 4.35–5.65)
SODIUM SERPL-SCNC: 136 MMOL/L (ref 136–145)
WBC # BLD AUTO: 6.7 K/UL (ref 4.6–13.2)

## 2023-05-10 PROCEDURE — 93970 EXTREMITY STUDY: CPT

## 2023-05-10 PROCEDURE — 93010 ELECTROCARDIOGRAM REPORT: CPT | Performed by: INTERNAL MEDICINE

## 2023-05-10 PROCEDURE — 6360000002 HC RX W HCPCS: Performed by: STUDENT IN AN ORGANIZED HEALTH CARE EDUCATION/TRAINING PROGRAM

## 2023-05-10 PROCEDURE — 85025 COMPLETE CBC W/AUTO DIFF WBC: CPT

## 2023-05-10 PROCEDURE — 6360000002 HC RX W HCPCS: Performed by: HOSPITALIST

## 2023-05-10 PROCEDURE — 80048 BASIC METABOLIC PNL TOTAL CA: CPT

## 2023-05-10 PROCEDURE — 99233 SBSQ HOSP IP/OBS HIGH 50: CPT | Performed by: HOSPITALIST

## 2023-05-10 PROCEDURE — 36415 COLL VENOUS BLD VENIPUNCTURE: CPT

## 2023-05-10 PROCEDURE — 94761 N-INVAS EAR/PLS OXIMETRY MLT: CPT

## 2023-05-10 PROCEDURE — 82962 GLUCOSE BLOOD TEST: CPT

## 2023-05-10 PROCEDURE — 6370000000 HC RX 637 (ALT 250 FOR IP): Performed by: STUDENT IN AN ORGANIZED HEALTH CARE EDUCATION/TRAINING PROGRAM

## 2023-05-10 PROCEDURE — 2580000003 HC RX 258: Performed by: STUDENT IN AN ORGANIZED HEALTH CARE EDUCATION/TRAINING PROGRAM

## 2023-05-10 PROCEDURE — 1100000003 HC PRIVATE W/ TELEMETRY

## 2023-05-10 PROCEDURE — 6370000000 HC RX 637 (ALT 250 FOR IP): Performed by: HOSPITALIST

## 2023-05-10 RX ORDER — ENOXAPARIN SODIUM 100 MG/ML
40 INJECTION SUBCUTANEOUS DAILY
Status: ACTIVE | OUTPATIENT
Start: 2023-05-10 | End: 2023-05-12

## 2023-05-10 RX ORDER — HYDROCODONE BITARTRATE AND ACETAMINOPHEN 5; 325 MG/1; MG/1
1 TABLET ORAL EVERY 6 HOURS PRN
Status: DISCONTINUED | OUTPATIENT
Start: 2023-05-10 | End: 2023-05-14

## 2023-05-10 RX ORDER — AMLODIPINE BESYLATE 5 MG/1
5 TABLET ORAL DAILY
Status: DISCONTINUED | OUTPATIENT
Start: 2023-05-10 | End: 2023-05-15 | Stop reason: HOSPADM

## 2023-05-10 RX ADMIN — ATORVASTATIN CALCIUM 20 MG: 20 TABLET, FILM COATED ORAL at 20:10

## 2023-05-10 RX ADMIN — GABAPENTIN 600 MG: 300 CAPSULE ORAL at 20:35

## 2023-05-10 RX ADMIN — CHOLECALCIFEROL TAB 25 MCG (1000 UNIT) 2000 UNITS: 25 TAB at 08:52

## 2023-05-10 RX ADMIN — ACETAMINOPHEN 325MG 650 MG: 325 TABLET ORAL at 16:20

## 2023-05-10 RX ADMIN — ENOXAPARIN SODIUM 40 MG: 100 INJECTION SUBCUTANEOUS at 16:20

## 2023-05-10 RX ADMIN — HYDROMORPHONE HYDROCHLORIDE 1 MG: 1 INJECTION, SOLUTION INTRAMUSCULAR; INTRAVENOUS; SUBCUTANEOUS at 19:48

## 2023-05-10 RX ADMIN — SODIUM CHLORIDE, PRESERVATIVE FREE 10 ML: 5 INJECTION INTRAVENOUS at 20:11

## 2023-05-10 RX ADMIN — HYDROMORPHONE HYDROCHLORIDE 1 MG: 1 INJECTION, SOLUTION INTRAMUSCULAR; INTRAVENOUS; SUBCUTANEOUS at 06:11

## 2023-05-10 RX ADMIN — ACETAMINOPHEN 325MG 650 MG: 325 TABLET ORAL at 22:26

## 2023-05-10 RX ADMIN — HYDROMORPHONE HYDROCHLORIDE 1 MG: 1 INJECTION, SOLUTION INTRAMUSCULAR; INTRAVENOUS; SUBCUTANEOUS at 10:53

## 2023-05-10 RX ADMIN — ACETAMINOPHEN 325MG 650 MG: 325 TABLET ORAL at 08:04

## 2023-05-10 RX ADMIN — HYDROCODONE BITARTRATE AND ACETAMINOPHEN 1 TABLET: 5; 325 TABLET ORAL at 14:53

## 2023-05-10 RX ADMIN — HYDROCODONE BITARTRATE AND ACETAMINOPHEN 1 TABLET: 5; 325 TABLET ORAL at 21:45

## 2023-05-10 RX ADMIN — INSULIN GLARGINE 20 UNITS: 100 INJECTION, SOLUTION SUBCUTANEOUS at 21:46

## 2023-05-10 RX ADMIN — AMLODIPINE BESYLATE 5 MG: 5 TABLET ORAL at 16:20

## 2023-05-10 RX ADMIN — GABAPENTIN 600 MG: 300 CAPSULE ORAL at 08:52

## 2023-05-10 RX ADMIN — GABAPENTIN 600 MG: 300 CAPSULE ORAL at 16:20

## 2023-05-10 ASSESSMENT — PAIN DESCRIPTION - ORIENTATION
ORIENTATION: LOWER;MID
ORIENTATION: MID;LOWER

## 2023-05-10 ASSESSMENT — PAIN - FUNCTIONAL ASSESSMENT
PAIN_FUNCTIONAL_ASSESSMENT: PREVENTS OR INTERFERES SOME ACTIVE ACTIVITIES AND ADLS

## 2023-05-10 ASSESSMENT — PAIN DESCRIPTION - LOCATION
LOCATION: BACK

## 2023-05-10 ASSESSMENT — PAIN DESCRIPTION - DESCRIPTORS
DESCRIPTORS: THROBBING
DESCRIPTORS: ACHING

## 2023-05-10 ASSESSMENT — PAIN SCALES - GENERAL
PAINLEVEL_OUTOF10: 4
PAINLEVEL_OUTOF10: 5
PAINLEVEL_OUTOF10: 4
PAINLEVEL_OUTOF10: 10
PAINLEVEL_OUTOF10: 4
PAINLEVEL_OUTOF10: 8
PAINLEVEL_OUTOF10: 10
PAINLEVEL_OUTOF10: 7

## 2023-05-11 ENCOUNTER — ANESTHESIA EVENT (OUTPATIENT)
Facility: HOSPITAL | Age: 63
End: 2023-05-11
Payer: COMMERCIAL

## 2023-05-11 LAB
ANION GAP SERPL CALC-SCNC: 6 MMOL/L (ref 3–18)
BASOPHILS # BLD: 0 K/UL (ref 0–0.1)
BASOPHILS NFR BLD: 1 % (ref 0–2)
BUN SERPL-MCNC: 27 MG/DL (ref 7–18)
BUN/CREAT SERPL: 25 (ref 12–20)
CALCIUM SERPL-MCNC: 9.8 MG/DL (ref 8.5–10.1)
CHLORIDE SERPL-SCNC: 103 MMOL/L (ref 100–111)
CO2 SERPL-SCNC: 25 MMOL/L (ref 21–32)
CREAT SERPL-MCNC: 1.08 MG/DL (ref 0.6–1.3)
DIFFERENTIAL METHOD BLD: ABNORMAL
EOSINOPHIL # BLD: 0.1 K/UL (ref 0–0.4)
EOSINOPHIL NFR BLD: 2 % (ref 0–5)
ERYTHROCYTE [DISTWIDTH] IN BLOOD BY AUTOMATED COUNT: 14.3 % (ref 11.6–14.5)
GLUCOSE BLD STRIP.AUTO-MCNC: 120 MG/DL (ref 70–110)
GLUCOSE BLD STRIP.AUTO-MCNC: 130 MG/DL (ref 70–110)
GLUCOSE BLD STRIP.AUTO-MCNC: 141 MG/DL (ref 70–110)
GLUCOSE BLD STRIP.AUTO-MCNC: 163 MG/DL (ref 70–110)
GLUCOSE SERPL-MCNC: 132 MG/DL (ref 74–99)
HCT VFR BLD AUTO: 47.8 % (ref 36–48)
HGB BLD-MCNC: 15.2 G/DL (ref 13–16)
IMM GRANULOCYTES # BLD AUTO: 0 K/UL (ref 0–0.04)
IMM GRANULOCYTES NFR BLD AUTO: 0 % (ref 0–0.5)
LYMPHOCYTES # BLD: 1.7 K/UL (ref 0.9–3.6)
LYMPHOCYTES NFR BLD: 35 % (ref 21–52)
MAGNESIUM SERPL-MCNC: 2 MG/DL (ref 1.6–2.6)
MCH RBC QN AUTO: 29.3 PG (ref 24–34)
MCHC RBC AUTO-ENTMCNC: 31.8 G/DL (ref 31–37)
MCV RBC AUTO: 92.3 FL (ref 78–100)
MONOCYTES # BLD: 0.8 K/UL (ref 0.05–1.2)
MONOCYTES NFR BLD: 18 % (ref 3–10)
NEUTS SEG # BLD: 2.1 K/UL (ref 1.8–8)
NEUTS SEG NFR BLD: 45 % (ref 40–73)
NRBC # BLD: 0 K/UL (ref 0–0.01)
NRBC BLD-RTO: 0 PER 100 WBC
PLATELET # BLD AUTO: 250 K/UL (ref 135–420)
PMV BLD AUTO: 9 FL (ref 9.2–11.8)
POTASSIUM SERPL-SCNC: 4.2 MMOL/L (ref 3.5–5.5)
RBC # BLD AUTO: 5.18 M/UL (ref 4.35–5.65)
SODIUM SERPL-SCNC: 134 MMOL/L (ref 136–145)
WBC # BLD AUTO: 4.7 K/UL (ref 4.6–13.2)

## 2023-05-11 PROCEDURE — 75825 VEIN X-RAY TRUNK: CPT | Performed by: SURGERY

## 2023-05-11 PROCEDURE — 80048 BASIC METABOLIC PNL TOTAL CA: CPT

## 2023-05-11 PROCEDURE — 37191 INS ENDOVAS VENA CAVA FILTR: CPT | Performed by: SURGERY

## 2023-05-11 PROCEDURE — 6370000000 HC RX 637 (ALT 250 FOR IP): Performed by: STUDENT IN AN ORGANIZED HEALTH CARE EDUCATION/TRAINING PROGRAM

## 2023-05-11 PROCEDURE — 99232 SBSQ HOSP IP/OBS MODERATE 35: CPT | Performed by: HOSPITALIST

## 2023-05-11 PROCEDURE — 36010 PLACE CATHETER IN VEIN: CPT | Performed by: SURGERY

## 2023-05-11 PROCEDURE — 6370000000 HC RX 637 (ALT 250 FOR IP): Performed by: HOSPITALIST

## 2023-05-11 PROCEDURE — C1880 VENA CAVA FILTER: HCPCS | Performed by: SURGERY

## 2023-05-11 PROCEDURE — 83735 ASSAY OF MAGNESIUM: CPT

## 2023-05-11 PROCEDURE — 82962 GLUCOSE BLOOD TEST: CPT

## 2023-05-11 PROCEDURE — 1100000003 HC PRIVATE W/ TELEMETRY

## 2023-05-11 PROCEDURE — 6360000002 HC RX W HCPCS: Performed by: STUDENT IN AN ORGANIZED HEALTH CARE EDUCATION/TRAINING PROGRAM

## 2023-05-11 PROCEDURE — 36415 COLL VENOUS BLD VENIPUNCTURE: CPT

## 2023-05-11 PROCEDURE — C1769 GUIDE WIRE: HCPCS | Performed by: SURGERY

## 2023-05-11 PROCEDURE — 76000 FLUOROSCOPY <1 HR PHYS/QHP: CPT | Performed by: SURGERY

## 2023-05-11 PROCEDURE — 6360000004 HC RX CONTRAST MEDICATION: Performed by: SURGERY

## 2023-05-11 PROCEDURE — 85025 COMPLETE CBC W/AUTO DIFF WBC: CPT

## 2023-05-11 PROCEDURE — 2580000003 HC RX 258: Performed by: STUDENT IN AN ORGANIZED HEALTH CARE EDUCATION/TRAINING PROGRAM

## 2023-05-11 PROCEDURE — 6360000002 HC RX W HCPCS

## 2023-05-11 PROCEDURE — 06H03DZ INSERTION OF INTRALUMINAL DEVICE INTO INFERIOR VENA CAVA, PERCUTANEOUS APPROACH: ICD-10-PCS | Performed by: SURGERY

## 2023-05-11 PROCEDURE — 2500000003 HC RX 250 WO HCPCS: Performed by: SURGERY

## 2023-05-11 PROCEDURE — 76937 US GUIDE VASCULAR ACCESS: CPT | Performed by: SURGERY

## 2023-05-11 PROCEDURE — 6360000002 HC RX W HCPCS: Performed by: HOSPITALIST

## 2023-05-11 DEVICE — FILTER VASC L49MM DIA30MM INTRO 7FR L65CM CATH L79CM VENA: Type: IMPLANTABLE DEVICE | Site: GROIN | Status: FUNCTIONAL

## 2023-05-11 RX ORDER — HYDROMORPHONE HCL 110MG/55ML
0.5 PATIENT CONTROLLED ANALGESIA SYRINGE INTRAVENOUS
Status: COMPLETED | OUTPATIENT
Start: 2023-05-11 | End: 2023-05-11

## 2023-05-11 RX ORDER — LIDOCAINE HYDROCHLORIDE 10 MG/ML
INJECTION, SOLUTION EPIDURAL; INFILTRATION; INTRACAUDAL; PERINEURAL PRN
Status: DISCONTINUED | OUTPATIENT
Start: 2023-05-11 | End: 2023-05-11 | Stop reason: HOSPADM

## 2023-05-11 RX ORDER — HYDROMORPHONE HCL 110MG/55ML
PATIENT CONTROLLED ANALGESIA SYRINGE INTRAVENOUS
Status: COMPLETED
Start: 2023-05-11 | End: 2023-05-11

## 2023-05-11 RX ORDER — CYCLOBENZAPRINE HCL 10 MG
10 TABLET ORAL 3 TIMES DAILY PRN
Status: DISCONTINUED | OUTPATIENT
Start: 2023-05-11 | End: 2023-05-15 | Stop reason: HOSPADM

## 2023-05-11 RX ORDER — CYCLOBENZAPRINE HCL 10 MG
5 TABLET ORAL 3 TIMES DAILY PRN
Status: DISCONTINUED | OUTPATIENT
Start: 2023-05-11 | End: 2023-05-11

## 2023-05-11 RX ADMIN — CYCLOBENZAPRINE 10 MG: 10 TABLET, FILM COATED ORAL at 20:47

## 2023-05-11 RX ADMIN — GABAPENTIN 600 MG: 300 CAPSULE ORAL at 13:48

## 2023-05-11 RX ADMIN — HYDROCODONE BITARTRATE AND ACETAMINOPHEN 1 TABLET: 5; 325 TABLET ORAL at 15:16

## 2023-05-11 RX ADMIN — SODIUM CHLORIDE, PRESERVATIVE FREE 10 ML: 5 INJECTION INTRAVENOUS at 08:49

## 2023-05-11 RX ADMIN — HYDROCODONE BITARTRATE AND ACETAMINOPHEN 1 TABLET: 5; 325 TABLET ORAL at 20:47

## 2023-05-11 RX ADMIN — HYDROMORPHONE HYDROCHLORIDE 1 MG: 1 INJECTION, SOLUTION INTRAMUSCULAR; INTRAVENOUS; SUBCUTANEOUS at 13:47

## 2023-05-11 RX ADMIN — ACETAMINOPHEN 325MG 650 MG: 325 TABLET ORAL at 06:14

## 2023-05-11 RX ADMIN — ACETAMINOPHEN 325MG 650 MG: 325 TABLET ORAL at 22:19

## 2023-05-11 RX ADMIN — HYDROMORPHONE HYDROCHLORIDE 1 MG: 1 INJECTION, SOLUTION INTRAMUSCULAR; INTRAVENOUS; SUBCUTANEOUS at 18:41

## 2023-05-11 RX ADMIN — SODIUM CHLORIDE, PRESERVATIVE FREE 10 ML: 5 INJECTION INTRAVENOUS at 20:39

## 2023-05-11 RX ADMIN — ATORVASTATIN CALCIUM 20 MG: 20 TABLET, FILM COATED ORAL at 20:41

## 2023-05-11 RX ADMIN — INSULIN GLARGINE 20 UNITS: 100 INJECTION, SOLUTION SUBCUTANEOUS at 20:51

## 2023-05-11 RX ADMIN — Medication 0.5 MG: at 11:30

## 2023-05-11 RX ADMIN — POLYETHYLENE GLYCOL 3350 17 G: 17 POWDER, FOR SOLUTION ORAL at 08:49

## 2023-05-11 RX ADMIN — HYDROMORPHONE HYDROCHLORIDE 0.5 MG: 2 INJECTION, SOLUTION INTRAMUSCULAR; INTRAVENOUS; SUBCUTANEOUS at 11:30

## 2023-05-11 RX ADMIN — HYDROCODONE BITARTRATE AND ACETAMINOPHEN 1 TABLET: 5; 325 TABLET ORAL at 05:07

## 2023-05-11 RX ADMIN — GABAPENTIN 600 MG: 300 CAPSULE ORAL at 08:48

## 2023-05-11 RX ADMIN — HYDROMORPHONE HYDROCHLORIDE 1 MG: 1 INJECTION, SOLUTION INTRAMUSCULAR; INTRAVENOUS; SUBCUTANEOUS at 08:48

## 2023-05-11 RX ADMIN — GABAPENTIN 600 MG: 300 CAPSULE ORAL at 20:38

## 2023-05-11 RX ADMIN — AMLODIPINE BESYLATE 5 MG: 5 TABLET ORAL at 08:47

## 2023-05-11 RX ADMIN — CHOLECALCIFEROL TAB 25 MCG (1000 UNIT) 2000 UNITS: 25 TAB at 08:47

## 2023-05-11 RX ADMIN — ASPIRIN 81 MG: 81 TABLET, COATED ORAL at 08:47

## 2023-05-11 RX ADMIN — HYDROMORPHONE HYDROCHLORIDE 1 MG: 1 INJECTION, SOLUTION INTRAMUSCULAR; INTRAVENOUS; SUBCUTANEOUS at 02:39

## 2023-05-11 ASSESSMENT — PAIN SCALES - GENERAL
PAINLEVEL_OUTOF10: 10
PAINLEVEL_OUTOF10: 7
PAINLEVEL_OUTOF10: 5
PAINLEVEL_OUTOF10: 8
PAINLEVEL_OUTOF10: 4
PAINLEVEL_OUTOF10: 6
PAINLEVEL_OUTOF10: 6
PAINLEVEL_OUTOF10: 0
PAINLEVEL_OUTOF10: 10
PAINLEVEL_OUTOF10: 7
PAINLEVEL_OUTOF10: 4
PAINLEVEL_OUTOF10: 7

## 2023-05-11 ASSESSMENT — PAIN DESCRIPTION - LOCATION
LOCATION: BACK

## 2023-05-11 ASSESSMENT — PAIN DESCRIPTION - DESCRIPTORS
DESCRIPTORS: SHARP
DESCRIPTORS: ACHING
DESCRIPTORS: ACHING

## 2023-05-11 ASSESSMENT — PAIN DESCRIPTION - ORIENTATION
ORIENTATION: MID
ORIENTATION: LOWER
ORIENTATION: RIGHT

## 2023-05-11 ASSESSMENT — PAIN - FUNCTIONAL ASSESSMENT: PAIN_FUNCTIONAL_ASSESSMENT: 0-10

## 2023-05-12 ENCOUNTER — APPOINTMENT (OUTPATIENT)
Facility: HOSPITAL | Age: 63
DRG: 454 | End: 2023-05-12
Payer: COMMERCIAL

## 2023-05-12 ENCOUNTER — ANESTHESIA (OUTPATIENT)
Facility: HOSPITAL | Age: 63
End: 2023-05-12
Payer: COMMERCIAL

## 2023-05-12 LAB
ABO + RH BLD: NORMAL
ANION GAP SERPL CALC-SCNC: 6 MMOL/L (ref 3–18)
BASOPHILS # BLD: 0 K/UL (ref 0–0.1)
BASOPHILS NFR BLD: 1 % (ref 0–2)
BLOOD GROUP ANTIBODIES SERPL: NORMAL
BUN SERPL-MCNC: 28 MG/DL (ref 7–18)
BUN/CREAT SERPL: 25 (ref 12–20)
CALCIUM SERPL-MCNC: 9.6 MG/DL (ref 8.5–10.1)
CHLORIDE SERPL-SCNC: 102 MMOL/L (ref 100–111)
CO2 SERPL-SCNC: 26 MMOL/L (ref 21–32)
CREAT SERPL-MCNC: 1.12 MG/DL (ref 0.6–1.3)
DIFFERENTIAL METHOD BLD: ABNORMAL
EOSINOPHIL # BLD: 0.1 K/UL (ref 0–0.4)
EOSINOPHIL NFR BLD: 1 % (ref 0–5)
ERYTHROCYTE [DISTWIDTH] IN BLOOD BY AUTOMATED COUNT: 14 % (ref 11.6–14.5)
GLUCOSE BLD STRIP.AUTO-MCNC: 123 MG/DL (ref 70–110)
GLUCOSE BLD STRIP.AUTO-MCNC: 129 MG/DL (ref 70–110)
GLUCOSE BLD STRIP.AUTO-MCNC: 143 MG/DL (ref 70–110)
GLUCOSE BLD STRIP.AUTO-MCNC: 158 MG/DL (ref 70–110)
GLUCOSE SERPL-MCNC: 142 MG/DL (ref 74–99)
HCT VFR BLD AUTO: 48.1 % (ref 36–48)
HGB BLD-MCNC: 15.3 G/DL (ref 13–16)
IMM GRANULOCYTES # BLD AUTO: 0 K/UL (ref 0–0.04)
IMM GRANULOCYTES NFR BLD AUTO: 1 % (ref 0–0.5)
LYMPHOCYTES # BLD: 1.3 K/UL (ref 0.9–3.6)
LYMPHOCYTES NFR BLD: 22 % (ref 21–52)
MAGNESIUM SERPL-MCNC: 2.1 MG/DL (ref 1.6–2.6)
MCH RBC QN AUTO: 29.6 PG (ref 24–34)
MCHC RBC AUTO-ENTMCNC: 31.8 G/DL (ref 31–37)
MCV RBC AUTO: 93 FL (ref 78–100)
MONOCYTES # BLD: 1.1 K/UL (ref 0.05–1.2)
MONOCYTES NFR BLD: 19 % (ref 3–10)
NEUTS SEG # BLD: 3.2 K/UL (ref 1.8–8)
NEUTS SEG NFR BLD: 56 % (ref 40–73)
NRBC # BLD: 0 K/UL (ref 0–0.01)
NRBC BLD-RTO: 0 PER 100 WBC
PLATELET # BLD AUTO: 239 K/UL (ref 135–420)
PMV BLD AUTO: 8.9 FL (ref 9.2–11.8)
POTASSIUM SERPL-SCNC: 4.1 MMOL/L (ref 3.5–5.5)
RBC # BLD AUTO: 5.17 M/UL (ref 4.35–5.65)
SODIUM SERPL-SCNC: 134 MMOL/L (ref 136–145)
SPECIMEN EXP DATE BLD: NORMAL
WBC # BLD AUTO: 5.6 K/UL (ref 4.6–13.2)

## 2023-05-12 PROCEDURE — 1100000003 HC PRIVATE W/ TELEMETRY

## 2023-05-12 PROCEDURE — 94761 N-INVAS EAR/PLS OXIMETRY MLT: CPT

## 2023-05-12 PROCEDURE — 85025 COMPLETE CBC W/AUTO DIFF WBC: CPT

## 2023-05-12 PROCEDURE — 6370000000 HC RX 637 (ALT 250 FOR IP): Performed by: STUDENT IN AN ORGANIZED HEALTH CARE EDUCATION/TRAINING PROGRAM

## 2023-05-12 PROCEDURE — 2500000003 HC RX 250 WO HCPCS: Performed by: ORTHOPAEDIC SURGERY

## 2023-05-12 PROCEDURE — 82962 GLUCOSE BLOOD TEST: CPT

## 2023-05-12 PROCEDURE — 6370000000 HC RX 637 (ALT 250 FOR IP): Performed by: HOSPITALIST

## 2023-05-12 PROCEDURE — 2580000003 HC RX 258: Performed by: STUDENT IN AN ORGANIZED HEALTH CARE EDUCATION/TRAINING PROGRAM

## 2023-05-12 PROCEDURE — 00NY0ZZ RELEASE LUMBAR SPINAL CORD, OPEN APPROACH: ICD-10-PCS | Performed by: ORTHOPAEDIC SURGERY

## 2023-05-12 PROCEDURE — 6360000002 HC RX W HCPCS: Performed by: STUDENT IN AN ORGANIZED HEALTH CARE EDUCATION/TRAINING PROGRAM

## 2023-05-12 PROCEDURE — C1729 CATH, DRAINAGE: HCPCS | Performed by: ORTHOPAEDIC SURGERY

## 2023-05-12 PROCEDURE — 2580000003 HC RX 258: Performed by: NURSE ANESTHETIST, CERTIFIED REGISTERED

## 2023-05-12 PROCEDURE — 7100000001 HC PACU RECOVERY - ADDTL 15 MIN: Performed by: ORTHOPAEDIC SURGERY

## 2023-05-12 PROCEDURE — 6360000002 HC RX W HCPCS: Performed by: NURSE ANESTHETIST, CERTIFIED REGISTERED

## 2023-05-12 PROCEDURE — 6360000002 HC RX W HCPCS: Performed by: ORTHOPAEDIC SURGERY

## 2023-05-12 PROCEDURE — 0SG00AJ FUSION OF LUMBAR VERTEBRAL JOINT WITH INTERBODY FUSION DEVICE, POSTERIOR APPROACH, ANTERIOR COLUMN, OPEN APPROACH: ICD-10-PCS | Performed by: ORTHOPAEDIC SURGERY

## 2023-05-12 PROCEDURE — 99232 SBSQ HOSP IP/OBS MODERATE 35: CPT | Performed by: INTERNAL MEDICINE

## 2023-05-12 PROCEDURE — 2720000010 HC SURG SUPPLY STERILE: Performed by: ORTHOPAEDIC SURGERY

## 2023-05-12 PROCEDURE — 7100000000 HC PACU RECOVERY - FIRST 15 MIN: Performed by: ORTHOPAEDIC SURGERY

## 2023-05-12 PROCEDURE — 6360000002 HC RX W HCPCS

## 2023-05-12 PROCEDURE — 2709999900 HC NON-CHARGEABLE SUPPLY: Performed by: ORTHOPAEDIC SURGERY

## 2023-05-12 PROCEDURE — 80048 BASIC METABOLIC PNL TOTAL CA: CPT

## 2023-05-12 PROCEDURE — 6370000000 HC RX 637 (ALT 250 FOR IP): Performed by: NURSE ANESTHETIST, CERTIFIED REGISTERED

## 2023-05-12 PROCEDURE — 2500000003 HC RX 250 WO HCPCS: Performed by: NURSE ANESTHETIST, CERTIFIED REGISTERED

## 2023-05-12 PROCEDURE — 0ST20ZZ RESECTION OF LUMBAR VERTEBRAL DISC, OPEN APPROACH: ICD-10-PCS | Performed by: ORTHOPAEDIC SURGERY

## 2023-05-12 PROCEDURE — 36415 COLL VENOUS BLD VENIPUNCTURE: CPT

## 2023-05-12 PROCEDURE — 83735 ASSAY OF MAGNESIUM: CPT

## 2023-05-12 PROCEDURE — 3600000012 HC SURGERY LEVEL 2 ADDTL 15MIN: Performed by: ORTHOPAEDIC SURGERY

## 2023-05-12 PROCEDURE — 86850 RBC ANTIBODY SCREEN: CPT

## 2023-05-12 PROCEDURE — 3600000002 HC SURGERY LEVEL 2 BASE: Performed by: ORTHOPAEDIC SURGERY

## 2023-05-12 PROCEDURE — 86900 BLOOD TYPING SEROLOGIC ABO: CPT

## 2023-05-12 PROCEDURE — 3700000001 HC ADD 15 MINUTES (ANESTHESIA): Performed by: ORTHOPAEDIC SURGERY

## 2023-05-12 PROCEDURE — 0SG0071 FUSION OF LUMBAR VERTEBRAL JOINT WITH AUTOLOGOUS TISSUE SUBSTITUTE, POSTERIOR APPROACH, POSTERIOR COLUMN, OPEN APPROACH: ICD-10-PCS | Performed by: ORTHOPAEDIC SURGERY

## 2023-05-12 PROCEDURE — 2580000003 HC RX 258: Performed by: ORTHOPAEDIC SURGERY

## 2023-05-12 PROCEDURE — C1713 ANCHOR/SCREW BN/BN,TIS/BN: HCPCS | Performed by: ORTHOPAEDIC SURGERY

## 2023-05-12 PROCEDURE — 86901 BLOOD TYPING SEROLOGIC RH(D): CPT

## 2023-05-12 PROCEDURE — C1889 IMPLANT/INSERT DEVICE, NOC: HCPCS | Performed by: ORTHOPAEDIC SURGERY

## 2023-05-12 PROCEDURE — 6370000000 HC RX 637 (ALT 250 FOR IP): Performed by: ORTHOPAEDIC SURGERY

## 2023-05-12 PROCEDURE — 3700000000 HC ANESTHESIA ATTENDED CARE: Performed by: ORTHOPAEDIC SURGERY

## 2023-05-12 PROCEDURE — 01NB0ZZ RELEASE LUMBAR NERVE, OPEN APPROACH: ICD-10-PCS | Performed by: ORTHOPAEDIC SURGERY

## 2023-05-12 DEVICE — IMPLANTABLE DEVICE: Type: IMPLANTABLE DEVICE | Site: SPINE LUMBAR | Status: FUNCTIONAL

## 2023-05-12 DEVICE — BONE GRAFT DELIVERY DEVICE
Type: IMPLANTABLE DEVICE | Site: SPINE LUMBAR | Status: FUNCTIONAL
Brand: BI-PORTAL BONE GRAFT DELIVERY DEVICE

## 2023-05-12 DEVICE — ALLOGRAFT BNE DBM 10 CC VESUVIUS 4104K0810DC: Type: IMPLANTABLE DEVICE | Site: SPINE LUMBAR | Status: FUNCTIONAL

## 2023-05-12 DEVICE — SCREW SET EVEREST: Type: IMPLANTABLE DEVICE | Site: SPINE LUMBAR | Status: FUNCTIONAL

## 2023-05-12 RX ORDER — PHENYLEPHRINE HCL IN 0.9% NACL 1 MG/10 ML
SYRINGE (ML) INTRAVENOUS PRN
Status: DISCONTINUED | OUTPATIENT
Start: 2023-05-12 | End: 2023-05-12 | Stop reason: SDUPTHER

## 2023-05-12 RX ORDER — INSULIN LISPRO 100 [IU]/ML
0-15 INJECTION, SOLUTION INTRAVENOUS; SUBCUTANEOUS ONCE
Status: DISCONTINUED | OUTPATIENT
Start: 2023-05-12 | End: 2023-05-12 | Stop reason: HOSPADM

## 2023-05-12 RX ORDER — LORAZEPAM 2 MG/ML
INJECTION INTRAMUSCULAR
Status: COMPLETED
Start: 2023-05-12 | End: 2023-05-12

## 2023-05-12 RX ORDER — OXYCODONE HYDROCHLORIDE 5 MG/1
5 TABLET ORAL EVERY 4 HOURS PRN
Status: CANCELLED | OUTPATIENT
Start: 2023-05-12

## 2023-05-12 RX ORDER — HYDROMORPHONE HCL 110MG/55ML
PATIENT CONTROLLED ANALGESIA SYRINGE INTRAVENOUS PRN
Status: DISCONTINUED | OUTPATIENT
Start: 2023-05-12 | End: 2023-05-12 | Stop reason: SDUPTHER

## 2023-05-12 RX ORDER — OXYCODONE HYDROCHLORIDE 5 MG/1
10 TABLET ORAL EVERY 4 HOURS PRN
Status: CANCELLED | OUTPATIENT
Start: 2023-05-12

## 2023-05-12 RX ORDER — CEFAZOLIN SODIUM 1 G/3ML
INJECTION, POWDER, FOR SOLUTION INTRAMUSCULAR; INTRAVENOUS PRN
Status: DISCONTINUED | OUTPATIENT
Start: 2023-05-12 | End: 2023-05-12 | Stop reason: SDUPTHER

## 2023-05-12 RX ORDER — LORAZEPAM 2 MG/ML
0.5 INJECTION INTRAMUSCULAR
Status: COMPLETED | OUTPATIENT
Start: 2023-05-12 | End: 2023-05-12

## 2023-05-12 RX ORDER — ROCURONIUM BROMIDE 10 MG/ML
INJECTION, SOLUTION INTRAVENOUS PRN
Status: DISCONTINUED | OUTPATIENT
Start: 2023-05-12 | End: 2023-05-12 | Stop reason: SDUPTHER

## 2023-05-12 RX ORDER — SODIUM CHLORIDE, SODIUM LACTATE, POTASSIUM CHLORIDE, CALCIUM CHLORIDE 600; 310; 30; 20 MG/100ML; MG/100ML; MG/100ML; MG/100ML
INJECTION, SOLUTION INTRAVENOUS CONTINUOUS
Status: DISCONTINUED | OUTPATIENT
Start: 2023-05-12 | End: 2023-05-12 | Stop reason: HOSPADM

## 2023-05-12 RX ORDER — HALOPERIDOL 5 MG/ML
1 INJECTION INTRAMUSCULAR ONCE
Status: COMPLETED | OUTPATIENT
Start: 2023-05-12 | End: 2023-05-12

## 2023-05-12 RX ORDER — ACETAMINOPHEN 325 MG/1
650 TABLET ORAL EVERY 6 HOURS
Status: CANCELLED | OUTPATIENT
Start: 2023-05-12

## 2023-05-12 RX ORDER — HYDROMORPHONE HYDROCHLORIDE 2 MG/ML
0.25 INJECTION, SOLUTION INTRAMUSCULAR; INTRAVENOUS; SUBCUTANEOUS
Status: CANCELLED | OUTPATIENT
Start: 2023-05-12

## 2023-05-12 RX ORDER — ONDANSETRON 2 MG/ML
INJECTION INTRAMUSCULAR; INTRAVENOUS PRN
Status: DISCONTINUED | OUTPATIENT
Start: 2023-05-12 | End: 2023-05-12 | Stop reason: SDUPTHER

## 2023-05-12 RX ORDER — SODIUM CHLORIDE 0.9 % (FLUSH) 0.9 %
5-40 SYRINGE (ML) INJECTION EVERY 12 HOURS SCHEDULED
Status: DISCONTINUED | OUTPATIENT
Start: 2023-05-12 | End: 2023-05-12 | Stop reason: HOSPADM

## 2023-05-12 RX ORDER — PROPOFOL 10 MG/ML
INJECTION, EMULSION INTRAVENOUS PRN
Status: DISCONTINUED | OUTPATIENT
Start: 2023-05-12 | End: 2023-05-12 | Stop reason: SDUPTHER

## 2023-05-12 RX ORDER — SODIUM CHLORIDE 0.9 % (FLUSH) 0.9 %
5-40 SYRINGE (ML) INJECTION EVERY 12 HOURS SCHEDULED
Status: CANCELLED | OUTPATIENT
Start: 2023-05-12

## 2023-05-12 RX ORDER — PROCHLORPERAZINE EDISYLATE 5 MG/ML
5 INJECTION INTRAMUSCULAR; INTRAVENOUS
Status: DISCONTINUED | OUTPATIENT
Start: 2023-05-12 | End: 2023-05-12 | Stop reason: HOSPADM

## 2023-05-12 RX ORDER — DEXTROSE MONOHYDRATE 100 MG/ML
INJECTION, SOLUTION INTRAVENOUS CONTINUOUS PRN
Status: DISCONTINUED | OUTPATIENT
Start: 2023-05-12 | End: 2023-05-12 | Stop reason: HOSPADM

## 2023-05-12 RX ORDER — FENTANYL CITRATE 50 UG/ML
25 INJECTION, SOLUTION INTRAMUSCULAR; INTRAVENOUS EVERY 5 MIN PRN
Status: DISCONTINUED | OUTPATIENT
Start: 2023-05-12 | End: 2023-05-12 | Stop reason: HOSPADM

## 2023-05-12 RX ORDER — LIDOCAINE HYDROCHLORIDE 10 MG/ML
1 INJECTION, SOLUTION EPIDURAL; INFILTRATION; INTRACAUDAL; PERINEURAL
Status: DISCONTINUED | OUTPATIENT
Start: 2023-05-12 | End: 2023-05-12 | Stop reason: HOSPADM

## 2023-05-12 RX ORDER — SODIUM CHLORIDE 9 MG/ML
INJECTION, SOLUTION INTRAVENOUS PRN
Status: DISCONTINUED | OUTPATIENT
Start: 2023-05-12 | End: 2023-05-12 | Stop reason: HOSPADM

## 2023-05-12 RX ORDER — POLYETHYLENE GLYCOL 3350 17 G/17G
17 POWDER, FOR SOLUTION ORAL DAILY
Status: CANCELLED | OUTPATIENT
Start: 2023-05-12

## 2023-05-12 RX ORDER — VANCOMYCIN HYDROCHLORIDE 1 G/20ML
INJECTION, POWDER, LYOPHILIZED, FOR SOLUTION INTRAVENOUS PRN
Status: DISCONTINUED | OUTPATIENT
Start: 2023-05-12 | End: 2023-05-12 | Stop reason: ALTCHOICE

## 2023-05-12 RX ORDER — METAXALONE 800 MG/1
800 TABLET ORAL EVERY 8 HOURS PRN
Status: CANCELLED | OUTPATIENT
Start: 2023-05-12

## 2023-05-12 RX ORDER — DIPHENHYDRAMINE HCL 25 MG
25 CAPSULE ORAL EVERY 6 HOURS PRN
Status: CANCELLED | OUTPATIENT
Start: 2023-05-12

## 2023-05-12 RX ORDER — MIDAZOLAM HYDROCHLORIDE 1 MG/ML
INJECTION INTRAMUSCULAR; INTRAVENOUS PRN
Status: DISCONTINUED | OUTPATIENT
Start: 2023-05-12 | End: 2023-05-12 | Stop reason: SDUPTHER

## 2023-05-12 RX ORDER — SODIUM CHLORIDE 0.9 % (FLUSH) 0.9 %
5-40 SYRINGE (ML) INJECTION PRN
Status: DISCONTINUED | OUTPATIENT
Start: 2023-05-12 | End: 2023-05-12 | Stop reason: HOSPADM

## 2023-05-12 RX ORDER — SODIUM CHLORIDE, SODIUM LACTATE, POTASSIUM CHLORIDE, CALCIUM CHLORIDE 600; 310; 30; 20 MG/100ML; MG/100ML; MG/100ML; MG/100ML
INJECTION, SOLUTION INTRAVENOUS CONTINUOUS
Status: DISCONTINUED | OUTPATIENT
Start: 2023-05-12 | End: 2023-05-13

## 2023-05-12 RX ORDER — HYDROMORPHONE HYDROCHLORIDE 2 MG/ML
0.5 INJECTION, SOLUTION INTRAMUSCULAR; INTRAVENOUS; SUBCUTANEOUS
Status: CANCELLED | OUTPATIENT
Start: 2023-05-12

## 2023-05-12 RX ORDER — FAMOTIDINE 20 MG/1
20 TABLET, FILM COATED ORAL ONCE
Status: COMPLETED | OUTPATIENT
Start: 2023-05-12 | End: 2023-05-12

## 2023-05-12 RX ORDER — SUCCINYLCHOLINE/SOD CL,ISO/PF 100 MG/5ML
SYRINGE (ML) INTRAVENOUS PRN
Status: DISCONTINUED | OUTPATIENT
Start: 2023-05-12 | End: 2023-05-12 | Stop reason: SDUPTHER

## 2023-05-12 RX ORDER — LIDOCAINE HYDROCHLORIDE 20 MG/ML
INJECTION, SOLUTION EPIDURAL; INFILTRATION; INTRACAUDAL; PERINEURAL PRN
Status: DISCONTINUED | OUTPATIENT
Start: 2023-05-12 | End: 2023-05-12 | Stop reason: SDUPTHER

## 2023-05-12 RX ORDER — DIPHENHYDRAMINE HYDROCHLORIDE 50 MG/ML
25 INJECTION INTRAMUSCULAR; INTRAVENOUS EVERY 6 HOURS PRN
Status: CANCELLED | OUTPATIENT
Start: 2023-05-12

## 2023-05-12 RX ORDER — FENTANYL CITRATE 50 UG/ML
INJECTION, SOLUTION INTRAMUSCULAR; INTRAVENOUS PRN
Status: DISCONTINUED | OUTPATIENT
Start: 2023-05-12 | End: 2023-05-12 | Stop reason: SDUPTHER

## 2023-05-12 RX ORDER — BUPIVACAINE HYDROCHLORIDE 5 MG/ML
INJECTION, SOLUTION EPIDURAL; INTRACAUDAL PRN
Status: DISCONTINUED | OUTPATIENT
Start: 2023-05-12 | End: 2023-05-12 | Stop reason: ALTCHOICE

## 2023-05-12 RX ADMIN — MIDAZOLAM 2 MG: 1 INJECTION, SOLUTION INTRAMUSCULAR; INTRAVENOUS at 12:19

## 2023-05-12 RX ADMIN — LORAZEPAM 0.5 MG: 2 INJECTION INTRAMUSCULAR; INTRAVENOUS at 16:00

## 2023-05-12 RX ADMIN — SODIUM CHLORIDE, PRESERVATIVE FREE 10 ML: 5 INJECTION INTRAVENOUS at 08:31

## 2023-05-12 RX ADMIN — Medication 100 MCG: at 12:49

## 2023-05-12 RX ADMIN — ROCURONIUM BROMIDE 25 MG: 10 INJECTION, SOLUTION INTRAVENOUS at 12:31

## 2023-05-12 RX ADMIN — CEFAZOLIN 2 G: 330 INJECTION, POWDER, FOR SOLUTION INTRAMUSCULAR; INTRAVENOUS at 12:34

## 2023-05-12 RX ADMIN — GABAPENTIN 600 MG: 300 CAPSULE ORAL at 21:07

## 2023-05-12 RX ADMIN — SODIUM CHLORIDE, SODIUM LACTATE, POTASSIUM CHLORIDE, AND CALCIUM CHLORIDE: 600; 310; 30; 20 INJECTION, SOLUTION INTRAVENOUS at 13:58

## 2023-05-12 RX ADMIN — ASPIRIN 81 MG: 81 TABLET, COATED ORAL at 08:30

## 2023-05-12 RX ADMIN — HALOPERIDOL LACTATE 1 MG: 5 INJECTION, SOLUTION INTRAMUSCULAR at 16:20

## 2023-05-12 RX ADMIN — ACETAMINOPHEN 325MG 650 MG: 325 TABLET ORAL at 06:13

## 2023-05-12 RX ADMIN — HYDROMORPHONE HYDROCHLORIDE 0.2 MG: 2 INJECTION INTRAMUSCULAR; INTRAVENOUS; SUBCUTANEOUS at 14:48

## 2023-05-12 RX ADMIN — WATER 2000 MG: 1 INJECTION INTRAMUSCULAR; INTRAVENOUS; SUBCUTANEOUS at 21:05

## 2023-05-12 RX ADMIN — HYDROMORPHONE HYDROCHLORIDE 0.2 MG: 2 INJECTION INTRAMUSCULAR; INTRAVENOUS; SUBCUTANEOUS at 15:06

## 2023-05-12 RX ADMIN — Medication 80 MG: at 12:24

## 2023-05-12 RX ADMIN — ROCURONIUM BROMIDE 5 MG: 10 INJECTION, SOLUTION INTRAVENOUS at 12:23

## 2023-05-12 RX ADMIN — Medication 100 MCG: at 12:57

## 2023-05-12 RX ADMIN — HYDROMORPHONE HYDROCHLORIDE 1 MG: 1 INJECTION, SOLUTION INTRAMUSCULAR; INTRAVENOUS; SUBCUTANEOUS at 07:15

## 2023-05-12 RX ADMIN — HYDROMORPHONE HYDROCHLORIDE 0.2 MG: 2 INJECTION INTRAMUSCULAR; INTRAVENOUS; SUBCUTANEOUS at 14:43

## 2023-05-12 RX ADMIN — HYDROCODONE BITARTRATE AND ACETAMINOPHEN 1 TABLET: 5; 325 TABLET ORAL at 06:13

## 2023-05-12 RX ADMIN — AMLODIPINE BESYLATE 5 MG: 5 TABLET ORAL at 08:30

## 2023-05-12 RX ADMIN — PROPOFOL 120 MG: 10 INJECTION, EMULSION INTRAVENOUS at 12:23

## 2023-05-12 RX ADMIN — TRANEXAMIC ACID 1 G: 100 INJECTION, SOLUTION INTRAVENOUS at 13:57

## 2023-05-12 RX ADMIN — GABAPENTIN 600 MG: 300 CAPSULE ORAL at 08:30

## 2023-05-12 RX ADMIN — CYCLOBENZAPRINE 10 MG: 10 TABLET, FILM COATED ORAL at 23:39

## 2023-05-12 RX ADMIN — FENTANYL CITRATE 50 MCG: 50 INJECTION, SOLUTION INTRAMUSCULAR; INTRAVENOUS at 12:23

## 2023-05-12 RX ADMIN — SUGAMMADEX 204 MG: 100 INJECTION, SOLUTION INTRAVENOUS at 14:26

## 2023-05-12 RX ADMIN — ATORVASTATIN CALCIUM 20 MG: 20 TABLET, FILM COATED ORAL at 21:07

## 2023-05-12 RX ADMIN — LORAZEPAM 0.5 MG: 2 INJECTION INTRAMUSCULAR; INTRAVENOUS at 15:45

## 2023-05-12 RX ADMIN — HYDROMORPHONE HYDROCHLORIDE 0.2 MG: 2 INJECTION INTRAMUSCULAR; INTRAVENOUS; SUBCUTANEOUS at 15:02

## 2023-05-12 RX ADMIN — ROCURONIUM BROMIDE 10 MG: 10 INJECTION, SOLUTION INTRAVENOUS at 12:52

## 2023-05-12 RX ADMIN — LIDOCAINE HYDROCHLORIDE 50 MG: 20 INJECTION, SOLUTION EPIDURAL; INFILTRATION; INTRACAUDAL; PERINEURAL at 12:23

## 2023-05-12 RX ADMIN — Medication 200 MCG: at 12:36

## 2023-05-12 RX ADMIN — HYDROMORPHONE HYDROCHLORIDE 0.5 MG: 2 INJECTION INTRAMUSCULAR; INTRAVENOUS; SUBCUTANEOUS at 14:30

## 2023-05-12 RX ADMIN — SODIUM CHLORIDE, SODIUM LACTATE, POTASSIUM CHLORIDE, AND CALCIUM CHLORIDE: 600; 310; 30; 20 INJECTION, SOLUTION INTRAVENOUS at 12:03

## 2023-05-12 RX ADMIN — SODIUM CHLORIDE, PRESERVATIVE FREE 5 ML: 5 INJECTION INTRAVENOUS at 21:07

## 2023-05-12 RX ADMIN — FENTANYL CITRATE 50 MCG: 50 INJECTION, SOLUTION INTRAMUSCULAR; INTRAVENOUS at 12:39

## 2023-05-12 RX ADMIN — CHOLECALCIFEROL TAB 25 MCG (1000 UNIT) 2000 UNITS: 25 TAB at 08:30

## 2023-05-12 RX ADMIN — SODIUM CHLORIDE, SODIUM LACTATE, POTASSIUM CHLORIDE, AND CALCIUM CHLORIDE: 600; 310; 30; 20 INJECTION, SOLUTION INTRAVENOUS at 18:44

## 2023-05-12 RX ADMIN — ROCURONIUM BROMIDE 10 MG: 10 INJECTION, SOLUTION INTRAVENOUS at 13:19

## 2023-05-12 RX ADMIN — FAMOTIDINE 20 MG: 20 TABLET ORAL at 12:03

## 2023-05-12 RX ADMIN — ONDANSETRON 4 MG: 2 INJECTION INTRAMUSCULAR; INTRAVENOUS at 14:24

## 2023-05-12 RX ADMIN — ACETAMINOPHEN 325MG 650 MG: 325 TABLET ORAL at 23:39

## 2023-05-12 RX ADMIN — HYDROCODONE BITARTRATE AND ACETAMINOPHEN 1 TABLET: 5; 325 TABLET ORAL at 19:10

## 2023-05-12 RX ADMIN — POLYETHYLENE GLYCOL 3350 17 G: 17 POWDER, FOR SOLUTION ORAL at 08:30

## 2023-05-12 RX ADMIN — HYDROMORPHONE HYDROCHLORIDE 1 MG: 1 INJECTION, SOLUTION INTRAMUSCULAR; INTRAVENOUS; SUBCUTANEOUS at 02:44

## 2023-05-12 RX ADMIN — HYDROMORPHONE HYDROCHLORIDE 0.2 MG: 2 INJECTION INTRAMUSCULAR; INTRAVENOUS; SUBCUTANEOUS at 14:37

## 2023-05-12 RX ADMIN — Medication 100 MCG: at 12:29

## 2023-05-12 RX ADMIN — ROCURONIUM BROMIDE 10 MG: 10 INJECTION, SOLUTION INTRAVENOUS at 13:01

## 2023-05-12 RX ADMIN — Medication 100 MCG: at 12:24

## 2023-05-12 RX ADMIN — HYDROMORPHONE HYDROCHLORIDE 0.5 MG: 2 INJECTION INTRAMUSCULAR; INTRAVENOUS; SUBCUTANEOUS at 14:56

## 2023-05-12 RX ADMIN — ROCURONIUM BROMIDE 10 MG: 10 INJECTION, SOLUTION INTRAVENOUS at 13:40

## 2023-05-12 RX ADMIN — ROCURONIUM BROMIDE 10 MG: 10 INJECTION, SOLUTION INTRAVENOUS at 13:53

## 2023-05-12 RX ADMIN — INSULIN GLARGINE 20 UNITS: 100 INJECTION, SOLUTION SUBCUTANEOUS at 21:08

## 2023-05-12 ASSESSMENT — PAIN DESCRIPTION - DESCRIPTORS
DESCRIPTORS: ACHING
DESCRIPTORS: SPASM

## 2023-05-12 ASSESSMENT — PAIN DESCRIPTION - ORIENTATION
ORIENTATION: MID;LOWER
ORIENTATION: LOWER;MID

## 2023-05-12 ASSESSMENT — PAIN SCALES - GENERAL
PAINLEVEL_OUTOF10: 8
PAINLEVEL_OUTOF10: 7
PAINLEVEL_OUTOF10: 8
PAINLEVEL_OUTOF10: 6
PAINLEVEL_OUTOF10: 4
PAINLEVEL_OUTOF10: 0

## 2023-05-12 ASSESSMENT — PAIN - FUNCTIONAL ASSESSMENT
PAIN_FUNCTIONAL_ASSESSMENT: 0-10
PAIN_FUNCTIONAL_ASSESSMENT: PREVENTS OR INTERFERES SOME ACTIVE ACTIVITIES AND ADLS

## 2023-05-12 ASSESSMENT — PAIN DESCRIPTION - LOCATION
LOCATION: BACK

## 2023-05-12 NOTE — ANESTHESIA POSTPROCEDURE EVALUATION
Department of Anesthesiology  Postprocedure Note    Patient: Soraya Keller  MRN: [id number]  YOB: 1960  Date of evaluation: 5/12/2023      Procedure Summary     Date: 05/12/23 Room / Location: [facility] MAIN 06 / [facility] MAIN OR    Anesthesia Start: 1219 Anesthesia Stop: 1506    Procedure: L2-L3 LAMINECTOMY WITH EXTENSION OF LUMBAR FUSION (Spine Lumbar) Diagnosis:       Acute back pain, unspecified back location, unspecified back pain laterality      (Acute back pain, unspecified back location, unspecified back pain laterality [M54.9])    Surgeons: Aliza Sanches MD Responsible Provider: Bette Gottron, MD    Anesthesia Type: General ASA Status: 3          Anesthesia Type: General    Destiny Phase I: Destiny Score: 10    Destiny Phase II:        Anesthesia Post Evaluation    Patient location during evaluation: PACU  Patient participation: complete - patient participated  Level of consciousness: agitated and combative  Pain score: 0  Airway patency: patent  Nausea & Vomiting: no nausea and no vomiting  Complications: no  Cardiovascular status: blood pressure returned to baseline  Respiratory status: acceptable  Hydration status: euvolemic  Comments: Given patient's agitation and combative nature in PACU, pt was placed in hand restraints and attempts make to verbally calm him down. These were unsuccessful and pt was given dose of ativan, which improved mood.  Pt stable throughout

## 2023-05-13 LAB
ANION GAP SERPL CALC-SCNC: 6 MMOL/L (ref 3–18)
BUN SERPL-MCNC: 22 MG/DL (ref 7–18)
BUN/CREAT SERPL: 16 (ref 12–20)
CALCIUM SERPL-MCNC: 8.4 MG/DL (ref 8.5–10.1)
CHLORIDE SERPL-SCNC: 103 MMOL/L (ref 100–111)
CO2 SERPL-SCNC: 27 MMOL/L (ref 21–32)
CREAT SERPL-MCNC: 1.39 MG/DL (ref 0.6–1.3)
ERYTHROCYTE [DISTWIDTH] IN BLOOD BY AUTOMATED COUNT: 14.1 % (ref 11.6–14.5)
GLUCOSE BLD STRIP.AUTO-MCNC: 136 MG/DL (ref 70–110)
GLUCOSE BLD STRIP.AUTO-MCNC: 164 MG/DL (ref 70–110)
GLUCOSE BLD STRIP.AUTO-MCNC: 169 MG/DL (ref 70–110)
GLUCOSE BLD STRIP.AUTO-MCNC: 178 MG/DL (ref 70–110)
GLUCOSE SERPL-MCNC: 205 MG/DL (ref 74–99)
HCT VFR BLD AUTO: 37.2 % (ref 36–48)
HGB BLD-MCNC: 12 G/DL (ref 13–16)
MCH RBC QN AUTO: 30.1 PG (ref 24–34)
MCHC RBC AUTO-ENTMCNC: 32.3 G/DL (ref 31–37)
MCV RBC AUTO: 93.2 FL (ref 78–100)
NRBC # BLD: 0 K/UL (ref 0–0.01)
NRBC BLD-RTO: 0 PER 100 WBC
PLATELET # BLD AUTO: 211 K/UL (ref 135–420)
PMV BLD AUTO: 9.7 FL (ref 9.2–11.8)
POTASSIUM SERPL-SCNC: 3.9 MMOL/L (ref 3.5–5.5)
RBC # BLD AUTO: 3.99 M/UL (ref 4.35–5.65)
SODIUM SERPL-SCNC: 136 MMOL/L (ref 136–145)
WBC # BLD AUTO: 6.8 K/UL (ref 4.6–13.2)

## 2023-05-13 PROCEDURE — 97116 GAIT TRAINING THERAPY: CPT

## 2023-05-13 PROCEDURE — 85027 COMPLETE CBC AUTOMATED: CPT

## 2023-05-13 PROCEDURE — 2580000003 HC RX 258: Performed by: STUDENT IN AN ORGANIZED HEALTH CARE EDUCATION/TRAINING PROGRAM

## 2023-05-13 PROCEDURE — 6370000000 HC RX 637 (ALT 250 FOR IP): Performed by: ORTHOPAEDIC SURGERY

## 2023-05-13 PROCEDURE — 2580000003 HC RX 258: Performed by: INTERNAL MEDICINE

## 2023-05-13 PROCEDURE — 36415 COLL VENOUS BLD VENIPUNCTURE: CPT

## 2023-05-13 PROCEDURE — 2580000003 HC RX 258: Performed by: ORTHOPAEDIC SURGERY

## 2023-05-13 PROCEDURE — 97162 PT EVAL MOD COMPLEX 30 MIN: CPT

## 2023-05-13 PROCEDURE — 99232 SBSQ HOSP IP/OBS MODERATE 35: CPT | Performed by: INTERNAL MEDICINE

## 2023-05-13 PROCEDURE — 82962 GLUCOSE BLOOD TEST: CPT

## 2023-05-13 PROCEDURE — 80048 BASIC METABOLIC PNL TOTAL CA: CPT

## 2023-05-13 PROCEDURE — 97530 THERAPEUTIC ACTIVITIES: CPT

## 2023-05-13 PROCEDURE — 6360000002 HC RX W HCPCS: Performed by: INTERNAL MEDICINE

## 2023-05-13 PROCEDURE — 6360000002 HC RX W HCPCS: Performed by: ORTHOPAEDIC SURGERY

## 2023-05-13 PROCEDURE — 1100000003 HC PRIVATE W/ TELEMETRY

## 2023-05-13 PROCEDURE — 6370000000 HC RX 637 (ALT 250 FOR IP): Performed by: HOSPITALIST

## 2023-05-13 PROCEDURE — 6360000002 HC RX W HCPCS: Performed by: STUDENT IN AN ORGANIZED HEALTH CARE EDUCATION/TRAINING PROGRAM

## 2023-05-13 PROCEDURE — 97165 OT EVAL LOW COMPLEX 30 MIN: CPT

## 2023-05-13 PROCEDURE — 6370000000 HC RX 637 (ALT 250 FOR IP): Performed by: STUDENT IN AN ORGANIZED HEALTH CARE EDUCATION/TRAINING PROGRAM

## 2023-05-13 RX ORDER — SODIUM CHLORIDE 450 MG/100ML
INJECTION, SOLUTION INTRAVENOUS CONTINUOUS
Status: DISCONTINUED | OUTPATIENT
Start: 2023-05-13 | End: 2023-05-13

## 2023-05-13 RX ORDER — ONDANSETRON 2 MG/ML
4 INJECTION INTRAMUSCULAR; INTRAVENOUS EVERY 6 HOURS PRN
Status: DISCONTINUED | OUTPATIENT
Start: 2023-05-13 | End: 2023-05-15 | Stop reason: HOSPADM

## 2023-05-13 RX ORDER — BISACODYL 5 MG/1
5 TABLET, DELAYED RELEASE ORAL DAILY
Status: DISCONTINUED | OUTPATIENT
Start: 2023-05-13 | End: 2023-05-15 | Stop reason: HOSPADM

## 2023-05-13 RX ORDER — OXYCODONE HYDROCHLORIDE 5 MG/1
5 TABLET ORAL EVERY 6 HOURS PRN
Qty: 28 TABLET | Refills: 0 | Status: SHIPPED | OUTPATIENT
Start: 2023-05-13 | End: 2023-05-15 | Stop reason: SDUPTHER

## 2023-05-13 RX ORDER — TAMSULOSIN HYDROCHLORIDE 0.4 MG/1
0.4 CAPSULE ORAL ONCE
Status: COMPLETED | OUTPATIENT
Start: 2023-05-13 | End: 2023-05-13

## 2023-05-13 RX ORDER — ONDANSETRON 4 MG/1
4 TABLET, ORALLY DISINTEGRATING ORAL EVERY 8 HOURS PRN
Status: DISCONTINUED | OUTPATIENT
Start: 2023-05-13 | End: 2023-05-15 | Stop reason: HOSPADM

## 2023-05-13 RX ORDER — SODIUM CHLORIDE 9 MG/ML
INJECTION, SOLUTION INTRAVENOUS CONTINUOUS
Status: DISPENSED | OUTPATIENT
Start: 2023-05-13 | End: 2023-05-13

## 2023-05-13 RX ORDER — FAMOTIDINE 20 MG/1
20 TABLET, FILM COATED ORAL 2 TIMES DAILY
Status: DISCONTINUED | OUTPATIENT
Start: 2023-05-13 | End: 2023-05-15 | Stop reason: HOSPADM

## 2023-05-13 RX ADMIN — HYDROMORPHONE HYDROCHLORIDE 0.5 MG: 1 INJECTION, SOLUTION INTRAMUSCULAR; INTRAVENOUS; SUBCUTANEOUS at 19:46

## 2023-05-13 RX ADMIN — WATER 2000 MG: 1 INJECTION INTRAMUSCULAR; INTRAVENOUS; SUBCUTANEOUS at 04:33

## 2023-05-13 RX ADMIN — SODIUM CHLORIDE: 4.5 INJECTION, SOLUTION INTRAVENOUS at 03:26

## 2023-05-13 RX ADMIN — CYCLOBENZAPRINE 10 MG: 10 TABLET, FILM COATED ORAL at 16:40

## 2023-05-13 RX ADMIN — HYDROCODONE BITARTRATE AND ACETAMINOPHEN 1 TABLET: 5; 325 TABLET ORAL at 17:40

## 2023-05-13 RX ADMIN — INSULIN GLARGINE 20 UNITS: 100 INJECTION, SOLUTION SUBCUTANEOUS at 21:11

## 2023-05-13 RX ADMIN — FAMOTIDINE 20 MG: 20 TABLET ORAL at 09:11

## 2023-05-13 RX ADMIN — HYDROMORPHONE HYDROCHLORIDE 0.5 MG: 1 INJECTION, SOLUTION INTRAMUSCULAR; INTRAVENOUS; SUBCUTANEOUS at 11:34

## 2023-05-13 RX ADMIN — WATER 2000 MG: 1 INJECTION INTRAMUSCULAR; INTRAVENOUS; SUBCUTANEOUS at 21:07

## 2023-05-13 RX ADMIN — ACETAMINOPHEN 325MG 650 MG: 325 TABLET ORAL at 06:32

## 2023-05-13 RX ADMIN — HYDROMORPHONE HYDROCHLORIDE 0.5 MG: 1 INJECTION, SOLUTION INTRAMUSCULAR; INTRAVENOUS; SUBCUTANEOUS at 23:39

## 2023-05-13 RX ADMIN — POLYETHYLENE GLYCOL 3350 17 G: 17 POWDER, FOR SOLUTION ORAL at 09:34

## 2023-05-13 RX ADMIN — HYDROMORPHONE HYDROCHLORIDE 0.5 MG: 1 INJECTION, SOLUTION INTRAMUSCULAR; INTRAVENOUS; SUBCUTANEOUS at 15:57

## 2023-05-13 RX ADMIN — SODIUM CHLORIDE, PRESERVATIVE FREE 10 ML: 5 INJECTION INTRAVENOUS at 09:34

## 2023-05-13 RX ADMIN — ASPIRIN 81 MG: 81 TABLET, COATED ORAL at 09:11

## 2023-05-13 RX ADMIN — TAMSULOSIN HYDROCHLORIDE 0.4 MG: 0.4 CAPSULE ORAL at 03:39

## 2023-05-13 RX ADMIN — GABAPENTIN 600 MG: 300 CAPSULE ORAL at 21:12

## 2023-05-13 RX ADMIN — ATORVASTATIN CALCIUM 20 MG: 20 TABLET, FILM COATED ORAL at 21:12

## 2023-05-13 RX ADMIN — FAMOTIDINE 20 MG: 20 TABLET ORAL at 21:12

## 2023-05-13 RX ADMIN — SODIUM CHLORIDE, PRESERVATIVE FREE 5 ML: 5 INJECTION INTRAVENOUS at 21:13

## 2023-05-13 RX ADMIN — WATER 2000 MG: 1 INJECTION INTRAMUSCULAR; INTRAVENOUS; SUBCUTANEOUS at 11:35

## 2023-05-13 RX ADMIN — AMLODIPINE BESYLATE 5 MG: 5 TABLET ORAL at 09:12

## 2023-05-13 RX ADMIN — SODIUM CHLORIDE: 900 INJECTION, SOLUTION INTRAVENOUS at 11:35

## 2023-05-13 RX ADMIN — HYDROMORPHONE HYDROCHLORIDE 1 MG: 1 INJECTION, SOLUTION INTRAMUSCULAR; INTRAVENOUS; SUBCUTANEOUS at 03:36

## 2023-05-13 RX ADMIN — HYDROCODONE BITARTRATE AND ACETAMINOPHEN 1 TABLET: 5; 325 TABLET ORAL at 09:33

## 2023-05-13 RX ADMIN — GABAPENTIN 600 MG: 300 CAPSULE ORAL at 13:40

## 2023-05-13 RX ADMIN — CHOLECALCIFEROL TAB 25 MCG (1000 UNIT) 2000 UNITS: 25 TAB at 09:11

## 2023-05-13 RX ADMIN — BISACODYL 5 MG: 5 TABLET, COATED ORAL at 09:11

## 2023-05-13 RX ADMIN — GABAPENTIN 600 MG: 300 CAPSULE ORAL at 09:11

## 2023-05-13 RX ADMIN — HYDROMORPHONE HYDROCHLORIDE 1 MG: 1 INJECTION, SOLUTION INTRAMUSCULAR; INTRAVENOUS; SUBCUTANEOUS at 07:44

## 2023-05-13 RX ADMIN — ACETAMINOPHEN 325MG 650 MG: 325 TABLET ORAL at 13:40

## 2023-05-13 RX ADMIN — ACETAMINOPHEN 325MG 650 MG: 325 TABLET ORAL at 23:31

## 2023-05-13 ASSESSMENT — PAIN DESCRIPTION - DESCRIPTORS
DESCRIPTORS: SHARP
DESCRIPTORS: ACHING;SHARP
DESCRIPTORS: SHARP;SPASM
DESCRIPTORS: ACHING
DESCRIPTORS: ACHING
DESCRIPTORS: ACHING;THROBBING
DESCRIPTORS: ACHING;THROBBING
DESCRIPTORS: ACHING
DESCRIPTORS: ACHING;THROBBING

## 2023-05-13 ASSESSMENT — PAIN DESCRIPTION - ORIENTATION
ORIENTATION: MID;LOWER
ORIENTATION: MID;LOWER
ORIENTATION: LOWER;MID
ORIENTATION: MID;LOWER
ORIENTATION: LOWER;MID
ORIENTATION: MID;LOWER
ORIENTATION: LOWER;MID
ORIENTATION: MID;LOWER

## 2023-05-13 ASSESSMENT — PAIN DESCRIPTION - LOCATION
LOCATION: BACK

## 2023-05-13 ASSESSMENT — PAIN SCALES - GENERAL
PAINLEVEL_OUTOF10: 0
PAINLEVEL_OUTOF10: 6
PAINLEVEL_OUTOF10: 2
PAINLEVEL_OUTOF10: 0
PAINLEVEL_OUTOF10: 6
PAINLEVEL_OUTOF10: 10
PAINLEVEL_OUTOF10: 6
PAINLEVEL_OUTOF10: 10
PAINLEVEL_OUTOF10: 10
PAINLEVEL_OUTOF10: 7
PAINLEVEL_OUTOF10: 0
PAINLEVEL_OUTOF10: 7
PAINLEVEL_OUTOF10: 8

## 2023-05-13 ASSESSMENT — PAIN - FUNCTIONAL ASSESSMENT
PAIN_FUNCTIONAL_ASSESSMENT: INTOLERABLE, UNABLE TO DO ANY ACTIVE OR PASSIVE ACTIVITIES

## 2023-05-14 LAB
ANION GAP SERPL CALC-SCNC: 5 MMOL/L (ref 3–18)
BUN SERPL-MCNC: 20 MG/DL (ref 7–18)
BUN/CREAT SERPL: 20 (ref 12–20)
CALCIUM SERPL-MCNC: 8.8 MG/DL (ref 8.5–10.1)
CHLORIDE SERPL-SCNC: 102 MMOL/L (ref 100–111)
CO2 SERPL-SCNC: 28 MMOL/L (ref 21–32)
CREAT SERPL-MCNC: 1 MG/DL (ref 0.6–1.3)
GLUCOSE BLD STRIP.AUTO-MCNC: 201 MG/DL (ref 70–110)
GLUCOSE SERPL-MCNC: 128 MG/DL (ref 74–99)
MAGNESIUM SERPL-MCNC: 2 MG/DL (ref 1.6–2.6)
POTASSIUM SERPL-SCNC: 4.1 MMOL/L (ref 3.5–5.5)
SODIUM SERPL-SCNC: 135 MMOL/L (ref 136–145)

## 2023-05-14 PROCEDURE — 6370000000 HC RX 637 (ALT 250 FOR IP): Performed by: HOSPITALIST

## 2023-05-14 PROCEDURE — 6370000000 HC RX 637 (ALT 250 FOR IP): Performed by: STUDENT IN AN ORGANIZED HEALTH CARE EDUCATION/TRAINING PROGRAM

## 2023-05-14 PROCEDURE — 2580000003 HC RX 258: Performed by: ORTHOPAEDIC SURGERY

## 2023-05-14 PROCEDURE — 6370000000 HC RX 637 (ALT 250 FOR IP): Performed by: INTERNAL MEDICINE

## 2023-05-14 PROCEDURE — 6360000002 HC RX W HCPCS: Performed by: ORTHOPAEDIC SURGERY

## 2023-05-14 PROCEDURE — 6360000002 HC RX W HCPCS: Performed by: INTERNAL MEDICINE

## 2023-05-14 PROCEDURE — 1100000003 HC PRIVATE W/ TELEMETRY

## 2023-05-14 PROCEDURE — 6370000000 HC RX 637 (ALT 250 FOR IP): Performed by: ORTHOPAEDIC SURGERY

## 2023-05-14 PROCEDURE — 80048 BASIC METABOLIC PNL TOTAL CA: CPT

## 2023-05-14 PROCEDURE — 2580000003 HC RX 258: Performed by: STUDENT IN AN ORGANIZED HEALTH CARE EDUCATION/TRAINING PROGRAM

## 2023-05-14 PROCEDURE — 82962 GLUCOSE BLOOD TEST: CPT

## 2023-05-14 PROCEDURE — 99232 SBSQ HOSP IP/OBS MODERATE 35: CPT | Performed by: INTERNAL MEDICINE

## 2023-05-14 PROCEDURE — 83735 ASSAY OF MAGNESIUM: CPT

## 2023-05-14 PROCEDURE — 36415 COLL VENOUS BLD VENIPUNCTURE: CPT

## 2023-05-14 RX ORDER — BISACODYL 5 MG/1
5 TABLET, DELAYED RELEASE ORAL ONCE
Status: COMPLETED | OUTPATIENT
Start: 2023-05-14 | End: 2023-05-14

## 2023-05-14 RX ORDER — SENNA AND DOCUSATE SODIUM 50; 8.6 MG/1; MG/1
2 TABLET, FILM COATED ORAL DAILY
Status: DISCONTINUED | OUTPATIENT
Start: 2023-05-14 | End: 2023-05-15 | Stop reason: HOSPADM

## 2023-05-14 RX ORDER — LISINOPRIL 5 MG/1
5 TABLET ORAL DAILY
Status: DISCONTINUED | OUTPATIENT
Start: 2023-05-15 | End: 2023-05-15 | Stop reason: HOSPADM

## 2023-05-14 RX ORDER — HYDROCODONE BITARTRATE AND ACETAMINOPHEN 5; 325 MG/1; MG/1
1 TABLET ORAL EVERY 4 HOURS PRN
Status: DISCONTINUED | OUTPATIENT
Start: 2023-05-14 | End: 2023-05-15 | Stop reason: HOSPADM

## 2023-05-14 RX ADMIN — ATORVASTATIN CALCIUM 20 MG: 20 TABLET, FILM COATED ORAL at 21:29

## 2023-05-14 RX ADMIN — HYDROCODONE BITARTRATE AND ACETAMINOPHEN 1 TABLET: 5; 325 TABLET ORAL at 03:13

## 2023-05-14 RX ADMIN — WATER 2000 MG: 1 INJECTION INTRAMUSCULAR; INTRAVENOUS; SUBCUTANEOUS at 05:02

## 2023-05-14 RX ADMIN — ACETAMINOPHEN 325MG 650 MG: 325 TABLET ORAL at 06:33

## 2023-05-14 RX ADMIN — ACETAMINOPHEN 325MG 650 MG: 325 TABLET ORAL at 21:29

## 2023-05-14 RX ADMIN — WATER 2000 MG: 1 INJECTION INTRAMUSCULAR; INTRAVENOUS; SUBCUTANEOUS at 21:12

## 2023-05-14 RX ADMIN — WATER 2000 MG: 1 INJECTION INTRAMUSCULAR; INTRAVENOUS; SUBCUTANEOUS at 12:26

## 2023-05-14 RX ADMIN — DOCUSATE SODIUM 50MG AND SENNOSIDES 8.6MG 2 TABLET: 8.6; 5 TABLET, FILM COATED ORAL at 12:27

## 2023-05-14 RX ADMIN — BISACODYL 5 MG: 5 TABLET, COATED ORAL at 12:27

## 2023-05-14 RX ADMIN — GABAPENTIN 600 MG: 300 CAPSULE ORAL at 21:29

## 2023-05-14 RX ADMIN — BISACODYL 5 MG: 5 TABLET, COATED ORAL at 09:48

## 2023-05-14 RX ADMIN — POLYETHYLENE GLYCOL 3350 17 G: 17 POWDER, FOR SOLUTION ORAL at 09:50

## 2023-05-14 RX ADMIN — HYDROCODONE BITARTRATE AND ACETAMINOPHEN 1 TABLET: 5; 325 TABLET ORAL at 20:01

## 2023-05-14 RX ADMIN — INSULIN GLARGINE 20 UNITS: 100 INJECTION, SOLUTION SUBCUTANEOUS at 21:43

## 2023-05-14 RX ADMIN — HYDROCODONE BITARTRATE AND ACETAMINOPHEN 1 TABLET: 5; 325 TABLET ORAL at 12:27

## 2023-05-14 RX ADMIN — HYDROMORPHONE HYDROCHLORIDE 0.5 MG: 1 INJECTION, SOLUTION INTRAMUSCULAR; INTRAVENOUS; SUBCUTANEOUS at 09:47

## 2023-05-14 RX ADMIN — ASPIRIN 81 MG: 81 TABLET, COATED ORAL at 09:48

## 2023-05-14 RX ADMIN — ACETAMINOPHEN 325MG 650 MG: 325 TABLET ORAL at 15:28

## 2023-05-14 RX ADMIN — GABAPENTIN 600 MG: 300 CAPSULE ORAL at 14:00

## 2023-05-14 RX ADMIN — FAMOTIDINE 20 MG: 20 TABLET ORAL at 21:29

## 2023-05-14 RX ADMIN — CHOLECALCIFEROL TAB 25 MCG (1000 UNIT) 2000 UNITS: 25 TAB at 09:48

## 2023-05-14 RX ADMIN — GABAPENTIN 600 MG: 300 CAPSULE ORAL at 09:47

## 2023-05-14 RX ADMIN — SODIUM CHLORIDE, PRESERVATIVE FREE 5 ML: 5 INJECTION INTRAVENOUS at 21:17

## 2023-05-14 RX ADMIN — AMLODIPINE BESYLATE 5 MG: 5 TABLET ORAL at 09:48

## 2023-05-14 RX ADMIN — FAMOTIDINE 20 MG: 20 TABLET ORAL at 09:48

## 2023-05-14 RX ADMIN — CYCLOBENZAPRINE 10 MG: 10 TABLET, FILM COATED ORAL at 15:29

## 2023-05-14 ASSESSMENT — PAIN DESCRIPTION - LOCATION
LOCATION: BACK

## 2023-05-14 ASSESSMENT — PAIN DESCRIPTION - ORIENTATION
ORIENTATION: MID
ORIENTATION: MID;LOWER
ORIENTATION: MID
ORIENTATION: LOWER;MID

## 2023-05-14 ASSESSMENT — PAIN SCALES - GENERAL
PAINLEVEL_OUTOF10: 0
PAINLEVEL_OUTOF10: 2
PAINLEVEL_OUTOF10: 10
PAINLEVEL_OUTOF10: 6
PAINLEVEL_OUTOF10: 8
PAINLEVEL_OUTOF10: 0
PAINLEVEL_OUTOF10: 6

## 2023-05-14 ASSESSMENT — PAIN DESCRIPTION - DESCRIPTORS
DESCRIPTORS: ACHING;THROBBING
DESCRIPTORS: ACHING
DESCRIPTORS: ACHING
DESCRIPTORS: ACHING;THROBBING

## 2023-05-14 ASSESSMENT — PAIN - FUNCTIONAL ASSESSMENT
PAIN_FUNCTIONAL_ASSESSMENT: PREVENTS OR INTERFERES WITH MANY ACTIVE NOT PASSIVE ACTIVITIES
PAIN_FUNCTIONAL_ASSESSMENT: PREVENTS OR INTERFERES WITH MANY ACTIVE NOT PASSIVE ACTIVITIES

## 2023-05-15 VITALS
OXYGEN SATURATION: 97 % | TEMPERATURE: 98.1 F | SYSTOLIC BLOOD PRESSURE: 133 MMHG | BODY MASS INDEX: 31.5 KG/M2 | RESPIRATION RATE: 18 BRPM | WEIGHT: 225 LBS | HEIGHT: 71 IN | DIASTOLIC BLOOD PRESSURE: 91 MMHG | HEART RATE: 99 BPM

## 2023-05-15 PROBLEM — N17.9 ACUTE KIDNEY INJURY (HCC): Status: ACTIVE | Noted: 2023-05-15

## 2023-05-15 LAB
ANION GAP SERPL CALC-SCNC: 8 MMOL/L (ref 3–18)
BACTERIA SPEC CULT: NORMAL
BACTERIA SPEC CULT: NORMAL
BUN SERPL-MCNC: 20 MG/DL (ref 7–18)
BUN/CREAT SERPL: 22 (ref 12–20)
CALCIUM SERPL-MCNC: 9.1 MG/DL (ref 8.5–10.1)
CHLORIDE SERPL-SCNC: 101 MMOL/L (ref 100–111)
CO2 SERPL-SCNC: 28 MMOL/L (ref 21–32)
CREAT SERPL-MCNC: 0.9 MG/DL (ref 0.6–1.3)
GLUCOSE BLD STRIP.AUTO-MCNC: 162 MG/DL (ref 70–110)
GLUCOSE BLD STRIP.AUTO-MCNC: 235 MG/DL (ref 70–110)
GLUCOSE SERPL-MCNC: 132 MG/DL (ref 74–99)
POTASSIUM SERPL-SCNC: 4.4 MMOL/L (ref 3.5–5.5)
SERVICE CMNT-IMP: NORMAL
SERVICE CMNT-IMP: NORMAL
SODIUM SERPL-SCNC: 137 MMOL/L (ref 136–145)

## 2023-05-15 PROCEDURE — 36415 COLL VENOUS BLD VENIPUNCTURE: CPT

## 2023-05-15 PROCEDURE — 82962 GLUCOSE BLOOD TEST: CPT

## 2023-05-15 PROCEDURE — 6370000000 HC RX 637 (ALT 250 FOR IP): Performed by: INTERNAL MEDICINE

## 2023-05-15 PROCEDURE — 99239 HOSP IP/OBS DSCHRG MGMT >30: CPT | Performed by: INTERNAL MEDICINE

## 2023-05-15 PROCEDURE — 6360000002 HC RX W HCPCS: Performed by: ORTHOPAEDIC SURGERY

## 2023-05-15 PROCEDURE — 6370000000 HC RX 637 (ALT 250 FOR IP): Performed by: HOSPITALIST

## 2023-05-15 PROCEDURE — 2580000003 HC RX 258: Performed by: ORTHOPAEDIC SURGERY

## 2023-05-15 PROCEDURE — 6370000000 HC RX 637 (ALT 250 FOR IP): Performed by: ORTHOPAEDIC SURGERY

## 2023-05-15 PROCEDURE — 6370000000 HC RX 637 (ALT 250 FOR IP): Performed by: STUDENT IN AN ORGANIZED HEALTH CARE EDUCATION/TRAINING PROGRAM

## 2023-05-15 PROCEDURE — 2580000003 HC RX 258: Performed by: STUDENT IN AN ORGANIZED HEALTH CARE EDUCATION/TRAINING PROGRAM

## 2023-05-15 PROCEDURE — 80048 BASIC METABOLIC PNL TOTAL CA: CPT

## 2023-05-15 RX ORDER — AMLODIPINE BESYLATE 5 MG/1
5 TABLET ORAL DAILY
Qty: 30 TABLET | Refills: 0 | Status: SHIPPED | OUTPATIENT
Start: 2023-05-16

## 2023-05-15 RX ORDER — OXYCODONE HYDROCHLORIDE 5 MG/1
5 TABLET ORAL EVERY 6 HOURS PRN
Qty: 28 TABLET | Refills: 0 | Status: SHIPPED | OUTPATIENT
Start: 2023-05-15 | End: 2023-05-22

## 2023-05-15 RX ORDER — SENNA AND DOCUSATE SODIUM 50; 8.6 MG/1; MG/1
1 TABLET, FILM COATED ORAL DAILY
Qty: 15 TABLET | Refills: 0 | Status: SHIPPED | OUTPATIENT
Start: 2023-05-16

## 2023-05-15 RX ORDER — LISINOPRIL 10 MG/1
10 TABLET ORAL DAILY
Qty: 30 TABLET | Refills: 0 | Status: SHIPPED | OUTPATIENT
Start: 2023-05-15

## 2023-05-15 RX ADMIN — HYDROCODONE BITARTRATE AND ACETAMINOPHEN 1 TABLET: 5; 325 TABLET ORAL at 03:51

## 2023-05-15 RX ADMIN — BISACODYL 5 MG: 5 TABLET, COATED ORAL at 08:20

## 2023-05-15 RX ADMIN — CHOLECALCIFEROL TAB 25 MCG (1000 UNIT) 2000 UNITS: 25 TAB at 08:21

## 2023-05-15 RX ADMIN — AMLODIPINE BESYLATE 5 MG: 5 TABLET ORAL at 08:19

## 2023-05-15 RX ADMIN — HYDROCODONE BITARTRATE AND ACETAMINOPHEN 1 TABLET: 5; 325 TABLET ORAL at 12:02

## 2023-05-15 RX ADMIN — WATER 2000 MG: 1 INJECTION INTRAMUSCULAR; INTRAVENOUS; SUBCUTANEOUS at 04:51

## 2023-05-15 RX ADMIN — HYDROCODONE BITARTRATE AND ACETAMINOPHEN 1 TABLET: 5; 325 TABLET ORAL at 00:12

## 2023-05-15 RX ADMIN — DOCUSATE SODIUM 50MG AND SENNOSIDES 8.6MG 2 TABLET: 8.6; 5 TABLET, FILM COATED ORAL at 08:18

## 2023-05-15 RX ADMIN — GABAPENTIN 600 MG: 300 CAPSULE ORAL at 08:18

## 2023-05-15 RX ADMIN — FAMOTIDINE 20 MG: 20 TABLET ORAL at 08:19

## 2023-05-15 RX ADMIN — CYCLOBENZAPRINE 10 MG: 10 TABLET, FILM COATED ORAL at 12:02

## 2023-05-15 RX ADMIN — GABAPENTIN 600 MG: 300 CAPSULE ORAL at 14:25

## 2023-05-15 RX ADMIN — INSULIN LISPRO 2 UNITS: 100 INJECTION, SOLUTION INTRAVENOUS; SUBCUTANEOUS at 08:20

## 2023-05-15 RX ADMIN — POLYETHYLENE GLYCOL 3350 17 G: 17 POWDER, FOR SOLUTION ORAL at 08:19

## 2023-05-15 RX ADMIN — ACETAMINOPHEN 325MG 325 MG: 325 TABLET ORAL at 14:26

## 2023-05-15 RX ADMIN — SODIUM CHLORIDE, PRESERVATIVE FREE 10 ML: 5 INJECTION INTRAVENOUS at 08:22

## 2023-05-15 RX ADMIN — ACETAMINOPHEN 325MG 650 MG: 325 TABLET ORAL at 06:56

## 2023-05-15 RX ADMIN — HYDROCODONE BITARTRATE AND ACETAMINOPHEN 1 TABLET: 5; 325 TABLET ORAL at 08:17

## 2023-05-15 RX ADMIN — LISINOPRIL 5 MG: 5 TABLET ORAL at 08:18

## 2023-05-15 RX ADMIN — WATER 2000 MG: 1 INJECTION INTRAMUSCULAR; INTRAVENOUS; SUBCUTANEOUS at 12:01

## 2023-05-15 RX ADMIN — ASPIRIN 81 MG: 81 TABLET, COATED ORAL at 08:18

## 2023-05-15 ASSESSMENT — PAIN DESCRIPTION - ORIENTATION
ORIENTATION: LOWER;MID
ORIENTATION: LOWER;MID
ORIENTATION: MID
ORIENTATION: MID;LOWER
ORIENTATION: MID;LOWER

## 2023-05-15 ASSESSMENT — PAIN DESCRIPTION - LOCATION
LOCATION: BACK

## 2023-05-15 ASSESSMENT — PAIN DESCRIPTION - DESCRIPTORS
DESCRIPTORS: ACHING;THROBBING
DESCRIPTORS: SPASM;SHARP
DESCRIPTORS: ACHING;THROBBING
DESCRIPTORS: ACHING;SPASM

## 2023-05-15 ASSESSMENT — PAIN SCALES - GENERAL
PAINLEVEL_OUTOF10: 7
PAINLEVEL_OUTOF10: 6
PAINLEVEL_OUTOF10: 0
PAINLEVEL_OUTOF10: 8
PAINLEVEL_OUTOF10: 6
PAINLEVEL_OUTOF10: 8
PAINLEVEL_OUTOF10: 0

## 2023-05-15 NOTE — CARE COORDINATION
DENISE spoke with Alber Martini with [facility], said they can take patient. CM put patient in the queue for [facility].              Rosco Boast, RN  Case Management [phone]

## 2023-05-15 NOTE — HOME CARE
Received the following HH orders - SN / PT / OT. Orders noted and entered per colleauge BW. And sent to central intake and scheduling.

## 2023-05-15 NOTE — CASE COMMUNICATION
CM spoke with patient, [facility] verbal consent given for [facility]. Patient said he already has a Rolling Walker at home, and patient said he roberts not need a Bedside Commode. Patient's friend will be transporting patient home at time of discharge. CM called Alber Garnett and Charlene with [facility], CM received voicemail, CM left a message to see if they can accept patient . CM left phone number for a return call.                Dana Severs, RANDY  Case Management [phone]

## 2023-05-15 NOTE — DISCHARGE SUMMARY
decided to proceed for surgery. 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 Patient underwent IVC filter placement followed by L2-L3 laminectomy with extension of lumbar fusion L2 S1 TLIF L2-L3 and posterior fusion L2-L3. During hospital course, patient had renal failure which was managed with IV fluid and his home dose of lisinopril was reduced from 40 to 10 mg and was also added on Norvasc for better blood pressure control. Patient was continue his home medication for other comorbidities. He was seen by PT OT recommended home health care. He was seen by spine specialist on the day of discharge and cleared for the discharge on the following medications today. Physical examination:    Please refer to my progress note from 5/15/23 for further detail    Consults:    Treatment Team: Attending Provider: Patricia Urena MD; Consulting Physician: Sree Smith MD; Utilization Reviewer: Td Vale RN; Surgeon: Sree Smith MD; Registered Nurse: Britni Canela RN; Occupational Therapist Assistant: RIGO Blanc    Significant Diagnostic Studies:     CT LUMBAR SPINE WO CONTRAST    Result Date: 5/9/2023  1. Visualization of the L2-3 right ventral epidural structure is largely obscured by artifact. There is however no associated air or calcific attenuations and this is suspected to represent a new large caudally migrating disc extrusion with neural compression. 2.  Asymmetric soft tissue effacement of the left L5-S1 lateral recess, stable and largely representing scar tissue by MRI. 3.  L3-S1 circumferential fusion with no evidence of prosthetic lucency or fracture. Of note, the left L4 pedicle screw breaches the lateral cortex of the pedicle. 4.  Grade 1 retrolisthesis of L2 on L3. MRI LUMBAR SPINE W WO CONTRAST    Result Date: 5/10/2023  1. Posterior L3-S1 fusion.  Progression of degenerative disc disease at

## 2023-05-15 NOTE — CARE COORDINATION
Discharge order noted for today. Pt has been accepted to [facility] agency. Spoke with patient and he is agreeable to the transition plan today. Transport has been arranged through patient's friend. Patient's home health  orders have been forwarded to [facility] home health  agency via [address]   Discharge information has been documented on the AVS.         Aisha Bullard RN  Case Management [phone]

## 2023-05-15 NOTE — PLAN OF CARE
Problem: Safety - Adult  Goal: Free from fall injury  5/15/2023 1041 by Britni Canela RN  Outcome: Progressing  5/15/2023 0053 by Amber Linares RN  Outcome: Progressing     Problem: Pain  Goal: Verbalizes/displays adequate comfort level or baseline comfort level  5/15/2023 1041 by Britni Canela RN  Outcome: Progressing  5/15/2023 0053 by Amber Linares RN  Outcome: Progressing     Problem: Chronic Conditions and Co-morbidities  Goal: Patient's chronic conditions and co-morbidity symptoms are monitored and maintained or improved  5/15/2023 0053 by Amber Linares RN  Outcome: Progressing  Flowsheets (Taken 5/14/2023 1954)  Care Plan - Patient's Chronic Conditions and Co-Morbidity Symptoms are Monitored and Maintained or Improved:   Monitor and assess patient's chronic conditions and comorbid symptoms for stability, deterioration, or improvement   Collaborate with multidisciplinary team to address chronic and comorbid conditions and prevent exacerbation or deterioration   Update acute care plan with appropriate goals if chronic or comorbid symptoms are exacerbated and prevent overall improvement and discharge

## 2023-05-16 ENCOUNTER — HOME CARE VISIT (OUTPATIENT)
Age: 63
End: 2023-05-16
Payer: COMMERCIAL

## 2023-05-16 VITALS
HEART RATE: 98 BPM | OXYGEN SATURATION: 98 % | RESPIRATION RATE: 20 BRPM | DIASTOLIC BLOOD PRESSURE: 60 MMHG | SYSTOLIC BLOOD PRESSURE: 90 MMHG | TEMPERATURE: 97.3 F

## 2023-05-16 PROCEDURE — G0151 HHCP-SERV OF PT,EA 15 MIN: HCPCS

## 2023-05-16 ASSESSMENT — ENCOUNTER SYMPTOMS
PAIN LOCATION - PAIN QUALITY: STABBING
CONSTIPATION: 1

## 2023-05-16 NOTE — HOME HEALTH
prior to this. LIVING SITUATION:  He lives alone in a 2 story home, staying on 1st floor, has CG intermittantly to assist malka, friends. CAREGIVER INVOLVEMENT/ASSISTANCE NEEDED FOR:  Shopping, household chores, meal prep, transportation, ADL assist.  PLOF:  Independent. MEDICATIONS RECONCILED AND UPDATED AS FOLLOWS: No issues. High risk medication teaching regarding anticoagulants, hyperglycemic agents or opioid narcotics performed for: Opiod ad antiplatelet. Instructed patient on Opiod: oxycodone:  ed re: sedation, fall risk and constipation risks. Aspirin antiplatelet and bleeding risk. The following medication discrepancies/interactions were noted: none   NEXT MD APPT:  TBD. Patient/caregiver encouraged/instructed to keep appointment as lack of follow through with physician appointment could result in discontinuation of home care services for non-compliance. .  ROM:  BLEs WFL  STRENGTH: BLE weakness associated with back pain:  3-/5 hips, 3/5 knees, see PT ROM for details. WOUNDS:  back. See wound addendum. BED MOBILITY: Used log roll technique and followed spinal precautions to get in and out of bed. TRANSFERS: Uses hand support and raised surfaces for seating:  commode raised over toilet, bed height raised and chair on chica raised for ease of teansfer with minimal use of hands. GAIT: No AD. Gait characterized by Reciprocal pattern, short step lengths, moves somewhat caiutiously. STAIRS:  He is not going to 2nd floor at this time. BALANCE:  Pt scored 25/28 on Tinetti Balance Assessment placing patient at lpw risk for falls. PATIENT RESPONSE TO TREATMENT:   Patient participated well and expressed his intention to stay active and follow HEP instructions. Ric Flores PATIENT EDUCATION PROVIDED THIS VISIT:  Home safety to include med precautions, HEP, walking, deep breathing, HIPAA, HHBOR     PATIENT RESPONSE TO EDUCATION PROVIDED: Verbalized understading. Ric Flores HEP consisting of:  1.  Walking every

## 2023-05-17 ENCOUNTER — HOME CARE VISIT (OUTPATIENT)
Age: 63
End: 2023-05-17
Payer: COMMERCIAL

## 2023-05-17 VITALS
TEMPERATURE: 97.8 F | DIASTOLIC BLOOD PRESSURE: 81 MMHG | OXYGEN SATURATION: 98 % | RESPIRATION RATE: 18 BRPM | SYSTOLIC BLOOD PRESSURE: 112 MMHG | HEART RATE: 97 BPM

## 2023-05-17 PROCEDURE — G0299 HHS/HOSPICE OF RN EA 15 MIN: HCPCS

## 2023-05-17 ASSESSMENT — ENCOUNTER SYMPTOMS
CONSTIPATION: 1
PAIN LOCATION - PAIN QUALITY: ACHING

## 2023-05-18 ENCOUNTER — HOME CARE VISIT (OUTPATIENT)
Age: 63
End: 2023-05-18
Payer: COMMERCIAL

## 2023-05-18 LAB — ECHO BSA: 2.26 M2

## 2023-05-18 NOTE — CASE COMMUNICATION
Patient is a 58 y/o male who was referred to home health for S/P L2-L3 Laminectomy with PMH of Diabetes and HTN. Patient is alert and oriented x4 with a pleasant and cooperative attitude. Initial assessment completed and Incision assessment completed. Incision is clean, dry, and intact with staples and covered by opsite. See wound addendum for description. Patient shows no signs or symptoms of infection currently. Respiratory rate regu lar, no sign of shortness of breath or cough, clear and bilateral lung fields. Heart rate regular, no chest pain or heart palpitations. Bowel sounds present x4, no  N/V/D/C or s/s of UTI. Medication rec performed and reviewed side effects, purposes, dosage, frequencies. Patient will be seen by nursing 1wk2, 2PRN.

## 2023-05-18 NOTE — HOME HEALTH
questions or concerns at this time. Sharps Education Provided: n/a    Patient response to procedure performed:  patient tolerated assessment with no signs of discomfort, grimacing or pain, no complications or concerns noted. Home exercise program/Homework provided: patient instructed to perform sob hep 4-5 x daily and prn for sob, to promote lung expansion. pt also encouraged to use ICS q 2 hours and to perform sob hep during therapy. Pt/Caregiver instructed on plan of care and are agreeable to plan of care at this time. Physician Dr. Felix Pena notified of patient admission to home health and plan of care including anticipated frequency of 1wk2, 2PRN and treatments/interventions/modalities of S/P L2-L3 Laminectomy. Discharge planning discussed with patient and caregiver. Discharge planning as follows: Patient will be discharged once education has completed, patient is medically stable and pt/cg are able to independently manage dressing changes/ incisional care. Pt/Caregiver did verbalize understanding of discharge planning. Next MD appointment 5/23/23 (date) with MD/NP/PA. Patient/caregiver encouraged/instructed to keep appointment as lack of follow through with physician appointment could result in discontinuation of home care services for non-compliance.

## 2023-05-18 NOTE — PROGRESS NOTES
Noted patient's operative dressing is soiled and with small amount of new drainage. Dr. Charli Dodd is informed. New order of PRN dressing change is received. 10:23 PM Patient refuses to have dressing change done. He states \"Do it later in the morning. \"
Physician Progress Note      PATIENT:               Emanuel Whitman  CSN #:                  [id number]  :                       1960  ADMIT DATE:       2023 1:54 PM  [address] DATE:        5/15/2023 3:39 PM  RESPONDING  PROVIDER #:        Macario Buck MD          QUERY TEXT:    Dear Dr. Ester Aguilera    Pt admitted with severe back pain with inability  to walk and underwent    previous   Lumbar  3/4 ;  4/5;  L5S1  Laminectomy  Fusion, transforaminal   lumbar interbody fusion on  22. If possible, please document in   progress notes and discharge summary the relationship if any between the :    The medical record reflects the following:  Risk Factors: previous  multilevel lumbar  decompression  and fusion. with    subsequent   wound infection. Clinical Indicators:  MRI lumbar spine demonstrates large herniated   sequestered disc junctional to his previous fusion. 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     Query created by: Kavitha Bailey on 2023 1:41 PM      Electronically signed by:  Macario Buck MD 2023 8:14 AM
Physician Progress Note      PATIENT:               Ijeoma Hand  CSN #:                  [id number]  :                       1960  ADMIT DATE:       2023 1:54 PM  [address] [place] DATE:        5/15/2023 3:39 PM  RESPONDING  PROVIDER #:        Tracee Reid MD          QUERY TEXT:    Dear Dr Rajendra Will    Pt admitted with intractable back pain  and he is  unable  to walk. Pt noted   to have new  right large disc  protrusion. If possible, please document in   progress notes and discharge summary the relationship, if any, between : The medical record reflects the following:  Risk Factors: previous  surgeries. Clinical Indicators: 5/10- 5/15 Hospitalist note: L2-L3 new large right disc   protrusion with severe central canal stenosis, thecal sac compression, right   lateral recess and descending nerve root compression  5/10 Ortho consult: His pain is now severe and he is unable to ambulate. No   numbness or tingling. No bowel or bladder dysfunction. MRI: There is significant progression of severe canal stenosis at L2-3,   previously mild to moderate on 1/3/2023. Treatment: Fusion;  resection  extruded  disc  herniation. Thank you,    Kenneth Arango RN   CCDS  Options provided:  -- pain,  inability to  walk possibly  due to  cauda  equina  syndrome  -- symptoms   unrelated to cauda  equina   syndrome  -- Other - I will add my own diagnosis  -- Disagree - Not applicable / Not valid  -- Disagree - Clinically unable to determine / Unknown  -- Refer to Clinical Documentation Reviewer    PROVIDER RESPONSE TEXT:    This patient has pain, inability to walk unrelated to cauda equina syndrome.     Query created by: Gali Ibarra on 2023 1:08 PM      Electronically signed by:  Tracee Reid MD 2023 1:14 PM
Physician Progress Note      PATIENT:               Rudy Fabian  CSN #:                  [id number]  :                       1960  ADMIT DATE:       2023 1:54 PM  [address] [place] DATE:        5/15/2023 3:39 PM  RESPONDING  PROVIDER #:        Leeann Blandon MD          QUERY TEXT:    Dear Dr. Odalis Beltrán    Pt admitted with severe back pain with inability  to walk and underwent    previous   Lumbar  3/4 ;  4/5;  L5S1  Laminectomy  Fusion, transforaminal   lumbar interbody fusion on  22. If possible, please document in   progress notes and discharge summary the relationship if any between the :    The medical record reflects the following:  Risk Factors: previous  multilevel lumbar  decompression  and fusion. with    subsequent   wound infection. Clinical Indicators:  MRI lumbar spine demonstrates large herniated   sequestered disc junctional to his previous fusion. Treatment:   L2-L3 laminectomy on right facetectomy; resection of extruded   disk herniation; L2-L3 transforaminal lumbar interbody fusion with expandable   titanium cage, [place] type; L2-L3 posterolateral fusion    Thank you,   Supriya Saeed RN   CCDS  Options provided:  -- HNP is due to the previous  spinal  surgery  -- HNP  is not due to   previous  spinal  surgery  -- Other - I will add my own diagnosis  -- Disagree - Not applicable / Not valid  -- Disagree - Clinically unable to determine / Unknown  -- Refer to Clinical Documentation Reviewer    PROVIDER RESPONSE TEXT:    This patient has HNP due to previous spinal surgery.     Query created by: Denise Singh on 2023 1:30 PM      Electronically signed by:  Leeann Blandon MD 2023 1:32 PM
Physician Progress Note      PATIENT:               Satish Peng  CSN #:                  [id number]  :                       1960  ADMIT DATE:       2023 1:54 PM  [address] [place] DATE:        5/15/2023 3:39 PM  RESPONDING  PROVIDER #:        Felix Fajardo MD          QUERY TEXT:    Dear DR Bertha Wilcox  or  Dr Reina Centeno;    Pt admitted s/p  spinal  surgery,  . Pt noted to have agitation,   combativeness, in recovery room,  requiring  IV  Ativan  x 2  and  Haldol IV x   1. If possible, please document in progress notes and discharge summary the   relationship:    The medical record reflects the following:  Risk Factors: post op  Clinical Indicators: per  recovery room nurse \" Agitated, anxious combative   emergence experienced noted from patient. Staff supporting patient yet patient   wants to get off of bed and yelling at the staff and saying get your hands   off of me\"  patient remains confused and agitated after anesthesia and myself spoke gently   to him  Treatment: Ativan 0.5mg IVP given  x 2 ; Haldol  1mg  IVP; Good response to Haldol IVP. Thank you,  Roseann Valdivia RN   CCDS  Options provided:  -- Toxic Encephalopathy due to Anesthesia  -- Toxic Encephalopathy due to Anesthesia  -- Delirium due to Anesthesia  -- Other - I will add my own diagnosis  -- Disagree - Not applicable / Not valid  -- Disagree - Clinically unable to determine / Unknown  -- Refer to Clinical Documentation Reviewer    PROVIDER RESPONSE TEXT:    This patient had Delirium due to Anesthesia.     Query created by: Cristina Dodd on 2023 1:53 PM      Electronically signed by:  Felix Fajardo MD 2023 1:58 PM
Received discharge prescription for patient at outpatient pharmacy. Patient has no copay. Please have patient  at outpatient pharmacy upon discharge.
Vss afeb  Neuro intact  Pain controlled  Pt ot  Dc when clears PT  Fu in office in two weeks  I believe likely to be able to go home today
SubCUTAneous Nightly    insulin lispro (HUMALOG) injection vial 0-8 Units  0-8 Units SubCUTAneous TID WC    insulin lispro (HUMALOG) injection vial 0-4 Units  0-4 Units SubCUTAneous Nightly       Labs:    Recent Results (from the past 24 hour(s))   POCT Glucose    Collection Time: 05/14/23  9:39 PM   Result Value Ref Range    POC Glucose 201 (H) 70 - 110 mg/dL   Basic Metabolic Panel    Collection Time: 05/15/23  3:24 AM   Result Value Ref Range    Sodium 137 136 - 145 mmol/L    Potassium 4.4 3.5 - 5.5 mmol/L    Chloride 101 100 - 111 mmol/L    CO2 28 21 - 32 mmol/L    Anion Gap 8 3.0 - 18 mmol/L    Glucose 132 (H) 74 - 99 mg/dL    BUN 20 (H) 7.0 - 18 MG/DL    Creatinine 0.90 0.6 - 1.3 MG/DL    Bun/Cre Ratio 22 (H) 12 - 20      Est, Glom Filt Rate >60 >60 ml/min/1.73m2    Calcium 9.1 8.5 - 10.1 MG/DL   POCT Glucose    Collection Time: 05/15/23  7:23 AM   Result Value Ref Range    POC Glucose 235 (H) 70 - 110 mg/dL       Signed By: Lester Coelho MD     May 15, 2023      Disclaimer: Sections of this note are dictated using utilizing voice recognition software. Minor typographical errors may be present. If questions arise, please do not hesitate to contact me or call our department.

## 2023-05-20 ENCOUNTER — HOME CARE VISIT (OUTPATIENT)
Age: 63
End: 2023-05-20
Payer: COMMERCIAL

## 2023-05-20 PROCEDURE — G0299 HHS/HOSPICE OF RN EA 15 MIN: HCPCS

## 2023-05-20 ASSESSMENT — ENCOUNTER SYMPTOMS
HEMOPTYSIS: 0
DYSPNEA ACTIVITY LEVEL: AFTER AMBULATING 10 - 20 FT

## 2023-05-21 VITALS
RESPIRATION RATE: 18 BRPM | SYSTOLIC BLOOD PRESSURE: 118 MMHG | TEMPERATURE: 98 F | HEART RATE: 76 BPM | DIASTOLIC BLOOD PRESSURE: 78 MMHG | OXYGEN SATURATION: 99 %

## 2023-05-21 ASSESSMENT — ENCOUNTER SYMPTOMS: PAIN LOCATION - PAIN QUALITY: DULL ACHE

## 2023-05-22 ENCOUNTER — HOME CARE VISIT (OUTPATIENT)
Age: 63
End: 2023-05-22

## 2023-05-22 PROCEDURE — G0300 HHS/HOSPICE OF LPN EA 15 MIN: HCPCS

## 2023-05-23 ENCOUNTER — HOME CARE VISIT (OUTPATIENT)
Age: 63
End: 2023-05-23

## 2023-05-23 PROCEDURE — G0151 HHCP-SERV OF PT,EA 15 MIN: HCPCS

## 2023-05-24 ASSESSMENT — ENCOUNTER SYMPTOMS: STOOL DESCRIPTION: FORMED

## 2023-05-24 NOTE — HOME HEALTH
SKILLED REASON for visit:    dressing chg /sn d/c  CAREGIVER INVOLVEMENT: ffriend/family is available as needed for assistance with iadls, adls, meal prep, medication management, taking to md appointments. MEDICATIONS: reviewed and all medications are available in the home this visit. Patient denies any medication changes at this time of assessment. EDUCATION PROVIDED: regarding medications, medication interactions, and look alike medications (specify): reviewed side effects, purposes, dosage, frequencies. High risk medication teaching regarding anticoagulants, hyperglycemic agents or opiod narcotics performed (specify) na Medications are effective at this time. SUPPLIES: by type and quantity ordered/delivered this visit include: n/a     PATIENT EDUCATION ON THIS VISIT:Instructed on some signs/symptoms of activity intolerance in response to physical activity, such as: shortness of breath and/or increased weakness, among others. PATIENT LEVEL OF UNDERSTANDING: patient/cg has a partial understanding of education that was provided at this time by engaging in all education provided and is able to verbalize understanding, pt denies any questions or concerns at this time. SKILLED CARE PERFORMED THIS VISIT- dressing change     PATIENT RESPONSE TO PROCEDURE PERFORMED: patient tolerated procedure with no signs of discomfort, grimacing or pain, no complications or concerns noted. Agency Progress toward goals: goals/teaching reviewed. patient is progressing towards goals at this time,   Patient's Progress towards personal goals: pt reporting they are progressing towards their goals at this time.  Home exercise program: HEP deep breathing exercises Continued need for the following skills: Nursing dc for next visit: dc     Patient and/or caregiver notified and agrees to changes in the Plan of Care NA The following discharge planning was discussed with the pt/caregiver: Patient will be discharged once education has

## 2023-05-25 VITALS
TEMPERATURE: 97.7 F | HEART RATE: 63 BPM | SYSTOLIC BLOOD PRESSURE: 120 MMHG | OXYGEN SATURATION: 97 % | RESPIRATION RATE: 16 BRPM | DIASTOLIC BLOOD PRESSURE: 66 MMHG

## 2023-05-25 ASSESSMENT — ENCOUNTER SYMPTOMS: PAIN LOCATION - PAIN QUALITY: DULL

## 2023-05-26 VITALS
DIASTOLIC BLOOD PRESSURE: 50 MMHG | HEART RATE: 100 BPM | RESPIRATION RATE: 18 BRPM | TEMPERATURE: 98.1 F | OXYGEN SATURATION: 98 % | SYSTOLIC BLOOD PRESSURE: 108 MMHG

## 2023-05-26 NOTE — HOME HEALTH
Pt. clinically discharged and documentation finalized for completion of agency discharge. Please see Discharge Summary for details.

## 2024-02-22 NOTE — PROGRESS NOTES
Bedside shift report [facility]...

## (undated) DEVICE — WATERPROOF, BACTERIA PROOF DRESSING WITH ABSORBENT SEE THROUGH PAD: Brand: OPSITE POST-OP VISIBLE 15X10CM CTN 20

## (undated) DEVICE — SINGLE PORT MANIFOLD: Brand: NEPTUNE 2

## (undated) DEVICE — JACKSON TABLE POSITIONER KIT: Brand: MEDLINE INDUSTRIES, INC.

## (undated) DEVICE — SOLUTION IV 1000ML 0.9% SOD CHL

## (undated) DEVICE — STOCKING ANTIMBLSM KNEE XL REG --

## (undated) DEVICE — PREP SKN CHLRAPRP APL 26ML STR --

## (undated) DEVICE — GUIDEWIRE VASC STR 5 CM 0.035 INX180 CM MAGIC TORQUE

## (undated) DEVICE — GAUZE,SPONGE,8"X4",12PLY,XRAY,STRL,LF: Brand: MEDLINE

## (undated) DEVICE — SURGIFOAM SPNG SZ 100

## (undated) DEVICE — 1010 S-DRAPE TOWEL DRAPE 10/BX: Brand: STERI-DRAPE™

## (undated) DEVICE — KIT ARMOR C DRP COLLAPSIBLE AND SELF EXP TOP CVR FOR FLUOROSCOPIC

## (undated) DEVICE — Device

## (undated) DEVICE — SOL IRRIGATION INJ NACL 0.9% 500ML BTL

## (undated) DEVICE — GLOVE ORANGE PI 8   MSG9080

## (undated) DEVICE — SUTURE VCRL SZ 1 L18IN ABSRB VLT CTX L48MM 1/2 CIR J765D

## (undated) DEVICE — SUT VCRL + 2-0 27IN CT2 UD -- 36/BX

## (undated) DEVICE — BONE VAC

## (undated) DEVICE — GARMENT,MEDLINE,DVT,INT,CALF,MED, GEN2: Brand: MEDLINE

## (undated) DEVICE — Device: Brand: PILLOW, GENTLETOUCH, 7 INCH RT

## (undated) DEVICE — 3.0MM PRECISION NEURO (MATCH HEAD)

## (undated) DEVICE — APPLICATOR MEDICATED 26 CC SOLUTION HI LT ORNG CHLORAPREP

## (undated) DEVICE — SUTURE STRATAFIX SYMMETRIC PDS + SZ 1 L18IN ABSRB VLT L48MM SXPP1A400

## (undated) DEVICE — DRAPE C ARM UNIV W41XL74IN CLR PLAS XR VELC CLSR POLY STRP

## (undated) DEVICE — TUBING SUCT 10FR MAL ALUM SHFT FN CAP VENT UNIV CONN W/ OBT

## (undated) DEVICE — TOTAL TRAY, 16FR 10ML SIL FOLEY, URN: Brand: MEDLINE

## (undated) DEVICE — REM POLYHESIVE ADULT PATIENT RETURN ELECTRODE: Brand: VALLEYLAB

## (undated) DEVICE — SUTURE VCRL SZ 2-0 L18IN ABSRB VLT CT-1 L36MM 1/2 CIR J739D

## (undated) DEVICE — INTENDED FOR TISSUE SEPARATION, AND OTHER PROCEDURES THAT REQUIRE A SHARP SURGICAL BLADE TO PUNCTURE OR CUT.: Brand: BARD-PARKER SAFETY BLADES SIZE 20, STERILE

## (undated) DEVICE — SUTURE MCRYL SZ 3-0 L27IN ABSRB UD L24MM PS-1 3/8 CIR PRIM Y936H

## (undated) DEVICE — STERILE LATEX POWDER-FREE SURGICAL GLOVESWITH NITRILE COATING: Brand: PROTEXIS

## (undated) DEVICE — KIT CLN UP BON SECOURS MARYV

## (undated) DEVICE — THE MILL DISPOSABLE - FINE

## (undated) DEVICE — SUTURE STRATAFIX SYMMETRIC PDS + SZ 0 L18IN ABSRB L36MM SXPP1A401

## (undated) DEVICE — SYS SKIN CLOS 22CM -- DERMABOUND PRINEO

## (undated) DEVICE — OPTIFOAM GENTLE SA, POSTOP, 4X8: Brand: MEDLINE

## (undated) DEVICE — TRAY,URINE METER,100% SILICONE,16FR10ML: Brand: MEDLINE

## (undated) DEVICE — SUTURE STRATAFIX SPRL MCRYL + SZ 4-0 L12IN ABSRB UD PS-2 SXMP1B117

## (undated) DEVICE — ELECTRODE PT RET AD L9FT HI MOIST COND ADH HYDRGEL CORDED

## (undated) DEVICE — DRAIN SURG W7MMXL20CM SIL FULL PERF HUBLESS FLAT RADPQ STRP

## (undated) DEVICE — PACK PROCEDURE SURG LAMINECTOMY SPINE CUST

## (undated) DEVICE — DRAPE C-ARMOUR C-ARM KIT --

## (undated) DEVICE — 450 ML BOTTLE OF 0.05% CHLORHEXIDINE GLUCONATE IN 99.95% STERILE WATER FOR IRRIGATION, USP AND APPLICATOR.: Brand: IRRISEPT ANTIMICROBIAL WOUND LAVAGE

## (undated) DEVICE — INTENDED FOR TISSUE SEPARATION, AND OTHER PROCEDURES THAT REQUIRE A SHARP SURGICAL BLADE TO PUNCTURE OR CUT.: Brand: BARD-PARKER SAFETY BLADES SIZE 15, STERILE

## (undated) DEVICE — BLANKET WRM W29.9XL79.1IN UP BODY FORC AIR MISTRAL-AIR

## (undated) DEVICE — SUTURE STRATAFIX 2-0 PDS PLUS 30CM VIO SXPP1A431

## (undated) DEVICE — SUTURE MCRYL + SZ 3-0 L27IN ABSRB UD PS1 L24MM 3/8 CIR REV MCP936H

## (undated) DEVICE — MARKER,SKIN,WI/RULER AND LABELS: Brand: MEDLINE

## (undated) DEVICE — DRESSING FOAM DISK DIA1IN H 7MM HYDRPHLC CHG IMPREG IN SL

## (undated) DEVICE — SUTURE MCRYL SZ 4-0 L18IN ABSRB UD L19MM PS-2 3/8 CIR PRIM Y496G

## (undated) DEVICE — SUTURE STRATAFIX SYMMETRIC PDS + SZ 2-0 L18IN ABSRB VLT SXPP1A403

## (undated) DEVICE — STERILE POLYISOPRENE POWDER-FREE SURGICAL GLOVES: Brand: PROTEXIS